# Patient Record
Sex: MALE | Race: WHITE | NOT HISPANIC OR LATINO | Employment: FULL TIME | URBAN - NONMETROPOLITAN AREA
[De-identification: names, ages, dates, MRNs, and addresses within clinical notes are randomized per-mention and may not be internally consistent; named-entity substitution may affect disease eponyms.]

---

## 2017-12-11 ENCOUNTER — OFFICE VISIT (OUTPATIENT)
Dept: RETAIL CLINIC | Facility: CLINIC | Age: 42
End: 2017-12-11

## 2017-12-11 VITALS
HEART RATE: 100 BPM | SYSTOLIC BLOOD PRESSURE: 130 MMHG | DIASTOLIC BLOOD PRESSURE: 78 MMHG | OXYGEN SATURATION: 98 % | TEMPERATURE: 98.9 F | RESPIRATION RATE: 20 BRPM

## 2017-12-11 DIAGNOSIS — J06.9 VIRAL UPPER RESPIRATORY TRACT INFECTION: Primary | ICD-10-CM

## 2017-12-11 PROCEDURE — 99213 OFFICE O/P EST LOW 20 MIN: CPT | Performed by: NURSE PRACTITIONER

## 2017-12-11 NOTE — PROGRESS NOTES
Chief Complaint   Patient presents with   • Flu Symptoms     Subjective   Speedy Nichols is a 42 y.o. male who presents to the clinic today with complaints flu symptoms that started 2-3 days ago. He has coughing, mild runny nose, fatigue, chills (but did not check his temperature)  Flu Symptoms   This is a new problem. The current episode started in the past 7 days. The problem occurs constantly. The problem has been gradually worsening. Associated symptoms include arthralgias, chills, congestion, coughing, fatigue, a fever, headaches (mild ), myalgias and a sore throat (mild). Pertinent negatives include no abdominal pain, anorexia, change in bowel habit, chest pain, diaphoresis, joint swelling, nausea, neck pain, numbness, rash, swollen glands, urinary symptoms, vertigo, visual change, vomiting or weakness. Nothing aggravates the symptoms. Treatments tried: cold medication. The treatment provided mild relief.       No current outpatient prescriptions on file.    Allergies:  Review of patient's allergies indicates no known allergies.    Past Medical History:   Diagnosis Date   • Diabetes mellitus      Past Surgical History:   Procedure Laterality Date   • WISDOM TOOTH EXTRACTION       Family History   Problem Relation Age of Onset   • Diabetes Father    • Diabetes Paternal Grandmother      Social History   Substance Use Topics   • Smoking status: Never Smoker   • Smokeless tobacco: None   • Alcohol use No       Review of Systems  Review of Systems   Constitutional: Positive for chills, fatigue and fever. Negative for diaphoresis.   HENT: Positive for congestion and sore throat (mild).    Respiratory: Positive for cough.    Cardiovascular: Negative for chest pain.   Gastrointestinal: Negative for abdominal pain, anorexia, change in bowel habit, nausea and vomiting.   Musculoskeletal: Positive for arthralgias and myalgias. Negative for joint swelling and neck pain.   Skin: Negative for rash.   Neurological: Positive  for headaches (mild ). Negative for vertigo, weakness and numbness.       Objective   /78 (BP Location: Left arm, Patient Position: Sitting, Cuff Size: Adult)  Pulse 100  Temp 98.9 °F (37.2 °C) (Tympanic)   Resp 20  SpO2 98%      Physical Exam   Constitutional: He is oriented to person, place, and time. He appears well-developed and well-nourished.   HENT:   Head: Normocephalic and atraumatic.   Right Ear: Hearing, external ear and ear canal normal. Tympanic membrane is bulging.   Left Ear: External ear and ear canal normal.   Nose: Mucosal edema and rhinorrhea present. Right sinus exhibits no maxillary sinus tenderness and no frontal sinus tenderness. Left sinus exhibits no maxillary sinus tenderness and no frontal sinus tenderness.   Mouth/Throat: Uvula is midline and mucous membranes are normal. Posterior oropharyngeal erythema present. No oropharyngeal exudate or posterior oropharyngeal edema. Tonsils are 1+ on the right. Tonsils are 1+ on the left. No tonsillar exudate.   Eyes: Pupils are equal, round, and reactive to light.   Neck: Normal range of motion. Neck supple.   Cardiovascular: Normal rate, regular rhythm and normal heart sounds.  Exam reveals no gallop and no friction rub.    No murmur heard.  Pulmonary/Chest: Effort normal and breath sounds normal. No stridor. No respiratory distress. He has no wheezes. He has no rales. He exhibits no tenderness.   Lymphadenopathy:     He has no cervical adenopathy.   Neurological: He is alert and oriented to person, place, and time.   Skin: Skin is warm and dry.   Psychiatric: He has a normal mood and affect. His behavior is normal.   Vitals reviewed.      Assessment/Plan     Speedy was seen today for flu symptoms.    Diagnoses and all orders for this visit:    Viral upper respiratory tract infection

## 2017-12-11 NOTE — PATIENT INSTRUCTIONS
"Upper Respiratory Infection, Adult  Most upper respiratory infections (URIs) are a viral infection of the air passages leading to the lungs. A URI affects the nose, throat, and upper air passages. The most common type of URI is nasopharyngitis and is typically referred to as \"the common cold.\"  URIs run their course and usually go away on their own. Most of the time, a URI does not require medical attention, but sometimes a bacterial infection in the upper airways can follow a viral infection. This is called a secondary infection. Sinus and middle ear infections are common types of secondary upper respiratory infections.  Bacterial pneumonia can also complicate a URI. A URI can worsen asthma and chronic obstructive pulmonary disease (COPD). Sometimes, these complications can require emergency medical care and may be life threatening.   CAUSES  Almost all URIs are caused by viruses. A virus is a type of germ and can spread from one person to another.   RISKS FACTORS  You may be at risk for a URI if:   · You smoke.    · You have chronic heart or lung disease.  · You have a weakened defense (immune) system.    · You are very young or very old.    · You have nasal allergies or asthma.  · You work in crowded or poorly ventilated areas.  · You work in health care facilities or schools.  SIGNS AND SYMPTOMS   Symptoms typically develop 2-3 days after you come in contact with a cold virus. Most viral URIs last 7-10 days. However, viral URIs from the influenza virus (flu virus) can last 14-18 days and are typically more severe. Symptoms may include:   · Runny or stuffy (congested) nose.    · Sneezing.    · Cough.    · Sore throat.    · Headache.    · Fatigue.    · Fever.    · Loss of appetite.    · Pain in your forehead, behind your eyes, and over your cheekbones (sinus pain).  · Muscle aches.    DIAGNOSIS   Your health care provider may diagnose a URI by:  · Physical exam.  · Tests to check that your symptoms are not due to " another condition such as:  ¨ Strep throat.  ¨ Sinusitis.  ¨ Pneumonia.  ¨ Asthma.  TREATMENT   A URI goes away on its own with time. It cannot be cured with medicines, but medicines may be prescribed or recommended to relieve symptoms. Medicines may help:  · Reduce your fever.  · Reduce your cough.  · Relieve nasal congestion.  HOME CARE INSTRUCTIONS   · Take medicines only as directed by your health care provider.    · Gargle warm saltwater or take cough drops to comfort your throat as directed by your health care provider.  · Use a warm mist humidifier or inhale steam from a shower to increase air moisture. This may make it easier to breathe.  · Drink enough fluid to keep your urine clear or pale yellow.    · Eat soups and other clear broths and maintain good nutrition.    · Rest as needed.    · Return to work when your temperature has returned to normal or as your health care provider advises. You may need to stay home longer to avoid infecting others. You can also use a face mask and careful hand washing to prevent spread of the virus.  · Increase the usage of your inhaler if you have asthma.    · Do not use any tobacco products, including cigarettes, chewing tobacco, or electronic cigarettes. If you need help quitting, ask your health care provider.  PREVENTION   The best way to protect yourself from getting a cold is to practice good hygiene.   · Avoid oral or hand contact with people with cold symptoms.    · Wash your hands often if contact occurs.    There is no clear evidence that vitamin C, vitamin E, echinacea, or exercise reduces the chance of developing a cold. However, it is always recommended to get plenty of rest, exercise, and practice good nutrition.   SEEK MEDICAL CARE IF:   · You are getting worse rather than better.    · Your symptoms are not controlled by medicine.    · You have chills.  · You have worsening shortness of breath.  · You have brown or red mucus.  · You have yellow or brown nasal  discharge.  · You have pain in your face, especially when you bend forward.  · You have a fever.  · You have swollen neck glands.  · You have pain while swallowing.  · You have white areas in the back of your throat.  SEEK IMMEDIATE MEDICAL CARE IF:   · You have severe or persistent:    Headache.    Ear pain.    Sinus pain.    Chest pain.  · You have chronic lung disease and any of the following:    Wheezing.    Prolonged cough.    Coughing up blood.    A change in your usual mucus.  · You have a stiff neck.  · You have changes in your:    Vision.    Hearing.    Thinking.    Mood.  MAKE SURE YOU:   · Understand these instructions.  · Will watch your condition.  · Will get help right away if you are not doing well or get worse.     This information is not intended to replace advice given to you by your health care provider. Make sure you discuss any questions you have with your health care provider.     Document Released: 06/13/2002 Document Revised: 05/03/2016 Document Reviewed: 03/25/2015  [email or web address] Interactive Patient Education ©2017 [email or web address]    Follow up if symptoms worsen or persist

## 2017-12-13 ENCOUNTER — HOSPITAL ENCOUNTER (EMERGENCY)
Facility: HOSPITAL | Age: 42
Discharge: HOME | End: 2017-12-14
Admitting: EMERGENCY MEDICINE

## 2017-12-13 DIAGNOSIS — R23.8 VESICULAR RASH: ICD-10-CM

## 2017-12-13 DIAGNOSIS — J02.9 PHARYNGITIS, UNSPECIFIED ETIOLOGY: Primary | ICD-10-CM

## 2017-12-13 DIAGNOSIS — B09 VIRAL RASH: ICD-10-CM

## 2017-12-13 LAB — S PYO AG THROAT QL: NEGATIVE

## 2017-12-13 PROCEDURE — 99283 EMERGENCY DEPT VISIT LOW MDM: CPT

## 2017-12-13 PROCEDURE — 87077 CULTURE AEROBIC IDENTIFY: CPT | Performed by: NURSE PRACTITIONER

## 2017-12-13 PROCEDURE — 87081 CULTURE SCREEN ONLY: CPT | Performed by: NURSE PRACTITIONER

## 2017-12-13 PROCEDURE — 87252 VIRUS INOCULATION TISSUE: CPT | Performed by: NURSE PRACTITIONER

## 2017-12-13 PROCEDURE — 87880 STREP A ASSAY W/OPTIC: CPT | Performed by: NURSE PRACTITIONER

## 2017-12-13 RX ORDER — METHYLPREDNISOLONE 4 MG/1
TABLET ORAL
Qty: 21 TABLET | Refills: 0 | Status: SHIPPED | OUTPATIENT
Start: 2017-12-13 | End: 2018-05-05

## 2017-12-13 RX ORDER — DIPHENHYDRAMINE HCL 25 MG
25 TABLET ORAL EVERY 6 HOURS PRN
Qty: 30 TABLET | Refills: 0 | Status: SHIPPED | OUTPATIENT
Start: 2017-12-13 | End: 2018-11-02

## 2017-12-13 RX ORDER — AZITHROMYCIN 250 MG/1
TABLET, FILM COATED ORAL
Qty: 6 TABLET | Refills: 0 | Status: SHIPPED | OUTPATIENT
Start: 2017-12-13 | End: 2018-05-05

## 2017-12-13 RX ORDER — ACYCLOVIR 400 MG/1
400 TABLET ORAL
Qty: 50 TABLET | Refills: 0 | Status: SHIPPED | OUTPATIENT
Start: 2017-12-13 | End: 2017-12-23

## 2017-12-14 VITALS
RESPIRATION RATE: 18 BRPM | WEIGHT: 175 LBS | BODY MASS INDEX: 23.7 KG/M2 | HEART RATE: 102 BPM | OXYGEN SATURATION: 96 % | SYSTOLIC BLOOD PRESSURE: 142 MMHG | DIASTOLIC BLOOD PRESSURE: 84 MMHG | HEIGHT: 72 IN | TEMPERATURE: 97.7 F

## 2017-12-14 NOTE — ED PROVIDER NOTES
Subjective   HPI Comments: Mr Nichols is a 42 year old male who presents today with new complaints of acute onset of tender rash all over body since yesterday. He states he has been sick with cough, subjective fever, chills and sore throat x 1 week. He was seen in walk in clinic where he had negative flu swab and diagnosed with viral URI. Pt has been using Robitussin, Nyquil, and Motrin PM without much improvement. Onset of rapidly spreading rash prompted ER presentation.  Pt also states that throat has been more sore and continues to have subjective fever/chills. He denies any vomiting/diarrhea. States some mild congestion. Mild cough with occasional sputum. He states he did have contact with a child last week who has strep throat.      History provided by:  Patient   used: No        Review of Systems   Constitutional: Positive for chills, fatigue and fever.   HENT: Positive for congestion, postnasal drip, sinus pressure and sore throat. Negative for ear pain, rhinorrhea and trouble swallowing.    Eyes: Negative.    Respiratory: Positive for cough. Negative for wheezing.    Gastrointestinal: Positive for constipation and nausea (sporadic episodes of nausea, not long lasting.). Negative for vomiting.   Endocrine: Negative.    Genitourinary: Negative.    Musculoskeletal: Positive for myalgias.   Skin: Positive for rash.   Allergic/Immunologic: Negative.    Neurological: Negative.    Hematological: Negative.    Psychiatric/Behavioral: Negative.        Past Medical History:   Diagnosis Date   • Diabetes mellitus        No Known Allergies    Past Surgical History:   Procedure Laterality Date   • WISDOM TOOTH EXTRACTION         Family History   Problem Relation Age of Onset   • Diabetes Father    • Diabetes Paternal Grandmother        Social History     Social History   • Marital status: Single     Spouse name: N/A   • Number of children: N/A   • Years of education: N/A     Social History Main Topics    • Smoking status: Never Smoker   • Smokeless tobacco: None   • Alcohol use No   • Drug use: Defer   • Sexual activity: Defer     Other Topics Concern   • None     Social History Narrative           Objective   Physical Exam   Constitutional: He is oriented to person, place, and time. He appears well-developed and well-nourished.   HENT:   Head: Normocephalic and atraumatic.   Right Ear: Tympanic membrane normal.   Left Ear: Tympanic membrane normal.   Nose: Mucosal edema present.   Mouth/Throat: Posterior oropharyngeal erythema (with PND and cobblestoning) present. Tonsils are 2+ on the right. Tonsils are 2+ on the left. No tonsillar exudate.   Eyes: EOM are normal. Pupils are equal, round, and reactive to light.   Neck: Normal range of motion. Neck supple.   Cardiovascular: Regular rhythm.  Tachycardia present.    Pulmonary/Chest: Effort normal and breath sounds normal.   Abdominal: Soft. Bowel sounds are normal.   Lymphadenopathy:     He has no cervical adenopathy.   Neurological: He is alert and oriented to person, place, and time.   Skin: Skin is warm. Rash noted. Rash is macular (diffuse macular rash concentrated to trunk also noted to extremities, face, and scalp.) and vesicular (vesicular lesions noted with rash that scattered over trunk and scalp. some vesicles have opened and crusted.).   Nursing note and vitals reviewed.      Procedures         ED Course  ED Course   Comment By Time   Pending strep swab at this time. There were no throat swabs in the emergency dept. Pending receiving swabs from lab Josephine Orozco, APRN 12/13 6320   Reviewed strep screen with pt - advised to call back in 2 days for viral screen. Will be discharged home soon in stable condition Josephine Orozco APRSHADI 12/13 [id number]                  MDM  Number of Diagnoses or Management Options  Pharyngitis, unspecified etiology: minor  Vesicular rash: minor  Viral rash: minor     Amount and/or Complexity of Data Reviewed  Clinical  lab tests: ordered and reviewed    Patient Progress  Patient progress: stable      Final diagnoses:   Pharyngitis, unspecified etiology   Viral rash   Vesicular rash            Josephine Orozco, APRN  12/15/17 0046

## 2017-12-16 LAB
BACTERIA SPEC AEROBE CULT: ABNORMAL
BACTERIA SPEC AEROBE CULT: ABNORMAL

## 2017-12-18 ENCOUNTER — TELEPHONE (OUTPATIENT)
Dept: EMERGENCY DEPT | Facility: HOSPITAL | Age: 42
End: 2017-12-18

## 2017-12-18 NOTE — TELEPHONE ENCOUNTER
----- Message from ANUPAM Ruth sent at 12/16/2017  3:32 PM CST -----  Call and see if pt is doing better.  ----- Message -----     From: Lab, Background User     Sent: 12/16/2017   6:45 AM       To: Bh Pad Asap In Basket Results Pool

## 2017-12-19 ENCOUNTER — TELEPHONE (OUTPATIENT)
Dept: EMERGENCY DEPT | Facility: HOSPITAL | Age: 42
End: 2017-12-19

## 2017-12-22 LAB — VIRAL CULTURE, GENERAL: NORMAL

## 2018-05-05 ENCOUNTER — OFFICE VISIT (OUTPATIENT)
Dept: RETAIL CLINIC | Facility: CLINIC | Age: 43
End: 2018-05-05

## 2018-05-05 VITALS
HEART RATE: 86 BPM | SYSTOLIC BLOOD PRESSURE: 128 MMHG | DIASTOLIC BLOOD PRESSURE: 70 MMHG | HEIGHT: 72 IN | BODY MASS INDEX: 24.56 KG/M2 | TEMPERATURE: 98.3 F | WEIGHT: 181.3 LBS | OXYGEN SATURATION: 97 % | RESPIRATION RATE: 18 BRPM

## 2018-05-05 DIAGNOSIS — K52.9 GASTROENTERITIS, ACUTE: ICD-10-CM

## 2018-05-05 DIAGNOSIS — J01.40 ACUTE PANSINUSITIS, RECURRENCE NOT SPECIFIED: Primary | ICD-10-CM

## 2018-05-05 DIAGNOSIS — E13.9 DIABETES 1.5, MANAGED AS TYPE 2 (HCC): ICD-10-CM

## 2018-05-05 DIAGNOSIS — Z76.0 MEDICATION REFILL: ICD-10-CM

## 2018-05-05 LAB — GLUCOSE BLDC GLUCOMTR-MCNC: 306 MG/DL (ref 70–130)

## 2018-05-05 PROCEDURE — 99214 OFFICE O/P EST MOD 30 MIN: CPT | Performed by: NURSE PRACTITIONER

## 2018-05-05 PROCEDURE — 82962 GLUCOSE BLOOD TEST: CPT | Performed by: NURSE PRACTITIONER

## 2018-05-05 RX ORDER — AMOXICILLIN AND CLAVULANATE POTASSIUM 875; 125 MG/1; MG/1
1 TABLET, FILM COATED ORAL 2 TIMES DAILY
Qty: 20 TABLET | Refills: 0 | Status: SHIPPED | OUTPATIENT
Start: 2018-05-05 | End: 2018-05-15

## 2018-05-05 RX ORDER — PROMETHAZINE HYDROCHLORIDE 25 MG/1
25 SUPPOSITORY RECTAL EVERY 6 HOURS PRN
Qty: 12 SUPPOSITORY | Refills: 0 | Status: SHIPPED | OUTPATIENT
Start: 2018-05-05 | End: 2018-11-02

## 2018-05-05 NOTE — PATIENT INSTRUCTIONS
He will need to monitor his glucose level more closely and continues to increase with use of Metformin, he will need to go to emergency room. Follow up with Teodoro Tamez, RICHARD  Stay well hydrated and call if he as any questions.     He has driving so will not give him any antiemetic. He continues to be nauseated, but no vomiting since last night so he can take suppository when he gets home.     Start Augmentin in am.     For diarrhea clear liquids until this resolves       Viral Gastroenteritis, Adult  Viral gastroenteritis is also known as the stomach flu. This condition is caused by various viruses. These viruses can be passed from person to person very easily (are very contagious). This condition may affect your stomach, small intestine, and large intestine. It can cause sudden watery diarrhea, fever, and vomiting.  Diarrhea and vomiting can make you feel weak and cause you to become dehydrated. You may not be able to keep fluids down. Dehydration can make you tired and thirsty, cause you to have a dry mouth, and decrease how often you urinate. Older adults and people with other diseases or a weak immune system are at higher risk for dehydration.  It is important to replace the fluids that you lose from diarrhea and vomiting. If you become severely dehydrated, you may need to get fluids through an IV tube.  What are the causes?  Gastroenteritis is caused by various viruses, including rotavirus and norovirus. Norovirus is the most common cause in adults.  You can get sick by eating food, drinking water, or touching a surface contaminated with one of these viruses. You can also get sick from sharing utensils or other personal items with an infected person.  What increases the risk?  This condition is more likely to develop in people:  · Who have a weak defense system (immune system).  · Who live with one or more children who are younger than 2 years old.  · Who live in a nursing home.  · Who go on cruise ships.  What  are the signs or symptoms?  Symptoms of this condition start suddenly 1-2 days after exposure to a virus. Symptoms may last a few days or as long as a week. The most common symptoms are watery diarrhea and vomiting. Other symptoms include:  · Fever.  · Headache.  · Fatigue.  · Pain in the abdomen.  · Chills.  · Weakness.  · Nausea.  · Muscle aches.  · Loss of appetite.  How is this diagnosed?  This condition is diagnosed with a medical history and physical exam. You may also have a stool test to check for viruses or other infections.  How is this treated?  This condition typically goes away on its own. The focus of treatment is to restore lost fluids (rehydration). Your health care provider may recommend that you take an oral rehydration solution (ORS) to replace important salts and minerals (electrolytes) in your body. Severe cases of this condition may require giving fluids through an IV tube.  Treatment may also include medicine to help with your symptoms.  Follow these instructions at home:  Follow instructions from your health care provider about how to care for yourself at home.  Eating and drinking   Follow these recommendations as told by your health care provider:  · Take an ORS. This is a drink that is sold at pharmacies and retail stores.  · Drink clear fluids in small amounts as you are able. Clear fluids include water, ice chips, diluted fruit juice, and low-calorie sports drinks.  · Eat bland, easy-to-digest foods in small amounts as you are able. These foods include bananas, applesauce, rice, lean meats, toast, and crackers.  · Avoid fluids that contain a lot of sugar or caffeine, such as energy drinks, sports drinks, and soda.  · Avoid alcohol.  · Avoid spicy or fatty foods.  General instructions     · Drink enough fluid to keep your urine clear or pale yellow.  · Wash your hands often. If soap and water are not available, use hand .  · Make sure that all people in your household wash their  hands well and often.  · Take over-the-counter and prescription medicines only as told by your health care provider.  · Rest at home while you recover.  · Watch your condition for any changes.  · Take a warm bath to relieve any burning or pain from frequent diarrhea episodes.  · Keep all follow-up visits as told by your health care provider. This is important.  Contact a health care provider if:  · You cannot keep fluids down.  · Your symptoms get worse.  · You have new symptoms.  · You feel light-headed or dizzy.  · You have muscle cramps.  Get help right away if:  · You have chest pain.  · You feel extremely weak or you faint.  · You see blood in your vomit.  · Your vomit looks like coffee grounds.  · You have bloody or black stools or stools that look like tar.  · You have a severe headache, a stiff neck, or both.  · You have a rash.  · You have severe pain, cramping, or bloating in your abdomen.  · You have trouble breathing or you are breathing very quickly.  · Your heart is beating very quickly.  · Your skin feels cold and clammy.  · You feel confused.  · You have pain when you urinate.  · You have signs of dehydration, such as:  ¨ Dark urine, very little urine, or no urine.  ¨ Cracked lips.  ¨ Dry mouth.  ¨ Sunken eyes.  ¨ Sleepiness.  ¨ Weakness.  This information is not intended to replace advice given to you by your health care provider. Make sure you discuss any questions you have with your health care provider.  Document Released: 12/18/2006 Document Revised: 05/31/2017 Document Reviewed: 08/23/2016  [employer] Patient Education © 2017 [employer]  Sinusitis, Adult  Sinusitis is soreness and inflammation of your sinuses. Sinuses are hollow spaces in the bones around your face. Your sinuses are located:  · Around your eyes.  · In the middle of your forehead.  · Behind your nose.  · In your cheekbones.  Your sinuses and nasal passages are lined with a stringy fluid (mucus). Mucus normally  drains out of your sinuses. When your nasal tissues become inflamed or swollen, the mucus can become trapped or blocked so air cannot flow through your sinuses. This allows bacteria, viruses, and funguses to grow, which leads to infection.  Sinusitis can develop quickly and last for 7?10 days (acute) or for more than 12 weeks (chronic). Sinusitis often develops after a cold.  What are the causes?  This condition is caused by anything that creates swelling in the sinuses or stops mucus from draining, including:  · Allergies.  · Asthma.  · Bacterial or viral infection.  · Abnormally shaped bones between the nasal passages.  · Nasal growths that contain mucus (nasal polyps).  · Narrow sinus openings.  · Pollutants, such as chemicals or irritants in the air.  · A foreign object stuck in the nose.  · A fungal infection. This is rare.  What increases the risk?  The following factors may make you more likely to develop this condition:  · Having allergies or asthma.  · Having had a recent cold or respiratory tract infection.  · Having structural deformities or blockages in your nose or sinuses.  · Having a weak immune system.  · Doing a lot of swimming or diving.  · Overusing nasal sprays.  · Smoking.  What are the signs or symptoms?  The main symptoms of this condition are pain and a feeling of pressure around the affected sinuses. Other symptoms include:  · Upper toothache.  · Earache.  · Headache.  · Bad breath.  · Decreased sense of smell and taste.  · A cough that may get worse at night.  · Fatigue.  · Fever.  · Thick drainage from your nose. The drainage is often green and it may contain pus (purulent).  · Stuffy nose or congestion.  · Postnasal drip. This is when extra mucus collects in the throat or back of the nose.  · Swelling and warmth over the affected sinuses.  · Sore throat.  · Sensitivity to light.  How is this diagnosed?  This condition is diagnosed based on symptoms, a medical history, and a physical exam.  To find out if your condition is acute or chronic, your health care provider may:  · Look in your nose for signs of nasal polyps.  · Tap over the affected sinus to check for signs of infection.  · View the inside of your sinuses using an imaging device that has a light attached (endoscope).  If your health care provider suspects that you have chronic sinusitis, you may also:  · Be tested for allergies.  · Have a sample of mucus taken from your nose (nasal culture) and checked for bacteria.  · Have a mucus sample examined to see if your sinusitis is related to an allergy.  If your sinusitis does not respond to treatment and it lasts longer than 8 weeks, you may have an MRI or CT scan to check your sinuses. These scans also help to determine how severe your infection is.  In rare cases, a bone biopsy may be done to rule out more serious types of fungal sinus disease.  How is this treated?  Treatment for sinusitis depends on the cause and whether your condition is chronic or acute. If a virus is causing your sinusitis, your symptoms will go away on their own within 10 days. You may be given medicines to relieve your symptoms, including:  · Topical nasal decongestants. They shrink swollen nasal passages and let mucus drain from your sinuses.  · Antihistamines. These drugs block inflammation that is triggered by allergies. This can help to ease swelling in your nose and sinuses.  · Topical nasal corticosteroids. These are nasal sprays that ease inflammation and swelling in your nose and sinuses.  · Nasal saline washes. These rinses can help to get rid of thick mucus in your nose.  If your condition is caused by bacteria, you will be given an antibiotic medicine. If your condition is caused by a fungus, you will be given an antifungal medicine.  Surgery may be needed to correct underlying conditions, such as narrow nasal passages. Surgery may also be needed to remove polyps.  Follow these instructions at home:  Medicines    · Take, use, or apply over-the-counter and prescription medicines only as told by your health care provider. These may include nasal sprays.  · If you were prescribed an antibiotic medicine, take it as told by your health care provider. Do not stop taking the antibiotic even if you start to feel better.  Hydrate and Humidify   · Drink enough water to keep your urine clear or pale yellow. Staying hydrated will help to thin your mucus.  · Use a cool mist humidifier to keep the humidity level in your home above 50%.  · Inhale steam for 10-15 minutes, 3-4 times a day or as told by your health care provider. You can do this in the bathroom while a hot shower is running.  · Limit your exposure to cool or dry air.  Rest   · Rest as much as possible.  · Sleep with your head raised (elevated).  · Make sure to get enough sleep each night.  General instructions   · Apply a warm, moist washcloth to your face 3-4 times a day or as told by your health care provider. This will help with discomfort.  · Wash your hands often with soap and water to reduce your exposure to viruses and other germs. If soap and water are not available, use hand .  · Do not smoke. Avoid being around people who are smoking (secondhand smoke).  · Keep all follow-up visits as told by your health care provider. This is important.  Contact a health care provider if:  · You have a fever.  · Your symptoms get worse.  · Your symptoms do not improve within 10 days.  Get help right away if:  · You have a severe headache.  · You have persistent vomiting.  · You have pain or swelling around your face or eyes.  · You have vision problems.  · You develop confusion.  · Your neck is stiff.  · You have trouble breathing.  This information is not intended to replace advice given to you by your health care provider. Make sure you discuss any questions you have with your health care provider.  Document Released: 12/18/2006 Document Revised: 08/13/2017 Document  Reviewed: 10/12/2016  [employer] Interactive Patient Education © 2017 [employer]

## 2018-05-05 NOTE — PROGRESS NOTES
Chief Complaint   Patient presents with   • GI Problem     Subjective   Speedy Nichols is a 43 y.o. male who presents to the clinic today with complaints nausea and vomiting since last night. He has had upper respiratory tract infection for about a week and now having GI symptoms. He started vomiting last night with dry heaves most of the night and now no longer throwing up. He has had a couple of loose stools. His wife was sick with this two days ago. He is out of his Metformin and needs refill. Normal glucose is 170-180s fasting. He would like phenergan suppositories as these help him best.   GI Problem   The primary symptoms include fever, fatigue, nausea, vomiting, diarrhea and myalgias. Primary symptoms do not include weight loss, abdominal pain, melena, hematemesis, jaundice, hematochezia, dysuria or rash. The illness began yesterday. The onset was sudden.   The illness is also significant for chills and anorexia. The illness does not include dysphagia, odynophagia, bloating, constipation, tenesmus, back pain or itching. Associated medical issues do not include inflammatory bowel disease, GERD, gallstones, liver disease, alcohol abuse, PUD, gastric bypass, bowel resection, irritable bowel syndrome, hemorrhoids or diverticulitis.         Current Outpatient Prescriptions:   •  amoxicillin-clavulanate (AUGMENTIN) 875-125 MG per tablet, Take 1 tablet by mouth 2 (Two) Times a Day for 10 days. Start tomorrow, Disp: 20 tablet, Rfl: 0  •  diphenhydrAMINE (BENADRYL) 25 MG tablet, Take 1 tablet by mouth Every 6 (Six) Hours As Needed for Itching., Disp: 30 tablet, Rfl: 0  •  metFORMIN (GLUCOPHAGE) 1000 MG tablet, Take 1 tablet by mouth 2 (Two) Times a Day With Meals for 30 days., Disp: 60 tablet, Rfl: 0  •  promethazine (PHENERGAN) 25 MG suppository, Insert 1 suppository into the rectum Every 6 (Six) Hours As Needed for Nausea or Vomiting., Disp: 12 suppository, Rfl: 0    Allergies:  Review of patient's allergies  "indicates no known allergies.    Past Medical History:   Diagnosis Date   • Diabetes mellitus    • Type 2 diabetes mellitus      Past Surgical History:   Procedure Laterality Date   • WISDOM TOOTH EXTRACTION       Family History   Problem Relation Age of Onset   • Diabetes Father    • Diabetes Paternal Grandmother      Social History   Substance Use Topics   • Smoking status: Never Smoker   • Smokeless tobacco: Never Used   • Alcohol use No       Review of Systems  Review of Systems   Constitutional: Positive for chills, fatigue and fever. Negative for weight loss.   Gastrointestinal: Positive for anorexia, diarrhea, nausea and vomiting. Negative for abdominal pain, bloating, constipation, dysphagia, hematemesis, hematochezia, jaundice and melena.   Genitourinary: Negative for dysuria.   Musculoskeletal: Positive for myalgias. Negative for back pain.   Skin: Negative for itching and rash.       Objective   /70 (BP Location: Left arm, Patient Position: Sitting, Cuff Size: Adult)   Pulse 86   Temp 98.3 °F (36.8 °C) (Axillary)   Resp 18   Ht 181.9 cm (71.6\")   Wt 82.2 kg (181 lb 4.8 oz)   SpO2 97%   BMI 24.86 kg/m²       Physical Exam   Constitutional: He is oriented to person, place, and time. He appears well-developed and well-nourished.   HENT:   Head: Normocephalic and atraumatic.   Right Ear: Hearing, external ear and ear canal normal. Tympanic membrane is bulging.   Left Ear: Hearing, external ear and ear canal normal. Tympanic membrane is bulging.   Nose: Right sinus exhibits maxillary sinus tenderness and frontal sinus tenderness. Left sinus exhibits maxillary sinus tenderness and frontal sinus tenderness.   Mouth/Throat: Uvula is midline and mucous membranes are normal. Oropharyngeal exudate and posterior oropharyngeal erythema present. Posterior oropharyngeal edema: thick yellow secretions in posterior pharynx  Tonsils are 1+ on the right. Tonsils are 1+ on the left. No tonsillar exudate.   Eyes: " EOM are normal. Pupils are equal, round, and reactive to light.   Neck: Normal range of motion. Neck supple.   Cardiovascular: Normal rate, regular rhythm and normal heart sounds.  Exam reveals no gallop and no friction rub.    No murmur heard.  Pulmonary/Chest: Effort normal and breath sounds normal. No stridor. No respiratory distress. He has no wheezes. He has no rales. He exhibits no tenderness.   Abdominal: Soft. Bowel sounds are normal. He exhibits no distension and no mass. There is no tenderness. There is no rebound and no guarding. No hernia.   Lymphadenopathy:     He has no cervical adenopathy.   Neurological: He is alert and oriented to person, place, and time.   Skin: Skin is warm and dry.   Psychiatric: He has a normal mood and affect. His behavior is normal.   Vitals reviewed.      Assessment/Plan     Speedy was seen today for gi problem.    Diagnoses and all orders for this visit:    Acute pansinusitis, recurrence not specified    Gastroenteritis, acute    Medication refill    Diabetes 1.5, managed as type 2  -     POCT Glucose    Other orders  -     promethazine (PHENERGAN) 25 MG suppository; Insert 1 suppository into the rectum Every 6 (Six) Hours As Needed for Nausea or Vomiting.  -     amoxicillin-clavulanate (AUGMENTIN) 875-125 MG per tablet; Take 1 tablet by mouth 2 (Two) Times a Day for 10 days. Start tomorrow  -     metFORMIN (GLUCOPHAGE) 1000 MG tablet; Take 1 tablet by mouth 2 (Two) Times a Day With Meals for 30 days.    He will need to monitor his glucose level more closely and continues to increase with use of Metformin, he will need to go to emergency room. Follow up with RICHARD Garsia  Stay well hydrated and call if he as any questions.     He has driving so will not give him any antiemetic. He continues to be nauseated, but no vomiting since last night so he can take suppository when he gets home.     Start Augmentin in am. Metformin after phenergan suppository and nausea improves.      [facility] called to verify Metformin prescriptions with last date of fill on 3/27 and dose 1000 mg bid.

## 2018-05-09 ENCOUNTER — APPOINTMENT (OUTPATIENT)
Dept: GENERAL RADIOLOGY | Facility: HOSPITAL | Age: 43
End: 2018-05-09

## 2018-05-09 ENCOUNTER — HOSPITAL ENCOUNTER (EMERGENCY)
Facility: HOSPITAL | Age: 43
Discharge: HOME | End: 2018-05-09
Attending: EMERGENCY MEDICINE | Admitting: EMERGENCY MEDICINE

## 2018-05-09 VITALS
WEIGHT: 181 LBS | OXYGEN SATURATION: 98 % | SYSTOLIC BLOOD PRESSURE: 142 MMHG | DIASTOLIC BLOOD PRESSURE: 83 MMHG | HEIGHT: 72 IN | TEMPERATURE: 98.5 F | BODY MASS INDEX: 24.52 KG/M2 | HEART RATE: 87 BPM | RESPIRATION RATE: 16 BRPM

## 2018-05-09 DIAGNOSIS — K52.9 GASTROENTERITIS: Primary | ICD-10-CM

## 2018-05-09 DIAGNOSIS — J06.9 UPPER RESPIRATORY TRACT INFECTION, UNSPECIFIED TYPE: ICD-10-CM

## 2018-05-09 LAB
ALBUMIN SERPL-MCNC: 3.9 G/DL (ref 3.5–5)
ALBUMIN/GLOB SERPL: 1.3 G/DL (ref 1.1–2.5)
ALP SERPL-CCNC: 61 U/L (ref 24–120)
ALT SERPL W P-5'-P-CCNC: 71 U/L (ref 0–54)
ANION GAP SERPL CALCULATED.3IONS-SCNC: 9 MMOL/L (ref 4–13)
AST SERPL-CCNC: 44 U/L (ref 7–45)
BASOPHILS # BLD AUTO: 0.03 10*3/MM3 (ref 0–0.2)
BASOPHILS NFR BLD AUTO: 0.6 % (ref 0–2)
BILIRUB SERPL-MCNC: 0.8 MG/DL (ref 0.1–1)
BUN BLD-MCNC: 11 MG/DL (ref 5–21)
BUN/CREAT SERPL: 16.7 (ref 7–25)
CALCIUM SPEC-SCNC: 9.2 MG/DL (ref 8.4–10.4)
CHLORIDE SERPL-SCNC: 101 MMOL/L (ref 98–110)
CO2 SERPL-SCNC: 28 MMOL/L (ref 24–31)
CREAT BLD-MCNC: 0.66 MG/DL (ref 0.5–1.4)
DEPRECATED RDW RBC AUTO: 36.8 FL (ref 40–54)
EOSINOPHIL # BLD AUTO: 0.18 10*3/MM3 (ref 0–0.7)
EOSINOPHIL NFR BLD AUTO: 3.8 % (ref 0–4)
ERYTHROCYTE [DISTWIDTH] IN BLOOD BY AUTOMATED COUNT: 11.9 % (ref 12–15)
FLUAV AG NPH QL: NEGATIVE
FLUBV AG NPH QL IA: NEGATIVE
GFR SERPL CREATININE-BSD FRML MDRD: 131 ML/MIN/1.73
GLOBULIN UR ELPH-MCNC: 3 GM/DL
GLUCOSE BLD-MCNC: 218 MG/DL (ref 70–100)
HCT VFR BLD AUTO: 40.9 % (ref 40–52)
HGB BLD-MCNC: 14.1 G/DL (ref 14–18)
IMM GRANULOCYTES # BLD: 0.04 10*3/MM3 (ref 0–0.03)
IMM GRANULOCYTES NFR BLD: 0.9 % (ref 0–5)
LYMPHOCYTES # BLD AUTO: 1.4 10*3/MM3 (ref 0.72–4.86)
LYMPHOCYTES NFR BLD AUTO: 29.8 % (ref 15–45)
MCH RBC QN AUTO: 29.3 PG (ref 28–32)
MCHC RBC AUTO-ENTMCNC: 34.5 G/DL (ref 33–36)
MCV RBC AUTO: 84.9 FL (ref 82–95)
MONOCYTES # BLD AUTO: 0.39 10*3/MM3 (ref 0.19–1.3)
MONOCYTES NFR BLD AUTO: 8.3 % (ref 4–12)
NEUTROPHILS # BLD AUTO: 2.66 10*3/MM3 (ref 1.87–8.4)
NEUTROPHILS NFR BLD AUTO: 56.6 % (ref 39–78)
NRBC BLD MANUAL-RTO: 0 /100 WBC (ref 0–0)
PLATELET # BLD AUTO: 156 10*3/MM3 (ref 130–400)
PMV BLD AUTO: 10.4 FL (ref 6–12)
POTASSIUM BLD-SCNC: 4.3 MMOL/L (ref 3.5–5.3)
PROT SERPL-MCNC: 6.9 G/DL (ref 6.3–8.7)
RBC # BLD AUTO: 4.82 10*6/MM3 (ref 4.8–5.9)
SODIUM BLD-SCNC: 138 MMOL/L (ref 135–145)
WBC NRBC COR # BLD: 4.7 10*3/MM3 (ref 4.8–10.8)

## 2018-05-09 PROCEDURE — 87804 INFLUENZA ASSAY W/OPTIC: CPT | Performed by: EMERGENCY MEDICINE

## 2018-05-09 PROCEDURE — 96360 HYDRATION IV INFUSION INIT: CPT

## 2018-05-09 PROCEDURE — 71046 X-RAY EXAM CHEST 2 VIEWS: CPT

## 2018-05-09 PROCEDURE — 99284 EMERGENCY DEPT VISIT MOD MDM: CPT

## 2018-05-09 PROCEDURE — 80053 COMPREHEN METABOLIC PANEL: CPT | Performed by: EMERGENCY MEDICINE

## 2018-05-09 PROCEDURE — 85025 COMPLETE CBC W/AUTO DIFF WBC: CPT | Performed by: EMERGENCY MEDICINE

## 2018-05-09 RX ORDER — DIPHENOXYLATE HYDROCHLORIDE AND ATROPINE SULFATE 2.5; .025 MG/1; MG/1
1 TABLET ORAL 4 TIMES DAILY PRN
Qty: 12 TABLET | Refills: 0 | Status: SHIPPED | OUTPATIENT
Start: 2018-05-09 | End: 2018-11-02

## 2018-05-09 RX ORDER — SODIUM CHLORIDE 0.9 % (FLUSH) 0.9 %
10 SYRINGE (ML) INJECTION AS NEEDED
Status: DISCONTINUED | OUTPATIENT
Start: 2018-05-09 | End: 2018-05-09 | Stop reason: HOSPADM

## 2018-05-09 RX ADMIN — SODIUM CHLORIDE 1000 ML: 9 INJECTION, SOLUTION INTRAVENOUS at 07:04

## 2018-05-09 NOTE — ED PROVIDER NOTES
"Subjective   Patient says he had \"cold\" symptoms for 1 week.  Finally seen at clinic and diagnosed with sinus infection and viral illness and put on augmentin and metformin refilled.  Also has had sore throat and severe cover that occasionally has some production.  No definite fever but chilled.  Diarrhea has become more severe.        History provided by:  Patient   used: No    Illness   Location:  Generalized  Quality:  Aching  Severity:  Severe  Onset quality:  Gradual  Duration:  1 week  Timing:  Constant  Progression:  Worsening  Chronicity:  New  Associated symptoms: congestion, cough, diarrhea, myalgias, rhinorrhea and sore throat    Associated symptoms: no abdominal pain, no chest pain, no ear pain, no fatigue, no fever, no headaches, no loss of consciousness, no nausea, no rash, no shortness of breath, no vomiting and no wheezing        Review of Systems   Constitutional: Positive for chills. Negative for fatigue and fever.   HENT: Positive for congestion, rhinorrhea and sore throat. Negative for ear pain.    Respiratory: Positive for cough. Negative for shortness of breath and wheezing.    Cardiovascular: Negative for chest pain.   Gastrointestinal: Positive for diarrhea. Negative for abdominal pain, nausea and vomiting.   Genitourinary: Negative.    Musculoskeletal: Positive for myalgias.   Skin: Negative.  Negative for rash.   Neurological: Negative.  Negative for loss of consciousness and headaches.   Hematological: Negative.    Psychiatric/Behavioral: Negative.    All other systems reviewed and are negative.      Past Medical History:   Diagnosis Date   • Diabetes mellitus    • Type 2 diabetes mellitus        No Known Allergies    Past Surgical History:   Procedure Laterality Date   • WISDOM TOOTH EXTRACTION         Family History   Problem Relation Age of Onset   • Diabetes Father    • Diabetes Paternal Grandmother        Social History     Social History   • Marital status: Single "     Social History Main Topics   • Smoking status: Never Smoker   • Smokeless tobacco: Never Used   • Alcohol use No   • Drug use: No   • Sexual activity: Defer     Other Topics Concern   • Not on file       Prior to Admission medications    Medication Sig Start Date End Date Taking? Authorizing Provider   amoxicillin-clavulanate (AUGMENTIN) 875-125 MG per tablet Take 1 tablet by mouth 2 (Two) Times a Day for 10 days. Start tomorrow 5/5/18 5/15/18 Yes ANUPAM Pyle   diphenhydrAMINE (BENADRYL) 25 MG tablet Take 1 tablet by mouth Every 6 (Six) Hours As Needed for Itching. 12/13/17  Yes ANUPAM Ruth   metFORMIN (GLUCOPHAGE) 1000 MG tablet Take 1 tablet by mouth 2 (Two) Times a Day With Meals for 30 days. 5/5/18 6/4/18 Yes ANUPAM Pyle   promethazine (PHENERGAN) 25 MG suppository Insert 1 suppository into the rectum Every 6 (Six) Hours As Needed for Nausea or Vomiting. 5/5/18  Yes ANUPAM Pyle       Medications   sodium chloride 0.9 % flush 10 mL (not administered)   sodium chloride 0.9 % bolus 1,000 mL (0 mL Intravenous Stopped 5/9/18 0809)       Vitals:    05/09/18 0614   BP: 160/99   Pulse: 101   Resp: 18   Temp: 98.1 °F (36.7 °C)   SpO2: 100%         Objective   Physical Exam   Constitutional: He is oriented to person, place, and time. He appears well-developed and well-nourished.   HENT:   Head: Normocephalic and atraumatic.   Nose: Nose normal.   Mouth/Throat: Oropharynx is clear and moist.   Neck: Normal range of motion. Neck supple.   Cardiovascular: Normal rate and regular rhythm.    Pulmonary/Chest: Effort normal and breath sounds normal.   Abdominal: Soft. Bowel sounds are normal.   Musculoskeletal: Normal range of motion.   Neurological: He is alert and oriented to person, place, and time.   Psychiatric: He has a normal mood and affect. His behavior is normal.   Nursing note and vitals reviewed.      Procedures         Lab Results (last 24  hours)     Procedure Component Value Units Date/Time    CBC & Differential [[id number]] Collected:  05/09/18 0659    Specimen:  Blood Updated:  05/09/18 0714    Narrative:       The following orders were created for panel order CBC & Differential.  Procedure                               Abnormality         Status                     ---------                               -----------         ------                     CBC Auto Differential[[id number]]         Abnormal            Final result                 Please view results for these tests on the individual orders.    Comprehensive Metabolic Panel [[id number]]  (Abnormal) Collected:  05/09/18 0659    Specimen:  Blood Updated:  05/09/18 0729     Glucose 218 (H) mg/dL      BUN 11 mg/dL      Creatinine 0.66 mg/dL      Sodium 138 mmol/L      Potassium 4.3 mmol/L      Chloride 101 mmol/L      CO2 28.0 mmol/L      Calcium 9.2 mg/dL      Total Protein 6.9 g/dL      Albumin 3.90 g/dL      ALT (SGPT) 71 (H) U/L      AST (SGOT) 44 U/L      Alkaline Phosphatase 61 U/L      Total Bilirubin 0.8 mg/dL      eGFR Non African Amer 131 mL/min/1.73      Globulin 3.0 gm/dL      A/G Ratio 1.3 g/dL      BUN/Creatinine Ratio 16.7     Anion Gap 9.0 mmol/L     CBC Auto Differential [[id number]]  (Abnormal) Collected:  05/09/18 0659    Specimen:  Blood Updated:  05/09/18 0714     WBC 4.70 (L) 10*3/mm3      RBC 4.82 10*6/mm3      Hemoglobin 14.1 g/dL      Hematocrit 40.9 %      MCV 84.9 fL      MCH 29.3 pg      MCHC 34.5 g/dL      RDW 11.9 (L) %      RDW-SD 36.8 (L) fl      MPV 10.4 fL      Platelets 156 10*3/mm3      Neutrophil % 56.6 %      Lymphocyte % 29.8 %      Monocyte % 8.3 %      Eosinophil % 3.8 %      Basophil % 0.6 %      Immature Grans % 0.9 %      Neutrophils, Absolute 2.66 10*3/mm3      Lymphocytes, Absolute 1.40 10*3/mm3      Monocytes, Absolute 0.39 10*3/mm3      Eosinophils, Absolute 0.18 10*3/mm3      Basophils, Absolute 0.03 10*3/mm3      Immature Grans, Absolute 0.04 (H)  10*3/mm3      nRBC 0.0 /100 WBC     Influenza Antigen, Rapid - Swab, Nasopharynx [[id number]]  (Normal) Collected:  05/09/18 0704    Specimen:  Swab from Nasopharynx Updated:  05/09/18 0726     Influenza A Ag, EIA Negative     Influenza B Ag, EIA Negative    Narrative:         Recommend confirmation of negative results by viral culture or molecular assay.          XR Chest 2 View   Final Result   1. Shallow inspiration, no acute cardiopulmonary abnormality.   2. Small air-fluid levels within nondilated bowel loops in the upper   abdomen, correlate clinically for diarrhea or enteritis.   This report was finalized on 05/09/2018 07:43 by Dr. Mahin Villasenor MD.          ED Course  ED Course   Comment By Time   Told patient his testing was okay and this seems to be viral illness and we will treat symptom. Eliseo Shin Jr., MD 05/09 7686          MDM  Number of Diagnoses or Management Options  Gastroenteritis: new and requires workup  Upper respiratory tract infection, unspecified type: new and requires workup     Amount and/or Complexity of Data Reviewed  Clinical lab tests: ordered and reviewed  Tests in the radiology section of CPT®: ordered and reviewed    Risk of Complications, Morbidity, and/or Mortality  Presenting problems: moderate  Diagnostic procedures: moderate  Management options: moderate    Patient Progress  Patient progress: stable      Final diagnoses:   Gastroenteritis   Upper respiratory tract infection, unspecified type          Eliseo Shin Jr., MD  05/09/18 4836

## 2018-11-02 ENCOUNTER — OFFICE VISIT (OUTPATIENT)
Dept: RETAIL CLINIC | Facility: CLINIC | Age: 43
End: 2018-11-02

## 2018-11-02 DIAGNOSIS — J20.9 ACUTE BRONCHITIS, UNSPECIFIED ORGANISM: Primary | ICD-10-CM

## 2018-11-02 PROCEDURE — 99213 OFFICE O/P EST LOW 20 MIN: CPT | Performed by: NURSE PRACTITIONER

## 2018-11-02 RX ORDER — AZITHROMYCIN 250 MG/1
TABLET, FILM COATED ORAL
Qty: 6 TABLET | Refills: 0 | Status: SHIPPED | OUTPATIENT
Start: 2018-11-02 | End: 2019-01-02

## 2018-11-02 RX ORDER — METHYLPREDNISOLONE 4 MG/1
TABLET ORAL
Qty: 21 TABLET | Refills: 0 | Status: SHIPPED | OUTPATIENT
Start: 2018-11-02 | End: 2019-01-02

## 2018-11-02 NOTE — PROGRESS NOTES
Chief Complaint   Patient presents with   • Cough     Subjective   Speedy Nichols is a 43 y.o. male who presents to the clinic today with complaints bronchitis. He had cold about a week ago and thought he was getting better, but cough is worsening with rib pain and sore abdominal muscles from coughing so much. He take MDP when he has bronchitis and does not affect his glucose level much and will monitor it closely while on the steroid.   Cough   This is a new problem. The current episode started 1 to 4 weeks ago. The problem has been gradually worsening. The problem occurs every few minutes. The cough is productive of sputum. Associated symptoms include chills, headaches, nasal congestion, postnasal drip, rhinorrhea and shortness of breath. Pertinent negatives include no chest pain, ear congestion, ear pain, fever, heartburn, hemoptysis, myalgias, rash, sore throat, sweats, weight loss or wheezing. Nothing aggravates the symptoms. He has tried OTC cough suppressant for the symptoms. The treatment provided mild relief. His past medical history is significant for pneumonia. There is no history of asthma, bronchiectasis, COPD, emphysema or environmental allergies.         Current Outpatient Prescriptions:   •  metFORMIN (GLUCOPHAGE) 1000 MG tablet, Take 1,000 mg by mouth 2 (Two) Times a Day With Meals., Disp: , Rfl:   •  azithromycin (ZITHROMAX Z-TANG) 250 MG tablet, Take 2 tablets the first day, then 1 tablet daily for 4 days., Disp: 6 tablet, Rfl: 0  •  MethylPREDNISolone (MEDROL, TANG,) 4 MG tablet, Take as directed on package instructions., Disp: 21 tablet, Rfl: 0    Allergies:  Patient has no known allergies.    Past Medical History:   Diagnosis Date   • Diabetes mellitus (CMS/HCC)    • Type 2 diabetes mellitus (CMS/HCC)      Past Surgical History:   Procedure Laterality Date   • WISDOM TOOTH EXTRACTION       Family History   Problem Relation Age of Onset   • Diabetes Father    • Diabetes Paternal Grandmother       Social History   Substance Use Topics   • Smoking status: Never Smoker   • Smokeless tobacco: Never Used   • Alcohol use No       Review of Systems  Review of Systems   Constitutional: Positive for chills. Negative for fever and weight loss.   HENT: Positive for postnasal drip and rhinorrhea. Negative for ear pain and sore throat.    Respiratory: Positive for cough and shortness of breath. Negative for hemoptysis and wheezing.    Cardiovascular: Negative for chest pain.   Gastrointestinal: Negative for heartburn.   Musculoskeletal: Negative for myalgias.   Skin: Negative for rash.   Allergic/Immunologic: Negative for environmental allergies.   Neurological: Positive for headaches.       Objective   There were no vitals taken for this visit.      Physical Exam   Constitutional: He is oriented to person, place, and time. He appears well-developed and well-nourished.   HENT:   Head: Normocephalic and atraumatic.   Right Ear: Hearing, external ear and ear canal normal. Tympanic membrane is erythematous.   Left Ear: Hearing, external ear and ear canal normal. Tympanic membrane is erythematous.   Nose: Mucosal edema and rhinorrhea present. Right sinus exhibits no maxillary sinus tenderness and no frontal sinus tenderness. Left sinus exhibits no maxillary sinus tenderness and no frontal sinus tenderness.   Mouth/Throat: Uvula is midline and mucous membranes are normal. Posterior oropharyngeal erythema present. No oropharyngeal exudate, posterior oropharyngeal edema or tonsillar abscesses. Tonsils are 1+ on the right. Tonsils are 1+ on the left.   Eyes: Pupils are equal, round, and reactive to light. EOM are normal.   Neck: Normal range of motion. Neck supple.   Cardiovascular: Normal rate, regular rhythm and normal heart sounds.  Exam reveals no gallop and no friction rub.    No murmur heard.  Pulmonary/Chest: Effort normal and breath sounds normal. No stridor. No respiratory distress. He has no wheezes. He has no  rales. He exhibits no tenderness.   Nonproductive cough every few minutes    Lymphadenopathy:     He has no cervical adenopathy.   Neurological: He is alert and oriented to person, place, and time.   Skin: Skin is warm and dry.   Psychiatric: He has a normal mood and affect. His behavior is normal.   Vitals reviewed.      Assessment/Plan     Speedy was seen today for cough.    Diagnoses and all orders for this visit:    Acute bronchitis, unspecified organism    Other orders  -     azithromycin (ZITHROMAX Z-TANG) 250 MG tablet; Take 2 tablets the first day, then 1 tablet daily for 4 days.  -     MethylPREDNISolone (MEDROL, TANG,) 4 MG tablet; Take as directed on package instructions.      Drink plenty of fluids and rest  Monitor glucose closely and if elevated > 180 call pcp, ANUPAM Frederick  Follow up is symptoms worsen or persist

## 2018-11-02 NOTE — PATIENT INSTRUCTIONS
Drink plenty of fluids and rest  Monitor glucose closely and if elevated > 180 call pcp, ANUPAM Frederick  Follow up is symptoms worsen or persist       Acute Bronchitis, Adult  Acute bronchitis is when air tubes (bronchi) in the lungs suddenly get swollen. The condition can make it hard to breathe. It can also cause these symptoms:  · A cough.  · Coughing up clear, yellow, or green mucus.  · Wheezing.  · Chest congestion.  · Shortness of breath.  · A fever.  · Body aches.  · Chills.  · A sore throat.    Follow these instructions at home:  Medicines  · Take over-the-counter and prescription medicines only as told by your doctor.  · If you were prescribed an antibiotic medicine, take it as told by your doctor. Do not stop taking the antibiotic even if you start to feel better.  General instructions  · Rest.  · Drink enough fluids to keep your pee (urine) clear or pale yellow.  · Avoid smoking and secondhand smoke. If you smoke and you need help quitting, ask your doctor. Quitting will help your lungs heal faster.  · Use an inhaler, cool mist vaporizer, or humidifier as told by your doctor.  · Keep all follow-up visits as told by your doctor. This is important.  How is this prevented?  To lower your risk of getting this condition again:  · Wash your hands often with soap and water. If you cannot use soap and water, use hand .  · Avoid contact with people who have cold symptoms.  · Try not to touch your hands to your mouth, nose, or eyes.  · Make sure to get the flu shot every year.    Contact a doctor if:  · Your symptoms do not get better in 2 weeks.  Get help right away if:  · You cough up blood.  · You have chest pain.  · You have very bad shortness of breath.  · You become dehydrated.  · You faint (pass out) or keep feeling like you are going to pass out.  · You keep throwing up (vomiting).  · You have a very bad headache.  · Your fever or chills gets worse.  This information is not intended to replace  advice given to you by your health care provider. Make sure you discuss any questions you have with your health care provider.  Document Released: 06/05/2009 Document Revised: 07/26/2017 Document Reviewed: 06/07/2017  [employer] Interactive Patient Education © 2018 [employer]

## 2019-01-02 ENCOUNTER — OFFICE VISIT (OUTPATIENT)
Dept: RETAIL CLINIC | Facility: CLINIC | Age: 44
End: 2019-01-02

## 2019-01-02 DIAGNOSIS — J20.9 ACUTE BRONCHITIS, UNSPECIFIED ORGANISM: Primary | ICD-10-CM

## 2019-01-02 PROCEDURE — 99213 OFFICE O/P EST LOW 20 MIN: CPT | Performed by: NURSE PRACTITIONER

## 2019-01-02 RX ORDER — ALBUTEROL SULFATE 90 UG/1
2 AEROSOL, METERED RESPIRATORY (INHALATION) EVERY 4 HOURS PRN
Qty: 1 INHALER | Refills: 0 | Status: SHIPPED | OUTPATIENT
Start: 2019-01-02

## 2019-01-02 RX ORDER — AZITHROMYCIN 250 MG/1
TABLET, FILM COATED ORAL
Qty: 6 TABLET | Refills: 0 | Status: SHIPPED | OUTPATIENT
Start: 2019-01-02 | End: 2019-03-11

## 2019-01-02 RX ORDER — METHYLPREDNISOLONE 4 MG/1
TABLET ORAL
Qty: 21 TABLET | Refills: 0 | Status: SHIPPED | OUTPATIENT
Start: 2019-01-02 | End: 2019-03-11

## 2019-01-02 NOTE — PATIENT INSTRUCTIONS
Drink plenty of fluids and rest  Follow up if symptoms worsen or persist     Acute Bronchitis, Adult  Acute bronchitis is when air tubes (bronchi) in the lungs suddenly get swollen. The condition can make it hard to breathe. It can also cause these symptoms:  · A cough.  · Coughing up clear, yellow, or green mucus.  · Wheezing.  · Chest congestion.  · Shortness of breath.  · A fever.  · Body aches.  · Chills.  · A sore throat.    Follow these instructions at home:  Medicines  · Take over-the-counter and prescription medicines only as told by your doctor.  · If you were prescribed an antibiotic medicine, take it as told by your doctor. Do not stop taking the antibiotic even if you start to feel better.  General instructions  · Rest.  · Drink enough fluids to keep your pee (urine) clear or pale yellow.  · Avoid smoking and secondhand smoke. If you smoke and you need help quitting, ask your doctor. Quitting will help your lungs heal faster.  · Use an inhaler, cool mist vaporizer, or humidifier as told by your doctor.  · Keep all follow-up visits as told by your doctor. This is important.  How is this prevented?  To lower your risk of getting this condition again:  · Wash your hands often with soap and water. If you cannot use soap and water, use hand .  · Avoid contact with people who have cold symptoms.  · Try not to touch your hands to your mouth, nose, or eyes.  · Make sure to get the flu shot every year.    Contact a doctor if:  · Your symptoms do not get better in 2 weeks.  Get help right away if:  · You cough up blood.  · You have chest pain.  · You have very bad shortness of breath.  · You become dehydrated.  · You faint (pass out) or keep feeling like you are going to pass out.  · You keep throwing up (vomiting).  · You have a very bad headache.  · Your fever or chills gets worse.  This information is not intended to replace advice given to you by your health care provider. Make sure you discuss any  questions you have with your health care provider.  Document Released: 06/05/2009 Document Revised: 07/26/2017 Document Reviewed: 06/07/2017  [employer] Interactive Patient Education © 2018 [employer]

## 2019-01-02 NOTE — PROGRESS NOTES
"  Chief Complaint   Patient presents with   • Cough     Subjective   Speedy Nichols is a 43 y.o. male who presents to the clinic today with complaints cough. He has had cough off and on for \"very long time.\" His cough has worsened over the last few days and he feels he has bronchitis again. He has had pneumonia from this type of infection in the past and this is really concerning to him. He reports wheezing sometimes during the day.   Cough   This is a new problem. The current episode started in the past 7 days. The problem has been gradually worsening. The cough is non-productive. Associated symptoms include chest pain, chills (burning with coughing ), nasal congestion, postnasal drip, shortness of breath and wheezing. Pertinent negatives include no ear congestion, ear pain, fever, headaches, heartburn, hemoptysis, myalgias, rash, rhinorrhea, sore throat, sweats or weight loss. Nothing aggravates the symptoms. He has tried OTC cough suppressant for the symptoms. The treatment provided mild relief. There is no history of asthma, bronchiectasis, bronchitis, COPD, emphysema, environmental allergies or pneumonia.         Current Outpatient Medications:   •  metFORMIN (GLUCOPHAGE) 1000 MG tablet, Take 1,000 mg by mouth 2 (Two) Times a Day With Meals., Disp: , Rfl:   •  albuterol sulfate  (90 Base) MCG/ACT inhaler, Inhale 2 puffs Every 4 (Four) Hours As Needed for Wheezing or Shortness of Air., Disp: 1 inhaler, Rfl: 0  •  azithromycin (ZITHROMAX Z-TANG) 250 MG tablet, Take 2 tablets the first day, then 1 tablet daily for 4 days., Disp: 6 tablet, Rfl: 0  •  MethylPREDNISolone (MEDROL, TANG,) 4 MG tablet, Take as directed on package instructions., Disp: 21 tablet, Rfl: 0    Allergies:  Patient has no known allergies.    Past Medical History:   Diagnosis Date   • Diabetes mellitus (CMS/HCC)    • Type 2 diabetes mellitus (CMS/HCC)      Past Surgical History:   Procedure Laterality Date   • WISDOM TOOTH EXTRACTION   "     Family History   Problem Relation Age of Onset   • Diabetes Father    • Diabetes Paternal Grandmother      Social History     Tobacco Use   • Smoking status: Never Smoker   • Smokeless tobacco: Never Used   Substance Use Topics   • Alcohol use: No   • Drug use: No       Review of Systems  Review of Systems   Constitutional: Positive for chills (burning with coughing ). Negative for fatigue, fever and weight loss.   HENT: Positive for congestion, postnasal drip, sinus pressure and sinus pain. Negative for ear pain, rhinorrhea, sneezing and sore throat.    Respiratory: Positive for cough, shortness of breath and wheezing. Negative for hemoptysis.    Cardiovascular: Positive for chest pain.   Gastrointestinal: Negative for heartburn.   Musculoskeletal: Negative for myalgias.   Skin: Negative for rash.   Allergic/Immunologic: Negative for environmental allergies.   Neurological: Negative for headaches.   Hematological: Negative for adenopathy.       Objective   There were no vitals taken for this visit.      Physical Exam   Constitutional: He is oriented to person, place, and time. He appears well-developed and well-nourished. He appears ill.   HENT:   Head: Normocephalic and atraumatic.   Right Ear: Hearing, tympanic membrane, external ear and ear canal normal.   Left Ear: Hearing, tympanic membrane, external ear and ear canal normal.   Nose: Mucosal edema and rhinorrhea present. Right sinus exhibits no maxillary sinus tenderness and no frontal sinus tenderness. Left sinus exhibits no maxillary sinus tenderness and no frontal sinus tenderness.   Mouth/Throat: Uvula is midline and mucous membranes are normal. Posterior oropharyngeal erythema present. No oropharyngeal exudate, posterior oropharyngeal edema or tonsillar abscesses. Tonsils are 1+ on the right. Tonsils are 1+ on the left. No tonsillar exudate.   Eyes: EOM are normal. Pupils are equal, round, and reactive to light.   Cardiovascular: Normal rate, regular  rhythm and normal heart sounds.   Pulmonary/Chest: Effort normal and breath sounds normal. No stridor. No respiratory distress. He has no wheezes. He has no rales. He exhibits no tenderness.   Intermittent nonproductive cough every few minutes.    Neurological: He is oriented to person, place, and time.   Skin: Skin is warm and dry.   Psychiatric: He has a normal mood and affect.   Vitals reviewed.      Assessment/Plan     Speedy was seen today for cough.    Diagnoses and all orders for this visit:    Acute bronchitis, unspecified organism    Other orders  -     azithromycin (ZITHROMAX Z-TANG) 250 MG tablet; Take 2 tablets the first day, then 1 tablet daily for 4 days.  -     MethylPREDNISolone (MEDROL, TANG,) 4 MG tablet; Take as directed on package instructions.  -     albuterol sulfate  (90 Base) MCG/ACT inhaler; Inhale 2 puffs Every 4 (Four) Hours As Needed for Wheezing or Shortness of Air.        Drink plenty of fluids and rest  Follow up if symptoms worsen or persist

## 2019-03-11 ENCOUNTER — OFFICE VISIT (OUTPATIENT)
Dept: RETAIL CLINIC | Facility: CLINIC | Age: 44
End: 2019-03-11

## 2019-03-11 VITALS
TEMPERATURE: 97.9 F | OXYGEN SATURATION: 98 % | RESPIRATION RATE: 18 BRPM | DIASTOLIC BLOOD PRESSURE: 78 MMHG | SYSTOLIC BLOOD PRESSURE: 122 MMHG | HEART RATE: 98 BPM

## 2019-03-11 DIAGNOSIS — K52.9 ACUTE GASTROENTERITIS: Primary | ICD-10-CM

## 2019-03-11 LAB
EXPIRATION DATE: NORMAL
FLUAV AG NPH QL: NEGATIVE
FLUBV AG NPH QL: NEGATIVE
INTERNAL CONTROL: NORMAL
Lab: NORMAL

## 2019-03-11 PROCEDURE — 99213 OFFICE O/P EST LOW 20 MIN: CPT | Performed by: NURSE PRACTITIONER

## 2019-03-11 PROCEDURE — 87804 INFLUENZA ASSAY W/OPTIC: CPT | Performed by: NURSE PRACTITIONER

## 2019-03-11 RX ORDER — ONDANSETRON 4 MG/1
4 TABLET, ORALLY DISINTEGRATING ORAL EVERY 8 HOURS PRN
Qty: 20 TABLET | Refills: 0 | Status: SHIPPED | OUTPATIENT
Start: 2019-03-11 | End: 2020-11-25

## 2019-03-11 RX ORDER — PROMETHAZINE HYDROCHLORIDE 25 MG/1
25 SUPPOSITORY RECTAL EVERY 6 HOURS PRN
Qty: 4 EACH | Refills: 0 | Status: SHIPPED | OUTPATIENT
Start: 2019-03-11 | End: 2020-11-25

## 2019-03-11 NOTE — PROGRESS NOTES
Chief Complaint   Patient presents with   • Flu Symptoms     Subjective   Speedy Nichols is a 43 y.o. male who presents to the clinic today with complaints flu like symptoms since Saturday. He has had nausea, vomiting and diarrhea. He has has mild cough and nasal congestion. He reports fever with chills, but no way to measure fever. He reports phenergan supp helps best when he is vomiting.   Flu Symptoms   This is a new problem. The current episode started in the past 7 days. The problem occurs constantly. The problem has been gradually improving. Associated symptoms include chills, congestion, coughing, diaphoresis, fatigue, a fever, headaches, nausea and vomiting. Pertinent negatives include no abdominal pain, anorexia, arthralgias, change in bowel habit, chest pain, joint swelling, myalgias, neck pain, numbness, rash, sore throat, swollen glands, urinary symptoms, vertigo, visual change or weakness. Nothing aggravates the symptoms. Treatments tried: phenergan suppository and dayquil this morning.  The treatment provided moderate relief.         Current Outpatient Medications:   •  albuterol sulfate  (90 Base) MCG/ACT inhaler, Inhale 2 puffs Every 4 (Four) Hours As Needed for Wheezing or Shortness of Air., Disp: 1 inhaler, Rfl: 0  •  metFORMIN (GLUCOPHAGE) 1000 MG tablet, Take 1,000 mg by mouth 2 (Two) Times a Day With Meals., Disp: , Rfl:   •  ondansetron ODT (ZOFRAN ODT) 4 MG disintegrating tablet, Take 1 tablet by mouth Every 8 (Eight) Hours As Needed for Nausea or Vomiting., Disp: 20 tablet, Rfl: 0  •  promethazine (PHENERGAN) 25 MG suppository, Insert 1 suppository into the rectum Every 6 (Six) Hours As Needed for Nausea or Vomiting., Disp: 4 each, Rfl: 0    Allergies:  Patient has no known allergies.    Past Medical History:   Diagnosis Date   • Diabetes mellitus (CMS/HCC)    • Type 2 diabetes mellitus (CMS/HCC)      Past Surgical History:   Procedure Laterality Date   • WISDOM TOOTH EXTRACTION        Family History   Problem Relation Age of Onset   • Diabetes Father    • Diabetes Paternal Grandmother      Social History     Tobacco Use   • Smoking status: Never Smoker   • Smokeless tobacco: Never Used   Substance Use Topics   • Alcohol use: No   • Drug use: No       Review of Systems  Review of Systems   Constitutional: Positive for chills, diaphoresis, fatigue and fever.   HENT: Positive for congestion. Negative for rhinorrhea, sinus pressure, sinus pain, sneezing and sore throat.    Respiratory: Positive for cough.    Cardiovascular: Negative for chest pain.   Gastrointestinal: Positive for nausea and vomiting. Negative for abdominal pain, anorexia and change in bowel habit.   Musculoskeletal: Negative for arthralgias, joint swelling, myalgias and neck pain.   Skin: Negative for rash.   Neurological: Positive for headaches. Negative for vertigo, weakness and numbness.   Hematological: Negative for adenopathy.       Objective   /78 (BP Location: Left arm, Patient Position: Sitting, Cuff Size: Adult)   Pulse 98   Temp 97.9 °F (36.6 °C) (Tympanic)   Resp 18   SpO2 98%       Physical Exam   Constitutional: He is oriented to person, place, and time. He appears well-developed and well-nourished.   HENT:   Head: Normocephalic and atraumatic.   Right Ear: Hearing, tympanic membrane, external ear and ear canal normal.   Left Ear: Hearing, tympanic membrane, external ear and ear canal normal.   Nose: Nose normal. Right sinus exhibits no maxillary sinus tenderness and no frontal sinus tenderness. Left sinus exhibits no maxillary sinus tenderness and no frontal sinus tenderness.   Mouth/Throat: Uvula is midline, oropharynx is clear and moist and mucous membranes are normal. Tonsils are 1+ on the right. Tonsils are 1+ on the left. No tonsillar exudate.   Eyes: Pupils are equal, round, and reactive to light.   Neck: Normal range of motion. Neck supple.   Cardiovascular: Normal rate, regular rhythm and normal  heart sounds.   Pulmonary/Chest: Effort normal and breath sounds normal.   Abdominal: Soft. Normal appearance and bowel sounds are normal. There is no hepatosplenomegaly. There is no tenderness. There is no rigidity, no rebound, no guarding and no CVA tenderness.   Lymphadenopathy:     He has no cervical adenopathy.   Neurological: He is alert and oriented to person, place, and time.   Skin: Skin is warm and dry.   Psychiatric: He has a normal mood and affect.   Vitals reviewed.      Assessment/Plan     Speedy was seen today for flu symptoms.    Diagnoses and all orders for this visit:    Acute gastroenteritis  -     POCT Influenza A/B    Other orders  -     promethazine (PHENERGAN) 25 MG suppository; Insert 1 suppository into the rectum Every 6 (Six) Hours As Needed for Nausea or Vomiting.  -     ondansetron ODT (ZOFRAN ODT) 4 MG disintegrating tablet; Take 1 tablet by mouth Every 8 (Eight) Hours As Needed for Nausea or Vomiting.    Try ondansetron first and if needed promethazine supp.   Drink plenty fluids and rest  Acetaminophen or ibuprofen for fever > 101 or pain   Follow up if symptoms persist > 2-3 days or before if they worsen

## 2019-03-11 NOTE — PATIENT INSTRUCTIONS
Drink plenty fluids and rest  Acetaminophen or ibuprofen for fever > 101 or pain   Follow up if symptoms persist > 3-4 days or before if they worsen     Viral Gastroenteritis, Adult  Viral gastroenteritis is also known as the stomach flu. This condition is caused by various viruses. These viruses can be passed from person to person very easily (are very contagious). This condition may affect your stomach, small intestine, and large intestine. It can cause sudden watery diarrhea, fever, and vomiting.  Diarrhea and vomiting can make you feel weak and cause you to become dehydrated. You may not be able to keep fluids down. Dehydration can make you tired and thirsty, cause you to have a dry mouth, and decrease how often you urinate. Older adults and people with other diseases or a weak immune system are at higher risk for dehydration.  It is important to replace the fluids that you lose from diarrhea and vomiting. If you become severely dehydrated, you may need to get fluids through an IV tube.  What are the causes?  Gastroenteritis is caused by various viruses, including rotavirus and norovirus. Norovirus is the most common cause in adults.  You can get sick by eating food, drinking water, or touching a surface contaminated with one of these viruses. You can also get sick from sharing utensils or other personal items with an infected person.  What increases the risk?  This condition is more likely to develop in people:  · Who have a weak defense system (immune system).  · Who live with one or more children who are younger than 2 years old.  · Who live in a nursing home.  · Who go on cruise ships.    What are the signs or symptoms?  Symptoms of this condition start suddenly 1-2 days after exposure to a virus. Symptoms may last a few days or as long as a week. The most common symptoms are watery diarrhea and vomiting. Other symptoms include:  · Fever.  · Headache.  · Fatigue.  · Pain in the  abdomen.  · Chills.  · Weakness.  · Nausea.  · Muscle aches.  · Loss of appetite.    How is this diagnosed?  This condition is diagnosed with a medical history and physical exam. You may also have a stool test to check for viruses or other infections.  How is this treated?  This condition typically goes away on its own. The focus of treatment is to restore lost fluids (rehydration). Your health care provider may recommend that you take an oral rehydration solution (ORS) to replace important salts and minerals (electrolytes) in your body. Severe cases of this condition may require giving fluids through an IV tube.  Treatment may also include medicine to help with your symptoms.  Follow these instructions at home:  Follow instructions from your health care provider about how to care for yourself at home.  Eating and drinking  Follow these recommendations as told by your health care provider:  · Take an ORS. This is a drink that is sold at pharmacies and retail stores.  · Drink clear fluids in small amounts as you are able. Clear fluids include water, ice chips, diluted fruit juice, and low-calorie sports drinks.  · Eat bland, easy-to-digest foods in small amounts as you are able. These foods include bananas, applesauce, rice, lean meats, toast, and crackers.  · Avoid fluids that contain a lot of sugar or caffeine, such as energy drinks, sports drinks, and soda.  · Avoid alcohol.  · Avoid spicy or fatty foods.    General instructions    · Drink enough fluid to keep your urine clear or pale yellow.  · Wash your hands often. If soap and water are not available, use hand .  · Make sure that all people in your household wash their hands well and often.  · Take over-the-counter and prescription medicines only as told by your health care provider.  · Rest at home while you recover.  · Watch your condition for any changes.  · Take a warm bath to relieve any burning or pain from frequent diarrhea episodes.  · Keep all  follow-up visits as told by your health care provider. This is important.  Contact a health care provider if:  · You cannot keep fluids down.  · Your symptoms get worse.  · You have new symptoms.  · You feel light-headed or dizzy.  · You have muscle cramps.  Get help right away if:  · You have chest pain.  · You feel extremely weak or you faint.  · You see blood in your vomit.  · Your vomit looks like coffee grounds.  · You have bloody or black stools or stools that look like tar.  · You have a severe headache, a stiff neck, or both.  · You have a rash.  · You have severe pain, cramping, or bloating in your abdomen.  · You have trouble breathing or you are breathing very quickly.  · Your heart is beating very quickly.  · Your skin feels cold and clammy.  · You feel confused.  · You have pain when you urinate.  · You have signs of dehydration, such as:  ? Dark urine, very little urine, or no urine.  ? Cracked lips.  ? Dry mouth.  ? Sunken eyes.  ? Sleepiness.  ? Weakness.  This information is not intended to replace advice given to you by your health care provider. Make sure you discuss any questions you have with your health care provider.  Document Released: 12/18/2006 Document Revised: 05/31/2017 Document Reviewed: 08/23/2016  [employer] Interactive Patient Education © 2018 [employer]

## 2019-03-20 ENCOUNTER — OFFICE VISIT (OUTPATIENT)
Dept: RETAIL CLINIC | Facility: CLINIC | Age: 44
End: 2019-03-20

## 2019-03-20 ENCOUNTER — TELEPHONE (OUTPATIENT)
Dept: PRIMARY CARE CLINIC | Age: 44
End: 2019-03-20

## 2019-03-20 VITALS — OXYGEN SATURATION: 98 % | HEART RATE: 95 BPM | DIASTOLIC BLOOD PRESSURE: 80 MMHG | SYSTOLIC BLOOD PRESSURE: 128 MMHG

## 2019-03-20 DIAGNOSIS — Z76.0 ENCOUNTER FOR MEDICATION REFILL: Primary | ICD-10-CM

## 2019-03-20 DIAGNOSIS — E11.9 TYPE 2 DIABETES MELLITUS WITHOUT COMPLICATION, WITHOUT LONG-TERM CURRENT USE OF INSULIN (HCC): ICD-10-CM

## 2019-03-20 PROCEDURE — 99213 OFFICE O/P EST LOW 20 MIN: CPT | Performed by: NURSE PRACTITIONER

## 2019-03-20 NOTE — PATIENT INSTRUCTIONS

## 2019-03-20 NOTE — PROGRESS NOTES
Subjective   Speedy Nichols is a 43 y.o. male.     Speedy presents today for a medication refill on Metformin.  He states he has had his diabetes diagnosis for years and has been taking the metformin for a few years now.  He had to recently change his PCP due to insurance.  He is scheduled to see his new PCP on April 1st.  He is requesting a refill to get him through until he sees his new provider.  He states he has some occasional stomach issues related to the medication.  He used to checked his blood sugars are home but started to have trouble with his glucometer and hasn't purchased a new one yet.  He thinks his sugar may have dropped some a little this morning but he hasn't been eating as much due to recent stomach bug and not having his metformin.  He reports he has been without his medication for about 1 week now. He presents with a pill bottle with last fill date of 2/08/19.           The following portions of the patient's history were reviewed and updated as appropriate: allergies, current medications, past family history, past medical history, past social history, past surgical history and problem list.    Review of Systems   Constitutional: Negative for fever, unexpected weight gain and unexpected weight loss.   Gastrointestinal: Negative for diarrhea, nausea and vomiting.   Genitourinary: Negative for dysuria.   Neurological: Negative for headache.       Objective   Physical Exam   Constitutional: He does not appear ill.   HENT:   Mouth/Throat: Oropharynx is clear and moist. No oropharyngeal exudate or posterior oropharyngeal erythema.   Neck: Neck supple.   Cardiovascular: Normal rate, regular rhythm and normal heart sounds. Exam reveals no gallop and no friction rub.   No murmur heard.  Pulses:       Radial pulses are 2+ on the right side, and 2+ on the left side.   No lower extremity edema   Pulmonary/Chest: Effort normal and breath sounds normal. No stridor. No respiratory distress. He has no decreased  breath sounds. He has no wheezes. He has no rhonchi. He has no rales.   Lymphadenopathy:     He has no cervical adenopathy.   Neurological: He is alert.   Skin: Skin is warm and dry.   Psychiatric: He has a normal mood and affect. His behavior is normal.         Assessment/Plan   Speedy was seen today for med refill.    Diagnoses and all orders for this visit:    Encounter for medication refill    Type 2 diabetes mellitus without complication, without long-term current use of insulin (CMS/[facility])    Other orders  -     metFORMIN (GLUCOPHAGE) 1000 MG tablet; Take 1 tablet by mouth 2 (Two) Times a Day With Meals.    Keep follow up with new PCP on April 1st

## 2019-03-31 ENCOUNTER — OFFICE VISIT (OUTPATIENT)
Dept: RETAIL CLINIC | Facility: CLINIC | Age: 44
End: 2019-03-31

## 2019-03-31 VITALS
WEIGHT: 178.8 LBS | HEART RATE: 91 BPM | SYSTOLIC BLOOD PRESSURE: 110 MMHG | DIASTOLIC BLOOD PRESSURE: 84 MMHG | TEMPERATURE: 98.5 F | BODY MASS INDEX: 24.25 KG/M2 | OXYGEN SATURATION: 99 %

## 2019-03-31 DIAGNOSIS — R21 RASH AND OTHER NONSPECIFIC SKIN ERUPTION: Primary | ICD-10-CM

## 2019-03-31 PROCEDURE — 99213 OFFICE O/P EST LOW 20 MIN: CPT | Performed by: NURSE PRACTITIONER

## 2019-03-31 RX ORDER — SULFAMETHOXAZOLE AND TRIMETHOPRIM 800; 160 MG/1; MG/1
1 TABLET ORAL 2 TIMES DAILY
Qty: 20 TABLET | Refills: 0 | Status: SHIPPED | OUTPATIENT
Start: 2019-03-31 | End: 2019-04-10

## 2019-03-31 NOTE — PROGRESS NOTES
Subjective   Speedy Nichols is a 43 y.o. male.     Insect Bite   This is a new problem. The current episode started today. The problem has been gradually worsening. Associated symptoms include a rash. Pertinent negatives include no coughing, fever, nausea, sore throat or vomiting. He has tried nothing for the symptoms.    Patient reports he was cleaning out children's toy box today and suspects he was bit by a spider.  He states he has gotten this rash before related to spider bites.  He first noticed today when he walked into the store.  He denies itchiness or pain.  He has not taken anything for the bite.  He states he has gotten this type of rash before related to spider bites and his old physician would give him steroids and an antibiotic.  He does not know when his last Tetanus shot was.     The following portions of the patient's history were reviewed and updated as appropriate: allergies, current medications, past family history, past medical history, past social history, past surgical history and problem list.    Review of Systems   Constitutional: Negative for fever.   HENT: Negative for sore throat.    Respiratory: Negative for cough and shortness of breath.    Gastrointestinal: Negative for diarrhea, nausea and vomiting.   Skin: Positive for rash.   Neurological: Negative for headache.       Objective   Physical Exam   Constitutional: He appears well-developed and well-nourished. He does not appear ill. No distress.   HENT:   Mouth/Throat: No posterior oropharyngeal edema or posterior oropharyngeal erythema.   Neck: Neck supple.   Cardiovascular: Normal rate, regular rhythm and normal heart sounds. Exam reveals no gallop and no friction rub.   No murmur heard.  Pulmonary/Chest: Effort normal and breath sounds normal. No stridor. No respiratory distress. He has no wheezes. He has no rales.   Lymphadenopathy:     He has no cervical adenopathy.   Neurological: He is alert.   Skin: Skin is warm and dry. He is  not diaphoretic.   Right hand presents with erythematous discoloration with streaks of clearing through the area.  There is no entry point that would suggest insect bite.  The hands are cool to touch bilaterally.  Radial pulses 2+ bilaterally. There is no increased heat or warmth, no swelling.    Psychiatric: He has a normal mood and affect. His behavior is normal.         Assessment/Plan   Speedy was seen today for insect bite.    Diagnoses and all orders for this visit:    Rash and other nonspecific skin eruption    Other orders  -     sulfamethoxazole-trimethoprim (BACTRIM DS,SEPTRA DS) 800-160 MG per tablet; Take 1 tablet by mouth 2 (Two) Times a Day for 10 days.      The presentation today does not suggest an insect bite.  Patient continued to say that this was not his first go-around with this rash and in the past his doctor gave him antibiotics and steroids, although the last few times it has cleared without antibiotic treatment.  He is diabetic.  He plans to see his new PCP tomorrow.  Informed that this was an odd presentation for a spider bite.  If concerned about a spider bite, recommended benadryl at this time.  Instructed to wait on antibiotic and have area re-evaluated tomorrow by his PCP.       Start benadryl first and wait on antibiotic.  If you noticed warmth, swelling, start the antibiotic.   Keep appointment with Dr. Schumacher tomorrow for recheck.

## 2019-03-31 NOTE — PATIENT INSTRUCTIONS
Start benadryl first and wait on antibiotic.  If you noticed warmth, swelling, start the antibiotic.   Keep appointment with Dr. Schumacher tomorrow for recheck.     Insect Bite, Adult  An insect bite can make your skin red, itchy, and swollen. Some insects can spread disease to people with a bite. However, most insect bites do not lead to disease, and most are not serious.  Follow these instructions at home:  Bite area care  · Do not scratch the bite area.  · Keep the bite area clean and dry.  · Wash the bite area every day with soap and water as told by your doctor.  · Check the bite area every day for signs of infection. Check for:  ? More redness, swelling, or pain.  ? Fluid or blood.  ? Warmth.  ? Pus.  Managing pain, itching, and swelling  · You may put any of these on the bite area as told by your doctor:  ? A baking soda paste.  ? Cortisone cream.  ? Calamine lotion.  · If directed, put ice on the bite area.  ? Put ice in a plastic bag.  ? Place a towel between your skin and the bag.  ? Leave the ice on for 20 minutes, 2-3 times a day.  Medicines  · Take medicines or put medicines on your skin only as told by your doctor.  · If you were prescribed an antibiotic medicine, use it as told by your doctor. Do not stop using the antibiotic even if your condition improves.  General instructions  · Keep all follow-up visits as told by your doctor. This is important.  How is this prevented?  To help you have a lower risk of insect bites:  · When you are outside, wear clothing that covers your arms and legs.  · Use insect repellent. The best insect repellents have:  ? An active ingredient of DEET, picaridin, oil of lemon eucalyptus (OLE), or [id number].  ? Higher amounts of DEET or another active ingredient than other repellents have.  · If your home windows do not have screens, think about putting some in.    Contact a doctor if:  · You have more redness, swelling, or pain in the bite area.  · You have fluid, blood, or  pus coming from the bite area.  · The bite area feels warm.  · You have a fever.  Get help right away if:  · You have joint pain.  · You have a rash.  · You have shortness of breath.  · You feel more tired or sleepy than you normally do.  · You have neck pain.  · You have a headache.  · You feel weaker than you normally do.  · You have chest pain.  · You have pain in your belly.  · You feel sick to your stomach (nauseous) or you throw up (vomit).  Summary  · An insect bite can make your skin red, itchy, and swollen.  · Do not scratch the bite area, and keep it clean and dry.  · Ice can help with pain and itching from the bite.  This information is not intended to replace advice given to you by your health care provider. Make sure you discuss any questions you have with your health care provider.  Document Released: 12/15/2001 Document Revised: 07/20/2017 Document Reviewed: 05/04/2016  [employer] Interactive Patient Education © 2019 [employer]

## 2019-04-01 ENCOUNTER — OFFICE VISIT (OUTPATIENT)
Dept: PRIMARY CARE CLINIC | Age: 44
End: 2019-04-01
Payer: MEDICAID

## 2019-04-01 VITALS
TEMPERATURE: 97.8 F | BODY MASS INDEX: 24.35 KG/M2 | HEART RATE: 97 BPM | WEIGHT: 179.75 LBS | DIASTOLIC BLOOD PRESSURE: 82 MMHG | SYSTOLIC BLOOD PRESSURE: 120 MMHG | HEIGHT: 72 IN | OXYGEN SATURATION: 98 %

## 2019-04-01 DIAGNOSIS — N52.9 ORGANIC ERECTILE DYSFUNCTION: ICD-10-CM

## 2019-04-01 DIAGNOSIS — E11.9 TYPE 2 DIABETES MELLITUS WITHOUT COMPLICATION, WITHOUT LONG-TERM CURRENT USE OF INSULIN (HCC): Primary | ICD-10-CM

## 2019-04-01 PROCEDURE — 99204 OFFICE O/P NEW MOD 45 MIN: CPT | Performed by: PEDIATRICS

## 2019-04-01 RX ORDER — GLUCOSAMINE HCL/CHONDROITIN SU 500-400 MG
CAPSULE ORAL
Qty: 100 STRIP | Refills: 0 | Status: SHIPPED | OUTPATIENT
Start: 2019-04-01 | End: 2019-05-23

## 2019-04-01 RX ORDER — LANCETS 30 GAUGE
1 EACH MISCELLANEOUS DAILY
Qty: 100 EACH | Refills: 5 | Status: SHIPPED | OUTPATIENT
Start: 2019-04-01 | End: 2019-05-23

## 2019-04-01 RX ORDER — SILDENAFIL 100 MG/1
100 TABLET, FILM COATED ORAL PRN
Qty: 10 TABLET | Refills: 3 | Status: SHIPPED | OUTPATIENT
Start: 2019-04-01 | End: 2021-12-01 | Stop reason: SDUPTHER

## 2019-04-01 SDOH — HEALTH STABILITY: MENTAL HEALTH: HOW OFTEN DO YOU HAVE A DRINK CONTAINING ALCOHOL?: MONTHLY OR LESS

## 2019-04-01 ASSESSMENT — ENCOUNTER SYMPTOMS
VOMITING: 0
BACK PAIN: 0
ABDOMINAL PAIN: 0
ALLERGIC/IMMUNOLOGIC NEGATIVE: 1
SHORTNESS OF BREATH: 0
TROUBLE SWALLOWING: 0
NAUSEA: 0
WHEEZING: 0
ROS SKIN COMMENTS: NO NEW OR SUSPICIOUS SKIN LESIONS
CONSTIPATION: 0
RHINORRHEA: 0
DIARRHEA: 0
SORE THROAT: 0
SINUS PRESSURE: 0
COUGH: 0
CHEST TIGHTNESS: 0

## 2019-04-01 ASSESSMENT — PATIENT HEALTH QUESTIONNAIRE - PHQ9
1. LITTLE INTEREST OR PLEASURE IN DOING THINGS: 0
2. FEELING DOWN, DEPRESSED OR HOPELESS: 0
SUM OF ALL RESPONSES TO PHQ QUESTIONS 1-9: 0
SUM OF ALL RESPONSES TO PHQ9 QUESTIONS 1 & 2: 0
SUM OF ALL RESPONSES TO PHQ QUESTIONS 1-9: 0

## 2019-04-01 NOTE — PROGRESS NOTES
[address] [facility], [address]  Phone [phone]   Fax [phone]      OFFICE VISIT: 2019    Barry Coelho-: 1975      HPI  Reason For Visit:  Barry Nazario is a 38 y.o. Health Maintenance    Establish Care (here to establish care, patient is a type 2 diabetic on metformin, [facility] PCP Sumanth Luther, located at [address] [place] [address].)      This presents to establish care. Present concerns:        Diabetes Mellitus Type 2    Diet compliance:  compliant most of the time  Nutrition Consultation Needed:  no  Medication:   Metformin 1000 mg twice daily  Medication compliance:  compliant most of the time  Weight trend: stable  Current exercise: yes - life in general  Checkin times daily  Home blood sugar records: unknown  Low BG:  no  Eye exam current (within one year): yes . Checking Feet regularly:  yes - no sores  ACE/ARB:  No, but no need  Aspirin: No:   Tobacco history: He  reports that he has never smoked. He has never used smokeless tobacco.    No results found for: LABA1C  No results found for: LABMICR, CREATININE    Last A1C was about 1 yr ago. He is having some issues with neuropathy in his feet. He has some issues with ED as well.     height is 6' (1.829 m) and weight is 179 lb 12 oz (81.5 kg). His temporal temperature is 97.8 °F (36.6 °C). His blood pressure is 120/82 and his pulse is 97. His oxygen saturation is 98%. Body mass index is 24.38 kg/m². I have reviewed the following with the Mr. Coelho   Lab Review  No visits with results within 6 Month(s) from this visit. Latest known visit with results is:   No results found for any previous visit. Copies of these are in the chart. Current Outpatient Medications   Medication Sig Dispense Refill    blood glucose monitor kit and supplies Test 1 times a day & as needed for symptoms of irregular blood glucose.  1 kit 0    blood glucose monitor strips Test 1 times a day & as needed for symptoms of irregular blood glucose. 100 strip 0    Lancets MISC 1 each by Does not apply route daily 100 each 5    metFORMIN (GLUCOPHAGE) 1000 MG tablet Take 1 tablet by mouth 2 times daily 180 tablet 3     No current facility-administered medications for this visit. Allergies: Patient has no known allergies. Past Medical History:   Diagnosis Date    Type 2 diabetes mellitus without complication ([facility] [address]        Family History   Problem Relation Age of Onset    Diabetes Father     Heart Disease Maternal Grandmother     Diabetes Maternal Grandfather     Diabetes Paternal Grandmother     Heart Disease Paternal Grandmother     Cancer Paternal Grandmother        Past Surgical History:   Procedure Laterality Date    WISDOM TOOTH EXTRACTION         Social History     Tobacco Use    Smoking status: Never Smoker    Smokeless tobacco: Never Used   Substance Use Topics    Alcohol use: Yes     Frequency: Monthly or less        Review of Systems   Constitutional: Negative for appetite change, chills, diaphoresis, fatigue, fever and unexpected weight change. HENT: Negative for congestion, dental problem (following with dentist regularly), ear pain, hearing loss, postnasal drip, rhinorrhea, sinus pressure, sore throat, tinnitus and trouble swallowing. Eyes: Negative for visual disturbance (following with regular eye exams). Respiratory: Negative for cough, chest tightness, shortness of breath and wheezing. No orthopnea   Cardiovascular: Negative for chest pain (or pressure), palpitations and leg swelling. Gastrointestinal: Negative for abdominal pain, constipation, diarrhea, nausea and vomiting. No melena or hematochezia   Endocrine: Negative for cold intolerance, heat intolerance, polydipsia and polyuria. Genitourinary: Negative for difficulty urinating, dysuria and enuresis. Musculoskeletal: Negative for arthralgias, back pain, joint swelling and myalgias.    Skin: Negative for sensation at most  Diminished sensation at most   No sensation at none    No sensation at none         ASSESSMENT      ICD-10-CM    1. 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 Type 2 diabetes mellitus without complication, without long-term current use of insulin ([facility]) E11.9 blood glucose monitor kit and supplies     blood glucose monitor strips     Lancets MISC     metFORMIN (GLUCOPHAGE) 1000 MG tablet     CBC Auto Differential     Comprehensive Metabolic Panel     Hemoglobin A1C     Lipid Panel     T4, Free     TSH without Reflex     Microalbumin / Creatinine Urine Ratio     Testosterone   2. 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

## 2019-04-17 ENCOUNTER — OFFICE VISIT (OUTPATIENT)
Dept: PRIMARY CARE CLINIC | Age: 44
End: 2019-04-17
Payer: MEDICAID

## 2019-04-17 VITALS
WEIGHT: 184.25 LBS | BODY MASS INDEX: 24.95 KG/M2 | DIASTOLIC BLOOD PRESSURE: 90 MMHG | HEART RATE: 90 BPM | SYSTOLIC BLOOD PRESSURE: 126 MMHG | TEMPERATURE: 99.2 F | HEIGHT: 72 IN | OXYGEN SATURATION: 98 %

## 2019-04-17 DIAGNOSIS — J02.9 SORE THROAT: Primary | ICD-10-CM

## 2019-04-17 DIAGNOSIS — E11.9 TYPE 2 DIABETES MELLITUS WITHOUT COMPLICATION, WITHOUT LONG-TERM CURRENT USE OF INSULIN (HCC): ICD-10-CM

## 2019-04-17 DIAGNOSIS — R50.9 FEVER, UNSPECIFIED FEVER CAUSE: ICD-10-CM

## 2019-04-17 LAB — S PYO AG THROAT QL: NORMAL

## 2019-04-17 PROCEDURE — 87880 STREP A ASSAY W/OPTIC: CPT | Performed by: PEDIATRICS

## 2019-04-17 PROCEDURE — 99213 OFFICE O/P EST LOW 20 MIN: CPT | Performed by: PEDIATRICS

## 2019-04-17 ASSESSMENT — ENCOUNTER SYMPTOMS
GASTROINTESTINAL NEGATIVE: 1
ALLERGIC/IMMUNOLOGIC NEGATIVE: 1
SORE THROAT: 1
EYES NEGATIVE: 1
COUGH: 1

## 2019-04-17 NOTE — PROGRESS NOTES
1719 [facility], [address]  Phone [phone]   Fax [phone]      OFFICE VISIT: 2019    Aline Coelho-: 1975      HPI  Reason For Visit:  Aline Barajas is a [address] y.o. Health Maintenance    Cough (pt woke up at 3 am this morning with a coughing fit. Coughing up colored phlegm. ); Fever (low grade ); Nasal Congestion (always had some sinus issues but got real bad last night. ); and Pharyngitis (scratchy throat - wife has strep throat)      Patient presents with a cough starting this morning he also has colored phlegm and low-grade fever, nasal congestion. Multiple Family members are sick. He is not allergic to any medication     height is 5' 11.5\" (1.816 m) and weight is 184 lb 4 oz (83.6 kg). His temporal temperature is 99.2 °F (37.3 °C). His blood pressure is 126/90 (abnormal) and his pulse is 90. His oxygen saturation is 98%. Body mass index is 25.34 kg/m². I have reviewed the following with the Mr. Coelho   Lab Review  No visits with results within 6 Month(s) from this visit. Latest known visit with results is:   No results found for any previous visit. Copies of these are in the chart. Current Outpatient Medications   Medication Sig Dispense Refill    UNABLE TO FIND Take 1 capsule by mouth daily Ashwagandha      metFORMIN (GLUCOPHAGE) 1000 MG tablet Take 1 tablet by mouth 2 times daily 180 tablet 3    sildenafil (VIAGRA) 100 MG tablet Take 1 tablet by mouth as needed for Erectile Dysfunction 10 tablet 3    blood glucose monitor kit and supplies Test 1 times a day & as needed for symptoms of irregular blood glucose. 1 kit 0    blood glucose monitor strips Test 1 times a day & as needed for symptoms of irregular blood glucose. 100 strip 0    Lancets MISC 1 each by Does not apply route daily 100 each 5     No current facility-administered medications for this visit. Allergies: Patient has no known allergies.      Past Medical History: Diagnosis Date    Type 2 diabetes mellitus without complication ([facility] [address]        Family History   Problem Relation Age of Onset    Diabetes Father     Heart Disease Maternal Grandmother     Diabetes Maternal Grandfather     Diabetes Paternal Grandmother     Heart Disease Paternal Grandmother     Cancer Paternal Grandmother        Past Surgical History:   Procedure Laterality Date    WISDOM TOOTH EXTRACTION         Social History     Tobacco Use    Smoking status: Never Smoker    Smokeless tobacco: Never Used   Substance Use Topics    Alcohol use: Yes     Frequency: Monthly or less        Review of Systems   Constitutional: Positive for fatigue and fever. HENT: Positive for congestion and sore throat. Eyes: Negative. Respiratory: Positive for cough (productive for thick secretions). Cardiovascular: Negative. Gastrointestinal: Negative. Endocrine: Negative. Genitourinary: Negative. Musculoskeletal: Negative. Skin: Negative. Allergic/Immunologic: Negative. Neurological: Negative. Hematological: Negative. Psychiatric/Behavioral: Negative. Physical Exam   Constitutional: He is oriented to person, place, and time. He appears well-developed and well-nourished. No distress. HENT:   Head: Normocephalic and atraumatic. Right Ear: External ear normal.   Left Ear: External ear normal.   Nose: Nose normal.   Mouth/Throat: Oropharynx is clear and moist. No oropharyngeal exudate. Eyes: Conjunctivae and EOM are normal. Right eye exhibits no discharge. Left eye exhibits no discharge. Neck: Normal range of motion. Neck supple. No JVD present. No thyromegaly present. Cardiovascular: Normal rate, regular rhythm and normal heart sounds. Exam reveals no gallop and no friction rub. No murmur heard. Pulmonary/Chest: Effort normal and breath sounds normal. No respiratory distress. He has no wheezes. He has no rales. Abdominal: Soft.  Bowel sounds are normal. He exhibits no distension and no mass. There is no tenderness. Musculoskeletal: Normal range of motion. He exhibits no edema or tenderness. Lymphadenopathy:     He has no cervical adenopathy. Neurological: He is alert and oriented to person, place, and time. He exhibits normal muscle tone. Skin: Skin is warm and dry. No rash noted. Nursing note and vitals reviewed. ASSESSMENT      ICD-10-CM    1. Sore throat J02.9 POCT rapid strep A   2. Type 2 diabetes mellitus without complication, without long-term current use of insulin ([facility]) E11.9 metFORMIN (GLUCOPHAGE) 1000 MG tablet   3. Fever, unspecified fever cause R50.9 POCT rapid strep A       PLAN      ICD-10-CM    1. Sore throat J02.9 POCT rapid strep A   2. Type 2 diabetes mellitus without complication, without long-term current use of insulin ([facility]CC) E11.9 metFORMIN (GLUCOPHAGE) 1000 MG tablet   3. Fever, unspecified fever cause R50.9 POCT rapid strep A       Orders Placed This Encounter   Procedures    POCT rapid strep A      Return if symptoms worsen or fail to improve.

## 2019-04-17 NOTE — PROGRESS NOTES
Results for orders placed or performed in visit on 04/17/19   POCT rapid strep A   Result Value Ref Range    Strep A Ag None Detected None Detected

## 2019-04-17 NOTE — PATIENT INSTRUCTIONS

## 2019-04-22 ENCOUNTER — OFFICE VISIT (OUTPATIENT)
Dept: RETAIL CLINIC | Facility: CLINIC | Age: 44
End: 2019-04-22

## 2019-04-22 VITALS
TEMPERATURE: 99.1 F | OXYGEN SATURATION: 98 % | SYSTOLIC BLOOD PRESSURE: 144 MMHG | DIASTOLIC BLOOD PRESSURE: 94 MMHG | HEART RATE: 113 BPM

## 2019-04-22 DIAGNOSIS — J06.9 ACUTE URI: Primary | ICD-10-CM

## 2019-04-22 PROCEDURE — 99213 OFFICE O/P EST LOW 20 MIN: CPT | Performed by: NURSE PRACTITIONER

## 2019-04-22 RX ORDER — BENZONATATE 200 MG/1
200 CAPSULE ORAL 3 TIMES DAILY PRN
Qty: 10 CAPSULE | Refills: 0 | Status: SHIPPED | OUTPATIENT
Start: 2019-04-22 | End: 2020-11-25

## 2019-04-22 RX ORDER — ALBUTEROL SULFATE 90 UG/1
2 AEROSOL, METERED RESPIRATORY (INHALATION) EVERY 4 HOURS PRN
Qty: 1 INHALER | Refills: 0 | Status: SHIPPED | OUTPATIENT
Start: 2019-04-22 | End: 2020-11-25

## 2019-04-22 RX ORDER — AZITHROMYCIN 250 MG/1
TABLET, FILM COATED ORAL
Qty: 6 TABLET | Refills: 0 | Status: SHIPPED | OUTPATIENT
Start: 2019-04-22 | End: 2020-11-25

## 2019-04-22 NOTE — PROGRESS NOTES
Subjective   Speedy Nichols is a 44 y.o. male.     URI    This is a new problem. The current episode started 1 to 4 weeks ago. The problem has been gradually worsening. Associated symptoms include chest pain (From coughing), congestion, coughing (Has worsened), headaches and nausea (From drainage). Pertinent negatives include no diarrhea or vomiting. Treatments tried: Robitussin; Dayquil. The treatment provided mild relief.    Patient states he saw new PCP last Wednesday for URI with other family members.  He wife also had strep, through the rest of the family tested negative.  He was told this was an URI and to give it 7-10 days.  He states some symptoms have improved but the cough has worsened.      The following portions of the patient's history were reviewed and updated as appropriate: allergies, current medications, past family history, past medical history, past social history, past surgical history and problem list.    Review of Systems   Constitutional: Positive for fever (Initially; denies recent fever).   HENT: Positive for congestion.    Respiratory: Positive for cough (Has worsened).    Cardiovascular: Positive for chest pain (From coughing).   Gastrointestinal: Positive for nausea (From drainage). Negative for diarrhea and vomiting.   Neurological: Positive for headache.       Objective   Physical Exam   Constitutional: He appears well-developed and well-nourished. He does not appear ill. No distress.   HENT:   Right Ear: External ear normal. Tympanic membrane is not erythematous.   Left Ear: External ear normal. Tympanic membrane is not erythematous.   Nose: No congestion. Right sinus exhibits no maxillary sinus tenderness and no frontal sinus tenderness. Left sinus exhibits no maxillary sinus tenderness and no frontal sinus tenderness.   Mouth/Throat: No oropharyngeal exudate or posterior oropharyngeal erythema. Tonsils are 1+ on the right. Tonsils are 1+ on the left. No tonsillar exudate.   Neck: Neck  supple.   Cardiovascular: Normal rate, regular rhythm and normal heart sounds. Exam reveals no gallop and no friction rub.   No murmur heard.  Pulmonary/Chest: Effort normal and breath sounds normal. No stridor. No respiratory distress. He has no decreased breath sounds. He has no wheezes. He has no rhonchi. He has no rales. He exhibits tenderness.   Frequent, tight, shallow cough.  Dry, nonproductive in office.    Lymphadenopathy:     He has no cervical adenopathy.   Neurological: He is alert.   Skin: Skin is warm. He is not diaphoretic.   Psychiatric: He has a normal mood and affect. His behavior is normal.         Assessment/Plan   Speedy was seen today for uri.    Diagnoses and all orders for this visit:    Acute URI    Other orders  -     albuterol sulfate  (90 Base) MCG/ACT inhaler; Inhale 2 puffs Every 4 (Four) Hours As Needed for Wheezing.  -     benzonatate (TESSALON) 200 MG capsule; Take 1 capsule by mouth 3 (Three) Times a Day As Needed for Cough.  -     azithromycin (ZITHROMAX Z-TANG) 250 MG tablet; Take 2 tablets the first day, then 1 tablet daily for 4 days.    May watch and wait on antibiotic over the next 2 days.  If symptoms do not start to improve, start the antibiotic.    Increase fluid intake  Warm salt water gargles as needed for sore throat  Do not over suppress cough  If no improvement over next 2-3 days or symptoms worsen, follow up with PCP

## 2019-04-23 ENCOUNTER — TELEPHONE (OUTPATIENT)
Dept: PRIMARY CARE CLINIC | Age: 44
End: 2019-04-23

## 2019-04-23 NOTE — PATIENT INSTRUCTIONS
"Increase fluid intake  Warm salt water gargles as needed for sore throat  Do not over suppress cough  If no improvement over next 2-3 days or symptoms worsen, follow up with PCP      Upper Respiratory Infection, Adult  An upper respiratory infection (URI) affects the nose, throat, and upper air passages. URIs are caused by germs (viruses). The most common type of URI is often called \"the common cold.\"  Medicines cannot cure URIs, but you can do things at home to relieve your symptoms. URIs usually get better within 7-10 days.  Follow these instructions at home:  Activity  · Rest as needed.  · If you have a fever, stay home from work or school until your fever is gone, or until your doctor says you may return to work or school.  ? You should stay home until you cannot spread the infection anymore (you are not contagious).  ? Your doctor may have you wear a face mask so you have less risk of spreading the infection.  Relieving symptoms  · Gargle with a salt-water mixture 3-4 times a day or as needed. To make a salt-water mixture, completely dissolve ½-1 tsp of salt in 1 cup of warm water.  · Use a cool-mist humidifier to add moisture to the air. This can help you breathe more easily.  Eating and drinking  · Drink enough fluid to keep your pee (urine) pale yellow.  · Eat soups and other clear broths.  General instructions  · Take over-the-counter and prescription medicines only as told by your doctor. These include cold medicines, fever reducers, and cough suppressants.  · Do not use any products that contain nicotine or tobacco. These include cigarettes and e-cigarettes. If you need help quitting, ask your doctor.  · Avoid being where people are smoking (avoid secondhand smoke).  · Make sure you get regular shots and get the flu shot every year.  · Keep all follow-up visits as told by your doctor. This is important.  How to avoid spreading infection to others  · Wash your hands often with soap and water. If you do not " "have soap and water, use hand .  · Avoid touching your mouth, face, eyes, or nose.  · Cough or sneeze into a tissue or your sleeve or elbow. Do not cough or sneeze into your hand or into the air.  Contact a doctor if:  · You are getting worse, not better.  · You have any of these:  ? A fever.  ? Chills.  ? Brown or red mucus in your nose.  ? Yellow or brown fluid (discharge)coming from your nose.  ? Pain in your face, especially when you bend forward.  ? Swollen neck glands.  ? Pain with swallowing.  ? White areas in the back of your throat.  Get help right away if:  · You have shortness of breath that gets worse.  · You have very bad or constant:  ? Headache.  ? Ear pain.  ? Pain in your forehead, behind your eyes, and over your cheekbones (sinus pain).  ? Chest pain.  · You have long-lasting (chronic) lung disease along with any of these:  ? Wheezing.  ? Long-lasting cough.  ? Coughing up blood.  ? A change in your usual mucus.  · You have a stiff neck.  · You have changes in your:  ? Vision.  ? Hearing.  ? Thinking.  ? Mood.  Summary  · An upper respiratory infection (URI) is caused by a germ called a virus. The most common type of URI is often called \"the common cold.\"  · URIs usually get better within 7-10 days.  · Take over-the-counter and prescription medicines only as told by your doctor.  This information is not intended to replace advice given to you by your health care provider. Make sure you discuss any questions you have with your health care provider.  Document Released: 06/05/2009 Document Revised: 08/10/2018 Document Reviewed: 08/10/2018  [employer] Interactive Patient Education © 2019 [employer]          "

## 2019-04-24 RX ORDER — DEXTROMETHORPHAN HYDROBROMIDE AND PROMETHAZINE HYDROCHLORIDE 15; 6.25 MG/5ML; MG/5ML
5 SYRUP ORAL 4 TIMES DAILY PRN
Qty: 180 ML | Refills: 0 | Status: SHIPPED | OUTPATIENT
Start: 2019-04-24 | End: 2019-04-26 | Stop reason: RX

## 2019-04-24 NOTE — TELEPHONE ENCOUNTER
Phenergan DM  5 ML's by mouth every 12 hours when necessary cough  Dispense 180 mL no refills  Please advise as may make drowsy

## 2019-04-26 RX ORDER — DEXTROMETHORPHAN POLISTIREX 30 MG/5ML
60 SUSPENSION ORAL 2 TIMES DAILY PRN
Qty: 240 ML | Refills: 0 | Status: SHIPPED | OUTPATIENT
Start: 2019-04-26 | End: 2019-05-06

## 2019-05-23 ENCOUNTER — OFFICE VISIT (OUTPATIENT)
Dept: PRIMARY CARE CLINIC | Age: 44
End: 2019-05-23
Payer: MEDICAID

## 2019-05-23 VITALS
BODY MASS INDEX: 24.78 KG/M2 | SYSTOLIC BLOOD PRESSURE: 137 MMHG | HEIGHT: 71 IN | TEMPERATURE: 97.8 F | DIASTOLIC BLOOD PRESSURE: 88 MMHG | OXYGEN SATURATION: 95 % | WEIGHT: 177 LBS | HEART RATE: 114 BPM

## 2019-05-23 DIAGNOSIS — J01.90 ACUTE SINUSITIS, RECURRENCE NOT SPECIFIED, UNSPECIFIED LOCATION: Primary | ICD-10-CM

## 2019-05-23 DIAGNOSIS — H69.81 EUSTACHIAN TUBE DYSFUNCTION, RIGHT: ICD-10-CM

## 2019-05-23 PROCEDURE — 99213 OFFICE O/P EST LOW 20 MIN: CPT | Performed by: NURSE PRACTITIONER

## 2019-05-23 RX ORDER — CETIRIZINE HYDROCHLORIDE 10 MG/1
10 TABLET ORAL DAILY
Qty: 30 TABLET | Refills: 3 | Status: SHIPPED | OUTPATIENT
Start: 2019-05-23 | End: 2019-11-06 | Stop reason: SDUPTHER

## 2019-05-23 RX ORDER — FLUTICASONE PROPIONATE 50 MCG
2 SPRAY, SUSPENSION (ML) NASAL DAILY
Qty: 1 BOTTLE | Refills: 3 | Status: SHIPPED | OUTPATIENT
Start: 2019-05-23 | End: 2020-09-28

## 2019-05-23 RX ORDER — AMOXICILLIN AND CLAVULANATE POTASSIUM 875; 125 MG/1; MG/1
1 TABLET, FILM COATED ORAL 2 TIMES DAILY
Qty: 20 TABLET | Refills: 0 | Status: SHIPPED | OUTPATIENT
Start: 2019-05-23 | End: 2019-06-02

## 2019-05-23 ASSESSMENT — ENCOUNTER SYMPTOMS
SHORTNESS OF BREATH: 0
SORE THROAT: 0
DIARRHEA: 0
ABDOMINAL PAIN: 0
COUGH: 1
VOMITING: 0
TROUBLE SWALLOWING: 0
RHINORRHEA: 0
NAUSEA: 0
CONSTIPATION: 0

## 2019-05-23 NOTE — PATIENT INSTRUCTIONS
Patient Education        Eustachian Tube Problems: Care Instructions  Your Care Instructions    The eustachian (say \"you-STAY-shee-un\") tubes run between the inside of the ears and the throat. They keep air pressure stable in the ears. If your eustachian tubes become blocked, the air pressure in your ears changes. The fluids from a cold can clog eustachian tubes, causing pain in the ears. A quick change in air pressure can cause eustachian tubes to close up. This might happen when an airplane changes altitude or when a  goes up or down underwater. Eustachian tube problems often clear up on their own or after antibiotic treatment. If your tubes continue to be blocked, you may need surgery. Follow-up care is a key part of your treatment and safety. Be sure to make and go to all appointments, and call your doctor if you are having problems. It's also a good idea to know your test results and keep a list of the medicines you take. How can you care for yourself at home? · To ease ear pain, apply a warm washcloth or a heating pad set on low. There may be some drainage from the ear when the heat melts earwax. Put a cloth between the heat source and your skin. Do not use a heating pad with children. · If your doctor prescribed antibiotics, take them as directed. Do not stop taking them just because you feel better. You need to take the full course of antibiotics. · Your doctor may recommend over-the-counter medicine. Be safe with medicines. Oral or nasal decongestants may relieve ear pain. Avoid decongestants that are combined with antihistamines, which tend to cause more blockage. But if allergies seem to be the problem, your doctor may recommend a combination. Be careful with cough and cold medicines. Don't give them to children younger than 6, because they don't work for children that age and can even be harmful. For children 6 and older, always follow all the instructions carefully.  Make sure you know how much medicine to give and how long to use it. And use the dosing device if one is included. When should you call for help? Call your doctor now or seek immediate medical care if:    · You develop sudden, complete hearing loss.     · You have severe pain or feel dizzy.     · You have new or increasing pus or blood draining from your ear.     · You have redness, swelling, or pain around or behind the ear.    Watch closely for changes in your health, and be sure to contact your doctor if:    · You do not get better after 2 weeks.     · You have any new symptoms, such as itching or a feeling of fullness in the ear. Where can you learn more? Go to https://[employer]Hound.efw-suhl. org and sign in to your [employer] account. Enter Y822 in the Glance box to learn more about \"Eustachian Tube Problems: Care Instructions. \"     If you do not have an account, please click on the \"Sign Up Now\" link. Current as of: October 21, 2018  Content Version: 12.0  © 2752-5641 [employer]. Care instructions adapted under license by [facility] ([facility]). If you have questions about a medical condition or this instruction, always ask your healthcare professional. [address] any warranty or liability for your use of this information.

## 2019-05-23 NOTE — PROGRESS NOTES
[address], [address]  Phone [phone]   Fax [phone]      OFFICE VISIT: 5/23/2019    Daja Williamson is a 38 y.o. male whopresents today for his medical conditions/complaints as noted below. Daja Williamson isc/o of Cough (sore throat, right ear pain, drainage. started around friday. low grade fever. )        :     HPI  Here for cough, sore throat, right ear pain and drainage. Low grade fever. Have chronic sinus problems. Coughing spells can hardly breathe. Right ear rings and has pain. Using cough drops for comfort. Throat is better. Pt read online that his symptoms could be throat cancer so he wanted checked.      Past Medical History:   Diagnosis Date    Type 2 diabetes mellitus without complication (HCC)       Past Surgical History:   Procedure Laterality Date    WISDOM TOOTH EXTRACTION         Family History   Problem Relation Age of Onset    Diabetes Father     Heart Disease Maternal Grandmother     Diabetes Maternal Grandfather     Diabetes Paternal Grandmother     Heart Disease Paternal Grandmother     Cancer Paternal Grandmother        Social History     Tobacco Use    Smoking status: Never Smoker    Smokeless tobacco: Never Used   Substance Use Topics    Alcohol use: Yes     Frequency: Monthly or less      Current Outpatient Medications   Medication Sig Dispense Refill    fluticasone (FLONASE) 50 MCG/ACT nasal spray 2 sprays by Nasal route daily 1 Bottle 3    cetirizine (ZYRTEC) 10 MG tablet Take 1 tablet by mouth daily 30 tablet 3    amoxicillin-clavulanate (AUGMENTIN) 875-125 MG per tablet Take 1 tablet by mouth 2 times daily for 10 days 20 tablet 0    UNABLE TO FIND Take 1 capsule by mouth daily Ashwagandha      metFORMIN (GLUCOPHAGE) 1000 MG tablet Take 1 tablet by mouth 2 times daily 180 tablet 3    sildenafil (VIAGRA) 100 MG tablet Take 1 tablet by mouth as needed for Erectile Dysfunction 10 tablet 3     No current facility-administered medications for this answered. Pt voiced understanding. Reviewed health maintenance. .  Patient agreed with treatment plan. Follow up asdirected. Patient Instructions       Patient Education        Eustachian Tube Problems: Care Instructions  Your Care Instructions    The eustachian (say \"you-STAY-shee-un\") tubes run between the inside of the ears and the throat. They keep air pressure stable in the ears. If your eustachian tubes become blocked, the air pressure in your ears changes. The fluids from a cold can clog eustachian tubes, causing pain in the ears. A quick change in air pressure can cause eustachian tubes to close up. This might happen when an airplane changes altitude or when a  goes up or down underwater. Eustachian tube problems often clear up on their own or after antibiotic treatment. If your tubes continue to be blocked, you may need surgery. Follow-up care is a key part of your treatment and safety. Be sure to make and go to all appointments, and call your doctor if you are having problems. It's also a good idea to know your test results and keep a list of the medicines you take. How can you care for yourself at home? · To ease ear pain, apply a warm washcloth or a heating pad set on low. There may be some drainage from the ear when the heat melts earwax. Put a cloth between the heat source and your skin. Do not use a heating pad with children. · If your doctor prescribed antibiotics, take them as directed. Do not stop taking them just because you feel better. You need to take the full course of antibiotics. · Your doctor may recommend over-the-counter medicine. Be safe with medicines. Oral or nasal decongestants may relieve ear pain. Avoid decongestants that are combined with antihistamines, which tend to cause more blockage. But if allergies seem to be the problem, your doctor may recommend a combination. Be careful with cough and cold medicines.  Don't give them to children younger than 6, because they don't work for children that age and can even be harmful. For children 6 and older, always follow all the instructions carefully. Make sure you know how much medicine to give and how long to use it. And use the dosing device if one is included. When should you call for help? Call your doctor now or seek immediate medical care if:    · You develop sudden, complete hearing loss.     · You have severe pain or feel dizzy.     · You have new or increasing pus or blood draining from your ear.     · You have redness, swelling, or pain around or behind the ear.    Watch closely for changes in your health, and be sure to contact your doctor if:    · You do not get better after 2 weeks.     · You have any new symptoms, such as itching or a feeling of fullness in the ear. Where can you learn more? Go to [email or web address]://[email or web address] [email or web address]. org and sign in to your [employer] account. Enter Y822 in the [employer] box to learn more about \"Eustachian Tube Problems: Care Instructions. \"     If you do not have an account, please click on the \"Sign Up Now\" link. Current as of: October 21, 2018  Content Version: 12.0  © 8960-2143 Healthwise, Incorporated. Care instructions adapted under license by [facility] ([facility]). If you have questions about a medical condition or this instruction, always ask your healthcare professional. [address] any warranty or liability for your use of this information. No flowsheet data found.     Electronically signed by JOE Deutsch on5/23/2019 at 3:38 PM

## 2019-07-08 ENCOUNTER — TELEPHONE (OUTPATIENT)
Dept: PRIMARY CARE CLINIC | Age: 44
End: 2019-07-08

## 2019-07-08 ENCOUNTER — OFFICE VISIT (OUTPATIENT)
Dept: PRIMARY CARE CLINIC | Age: 44
End: 2019-07-08
Payer: MEDICAID

## 2019-07-08 VITALS
BODY MASS INDEX: 24.24 KG/M2 | OXYGEN SATURATION: 97 % | SYSTOLIC BLOOD PRESSURE: 120 MMHG | HEIGHT: 72 IN | HEART RATE: 80 BPM | WEIGHT: 179 LBS | DIASTOLIC BLOOD PRESSURE: 70 MMHG | TEMPERATURE: 98.4 F

## 2019-07-08 DIAGNOSIS — E11.9 TYPE 2 DIABETES MELLITUS WITHOUT COMPLICATION, WITHOUT LONG-TERM CURRENT USE OF INSULIN (HCC): ICD-10-CM

## 2019-07-08 DIAGNOSIS — E11.9 TYPE 2 DIABETES MELLITUS WITHOUT COMPLICATION, WITHOUT LONG-TERM CURRENT USE OF INSULIN (HCC): Primary | ICD-10-CM

## 2019-07-08 DIAGNOSIS — N52.9 ORGANIC ERECTILE DYSFUNCTION: ICD-10-CM

## 2019-07-08 LAB
ALBUMIN SERPL-MCNC: 4.5 G/DL (ref 3.5–5.2)
ALP BLD-CCNC: 96 U/L (ref 40–130)
ALT SERPL-CCNC: 38 U/L (ref 5–41)
ANION GAP SERPL CALCULATED.3IONS-SCNC: 15 MMOL/L (ref 7–19)
AST SERPL-CCNC: 30 U/L (ref 5–40)
BASOPHILS ABSOLUTE: 0.1 K/UL (ref 0–0.2)
BASOPHILS RELATIVE PERCENT: 1 % (ref 0–1)
BILIRUB SERPL-MCNC: 0.3 MG/DL (ref 0.2–1.2)
BUN BLDV-MCNC: 20 MG/DL (ref 6–20)
CALCIUM SERPL-MCNC: 9.6 MG/DL (ref 8.6–10)
CHLORIDE BLD-SCNC: 100 MMOL/L (ref 98–111)
CHOLESTEROL, TOTAL: 202 MG/DL (ref 160–199)
CO2: 24 MMOL/L (ref 22–29)
CREAT SERPL-MCNC: 0.8 MG/DL (ref 0.5–1.2)
CREATININE URINE: 63.4 MG/DL (ref 4.2–622)
EOSINOPHILS ABSOLUTE: 0.2 K/UL (ref 0–0.6)
EOSINOPHILS RELATIVE PERCENT: 3.1 % (ref 0–5)
GFR NON-AFRICAN AMERICAN: >60
GLUCOSE BLD-MCNC: 350 MG/DL (ref 74–109)
HBA1C MFR BLD: 10.5 % (ref 4–6)
HCT VFR BLD CALC: 45.1 % (ref 42–52)
HDLC SERPL-MCNC: 34 MG/DL (ref 55–121)
HEMOGLOBIN: 15.2 G/DL (ref 14–18)
LDL CHOLESTEROL CALCULATED: ABNORMAL MG/DL
LDL CHOLESTEROL DIRECT: 100 MG/DL
LYMPHOCYTES ABSOLUTE: 1.1 K/UL (ref 1.1–4.5)
LYMPHOCYTES RELATIVE PERCENT: 19.3 % (ref 20–40)
MCH RBC QN AUTO: 29.7 PG (ref 27–31)
MCHC RBC AUTO-ENTMCNC: 33.7 G/DL (ref 33–37)
MCV RBC AUTO: 88.3 FL (ref 80–94)
MICROALBUMIN UR-MCNC: 6 MG/DL (ref 0–19)
MICROALBUMIN/CREAT UR-RTO: 94.6 MG/G
MONOCYTES ABSOLUTE: 0.5 K/UL (ref 0–0.9)
MONOCYTES RELATIVE PERCENT: 8.2 % (ref 0–10)
NEUTROPHILS ABSOLUTE: 4 K/UL (ref 1.5–7.5)
NEUTROPHILS RELATIVE PERCENT: 67.7 % (ref 50–65)
PDW BLD-RTO: 12 % (ref 11.5–14.5)
PLATELET # BLD: 175 K/UL (ref 130–400)
PMV BLD AUTO: 11 FL (ref 9.4–12.4)
POTASSIUM SERPL-SCNC: 4.9 MMOL/L (ref 3.5–5)
RBC # BLD: 5.11 M/UL (ref 4.7–6.1)
SODIUM BLD-SCNC: 139 MMOL/L (ref 136–145)
T4 FREE: 1.4 NG/DL (ref 0.9–1.7)
TESTOSTERONE TOTAL: 215.7 NG/DL (ref 249–836)
TOTAL PROTEIN: 7.7 G/DL (ref 6.6–8.7)
TRIGL SERPL-MCNC: 514 MG/DL (ref 0–149)
TSH SERPL DL<=0.05 MIU/L-ACNC: 1.37 UIU/ML (ref 0.27–4.2)
WBC # BLD: 5.9 K/UL (ref 4.8–10.8)

## 2019-07-08 PROCEDURE — 99213 OFFICE O/P EST LOW 20 MIN: CPT | Performed by: PEDIATRICS

## 2019-07-08 ASSESSMENT — ENCOUNTER SYMPTOMS
SORE THROAT: 1
ALLERGIC/IMMUNOLOGIC NEGATIVE: 1
GASTROINTESTINAL NEGATIVE: 1
COUGH: 1
EYES NEGATIVE: 1

## 2019-07-08 NOTE — PROGRESS NOTES
[address] [facility], [address]  Phone [phone]   Fax [phone]      OFFICE VISIT: 2019    Mateo Coelho-: 1975      HPI  Reason For Visit:  Mateo is a 38 y.o. Health Maintenance    Follow-up (Patient is here for follow up for DM and to go over his lab work. ) and Diabetes (Patient has not checked his blood sugar in a few days)      Diabetes Mellitus Type 2  Diet compliance:  compliant most of the time  Nutrition Consultation Needed:  no  Medication:              Metformin 1000 mg twice daily  Medication compliance:  compliant most of the time  Weight trend: stable  Current exercise: yes - life in general  Checkin times daily  Home blood sugar records: unknown  Low BG:  no  Eye exam current (within one year): yes . Checking Feet regularly:  yes - no sores  ACE/ARB:  No, but no need  Aspirin: No:   Tobacco history: He  reports that he has never smoked. He has never used smokeless tobacco.    Lab Results   Component Value Date    LABA1C 10.5 (H) 2019     Lab Results   Component Value Date    LABMICR 6.00 2019    CREATININE 0.8 2019       Low testosterone:  Medication:   He is not on any medication for this at this point time. Symptoms:none       height is 6' (1.829 m) and weight is 179 lb (81.2 kg). His temporal temperature is 98.4 °F (36.9 °C). His blood pressure is 120/70 and his pulse is 80. His oxygen saturation is 97%. Body mass index is 24.28 kg/m². I have reviewed the following with the Mr. Coelho   Lab Review  Orders Only on 2019   Component Date Value    Testosterone 2019 215.7*    Microalbumin, Random Uri* 2019 6.00     Creatinine, Ur 2019 63.4     Microalbumin Creatinine * 2019 94.6     TSH 2019 1.370     T4 Free 2019 1.4     Cholesterol, Total 2019 202*    Triglycerides 2019 514*    HDL 2019 34*    LDL Calculated 2019 see below     Hemoglobin A1C 07/08/2019 10.5*    Sodium 07/08/2019 139     Potassium 07/08/2019 4.9     Chloride 07/08/2019 100     CO2 07/08/2019 24     Anion Gap 07/08/2019 15     Glucose 07/08/2019 350*    BUN 07/08/2019 20     CREATININE 07/08/2019 0.8     GFR Non- 07/08/2019 >60     Calcium 07/08/2019 9.6     Total Protein 07/08/2019 7.7     Alb 07/08/2019 4.5     Total Bilirubin 07/08/2019 0.3     Alkaline Phosphatase 07/08/2019 96     ALT 07/08/2019 38     AST 07/08/2019 30     WBC 07/08/2019 5.9     RBC 07/08/2019 5.11     Hemoglobin 07/08/2019 15.2     Hematocrit 07/08/2019 45.1     MCV 07/08/2019 88.3     MCH 07/08/2019 29.7     MCHC 07/08/2019 33.7     RDW 07/08/2019 12.0     Platelets 14/15/2268 175     MPV 07/08/2019 11.0     Neutrophils % 07/08/2019 67.7*    Lymphocytes % 07/08/2019 19.3*    Monocytes % 07/08/2019 8.2     Eosinophils % 07/08/2019 3.1     Basophils % 07/08/2019 1.0     Neutrophils # 07/08/2019 4.0     Lymphocytes # 07/08/2019 1.1     Monocytes # 07/08/2019 0.50     Eosinophils # 07/08/2019 0.20     Basophils # 07/08/2019 0.10     LDL Direct 07/08/2019 100    Office Visit on 04/17/2019   Component Date Value    Strep A Ag 04/17/2019 None Detected      Copies of these are in the chart. Current Outpatient Medications   Medication Sig Dispense Refill    Semaglutide 0.25 or 0.5 MG/DOSE SOPN Inject 0.25 mg into the skin every 7 days 1 pen 11    fluticasone (FLONASE) 50 MCG/ACT nasal spray 2 sprays by Nasal route daily 1 Bottle 3    UNABLE TO FIND Take 1 capsule by mouth daily Ashwagandha      metFORMIN (GLUCOPHAGE) 1000 MG tablet Take 1 tablet by mouth 2 times daily 180 tablet 3    sildenafil (VIAGRA) 100 MG tablet Take 1 tablet by mouth as needed for Erectile Dysfunction 10 tablet 3     No current facility-administered medications for this visit. Allergies: Patient has no known allergies.      Past Medical History:   Diagnosis Date    Type 2 diabetes tenderness. Musculoskeletal: Normal range of motion. He exhibits no edema or tenderness. Lymphadenopathy:     He has no cervical adenopathy. Neurological: He is alert and oriented to person, place, and time. He exhibits normal muscle tone. Skin: Skin is warm and dry. No rash noted. Nursing note and vitals reviewed. ASSESSMENT      ICD-10-CM    1. Type 2 diabetes mellitus without complication, without long-term current use of insulin (HCC) E11.9        PLAN      ICD-10-CM    1. Type 2 diabetes mellitus without complication, without long-term current use of insulin (HCC) E11.9 Trial of ozempic. Sample and prescription given. This will be in addition to his metformin. We did discuss the need to assess sugars more frequently. We also discussed the possibility of type 1 diabetes or latent autoimmune diabetes. No orders of the defined types were placed in this encounter. Return in about 3 months (around 10/8/2019) for 30.

## 2019-08-08 ENCOUNTER — TELEPHONE (OUTPATIENT)
Dept: PRIMARY CARE CLINIC | Age: 44
End: 2019-08-08

## 2019-11-06 ENCOUNTER — OFFICE VISIT (OUTPATIENT)
Dept: PRIMARY CARE CLINIC | Age: 44
End: 2019-11-06
Payer: MEDICAID

## 2019-11-06 VITALS
SYSTOLIC BLOOD PRESSURE: 136 MMHG | TEMPERATURE: 98.1 F | HEIGHT: 72 IN | HEART RATE: 92 BPM | WEIGHT: 176 LBS | DIASTOLIC BLOOD PRESSURE: 86 MMHG | OXYGEN SATURATION: 98 % | BODY MASS INDEX: 23.84 KG/M2

## 2019-11-06 DIAGNOSIS — R79.89 LOW TESTOSTERONE IN MALE: ICD-10-CM

## 2019-11-06 DIAGNOSIS — E11.9 TYPE 2 DIABETES MELLITUS WITHOUT COMPLICATION, WITHOUT LONG-TERM CURRENT USE OF INSULIN (HCC): Primary | ICD-10-CM

## 2019-11-06 DIAGNOSIS — E78.1 HYPERTRIGLYCERIDEMIA: ICD-10-CM

## 2019-11-06 DIAGNOSIS — Z91.09 ENVIRONMENTAL ALLERGIES: ICD-10-CM

## 2019-11-06 PROCEDURE — 99213 OFFICE O/P EST LOW 20 MIN: CPT | Performed by: PEDIATRICS

## 2019-11-06 RX ORDER — CETIRIZINE HYDROCHLORIDE 10 MG/1
10 TABLET ORAL DAILY
Qty: 30 TABLET | Refills: 3 | Status: SHIPPED | OUTPATIENT
Start: 2019-11-06 | End: 2019-12-06

## 2019-11-06 ASSESSMENT — ENCOUNTER SYMPTOMS
SORE THROAT: 0
ALLERGIC/IMMUNOLOGIC NEGATIVE: 1
COUGH: 0
EYES NEGATIVE: 1
GASTROINTESTINAL NEGATIVE: 1

## 2020-02-07 ENCOUNTER — NURSE TRIAGE (OUTPATIENT)
Dept: CALL CENTER | Facility: HOSPITAL | Age: 45
End: 2020-02-07

## 2020-02-07 NOTE — TELEPHONE ENCOUNTER
He accidentally took his wife Keppra 1000mg- Advised to call poison control. He was given the phone number. He will call them.     Reason for Disposition  • Triager unable to answer question    Additional Information  • Negative: Severe difficulty breathing (e.g., struggling for each breath, speaks in single words)  • Negative: Bluish (or gray) lips or face now  • Negative: Seizure  • Negative: Difficult to awaken or acting confused (e.g., disoriented, slurred speech)  • Negative: Shock suspected (e.g., cold/pale/clammy skin, too weak to stand, low BP, rapid pulse)  • Negative: [1] Intentional overdose AND [2] suicidal thoughts or ideas  • Negative: Suicide attempt, known or suspected  • Negative: Sounds like a life-threatening emergency to the triager  • Negative: Inhalation of smoke or fumes  • Negative: Carbon monoxide exposure, known or suspected  • Negative: Chemical in the eye  • Negative: Chemical on the skin  • Negative: Swallowed a (non-poisonous) foreign body  • Negative: [1] HARMFUL SUBSTANCE or ACID or ALKALI ingestion (e.g., toilet , drain , lye, Clinitest tablets, ammonia, bleaches) AND [2] any symptoms (e.g., mouth pain, sore throat, breathing difficulty)  • Negative: [1] PETROLEUM PRODUCT ingestion (e.g., kerosene, gasoline, benzene, furniture polish, lighter fluid) AND [2] any symptoms (e.g., breathing difficulty, coughing, vomiting)  • Negative: [1] Poison Center advised caller to go to ED AND [2] caller seeking second opinion  • Negative: Patient sounds very sick or weak to the triager  • Negative: [1] HARMFUL SUBSTANCE or ACID or ALKALI ingestion (e.g., toilet , drain , lye, Clinitest tablets, ammonia, bleaches) AND [2] NO symptoms  • Negative: [1] PETROLEUM PRODUCT ingestion (e.g., kerosene, gasoline, benzene, furniture polish, lighter fluid) AND [2] NO symptoms  • Negative: DOUBLE DOSE (an extra dose or lesser amount) of prescription drug  • Negative: [1] DOUBLE  "DOSE (an extra dose or lesser amount) of over-the-counter (OTC) drug AND [2] any symptoms (e.g., dizziness, nausea, pain, sleepiness)  • Negative: Mercury spill (e.g., broken glass thermometer, broken spiral CFL lightbulb)  • Negative: Patient or caller provides unclear information about type or amount of substance  • Negative: All OTHER POTENTIALLY HARMFUL SUBSTANCES (e.g., nearly all chemicals, plants, more than a double dose of a drug, took someone else's medicine)    Answer Assessment - Initial Assessment Questions  1. SUBSTANCE: \"What was swallowed?\" If necessary, have the caller look at the label on the container.       He swallowed a Keppra   2. AMOUNT: \"How much was swallowed?\" (Err on the side of recording the maximal amount that is missing)       1000mg   3. ONSET: \"When was it probably swallowed?\" (Minutes or hours ago)       5 minutes ago   4. SYMPTOMS: \"Do you have any symptoms?\" If so, ask: \"What are they?\" (e.g., abdominal pain, vomiting, weakness)       None   5. SUICIDAL: \"Did you take this to hurt or kill yourself?\"      n  6. PREGNANCY: \"Is there any chance you are pregnant?\" \"When was your last menstrual period?\"      n/a    Protocols used: POISONING-ADULT-AH      "

## 2020-03-10 NOTE — TELEPHONE ENCOUNTER
Pt seen 11/6/19.     Requested Prescriptions     Pending Prescriptions Disp Refills    metFORMIN (GLUCOPHAGE) 1000 MG tablet [Pharmacy Med Name: metFORMIN HCl 1000 MG Oral Tablet] 180 tablet 0     Sig: Take 1 tablet by mouth twice daily

## 2020-09-28 ENCOUNTER — OFFICE VISIT (OUTPATIENT)
Dept: PRIMARY CARE CLINIC | Age: 45
End: 2020-09-28
Payer: MEDICAID

## 2020-09-28 VITALS
HEART RATE: 101 BPM | BODY MASS INDEX: 25.33 KG/M2 | WEIGHT: 187 LBS | HEIGHT: 72 IN | TEMPERATURE: 97.9 F | OXYGEN SATURATION: 98 %

## 2020-09-28 PROCEDURE — 99214 OFFICE O/P EST MOD 30 MIN: CPT | Performed by: NURSE PRACTITIONER

## 2020-09-28 ASSESSMENT — PATIENT HEALTH QUESTIONNAIRE - PHQ9
SUM OF ALL RESPONSES TO PHQ QUESTIONS 1-9: 0
SUM OF ALL RESPONSES TO PHQ9 QUESTIONS 1 & 2: 0
2. FEELING DOWN, DEPRESSED OR HOPELESS: 0
1. LITTLE INTEREST OR PLEASURE IN DOING THINGS: 0
SUM OF ALL RESPONSES TO PHQ QUESTIONS 1-9: 0

## 2020-09-28 ASSESSMENT — ENCOUNTER SYMPTOMS
COUGH: 1
SORE THROAT: 0
EYE REDNESS: 0
WHEEZING: 0
NAUSEA: 1
RHINORRHEA: 1
CHOKING: 0
DIARRHEA: 0
CONSTIPATION: 0
EYE DISCHARGE: 0
BLOOD IN STOOL: 0

## 2020-09-28 NOTE — PROGRESS NOTES
[address], [address] [facility]  Phone [phone]   Fax [phone]      OFFICE VISIT: 9/28/2020    Carroll Delaney is a 54 y.o. male who presents today for his medical conditions/complaints as noted below. Carroll Delaney isc/o of Cough; Fatigue; Otalgia (ringing in both of his ears, been happening for a while now on and off); and Nausea (pt was nauseous this morning)        :     HPI   Cough; Fatigue; Otalgia (ringing in both of his ears, been happening for a while now on and off); and Nausea (pt was nauseous this morning)  Chills. Has felt feverish. Past Medical History:   Diagnosis Date    Type 2 diabetes mellitus without complication (HCC)       Past Surgical History:   Procedure Laterality Date    WISDOM TOOTH EXTRACTION         Family History   Problem Relation Age of Onset    Diabetes Father     Heart Disease Maternal Grandmother     Diabetes Maternal Grandfather     Diabetes Paternal Grandmother     Heart Disease Paternal Grandmother     Cancer Paternal Grandmother        Social History     Tobacco Use    Smoking status: Never Smoker    Smokeless tobacco: Never Used   Substance Use Topics    Alcohol use: Yes     Frequency: Monthly or less      Current Outpatient Medications   Medication Sig Dispense Refill    metFORMIN (GLUCOPHAGE) 1000 MG tablet Take 1 tablet by mouth 2 times daily (with meals) 180 tablet 1    sildenafil (VIAGRA) 100 MG tablet Take 1 tablet by mouth as needed for Erectile Dysfunction 10 tablet 3    Semaglutide 0.25 or 0.5 MG/DOSE SOPN Inject 0.25 mg into the skin every 7 days (Patient not taking: Reported on 9/28/2020) 1 pen 11     No current facility-administered medications for this visit.       No Known Allergies    Health Maintenance   Topic Date Due    Statin Therapy  1975    Pneumococcal 0-64 years Vaccine (1 of 1 - PPSV23) 04/01/1980    Diabetic foot exam  04/01/1984    Diabetic retinal exam  04/01/1984    HIV screen  04/01/1989    Hepatitis B vaccine (1 of 3 - Risk 3-dose series) 04/01/1993    DTaP/Tdap/Td vaccine (1 - Tdap) 04/01/1993    A1C test (Diabetic or Prediabetic)  07/08/2020    Diabetic microalbuminuria test  07/08/2020    Lipid screen  07/08/2020    Flu vaccine (1) 09/01/2020    Hepatitis A vaccine  Aged Out    Hib vaccine  Aged Out    Meningococcal (ACWY) vaccine  Aged Out        :     Review of Systems   Constitutional: Positive for appetite change, chills, diaphoresis and fatigue. Negative for unexpected weight change. HENT: Positive for rhinorrhea. Negative for congestion, ear pain and sore throat. Eyes: Negative for discharge and redness. Respiratory: Positive for cough. Negative for choking and wheezing. Cardiovascular: Negative for chest pain. Gastrointestinal: Positive for nausea. Negative for blood in stool, constipation and diarrhea. Genitourinary: Negative for decreased urine volume and dysuria. Skin: Negative for rash. Neurological: Negative for weakness. Hematological: Negative for adenopathy. Psychiatric/Behavioral: Negative for suicidal ideas.       :     Physical Exam  Vitals signs reviewed. Constitutional:       Appearance: He is well-developed. HENT:      Head: Normocephalic. Eyes:      Conjunctiva/sclera: Conjunctivae normal.   Neck:      Musculoskeletal: Normal range of motion and neck supple. Cardiovascular:      Rate and Rhythm: Normal rate and regular rhythm. Heart sounds: Normal heart sounds. Pulmonary:      Effort: Pulmonary effort is normal.      Breath sounds: Normal breath sounds. Abdominal:      Palpations: Abdomen is soft. Skin:     General: Skin is warm and dry. Neurological:      Mental Status: He is alert and oriented to person, place, and time.    Psychiatric:         Behavior: Behavior normal.       Pulse 101   Temp 97.9 °F (36.6 °C) (Infrared)   Ht 6' (1.829 m)   Wt 187 lb (84.8 kg)   SpO2 98%   BMI 25.36 kg/m²     :        ICD-10-CM    1. Viral infection  B34.9 hospitalized  and   People with confirmed COVID-19 who were hospitalized and determined to be medically stable to go home    Your healthcare provider and public health staff will evaluate whether you can be cared for at home. If it is determined that you do not need to be hospitalized and can be isolated at home, you will be monitored by staff from your local or state health department. You should follow the prevention steps below until a healthcare provider or local or state health department says you can return to your normal activities. Stay home except to get medical care  People who are mildly ill with COVID-19 are able to isolate at home during their illness. You should restrict activities outside your home, except for getting medical care. Do not go to work, school, or public areas. Avoid using public transportation, ride-sharing, or taxis. Separate yourself from other people and animals in your home  People: As much as possible, you should stay in a specific room and away from other people in your home. Also, you should use a separate bathroom, if available. Animals: You should restrict contact with pets and other animals while you are sick with COVID-19, just like you would around other people. Although there have not been reports of pets or other animals becoming sick with COVID-19, it is still recommended that people sick with COVID-19 limit contact with animals until more information is known about the virus. When possible, have another member of your household care for your animals while you are sick. If you are sick with COVID-19, avoid contact with your pet, including petting, snuggling, being kissed or licked, and sharing food. If you must care for your pet or be around animals while you are sick, wash your hands before and after you interact with pets and wear a facemask.   Call ahead before visiting your doctor  If you have a medical appointment, call the healthcare provider and tell them that you have or may have COVID-19. This will help the healthcare providers office take steps to keep other people from getting infected or exposed. Wear a facemask  You should wear a facemask when you are around other people (e.g., sharing a room or vehicle) or pets and before you enter a healthcare providers office. If you are not able to wear a facemask (for example, because it causes trouble breathing), then people who live with you should not stay in the same room with you, or they should wear a facemask if they enter your room. Cover your coughs and sneezes  Cover your mouth and nose with a tissue when you cough or sneeze. Throw used tissues in a lined trash can. Immediately wash your hands with soap and water for at least 20 seconds or, if soap and water are not available, clean your hands with an alcohol-based hand  that contains at least 60% alcohol. Clean your hands often  Wash your hands often with soap and water for at least 20 seconds, especially after blowing your nose, coughing, or sneezing; going to the bathroom; and before eating or preparing food. If soap and water are not readily available, use an alcohol-based hand  with at least 60% alcohol, covering all surfaces of your hands and rubbing them together until they feel dry. Soap and water are the best option if hands are visibly dirty. Avoid touching your eyes, nose, and mouth with unwashed hands. Avoid sharing personal household items  You should not share dishes, drinking glasses, cups, eating utensils, towels, or bedding with other people or pets in your home. After using these items, they should be washed thoroughly with soap and water. Clean all high-touch surfaces everyday  High touch surfaces include counters, tabletops, doorknobs, bathroom fixtures, toilets, phones, keyboards, tablets, and bedside tables. Also, clean any surfaces that may have blood, stool, or body fluids on them.  Use a household cleaning spray or wipe, according to the label instructions. Labels contain instructions for safe and effective use of the cleaning product including precautions you should take when applying the product, such as wearing gloves and making sure you have good ventilation during use of the product. Monitor your symptoms  Seek prompt medical attention if your illness is worsening (e.g., difficulty breathing). Before seeking care, call your healthcare provider and tell them that you have, or are being evaluated for, COVID-19. Put on a facemask before you enter the facility. These steps will help the healthcare providers office to keep other people in the office or waiting room from getting infected or exposed. Ask your healthcare provider to call the local or state health department. Persons who are placed under active monitoring or facilitated self-monitoring should follow instructions provided by their local health department or occupational health professionals, as appropriate. When working with your local health department check their available hours. If you have a medical emergency and need to call 911, notify the dispatch personnel that you have, or are being evaluated for COVID-19. If possible, put on a facemask before emergency medical services arrive. Discontinuing home isolation  Patients with confirmed COVID-19 should remain under home isolation precautions until the risk of secondary transmission to others is thought to be low. The decision to discontinue home isolation precautions should be made on a case-by-case basis, in consultation with healthcare providers and state and local health departments.               Electronically signed by JOE Sexton on9/28/2020 at 4:36 PM

## 2020-09-30 LAB — SARS-COV-2, NAA: NOT DETECTED

## 2020-10-01 ENCOUNTER — TELEPHONE (OUTPATIENT)
Dept: PRIMARY CARE CLINIC | Age: 45
End: 2020-10-01

## 2020-11-16 NOTE — TELEPHONE ENCOUNTER
Received fax from pharmacy requesting refill on pts medication(s). Pt was last seen in office on 9/28/2020  and has a follow up scheduled for Visit date not found. Will send request to  Dr. Bayron Arredondo  for authorization.      Requested Prescriptions     Pending Prescriptions Disp Refills    metFORMIN (GLUCOPHAGE) 1000 MG tablet [Pharmacy Med Name: metFORMIN HCl 1000 MG Oral Tablet] 180 tablet 0     Sig: TAKE 1 TABLET BY MOUTH TWICE DAILY WITH MEALS

## 2020-11-25 PROCEDURE — U0003 INFECTIOUS AGENT DETECTION BY NUCLEIC ACID (DNA OR RNA); SEVERE ACUTE RESPIRATORY SYNDROME CORONAVIRUS 2 (SARS-COV-2) (CORONAVIRUS DISEASE [COVID-19]), AMPLIFIED PROBE TECHNIQUE, MAKING USE OF HIGH THROUGHPUT TECHNOLOGIES AS DESCRIBED BY CMS-2020-01-R: HCPCS | Performed by: NURSE PRACTITIONER

## 2021-03-23 DIAGNOSIS — E11.9 TYPE 2 DIABETES MELLITUS WITHOUT COMPLICATION, WITHOUT LONG-TERM CURRENT USE OF INSULIN (HCC): ICD-10-CM

## 2021-03-23 NOTE — TELEPHONE ENCOUNTER
Received fax from pharmacy requesting refill on pts medication(s). Pt was last seen in office on 9/28/2020  and has a follow up scheduled for Visit date not found. Will send request to  Dr. Jackelin De La Fuente  for patient.      Requested Prescriptions     Pending Prescriptions Disp Refills    metFORMIN (GLUCOPHAGE) 1000 MG tablet [Pharmacy Med Name: metFORMIN HCl 1000 MG Oral Tablet] 60 tablet 0     Sig: TAKE 1 TABLET BY MOUTH TWICE DAILY WITH MEALS --MUST  HAVE  APPOINTMENT  FOR  ADDITIONAL  REFILLS

## 2021-03-25 DIAGNOSIS — E11.9 TYPE 2 DIABETES MELLITUS WITHOUT COMPLICATION, WITHOUT LONG-TERM CURRENT USE OF INSULIN (HCC): ICD-10-CM

## 2021-03-25 NOTE — TELEPHONE ENCOUNTER
Called to reschedule pts apt from tomorrow to next week. Pt reports he is out of his metformin.  Will send this in for pt     Requested Prescriptions     Signed Prescriptions Disp Refills    metFORMIN (GLUCOPHAGE) 1000 MG tablet 60 tablet 0     Sig: Take 1 tablet by mouth 2 times daily (with meals)     Authorizing Provider: Horace Waddell     Ordering User: Pamela Strange

## 2021-03-30 ENCOUNTER — VIRTUAL VISIT (OUTPATIENT)
Dept: PRIMARY CARE CLINIC | Age: 46
End: 2021-03-30
Payer: MEDICAID

## 2021-03-30 DIAGNOSIS — E11.41 TYPE 2 DIABETES MELLITUS WITH DIABETIC MONONEUROPATHY, WITH LONG-TERM CURRENT USE OF INSULIN (HCC): Primary | ICD-10-CM

## 2021-03-30 DIAGNOSIS — Z79.4 TYPE 2 DIABETES MELLITUS WITH DIABETIC MONONEUROPATHY, WITH LONG-TERM CURRENT USE OF INSULIN (HCC): Primary | ICD-10-CM

## 2021-03-30 DIAGNOSIS — N52.9 ED (ERECTILE DYSFUNCTION) OF ORGANIC ORIGIN: ICD-10-CM

## 2021-03-30 DIAGNOSIS — I10 ESSENTIAL HYPERTENSION: ICD-10-CM

## 2021-03-30 PROCEDURE — 99214 OFFICE O/P EST MOD 30 MIN: CPT | Performed by: PEDIATRICS

## 2021-03-30 ASSESSMENT — ENCOUNTER SYMPTOMS
SORE THROAT: 0
GASTROINTESTINAL NEGATIVE: 1
COUGH: 0
ALLERGIC/IMMUNOLOGIC NEGATIVE: 1
EYES NEGATIVE: 1

## 2021-03-30 NOTE — PROGRESS NOTES
[facility], [address]  Phone [phone]   Fax [phone]      OFFICE VISIT: 3/30/2021    Tonya Coelho-: 1975      HPI  Reason For Visit:  Tonya Wells is a 54 y.o. Diabetes      Diabetes Mellitus Type 2  Diet compliance:  compliant most of the time, but he gets carried away at times. Nutrition Consultation Needed:  no  Medication:              Metformin 1000 mg twice daily              He was on Ozempic, but he stopped due to abdominal distention and nausea. Medication compliance:  compliant most of the time other than ozempic  Weight trend: stable by self report  Current exercise: yes - life in general  Checkin times daily. He does not check. Home blood sugar records: unknown  Low BG:  no  Eye exam current (within one year): yes . Checking Feet regularly:  yes - no sores  ACE/ARB:  No, but no need  Aspirin: No:     Tobacco history: He  reports that he has never smoked. He has never used smokeless tobacco.    Lab Results   Component Value Date    LABA1C 10.5 (H) 2019     Lab Results   Component Value Date    LABMICR 6.00 2019    CREATININE 0.8 2019       Hypertension:   BP today was   BP Readings from Last 1 Encounters:   19 136/86      Recent BP readings:    BP Readings from Last 3 Encounters:   19 136/86   19 120/70   19 137/88     Medication   none  Home blood pressure monitoring: No.    He is not adherent to a low sodium diet. Symptoms: none  Laboratory:  Lab Results   Component Value Date    BUN 20 2019    CREATININE 0.8 2019        vitals were not taken for this visit. There is no height or weight on file to calculate BMI. I have reviewed the following with the Mr. Coelho   Lab Review  No visits with results within 6 Month(s) from this visit.    Latest known visit with results is:   Office Visit on 2020   Component Date Value    SARS-CoV-2, YFN 2020 NOT DETECTED      Copies of these are in the chart. Current Outpatient Medications   Medication Sig Dispense Refill    metFORMIN (GLUCOPHAGE) 1000 MG tablet Take 1 tablet by mouth 2 times daily (with meals) 60 tablet 0    sildenafil (VIAGRA) 100 MG tablet Take 1 tablet by mouth as needed for Erectile Dysfunction 10 tablet 3     No current facility-administered medications for this visit. Allergies: Patient has no known allergies. Past Medical History:   Diagnosis Date    Type 2 diabetes mellitus without complication ([facility] [address]        Family History   Problem Relation Age of Onset    Diabetes Father     Heart Disease Maternal Grandmother     Diabetes Maternal Grandfather     Diabetes Paternal Grandmother     Heart Disease Paternal Grandmother     Cancer Paternal Grandmother        Past Surgical History:   Procedure Laterality Date    WISDOM TOOTH EXTRACTION         Social History     Tobacco Use    Smoking status: Never Smoker    Smokeless tobacco: Never Used   Substance Use Topics    Alcohol use: Yes     Frequency: Monthly or less        Review of Systems   Constitutional: Positive for fatigue. Negative for fever. HENT: Negative for congestion and sore throat. Eyes: Negative. Respiratory: Negative for cough (occasional). Cardiovascular: Negative. Gastrointestinal: Negative. Endocrine: Negative. Genitourinary: Negative. Musculoskeletal: Negative. Skin: Negative. Allergic/Immunologic: Negative. Neurological: Negative. Hematological: Negative. Psychiatric/Behavioral: Negative. Physical Exam  Physical exam was not performed today as this was a video teleconference visit using doxy. me      ASSESSMENT      ICD-10-CM    1.  Type 2 diabetes mellitus with diabetic mononeuropathy, with long-term current use of insulin (HCC)  E11.41 Comprehensive Metabolic Panel    R94.7 Hemoglobin A1C     Lipid Panel     Microalbumin / Creatinine Urine Ratio     T4, Free     TSH without Reflex C-Peptide     Glutamic Acid Decarboxylase   2. Essential hypertension  I10 CBC Auto Differential     Comprehensive Metabolic Panel     Lipid Panel     Microalbumin / Creatinine Urine Ratio   3. ED (erectile dysfunction) of organic origin  N52.9          PLAN    1. Type 2 diabetes mellitus with diabetic mononeuropathy, with long-term current use of insulin ([address]  He has not been monitoring at all. He believes that he may actually have type 1 diabetes. We can check some labs to ensure we can evaluate C-peptide and [id number]. We will also monitor diabetes control in general.  It has been a year and a half since she has had any labs done. He is not checking blood sugars at all. Strongly encouraged him to get a glucometer and do this on a regular basis. We are likely going to have to do something besides Metformin given his historical hemoglobin A1c of 10.5 in the past.    - Comprehensive Metabolic Panel; Future  - Hemoglobin A1C; Future  - Lipid Panel; Future  - Microalbumin / Creatinine Urine Ratio; Future  - T4, Free; Future  - TSH without Reflex; Future  - C-Peptide; Future  - Glutamic Acid Decarboxylase; Future    2. Essential hypertension  He is not monitoring blood pressure but when he has it checked at various places, it is running high. He is not on any blood pressure medications at this time. He does not know how high it has been but he states that he had other symptoms that may have been causing discomfort or stress at the time his blood pressure was being evaluated  He attributes this as the cause of his elevated blood pressure.    - CBC Auto Differential; Future  - Comprehensive Metabolic Panel; Future  - Lipid Panel; Future  - Microalbumin / Creatinine Urine Ratio; Future    3. ED (erectile dysfunction) of organic origin  He is doing okay on as needed Viagra. He is not need a refill of this today.       Orders Placed This Encounter   Procedures    CBC Auto Differential    Comprehensive Metabolic Panel    Hemoglobin A1C    Lipid Panel    Microalbumin / Creatinine Urine Ratio    T4, Free    TSH without Reflex    C-Peptide    Glutamic Acid Decarboxylase        Return in about 1 month (around 4/30/2021) for Gabe Monika Coats, was evaluated through a synchronous (real-time) audio-video encounter. The patient (or guardian if applicable) is aware that this is a billable service. Verbal consent to proceed has been obtained within the past 12 months. The visit was conducted pursuant to the emergency declaration under the [address] [place] [address] authority and the [employer] and [employer] Act. Patient identification was verified, and a caregiver was present when appropriate. The patient was located in a state where the provider was credentialed to provide care. Total time spent for this encounter: 30m    --ADWOA Barrera DO on 3/30/2021 at 7:55 AM    An electronic signature was used to authenticate this note.

## 2021-03-30 NOTE — PATIENT INSTRUCTIONS
Patient Education        Learning About Meal Planning for Diabetes  Why plan your meals? Meal planning can be a key part of managing diabetes. Planning meals and snacks with the right balance of carbohydrate, protein, and fat can help you keep your blood sugar at the target level you set with your doctor. You don't have to eat special foods. You can eat what your family eats, including sweets once in a while. But you do have to pay attention to how often you eat and how much you eat of certain foods. You may want to work with a dietitian or a certified diabetes educator. He or she can give you tips and meal ideas and can answer your questions about meal planning. This health professional can also help you reach a healthy weight if that is one of your goals. What plan is right for you? Your dietitian or diabetes educator may suggest that you start with the plate format or carbohydrate counting. The plate format  The plate format is a simple way to help you manage how you eat. You plan meals by learning how much space each food should take on a plate. Using the plate format helps you spread carbohydrate throughout the day. It can make it easier to keep your blood sugar level within your target range. It also helps you see if you're eating healthy portion sizes. To use the plate format, you put non-starchy vegetables on half your plate. Add meat or meat substitutes on one-quarter of the plate. Put a grain or starchy vegetable (such as brown rice or a potato) on the final quarter of the plate. You can add a small piece of fruit and some low-fat or fat-free milk or yogurt, depending on your carbohydrate goal for each meal.  Here are some tips for using the plate format:  · Make sure that you are not using an oversized plate. A 9-inch plate is best. Many restaurants use larger plates. · Get used to using the plate format at home. Then you can use it when you eat out.   · Write down your questions about using the plate format. Talk to your doctor, a dietitian, or a diabetes educator about your concerns. Carbohydrate counting  With carbohydrate counting, you plan meals based on the amount of carbohydrate in each food. Carbohydrate raises blood sugar higher and more quickly than any other nutrient. It is found in desserts, breads and cereals, and fruit. It's also found in starchy vegetables such as potatoes and corn, grains such as rice and pasta, and milk and yogurt. Spreading carbohydrate throughout the day helps keep your blood sugar levels within your target range. Your daily amount depends on several things, including your weight, how active you are, which diabetes medicines you take, and what your goals are for your blood sugar levels. A registered dietitian or diabetes educator can help you plan how much carbohydrate to include in each meal and snack. A guideline for your daily amount of carbohydrate is:  · 45 to 60 grams at each meal. That's about the same as 3 to 4 carbohydrate servings. · 15 to 20 grams at each snack. That's about the same as 1 carbohydrate serving. The Nutrition Facts label on packaged foods tells you how much carbohydrate is in a serving of the food. First, look at the serving size on the food label. Is that the amount you eat in a serving? All of the nutrition information on a food label is based on that serving size. So if you eat more or less than that, you'll need to adjust the other numbers. Total carbohydrate is the next thing you need to look for on the label. If you count carbohydrate servings, one serving of carbohydrate is 15 grams. For foods that don't come with labels, such as fresh fruits and vegetables, you'll need a guide that lists carbohydrate in these foods. Ask your doctor, dietitian, or diabetes educator about books or other nutrition guides you can use.   If you take insulin, you need to know how many grams of carbohydrate are in a meal. This lets you know how much rapid-acting insulin to take before you eat. If you use an insulin pump, you get a constant rate of insulin during the day. So the pump must be programmed at meals to give you extra insulin to cover the rise in blood sugar after meals. When you know how much carbohydrate you will eat, you can take the right amount of insulin. Or, if you always use the same amount of insulin, you need to make sure that you eat the same amount of carbohydrate at meals. If you need more help to understand carbohydrate counting and food labels, ask your doctor, dietitian, or diabetes educator. How can you plan healthy meals? Here are some tips to get started:  · Plan your meals a week at a time. Don't forget to include snacks too. · Use cookbooks or online recipes to plan several main meals. Plan some quick meals for busy nights. You also can double some recipes that freeze well. Then you can save half for other busy nights when you don't have time to cook. · Make sure you have the ingredients you need for your recipes. If you're running low on basic items, put these items on your shopping list too. · List foods that you use to make breakfasts, lunches, and snacks. List plenty of fruits and vegetables. · Post this list on the refrigerator. Add to it as you think of more things you need. · Take the list to the store to do your weekly shopping. Follow-up care is a key part of your treatment and safety. Be sure to make and go to all appointments, and call your doctor if you are having problems. It's also a good idea to know your test results and keep a list of the medicines you take. Where can you learn more? Go to https://nataliyaewanila.Cursa.[email or web address] org and sign in to your Sai [employer] account. Enter C310 in the drchrono box to learn more about \"Learning About Meal Planning for Diabetes. \"     If you do not have an account, please click on the \"Sign Up Now\" link.   Current as of: August 31, 2020               Content Version: 12.8  © 4916-2935 Healthwise, [employer]. Care instructions adapted under license by [facility] ([facility]). If you have questions about a medical condition or this instruction, always ask your healthcare professional. [address] any warranty or liability for your use of this information. Patient Education        Learning About Carbohydrate (Carb) Counting and Eating Out When You Have Diabetes  Why plan your meals? Meal planning can be a key part of managing diabetes. Planning meals and snacks with the right balance of carbohydrate, protein, and fat can help you keep your blood sugar at the target level you set with your doctor. You don't have to eat special foods. You can eat what your family eats, including sweets once in a while. But you do have to pay attention to how often you eat and how much you eat of certain foods. You may want to work with a dietitian or a certified diabetes educator. He or she can give you tips and meal ideas and can answer your questions about meal planning. This health professional can also help you reach a healthy weight if that is one of your goals. What should you know about eating carbs? Managing the amount of carbohydrate (carbs) you eat is an important part of healthy meals when you have diabetes. Carbohydrate is found in many foods. · Learn which foods have carbs. And learn the amounts of carbs in different foods. ? Bread, cereal, pasta, and rice have about 15 grams of carbs in a serving. A serving is 1 slice of bread (1 ounce), ½ cup of cooked cereal, or 1/3 cup of cooked pasta or rice. ? Fruits have 15 grams of carbs in a serving. A serving is 1 small fresh fruit, such as an apple or orange; ½ of a banana; ½ cup of cooked or canned fruit; ½ cup of fruit juice; 1 cup of melon or raspberries; or 2 tablespoons of dried fruit. ? Milk and no-sugar-added yogurt have 15 grams of carbs in a serving.  A serving is 1 cup of milk or 2/3 cup of no-sugar-added yogurt. ? Starchy vegetables have 15 grams of carbs in a serving. A serving is ½ cup of mashed potatoes or sweet potato; 1 cup winter squash; ½ of a small baked potato; ½ cup of cooked beans; or ½ cup cooked corn or green peas. · Learn how much carbs to eat each day and at each meal. A dietitian or CDE can teach you how to keep track of the amount of carbs you eat. This is called carbohydrate counting. · If you are not sure how to count carbohydrate grams, use the Plate Method to plan meals. It is a good, quick way to make sure that you have a balanced meal. It also helps you spread carbs throughout the day. ? Divide your plate by types of foods. Put non-starchy vegetables on half the plate, meat or other protein food on one-quarter of the plate, and a grain or starchy vegetable in the final quarter of the plate. To this you can add a small piece of fruit and 1 cup of milk or yogurt, depending on how many carbs you are supposed to eat at a meal.  · Try to eat about the same amount of carbs at each meal. Do not \"save up\" your daily allowance of carbs to eat at one meal.  · Proteins have very little or no carbs per serving. Examples of proteins are beef, chicken, turkey, fish, eggs, tofu, cheese, cottage cheese, and peanut butter. A serving size of meat is 3 ounces, which is about the size of a deck of cards. Examples of meat substitute serving sizes (equal to 1 ounce of meat) are 1/4 cup of cottage cheese, 1 egg, 1 tablespoon of peanut butter, and ½ cup of tofu. How can you eat out and still eat healthy? · Learn to estimate the serving sizes of foods that have carbohydrate. If you measure food at home, it will be easier to estimate the amount in a serving of restaurant food. · If the meal you order has too much carbohydrate (such as potatoes, corn, or baked beans), ask to have a low-carbohydrate food instead. Ask for a salad or green vegetables.   · If you use insulin, check your blood sugar before and after eating out to help you plan how much to eat in the future. · If you eat more carbohydrate at a meal than you had planned, take a walk or do other exercise. This will help lower your blood sugar. What are some tips for eating healthy? · Limit saturated fat, such as the fat from meat and dairy products. This is a healthy choice because people who have diabetes are at higher risk of heart disease. So choose lean cuts of meat and nonfat or low-fat dairy products. Use olive or canola oil instead of butter or shortening when cooking. · Don't skip meals. Your blood sugar may drop too low if you skip meals and take insulin or certain medicines for diabetes. · Check with your doctor before you drink alcohol. Alcohol can cause your blood sugar to drop too low. Alcohol can also cause a bad reaction if you take certain diabetes medicines. Follow-up care is a key part of your treatment and safety. Be sure to make and go to all appointments, and call your doctor if you are having problems. It's also a good idea to know your test results and keep a list of the medicines you take. Where can you learn more? Go to https://cafegivepepicewTagged.Trist. org and sign in to your Drip In account. Enter Z104 in the [facility] box to learn more about \"Learning About Carbohydrate (Carb) Counting and Eating Out When You Have Diabetes. \"     If you do not have an account, please click on the \"Sign Up Now\" link. Current as of: August 31, 2020               Content Version: 12.8  © 2006-2021 24/[employer] Card. Care instructions adapted under license by [facility] ([facility]). If you have questions about a medical condition or this instruction, always ask your healthcare professional. [place] [address] any warranty or liability for your use of this information.          Patient Education        Elevated Blood Pressure: Care Instructions  Your Care Instructions    Blood pressure is a measure of how hard the blood pushes against the walls of your arteries. It's normal for blood pressure to go up and down throughout the day. But if it stays up over time, you have high blood pressure. Two numbers tell you your blood pressure. The first number is the systolic pressure. It shows how hard the blood pushes when your heart is pumping. The second number is the diastolic pressure. It shows how hard the blood pushes between heartbeats, when your heart is relaxed and filling with blood. An ideal blood pressure in adults is less than 120/80 (say \"120 over 80\"). High blood pressure is 140/90 or higher. You have high blood pressure if your top number is 140 or higher or your bottom number is 90 or higher, or both. The main test for high blood pressure is simple, fast, and painless. To diagnose high blood pressure, your doctor will test your blood pressure at different times. After testing your blood pressure, your doctor may ask you to test it again when you are home. If you are diagnosed with high blood pressure, you can work with your doctor to make a long-term plan to manage it. Follow-up care is a key part of your treatment and safety. Be sure to make and go to all appointments, and call your doctor if you are having problems. It's also a good idea to know your test results and keep a list of the medicines you take. How can you care for yourself at home? · Do not smoke. Smoking increases your risk for heart attack and stroke. If you need help quitting, talk to your doctor about stop-smoking programs and medicines. These can increase your chances of quitting for good. · Stay at a healthy weight. · Try to limit how much sodium you eat to less than 2,300 milligrams (mg) a day. Your doctor may ask you to try to eat less than 1,500 mg a day. · Be physically active. Get at least 30 minutes of exercise on most days of the week. Walking is a good choice.  You also may want to do other activities, such as running, swimming, cycling, or playing tennis or team sports. · Avoid or limit alcohol. Talk to your doctor about whether you can drink any alcohol. · Eat plenty of fruits, vegetables, and low-fat dairy products. Eat less saturated and total fats. · Learn how to check your blood pressure at home. When should you call for help? Call your doctor now or seek immediate medical care if:    · Your blood pressure is much higher than normal (such as 180/110 or higher).     · You think high blood pressure is causing symptoms such as:  ¨ Severe headache. ¨ Blurry vision.    Watch closely for changes in your health, and be sure to contact your doctor if:    · You do not get better as expected. Where can you learn more? Go to https://Catacomb Technologies.Blaze health. org and sign in to your aVinci Media account. Enter T531 in the QualtrÃ© box to learn more about \"Elevated Blood Pressure: Care Instructions. \"     If you do not have an account, please click on the \"Sign Up Now\" link. Current as of: May 10, 2017  Content Version: 11.6  © 6462-3997 What's Trending. Care instructions adapted under license by [facility] ([facility]). If you have questions about a medical condition or this instruction, always ask your healthcare professional. [address] any warranty or liability for your use of this information. Patient Education        High Blood Pressure: Care Instructions  Overview     It's normal for blood pressure to go up and down throughout the day. But if it stays up, you have high blood pressure. Another name for high blood pressure is hypertension. Despite what a lot of people think, high blood pressure usually doesn't cause headaches or make you feel dizzy or lightheaded. It usually has no symptoms. But it does increase your risk of stroke, heart attack, and other problems. You and your doctor will talk about your risks of these problems based on your blood pressure.   Your doctor will give you a goal for your blood pressure. Your goal will be based on your health and your age. Lifestyle changes, such as eating healthy and being active, are always important to help lower blood pressure. You might also take medicine to reach your blood pressure goal.  Follow-up care is a key part of your treatment and safety. Be sure to make and go to all appointments, and call your doctor if you are having problems. It's also a good idea to know your test results and keep a list of the medicines you take. How can you care for yourself at home? Medical treatment  · If you stop taking your medicine, your blood pressure will go back up. You may take one or more types of medicine to lower your blood pressure. Be safe with medicines. Take your medicine exactly as prescribed. Call your doctor if you think you are having a problem with your medicine. · Talk to your doctor before you start taking aspirin every day. Aspirin can help certain people lower their risk of a heart attack or stroke. But taking aspirin isn't right for everyone, because it can cause serious bleeding. · See your doctor regularly. You may need to see the doctor more often at first or until your blood pressure comes down. · If you are taking blood pressure medicine, talk to your doctor before you take decongestants or anti-inflammatory medicine, such as ibuprofen. Some of these medicines can raise blood pressure. · Learn how to check your blood pressure at home. Lifestyle changes  · Stay at a healthy weight. This is especially important if you put on weight around the waist. Losing even 10 pounds can help you lower your blood pressure. · If your doctor recommends it, get more exercise. Walking is a good choice. Bit by bit, increase the amount you walk every day. Try for at least 30 minutes on most days of the week. You also may want to swim, bike, or do other activities. · Avoid or limit alcohol.  Talk to your doctor about whether you can drink any alcohol. · Try to limit how much sodium you eat to less than 2,300 milligrams (mg) a day. Your doctor may ask you to try to eat less than 1,500 mg a day. · Eat plenty of fruits (such as bananas and oranges), vegetables, legumes, whole grains, and low-fat dairy products. · Lower the amount of saturated fat in your diet. Saturated fat is found in animal products such as milk, cheese, and meat. Limiting these foods may help you lose weight and also lower your risk for heart disease. · Do not smoke. Smoking increases your risk for heart attack and stroke. If you need help quitting, talk to your doctor about stop-smoking programs and medicines. These can increase your chances of quitting for good. When should you call for help? Call  911 anytime you think you may need emergency care. This may mean having symptoms that suggest that your blood pressure is causing a serious heart or blood vessel problem. Your blood pressure may be over 180/120. For example, call 911 if:    · You have symptoms of a heart attack. These may include:  ? Chest pain or pressure, or a strange feeling in the chest.  ? Sweating. ? Shortness of breath. ? Nausea or vomiting. ? Pain, pressure, or a strange feeling in the back, neck, jaw, or upper belly or in one or both shoulders or arms. ? Lightheadedness or sudden weakness. ? A fast or irregular heartbeat.     · You have symptoms of a stroke. These may include:  ? Sudden numbness, tingling, weakness, or loss of movement in your face, arm, or leg, especially on only one side of your body. ? Sudden vision changes. ? Sudden trouble speaking. ? Sudden confusion or trouble understanding simple statements. ? Sudden problems with walking or balance. ? A sudden, severe headache that is different from past headaches.     · You have severe back or belly pain. Do not wait until your blood pressure comes down on its own. Get help right away.   Call your doctor now or seek immediate care if:    · Your blood pressure is much higher than normal (such as 180/120 or higher), but you don't have symptoms.     · You think high blood pressure is causing symptoms, such as:  ? Severe headache.  ? Blurry vision. Watch closely for changes in your health, and be sure to contact your doctor if:    · Your blood pressure measures higher than your doctor recommends at least 2 times. That means the top number is higher or the bottom number is higher, or both.     · You think you may be having side effects from your blood pressure medicine. Where can you learn more? Go to https://ImmusanTpeGazelle.Path. org and sign in to your Directa Plus account. Enter F431 in the GetLikeminds box to learn more about \"High Blood Pressure: Care Instructions. \"     If you do not have an account, please click on the \"Sign Up Now\" link. Current as of: August 31, 2020               Content Version: 12.8  © 2006-2021 Big Six. Care instructions adapted under license by [facility] [employer] [facility] ([facility]). If you have questions about a medical condition or this instruction, always ask your healthcare professional. [place] [address] any warranty or liability for your use of this information. Patient Education        Learning About High Blood Pressure  What is high blood pressure? Blood pressure is a measure of how hard the blood pushes against the walls of your arteries. It's normal for blood pressure to go up and down throughout the day. But if it stays up, you have high blood pressure. Another name for high blood pressure is hypertension. Two numbers tell you your blood pressure. The first number is the systolic pressure (top number). It shows how hard the blood pushes when your heart is pumping. The second number is the diastolic pressure (bottom number). It shows how hard the blood pushes between heartbeats, when your heart is relaxed and filling with blood.   Your doctor will give you a goal for your blood pressure based on your health and your age. High blood pressure (hypertension) means that the top number stays high, or the bottom number stays high, or both. High blood pressure increases the risk of stroke, heart attack, and other problems. What happens when you have high blood pressure? · Blood flows through your arteries with too much force. Over time, this can damage the heart and the walls of your arteries. But you can't feel it. High blood pressure usually doesn't cause symptoms. · High blood pressure makes your heart work harder. And that can lead to heart failure, which means your heart doesn't pump as much blood as your body needs. · Fat and calcium start to build up in your arteries. This buildup is called hardening of the arteries. It can cause many problems including a heart attack and stroke. · Arteries also carry blood and oxygen to organs like your eyes, kidneys, and brain. If high blood pressure damages those arteries, it can lead to vision loss, kidney disease, stroke, and a higher risk of dementia. How can you prevent high blood pressure? · Stay at a healthy weight. · Try to limit how much sodium you eat to less than 2,300 milligrams (mg) a day. If you limit your sodium to 1,500 mg a day, you can lower your blood pressure even more. ? Buy foods that are labeled \"unsalted,\" \"sodium-free,\" or \"low-sodium. \" Foods labeled \"reduced-sodium\" and \"light sodium\" may still have too much sodium. ? Flavor your food with garlic, lemon juice, onion, vinegar, herbs, and spices instead of salt. Do not use soy sauce, steak sauce, onion salt, garlic salt, mustard, or ketchup on your food. ? Use less salt (or none) when recipes call for it. You can often use half the salt a recipe calls for without losing flavor. · Be physically active. Get at least 30 minutes of exercise on most days of the week. Walking is a good choice.  You also may want to do other activities, such as running, swimming, cycling, or playing tennis or team sports. · Limit alcohol to 2 drinks a day for men and 1 drink a day for women. · Eat plenty of fruits, vegetables, and low-fat dairy products. Eat less saturated and total fats. How is high blood pressure treated? · Your doctor will suggest making lifestyle changes to help your heart. For example, your doctor may ask you to eat healthy foods, quit smoking, lose extra weight, and be more active. · If lifestyle changes don't help enough, your doctor may recommend that you take medicine. · When blood pressure is very high, medicines are needed to lower it. Follow-up care is a key part of your treatment and safety. Be sure to make and go to all appointments, and call your doctor if you are having problems. It's also a good idea to know your test results and keep a list of the medicines you take. Where can you learn more? Go to [email or web address] and sign in to your [employer] account. Enter P501 in the [employer] box to learn more about \"Learning About High Blood Pressure. \"     If you do not have an account, please click on the \"Sign Up Now\" link. Current as of: August 31, 2020               Content Version: 12.8  © 2006-2021 Healthwise, Incorporated. Care instructions adapted under license by [facility] ([facility]). If you have questions about a medical condition or this instruction, always ask your healthcare professional. [place] [address] any warranty or liability for your use of this information.

## 2021-04-28 DIAGNOSIS — E11.41 TYPE 2 DIABETES MELLITUS WITH DIABETIC MONONEUROPATHY, WITH LONG-TERM CURRENT USE OF INSULIN (HCC): ICD-10-CM

## 2021-04-28 DIAGNOSIS — I10 ESSENTIAL HYPERTENSION: ICD-10-CM

## 2021-04-28 DIAGNOSIS — Z79.4 TYPE 2 DIABETES MELLITUS WITH DIABETIC MONONEUROPATHY, WITH LONG-TERM CURRENT USE OF INSULIN (HCC): ICD-10-CM

## 2021-04-28 LAB
ALBUMIN SERPL-MCNC: 4.2 G/DL (ref 3.5–5.2)
ALP BLD-CCNC: 89 U/L (ref 40–130)
ALT SERPL-CCNC: 27 U/L (ref 5–41)
ANION GAP SERPL CALCULATED.3IONS-SCNC: 15 MMOL/L (ref 7–19)
AST SERPL-CCNC: 22 U/L (ref 5–40)
BASOPHILS ABSOLUTE: 0.1 K/UL (ref 0–0.2)
BASOPHILS RELATIVE PERCENT: 0.9 % (ref 0–1)
BILIRUB SERPL-MCNC: 0.3 MG/DL (ref 0.2–1.2)
BUN BLDV-MCNC: 19 MG/DL (ref 6–20)
CALCIUM SERPL-MCNC: 9.1 MG/DL (ref 8.6–10)
CHLORIDE BLD-SCNC: 93 MMOL/L (ref 98–111)
CHOLESTEROL, TOTAL: 267 MG/DL (ref 160–199)
CO2: 25 MMOL/L (ref 22–29)
CREAT SERPL-MCNC: 0.9 MG/DL (ref 0.5–1.2)
CREATININE URINE: 32.8 MG/DL (ref 4.2–622)
EOSINOPHILS ABSOLUTE: 0.2 K/UL (ref 0–0.6)
EOSINOPHILS RELATIVE PERCENT: 3.2 % (ref 0–5)
GFR AFRICAN AMERICAN: >59
GFR NON-AFRICAN AMERICAN: >60
GLUCOSE BLD-MCNC: 312 MG/DL (ref 74–109)
HBA1C MFR BLD: 11.4 % (ref 4–6)
HCT VFR BLD CALC: 42.5 % (ref 42–52)
HDLC SERPL-MCNC: 34 MG/DL (ref 55–121)
HEMOGLOBIN: 14.1 G/DL (ref 14–18)
IMMATURE GRANULOCYTES #: 0 K/UL
LDL CHOLESTEROL CALCULATED: ABNORMAL MG/DL
LDL CHOLESTEROL DIRECT: 108 MG/DL
LYMPHOCYTES ABSOLUTE: 1.4 K/UL (ref 1.1–4.5)
LYMPHOCYTES RELATIVE PERCENT: 25.1 % (ref 20–40)
MCH RBC QN AUTO: 29.5 PG (ref 27–31)
MCHC RBC AUTO-ENTMCNC: 33.2 G/DL (ref 33–37)
MCV RBC AUTO: 88.9 FL (ref 80–94)
MICROALBUMIN UR-MCNC: 6.6 MG/DL (ref 0–19)
MICROALBUMIN/CREAT UR-RTO: 201.2 MG/G
MONOCYTES ABSOLUTE: 0.4 K/UL (ref 0–0.9)
MONOCYTES RELATIVE PERCENT: 7.1 % (ref 0–10)
NEUTROPHILS ABSOLUTE: 3.6 K/UL (ref 1.5–7.5)
NEUTROPHILS RELATIVE PERCENT: 63.3 % (ref 50–65)
PDW BLD-RTO: 12.3 % (ref 11.5–14.5)
PLATELET # BLD: 162 K/UL (ref 130–400)
PMV BLD AUTO: 11.7 FL (ref 9.4–12.4)
POTASSIUM SERPL-SCNC: 4.6 MMOL/L (ref 3.5–5)
RBC # BLD: 4.78 M/UL (ref 4.7–6.1)
SODIUM BLD-SCNC: 133 MMOL/L (ref 136–145)
T4 FREE: 1.25 NG/DL (ref 0.93–1.7)
TOTAL PROTEIN: 6.9 G/DL (ref 6.6–8.7)
TRIGL SERPL-MCNC: 847 MG/DL (ref 0–149)
TSH SERPL DL<=0.05 MIU/L-ACNC: 0.66 UIU/ML (ref 0.27–4.2)
WBC # BLD: 5.7 K/UL (ref 4.8–10.8)

## 2021-04-30 ENCOUNTER — VIRTUAL VISIT (OUTPATIENT)
Dept: PRIMARY CARE CLINIC | Age: 46
End: 2021-04-30
Payer: MEDICAID

## 2021-04-30 DIAGNOSIS — E11.9 TYPE 2 DIABETES MELLITUS WITHOUT COMPLICATION, WITH LONG-TERM CURRENT USE OF INSULIN (HCC): Primary | ICD-10-CM

## 2021-04-30 DIAGNOSIS — I10 ESSENTIAL HYPERTENSION: ICD-10-CM

## 2021-04-30 DIAGNOSIS — R80.9 MICROALBUMINURIA: ICD-10-CM

## 2021-04-30 DIAGNOSIS — E78.2 MIXED HYPERLIPIDEMIA: ICD-10-CM

## 2021-04-30 DIAGNOSIS — Z79.4 TYPE 2 DIABETES MELLITUS WITHOUT COMPLICATION, WITH LONG-TERM CURRENT USE OF INSULIN (HCC): Primary | ICD-10-CM

## 2021-04-30 PROCEDURE — 99214 OFFICE O/P EST MOD 30 MIN: CPT | Performed by: PEDIATRICS

## 2021-04-30 RX ORDER — LISINOPRIL 5 MG/1
5 TABLET ORAL DAILY
Qty: 30 TABLET | Refills: 0 | Status: SHIPPED | OUTPATIENT
Start: 2021-04-30 | End: 2021-06-01 | Stop reason: SDUPTHER

## 2021-04-30 RX ORDER — INSULIN DEGLUDEC 200 U/ML
25 INJECTION, SOLUTION SUBCUTANEOUS NIGHTLY
Qty: 2 PEN | Refills: 3 | Status: SHIPPED | OUTPATIENT
Start: 2021-04-30 | End: 2021-06-01 | Stop reason: SDUPTHER

## 2021-04-30 RX ORDER — FENOFIBRATE 160 MG/1
160 TABLET ORAL DAILY
Qty: 30 TABLET | Refills: 5 | Status: SHIPPED | OUTPATIENT
Start: 2021-04-30 | End: 2021-05-07 | Stop reason: CLARIF

## 2021-04-30 ASSESSMENT — ENCOUNTER SYMPTOMS
EYES NEGATIVE: 1
ALLERGIC/IMMUNOLOGIC NEGATIVE: 1
COUGH: 0
ABDOMINAL PAIN: 0
GASTROINTESTINAL NEGATIVE: 1
ABDOMINAL DISTENTION: 0
SORE THROAT: 0

## 2021-04-30 NOTE — PROGRESS NOTES
[facility] [facility] [address], [address]  Phone [phone]   Fax [phone]      OFFICE VISIT: 2021    Adal Watts [place]-: 1975      [facility]  Reason For Visit:  Adal Watts is a 51 y.o. Diabetes, Hypertension, and Hyperlipidemia  Patient presents via doxy. me video conferencing on follow-up for his diabetes, hyperlipidemia and elevated blood pressure. He recently had blood work performed and there are some significant concerns    Diabetes Mellitus Type 2  Diet compliance:  compliant most of the time, but he gets carried away at times. Nutrition Consultation Needed:  no  Medication:              Metformin 1000 mg twice daily              He was on Ozempic, but he stopped due to abdominal distention and nausea. Medication compliance:  compliant most of the time other than ozempic  Weight trend: stable by self report  Current exercise: yes - life in general  Checkin times daily. [facility] does not check. Home blood sugar records: unknown  Low BG:  no  Eye exam current (within one year): yes . Checking Feet regularly:  yes - no sores  ACE/ARB:  No, but no need  Aspirin: No:   Tobacco history: He  reports that he has never smoked. He has never used smokeless tobacco.    We are still waiting on C-peptide and DANTE-65 results. Lab Results   Component Value Date    LABA1C 11.4 (H) 2021    LABA1C 10.5 (H) 2019     Lab Results   Component Value Date    LABMICR 6.60 2021    CREATININE 0.9 2021       Hypertension:   BP today was   BP Readings from Last 1 Encounters:   19 136/86      Recent BP readings:    BP Readings from Last 3 Encounters:   19 136/86   19 120/70   19 137/88     Medication   none  Home blood pressure monitoring: No.    He is not adherent to a low sodium diet.      Symptoms: none  Laboratory:  Lab Results   Component Value Date    BUN 19 2021    CREATININE 0.9 2021       Hyperlipidemia:   Medication:   none  Low Fat, Low Choleterol Diet:  no  Myalgias or GI upset: no  The patient exercises he is active. .  Laboratory:    Lab Results   Component Value Date    CHOL 267 (H) 04/28/2021    TRIG 847 (H) 04/28/2021    HDL 34 (L) 04/28/2021    LDLCALC see below 04/28/2021    LDLDIRECT 108 04/28/2021      Lab Results   Component Value Date    ALT 27 04/28/2021    AST 22 04/28/2021        vitals were not taken for this visit. There is no height or weight on file to calculate BMI. Lipids are significantly elevated to the point that this increases her risk of serious complications such as pancreatitis and premature heart disease. Recommend starting a medication called fenofibrate 160 mg daily. I would also recommend Vascepa 1 mg, 2 tablets twice daily. If this medication is too expensive, we can use over-the-counter fish oil to help get this down as well. It is also absolutely essential that all cholesterol determinations be made completely fasting for at least 12 hours. If you were not fasting for this specimen, we may need to recollected and see where your cholesterol levels truly are.     Blood sugar was 312 at the time of the evaluation. This is consistent with uncontrolled diabetes. Hemoglobin A1c is 11.4 which is significantly worse than it was last time. This value would equate to an average blood sugar of 294 24 hours a day 7 days a week. At this level, you are at serious risk of complications from diabetes. We need to schedule an appointment to discuss this and figure out how we are going to get this under better control before you begin having health issues from your uncontrolled diabetes. Thyroid values are normal.  Your WBC, (infection fighting ability) Hgb and Hct, (oxygen carrying cells) are normal; as is your percentage of each cell type. It does appear that there is protein in your urine which indicates that you already are having some kidney disease secondary to your uncontrolled diabetes.   We need to get this under control as the injury to your kidneys is irreversible. We are still waiting on other labs           I have reviewed the following with the Mr. Coelho   Lab Review  Orders Only on 04/28/2021   Component Date Value    TSH 04/28/2021 0.656     T4 Free 04/28/2021 1.25     Microalbumin, Random Uri* 04/28/2021 6.60     Creatinine, Ur 04/28/2021 32.8     Microalbumin Creatinine * 04/28/2021 201.2     Cholesterol, Total 04/28/2021 267*    Triglycerides 04/28/2021 847*    HDL 04/28/2021 34*    LDL Calculated 04/28/2021 see below     Hemoglobin A1C 04/28/2021 11.4*    Sodium 04/28/2021 133*    Potassium 04/28/2021 4.6     Chloride 04/28/2021 93*    CO2 04/28/2021 25     Anion Gap 04/28/2021 15     Glucose 04/28/2021 312*    BUN 04/28/2021 19     CREATININE 04/28/2021 0.9     GFR Non- 04/28/2021 >60     GFR  04/28/2021 >59     Calcium 04/28/2021 9.1     Total Protein 04/28/2021 6.9     Albumin 04/28/2021 4.2     Total Bilirubin 04/28/2021 0.3     Alkaline Phosphatase 04/28/2021 89     ALT 04/28/2021 27     AST 04/28/2021 22     WBC 04/28/2021 5.7     RBC 04/28/2021 4.78     Hemoglobin 04/28/2021 14.1     Hematocrit 04/28/2021 42.5     MCV 04/28/2021 88.9     MCH 04/28/2021 29.5     MCHC 04/28/2021 33.2     RDW 04/28/2021 12.3     Platelets 58/87/9635 162     MPV 04/28/2021 11.7     Neutrophils % 04/28/2021 63.3     Lymphocytes % 04/28/2021 25.1     Monocytes % 04/28/2021 7.1     Eosinophils % 04/28/2021 3.2     Basophils % 04/28/2021 0.9     Neutrophils Absolute 04/28/2021 3.6     Immature Granulocytes # 04/28/2021 0.0     Lymphocytes Absolute 04/28/2021 1.4     Monocytes Absolute 04/28/2021 0.40     Eosinophils Absolute 04/28/2021 0.20     Basophils Absolute 04/28/2021 0.10     LDL Direct 04/28/2021 108      Copies of these are in the chart.     Current Outpatient Medications   Medication Sig Dispense Refill    fenofibrate (TRIGLIDE) 160 MG tablet Take 1 tablet by mouth daily 30 tablet 5    Insulin Degludec (TRESIBA FLEXTOUCH) 200 UNIT/ML SOPN Inject 25 Units into the skin nightly 2 pen 3    Insulin Pen Needle 32G X 4 MM MISC 1 each by Does not apply route daily 100 each 3    lisinopril (PRINIVIL;ZESTRIL) 5 MG tablet Take 1 tablet by mouth daily 30 tablet 0    metFORMIN (GLUCOPHAGE) 1000 MG tablet Take 1 tablet by mouth 2 times daily (with meals) 60 tablet 0    sildenafil (VIAGRA) 100 MG tablet Take 1 tablet by mouth as needed for Erectile Dysfunction 10 tablet 3     No current facility-administered medications for this visit. Allergies: Patient has no known allergies. Past Medical History:   Diagnosis Date    Type 2 diabetes mellitus without complication ([facility] [address]        Family History   Problem Relation Age of Onset    Diabetes Father     Heart Disease Maternal Grandmother     Diabetes Maternal Grandfather     Diabetes Paternal Grandmother     Heart Disease Paternal Grandmother     Cancer Paternal Grandmother        Past Surgical History:   Procedure Laterality Date    WISDOM TOOTH EXTRACTION         Social History     Tobacco Use    Smoking status: Never Smoker    Smokeless tobacco: Never Used   Substance Use Topics    Alcohol use: Yes     Frequency: Monthly or less        Review of Systems   Constitutional: Positive for fatigue. Negative for fever. HENT: Negative for congestion and sore throat. Eyes: Negative. Respiratory: Negative for cough (occasional). Cardiovascular: Negative. Gastrointestinal: Negative. Negative for abdominal distention and abdominal pain. Endocrine: Negative. Not checking blood sugars regularly   Genitourinary: Negative. Musculoskeletal: Negative. Skin: Negative. Allergic/Immunologic: Negative. Neurological: Negative. Hematological: Negative. Psychiatric/Behavioral: Negative.         Physical Exam  Physical exam was not performed today as this was a video teleconference visit using doxy. Me      ASSESSMENT      ICD-10-CM    1. Type 2 diabetes mellitus without complication, with long-term current use of insulin ([facility])  E11.9 Insulin Degludec (TRESIBA FLEXTOUCH) 200 UNIT/ML SOPN    Z79.4 Insulin Pen Needle 32G X 4 MM MISC   2. Mixed hyperlipidemia  E78.2 fenofibrate (TRIGLIDE) 160 MG tablet   3. Microalbuminuria  R80.9 lisinopril (PRINIVIL;ZESTRIL) 5 MG tablet   4. Essential hypertension  I10 lisinopril (PRINIVIL;ZESTRIL) 5 MG tablet         PLAN    1. Type 2 diabetes mellitus without complication, with long-term current use of insulin ([facility])  Blood sugars are vastly uncontrolled. There is concerned that he may be type 1.5 or late onset autoimmune diabetes. We are going to initiate basal insulin at a modest dosage approximately 20-25 units nightly. We can adjust this based upon his response. We can also initiate Bydureon BCise with some samples. We will give him pen needles for his [facility] as well. We are still waiting on C-peptide level and holly 65 results. This will tell us whether we can continue with the Bydureon or whether we need to proceed to a basal bolus insulin regimen  - Insulin Degludec (TRESIBA FLEXTOUCH) 200 UNIT/ML SOPN; Inject 25 Units into the skin nightly  Dispense: 2 pen; Refill: 3  - Insulin Pen Needle 32G X 4 MM MISC; 1 each by Does not apply route daily  Dispense: 100 each; Refill: 3    2. Mixed hyperlipidemia  He has significantly elevated triglycerides. We are going to initially try fenofibrate 160 mg daily. He will also use over-the-counter fish oil to assist in getting this down. We advised him to take up to 2 g twice daily. We did offer Vascepa as an option in this regard but he would prefer to try the over-the-counter regimen  - fenofibrate (TRIGLIDE) 160 MG tablet; Take 1 tablet by mouth daily  Dispense: 30 tablet; Refill: 5    3. Microalbuminuria  He does have microalbuminuria indicating some degree renal injury.   We are going to initiate therapy with lisinopril 5 mg daily to try to get this under control and preserve renal function  - lisinopril (PRINIVIL;ZESTRIL) 5 MG tablet; Take 1 tablet by mouth daily  Dispense: 30 tablet; Refill: 0    4. Essential hypertension  Hopefully very low-dose lisinopril will result in normalization of his blood pressure  - lisinopril (PRINIVIL;ZESTRIL) 5 MG tablet; Take 1 tablet by mouth daily  Dispense: 30 tablet; Refill: 0      No orders of the defined types were placed in this encounter. Return in about 1 month (around 5/30/2021). Anabell Oneida, was evaluated through a synchronous (real-time) audio-video encounter. The patient (or guardian if applicable) is aware that this is a billable service. Verbal consent to proceed has been obtained within the past 12 months. The visit was conducted pursuant to the emergency declaration under the [address] [place] [address], [address] [place] [address] authority and the [employer] [facility] General Act. Patient identification was verified, and a caregiver was present when appropriate. The patient was located in a state where the provider was credentialed to provide care. Total time spent for this encounter: 30m    --ADWOA Zendejas DO on 4/30/2021 at 12:30 PM    An electronic signature was used to authenticate this note.

## 2021-05-01 LAB — GLUTAMIC ACID DECARB AB: <5 IU/ML (ref 0–5)

## 2021-05-03 LAB — C-PEPTIDE: 4.8 NG/ML (ref 1.1–4.4)

## 2021-05-04 ENCOUNTER — TELEPHONE (OUTPATIENT)
Dept: PRIMARY CARE CLINIC | Age: 46
End: 2021-05-04

## 2021-05-04 DIAGNOSIS — E11.9 TYPE 2 DIABETES MELLITUS WITHOUT COMPLICATION, WITHOUT LONG-TERM CURRENT USE OF INSULIN (HCC): ICD-10-CM

## 2021-05-04 NOTE — TELEPHONE ENCOUNTER
----- Message from [address] [facility],[address], DO sent at 4/29/2021  7:47 AM CDT -----  Lipids are significantly elevated to the point that this increases her risk of serious complications such as pancreatitis and premature heart disease. Recommend starting a medication called fenofibrate 160 mg daily. I would also recommend Vascepa 1 mg, 2 tablets twice daily. If this medication is too expensive, we can use over-the-counter fish oil to help get this down as well. It is also absolutely essential that all cholesterol determinations be made completely fasting for at least 12 hours. If you were not fasting for this specimen, we may need to recollected and see where your cholesterol levels truly are. Blood sugar was 312 at the time of the evaluation. This is consistent with uncontrolled diabetes. Hemoglobin A1c is 11.4 which is significantly worse than it was last time. This value would equate to an average blood sugar of 294 24 hours a day 7 days a week. At this level, you are at serious risk of complications from diabetes. We need to schedule an appointment to discuss this and figure out how we are going to get this under better control before you begin having health issues from your uncontrolled diabetes. Thyroid values are normal.  Your WBC, (infection fighting ability) Hgb and Hct, (oxygen carrying cells) are normal; as is your percentage of each cell type. It does appear that there is protein in your urine which indicates that you already are having some kidney disease secondary to your uncontrolled diabetes. We need to get this under control as the injury to your kidneys is irreversible.   We are still waiting on other labs

## 2021-05-06 DIAGNOSIS — E78.2 MIXED HYPERLIPIDEMIA: Primary | ICD-10-CM

## 2021-05-06 NOTE — TELEPHONE ENCOUNTER
Received a denial from [employer] on pts Fenofibrate. This medication cannot be approved     based on a review of [facility] Medicaid Cardiovascular: Lipotropics Clinical Criteria. Your doctor asked [address] to cover the drug, fenofibrate. A health plan medical director studied the request. The health plan will not approve the request. This is not a preferred drug for your health plan. You must try two preferred drugs first. Some of the preferred drugs are fenofibrate nanocrystalized (generic Tricor), fenofibric acid (generic Trilipix), gemfibrozil, ezetimibe, niacin extended release, and omega-3 acid ethyl esters. The preferred drug list is on the health plan formulary. You can find the formulary on the health plan website. You can call the health plan to help you. The Member Services telephone number is on your health plan identification card. A paper copy of the formulary can be sent if needed. Decisions about the care that you will have are between you and your doctor. Please talk to your doctor about treatment options. I will send this to provider for further recommendations.

## 2021-05-07 RX ORDER — FENOFIBRATE 145 MG/1
145 TABLET, COATED ORAL DAILY
Qty: 30 TABLET | Refills: 11 | Status: SHIPPED | OUTPATIENT
Start: 2021-05-07 | End: 2021-12-01 | Stop reason: SDUPTHER

## 2021-06-01 ENCOUNTER — VIRTUAL VISIT (OUTPATIENT)
Dept: PRIMARY CARE CLINIC | Age: 46
End: 2021-06-01
Payer: MEDICAID

## 2021-06-01 DIAGNOSIS — E78.2 MIXED HYPERLIPIDEMIA: ICD-10-CM

## 2021-06-01 DIAGNOSIS — R80.9 MICROALBUMINURIA: ICD-10-CM

## 2021-06-01 DIAGNOSIS — I10 ESSENTIAL HYPERTENSION: ICD-10-CM

## 2021-06-01 DIAGNOSIS — E11.9 TYPE 2 DIABETES MELLITUS WITHOUT COMPLICATION, WITHOUT LONG-TERM CURRENT USE OF INSULIN (HCC): Primary | ICD-10-CM

## 2021-06-01 PROCEDURE — 99214 OFFICE O/P EST MOD 30 MIN: CPT | Performed by: PEDIATRICS

## 2021-06-01 RX ORDER — LISINOPRIL 5 MG/1
5 TABLET ORAL DAILY
Qty: 30 TABLET | Refills: 11 | Status: SHIPPED | OUTPATIENT
Start: 2021-06-01 | End: 2021-12-01 | Stop reason: SDUPTHER

## 2021-06-01 RX ORDER — INSULIN DEGLUDEC 200 U/ML
25 INJECTION, SOLUTION SUBCUTANEOUS NIGHTLY
Qty: 2 PEN | Refills: 3 | Status: SHIPPED | OUTPATIENT
Start: 2021-06-01 | End: 2021-07-27

## 2021-06-01 ASSESSMENT — ENCOUNTER SYMPTOMS
ABDOMINAL DISTENTION: 0
ABDOMINAL PAIN: 0
ALLERGIC/IMMUNOLOGIC NEGATIVE: 1
EYES NEGATIVE: 1
SORE THROAT: 0
GASTROINTESTINAL NEGATIVE: 1
COUGH: 0

## 2021-06-01 NOTE — PROGRESS NOTES
[address] [facility], [address]  Phone [phone]   Fax [phone]      OFFICE VISIT: 2021    Helen Griffin [place]-: 1975      HPI  Reason For Visit:  Helen Griffin is a 51 y.o. Diabetes    Patient presents via doxy doxy. me video conferencing on follow-up for multiple health issues. Primary concern is his diabetes which has been vastly uncontrolled. At last visit, on 2021, we initiated basal insulin therapy with Tresiba 25 units and instructed him to titrate based upon morning blood sugars compared to nighttime. We also started him on fenofibrate for his hypertriglyceridemia. He was instructed to take this with fish oil    We treated his microalbuminuria and mild hypertension with lisinopril 5 mg daily. He was instructed to increase his clear liquids and avoid sugary drinks. Diabetes Mellitus Type 2  Diet compliance:  noncompliant: A good portion of the time  Nutrition Consultation Needed:  no  Medication:   Tresiba insulin 25 units nightly   Metformin 1000 mg twice daily  Medication compliance:  compliant most of the time  Weight trend: stable  Current exercise: he is very active  Checking: occasionally but rarely  Home blood sugar records: fasting range: not checking  Low BG:  no  Eye exam current (within one year): yes  Checking Feet regularly:  yes - and no sores  ACE/ARB:  yes -lisinopril 5 mg  Aspirin: No: But recommended  Tobacco history: He  reports that he has never smoked.  He has never used smokeless tobacco.    Lab Results   Component Value Date    LABA1C 11.4 (H) 2021    LABA1C 10.5 (H) 2019     Lab Results   Component Value Date    LABMICR 6.60 2021    CREATININE 0.9 2021       Hypertension:   BP today was   BP Readings from Last 1 Encounters:   19 136/86      Recent BP readings:    BP Readings from Last 3 Encounters:   19 136/86   19 120/70   19 137/88     Medication   Lisinopril 5 mg daily  Medication compliance:  compliant most of the time  Home blood pressure monitoring: No.    He Is somewhat adherent to a low sodium diet. Symptoms: none  Laboratory:  Lab Results   Component Value Date    BUN 19 04/28/2021    CREATININE 0.9 04/28/2021       Hyperlipidemia:   Medication:   fenofibrate (Tricor, Trilipix) and OTC Fish Oil  Low Fat, Low Choleterol Diet:  yes -to some degree  Myalgias or GI upset: no  The patient exercises intermittently. Laboratory:    Lab Results   Component Value Date    CHOL 267 (H) 04/28/2021    TRIG 847 (H) 04/28/2021    HDL 34 (L) 04/28/2021    LDLCALC see below 04/28/2021    LDLDIRECT 108 04/28/2021      Lab Results   Component Value Date    ALT 27 04/28/2021    AST 22 04/28/2021        vitals were not taken for this visit. There is no height or weight on file to calculate BMI. I have reviewed the following with the Mr. Coelho   Lab Review  Orders Only on 04/28/2021   Component Date Value    Glutamic Acid Decarb Ab 04/28/2021 <5.0     C-Peptide 04/28/2021 4.8*    TSH 04/28/2021 0.656     T4 Free 04/28/2021 1.25     Microalbumin, Random Uri* 04/28/2021 6.60     Creatinine, Ur 04/28/2021 32.8     Microalbumin Creatinine * 04/28/2021 201.2     Cholesterol, Total 04/28/2021 267*    Triglycerides 04/28/2021 847*    HDL 04/28/2021 34*    LDL Calculated 04/28/2021 see below     Hemoglobin A1C 04/28/2021 11.4*    Sodium 04/28/2021 133*    Potassium 04/28/2021 4.6     Chloride 04/28/2021 93*    CO2 04/28/2021 25     Anion Gap 04/28/2021 15     Glucose 04/28/2021 312*    BUN 04/28/2021 19     CREATININE 04/28/2021 0.9     GFR Non- 04/28/2021 >60     GFR  04/28/2021 >59     Calcium 04/28/2021 9.1     Total Protein 04/28/2021 6.9     Albumin 04/28/2021 4.2     Total Bilirubin 04/28/2021 0.3     Alkaline Phosphatase 04/28/2021 89     ALT 04/28/2021 27     AST 04/28/2021 22     WBC 04/28/2021 5.7     RBC 04/28/2021 4.78     Hemoglobin 04/28/2021 14.1     Hematocrit 04/28/2021 42.5     MCV 04/28/2021 88.9     MCH 04/28/2021 29.5     MCHC 04/28/2021 33.2     RDW 04/28/2021 12.3     Platelets 19/72/5310 162     MPV 04/28/2021 11.7     Neutrophils % 04/28/2021 63.3     Lymphocytes % 04/28/2021 25.1     Monocytes % 04/28/2021 7.1     Eosinophils % 04/28/2021 3.2     Basophils % 04/28/2021 0.9     Neutrophils Absolute 04/28/2021 3.6     Immature Granulocytes # 04/28/2021 0.0     Lymphocytes Absolute 04/28/2021 1.4     Monocytes Absolute 04/28/2021 0.40     Eosinophils Absolute 04/28/2021 0.20     Basophils Absolute 04/28/2021 0.10     LDL Direct 04/28/2021 108      Copies of these are in the chart. Current Outpatient Medications   Medication Sig Dispense Refill    lisinopril (PRINIVIL;ZESTRIL) 5 MG tablet Take 1 tablet by mouth daily 30 tablet 11    Insulin Degludec (TRESIBA FLEXTOUCH) 200 UNIT/ML SOPN Inject 25 Units into the skin nightly 2 pen 3    fenofibrate (TRICOR) 145 MG tablet Take 1 tablet by mouth daily 30 tablet 11    metFORMIN (GLUCOPHAGE) 1000 MG tablet Take 1 tablet by mouth 2 times daily (with meals) 60 tablet 11    Insulin Pen Needle 32G X 4 MM MISC 1 each by Does not apply route daily 100 each 3    sildenafil (VIAGRA) 100 MG tablet Take 1 tablet by mouth as needed for Erectile Dysfunction 10 tablet 3     No current facility-administered medications for this visit. Allergies: Patient has no known allergies.      Past Medical History:   Diagnosis Date    Type 2 diabetes mellitus without complication ([address]        Family History   Problem Relation Age of Onset    Diabetes Father     Heart Disease Maternal Grandmother     Diabetes Maternal Grandfather     Diabetes Paternal Grandmother     Heart Disease Paternal Grandmother     Cancer Paternal Grandmother        Past Surgical History:   Procedure Laterality Date    WISDOM TOOTH EXTRACTION         Social History Tobacco Use    Smoking status: Never Smoker    Smokeless tobacco: Never Used   Substance Use Topics    Alcohol use: Yes        Review of Systems   Constitutional: Positive for fatigue. Negative for fever. HENT: Negative for congestion and sore throat. Eyes: Negative. Respiratory: Negative for cough (occasional). Cardiovascular: Negative. Gastrointestinal: Negative. Negative for abdominal distention and abdominal pain. Endocrine: Negative. Not checking blood sugars regularly   Genitourinary: Negative. Musculoskeletal: Negative. Skin: Negative. Allergic/Immunologic: Negative. Neurological: Negative. Hematological: Negative. Psychiatric/Behavioral: Negative. Physical Exam  PE was not done due to being a video conference      ASSESSMENT      ICD-10-CM    1. Type 2 diabetes mellitus without complication, without long-term current use of insulin (HCC)  E11.9 Insulin Degludec (TRESIBA FLEXTOUCH) 200 UNIT/ML SOPN   2. Essential hypertension  I10 lisinopril (PRINIVIL;ZESTRIL) 5 MG tablet   3. Mixed hyperlipidemia  E78.2    4. Microalbuminuria  R80.9 lisinopril (PRINIVIL;ZESTRIL) 5 MG tablet         PLAN    1. Type 2 diabetes mellitus without complication, without long-term current use of insulin (HCC)  We will set aside a sample of Tresiba for him and send in a prescription of this medication as well. He will get a glucometer and titrate his Manjit Deal dose to obtain control of his blood sugars. If you need additional medications we can do that. He will call me and let me know about the sugars and whether we need to make any additional adjustments  - Insulin Degludec (TRESIBA FLEXTOUCH) 200 UNIT/ML SOPN; Inject 25 Units into the skin nightly  Dispense: 2 pen; Refill: 3    2. Essential hypertension  He is not monitoring blood pressure but he feels fine. We will continue to utilize lisinopril 5 mg daily  - lisinopril (PRINIVIL;ZESTRIL) 5 MG tablet;  Take 1 tablet by mouth daily  Dispense: 30 tablet; Refill: 11    3. Mixed hyperlipidemia  He does have a prescription of TriCor as well as fish oil. This medication regimen is well-tolerated. We will recheck lipids with his next blood draw    4. Microalbuminuria  He is on lisinopril to control his microalbuminuria. We will recheck this at next blood draw as well. - lisinopril (PRINIVIL;ZESTRIL) 5 MG tablet; Take 1 tablet by mouth daily  Dispense: 30 tablet; Refill: 11      No orders of the defined types were placed in this encounter. Return in about 3 months (around 9/1/2021) for 30. This visit was initially conducted via doxy. Me video conferencing, connection was lost and visit was finished utilizing a phone conversation      Berkley Méndez, was evaluated through a synchronous (real-time) audio-video encounter. The patient (or guardian if applicable) is aware that this is a billable service. Verbal consent to proceed has been obtained within the past 12 months. The visit was conducted pursuant to the emergency declaration under the [address] [place] [address], [address] [place] [address] authority and the [employer] General Act. Patient identification was verified, and a caregiver was present when appropriate. The patient was located in a state where the provider was credentialed to provide care. Total time spent for this encounter: 30m    --ADWOA Lozano DO on 6/1/2021 at 6:19 PM    An electronic signature was used to authenticate this note.

## 2021-06-09 ENCOUNTER — TELEPHONE (OUTPATIENT)
Dept: PRIMARY CARE CLINIC | Age: 46
End: 2021-06-09

## 2021-06-10 RX ORDER — INSULIN GLARGINE 100 [IU]/ML
25 INJECTION, SOLUTION SUBCUTANEOUS NIGHTLY
Qty: 5 PEN | Refills: 3 | Status: SHIPPED | OUTPATIENT
Start: 2021-06-10 | End: 2021-09-01 | Stop reason: SDUPTHER

## 2021-07-26 ENCOUNTER — TELEPHONE (OUTPATIENT)
Dept: PRIMARY CARE CLINIC | Age: 46
End: 2021-07-26

## 2021-07-26 NOTE — TELEPHONE ENCOUNTER
We can do Lantus Solostar U1 100 pens. Same dose as the Tresiba nightly.   Dispense quantity sufficient (1 month supply) with 11 refills

## 2021-09-01 ENCOUNTER — VIRTUAL VISIT (OUTPATIENT)
Dept: PRIMARY CARE CLINIC | Age: 46
End: 2021-09-01
Payer: MEDICAID

## 2021-09-01 DIAGNOSIS — E11.9 TYPE 2 DIABETES MELLITUS WITHOUT COMPLICATION, WITHOUT LONG-TERM CURRENT USE OF INSULIN (HCC): Primary | ICD-10-CM

## 2021-09-01 DIAGNOSIS — I10 ESSENTIAL HYPERTENSION: ICD-10-CM

## 2021-09-01 DIAGNOSIS — N52.9 ED (ERECTILE DYSFUNCTION) OF ORGANIC ORIGIN: ICD-10-CM

## 2021-09-01 DIAGNOSIS — E78.2 MIXED HYPERLIPIDEMIA: ICD-10-CM

## 2021-09-01 PROCEDURE — 99214 OFFICE O/P EST MOD 30 MIN: CPT | Performed by: PEDIATRICS

## 2021-09-01 RX ORDER — INSULIN GLARGINE 100 [IU]/ML
25 INJECTION, SOLUTION SUBCUTANEOUS NIGHTLY
Qty: 5 PEN | Refills: 3 | Status: SHIPPED | OUTPATIENT
Start: 2021-09-01 | End: 2021-12-01 | Stop reason: SDUPTHER

## 2021-09-01 ASSESSMENT — ENCOUNTER SYMPTOMS
ALLERGIC/IMMUNOLOGIC NEGATIVE: 1
ABDOMINAL DISTENTION: 0
COUGH: 0
GASTROINTESTINAL NEGATIVE: 1
EYES NEGATIVE: 1
SORE THROAT: 0
ABDOMINAL PAIN: 0

## 2021-09-01 NOTE — PROGRESS NOTES
17[address] [facility], [address]  Phone [phone]   Fax [phone]      OFFICE VISIT: 2021    Amy Lee [place]-: 1975      \[facility]\""  Reason For Visit:  Amy Lee is a 51 y.o. Diabetes    Patient presents today via doxy. Me video conferencing on follow-up for multiple health issues. Present concerns:  He has been having problems with getting his insulin. He notes that he has not had any basal insulin for a while now. Diabetes Mellitus Type 2  Diet compliance:  noncompliant: A good portion of the time  Nutrition Consultation Needed:  no  Medication:              Lantus insulin 25 units nightly              Metformin 1000 mg twice daily  Medication compliance:  compliant most of the time  Weight trend: stable  Current exercise: he is very active  Checking: occasionally but rarely. He feels like his sugar has been up lately. Home blood sugar records: fasting range: not checking  Low BG:  no  Eye exam current (within one year): yes  Checking Feet regularly:  yes - and no sores  ACE/ARB:  yes -lisinopril 5 mg  Aspirin: No: But recommended  Tobacco history: He  reports that he has never smoked. He has never used smokeless tobacco.    Lab Results   Component Value Date    LABA1C 11.4 (H) 2021    LABA1C 10.5 (H) 2019     Lab Results   Component Value Date    LABMICR 6.60 2021    CREATININE 0.9 2021       Hypertension:   BP today was   BP Readings from Last 1 Encounters:   19 136/86      Recent BP readings:    BP Readings from Last 3 Encounters:   19 136/86   19 120/70   19 137/88     Medication   Lisinopril 5 mg daily  Medication compliance:  compliant most of the time  Home blood pressure monitoring: No.    He Is somewhat adherent to a low sodium diet.      Symptoms: none  Laboratory:  Lab Results   Component Value Date    BUN 19 2021    CREATININE 0.9 2021       Hyperlipidemia:   Medication:   fenofibrate (Tricor, Trilipix) and OTC Fish Oil  Low Fat, Low Choleterol Diet:  yes - to some degree  Myalgias or GI upset: no  The patient exercises he remains very active. .  Laboratory:    Lab Results   Component Value Date    CHOL 267 (H) 04/28/2021    TRIG 847 (H) 04/28/2021    HDL 34 (L) 04/28/2021    LDLCALC see below 04/28/2021    LDLDIRECT 108 04/28/2021      Lab Results   Component Value Date    ALT 27 04/28/2021    AST 22 04/28/2021       Organic ED:  Medication:   Viagra 100 mg as needed  Symptoms: this helps some. vitals were not taken for this visit. There is no height or weight on file to calculate BMI. I have reviewed the following with the Mr. Coelho   Lab Review  Orders Only on 04/28/2021   Component Date Value    Glutamic Acid Decarb Ab 04/28/2021 <5.0     C-Peptide 04/28/2021 4.8*    TSH 04/28/2021 0.656     T4 Free 04/28/2021 1.25     Microalbumin, Random Uri* 04/28/2021 6.60     Creatinine, Ur 04/28/2021 32.8     Microalbumin Creatinine * 04/28/2021 201.2     Cholesterol, Total 04/28/2021 267*    Triglycerides 04/28/2021 847*    HDL 04/28/2021 34*    LDL Calculated 04/28/2021 see below     Hemoglobin A1C 04/28/2021 11.4*    Sodium 04/28/2021 133*    Potassium 04/28/2021 4.6     Chloride 04/28/2021 93*    CO2 04/28/2021 25     Anion Gap 04/28/2021 15     Glucose 04/28/2021 312*    BUN 04/28/2021 19     CREATININE 04/28/2021 0.9     GFR Non- 04/28/2021 >60     GFR  04/28/2021 >59     Calcium 04/28/2021 9.1     Total Protein 04/28/2021 6.9     Albumin 04/28/2021 4.2     Total Bilirubin 04/28/2021 0.3     Alkaline Phosphatase 04/28/2021 89     ALT 04/28/2021 27     AST 04/28/2021 22     WBC 04/28/2021 5.7     RBC 04/28/2021 4.78     Hemoglobin 04/28/2021 14.1     Hematocrit 04/28/2021 42.5     MCV 04/28/2021 88.9     MCH 04/28/2021 29.5     MCHC 04/28/2021 33.2     RDW 04/28/2021 12.3     Platelets 33/20/2449 162     MPV 04/28/2021 11.7  Neutrophils % 04/28/2021 63.3     Lymphocytes % 04/28/2021 25.1     Monocytes % 04/28/2021 7.1     Eosinophils % 04/28/2021 3.2     Basophils % 04/28/2021 0.9     Neutrophils Absolute 04/28/2021 3.6     Immature Granulocytes # 04/28/2021 0.0     Lymphocytes Absolute 04/28/2021 1.4     Monocytes Absolute 04/28/2021 0.40     Eosinophils Absolute 04/28/2021 0.20     Basophils Absolute 04/28/2021 0.10     LDL Direct 04/28/2021 108      Copies of these are in the chart. Current Outpatient Medications   Medication Sig Dispense Refill    insulin glargine (LANTUS SOLOSTAR) 100 UNIT/ML injection pen Inject 25 Units into the skin nightly 5 pen 3    lisinopril (PRINIVIL;ZESTRIL) 5 MG tablet Take 1 tablet by mouth daily 30 tablet 11    fenofibrate (TRICOR) 145 MG tablet Take 1 tablet by mouth daily 30 tablet 11    metFORMIN (GLUCOPHAGE) 1000 MG tablet Take 1 tablet by mouth 2 times daily (with meals) 60 tablet 11    Insulin Pen Needle 32G X 4 MM MISC 1 each by Does not apply route daily 100 each 3    sildenafil (VIAGRA) 100 MG tablet Take 1 tablet by mouth as needed for Erectile Dysfunction 10 tablet 3     No current facility-administered medications for this visit. Allergies: Patient has no known allergies. Past Medical History:   Diagnosis Date    Type 2 diabetes mellitus without complication ([facility] [address]        Family History   Problem Relation Age of Onset    Diabetes Father     Heart Disease Maternal Grandmother     Diabetes Maternal Grandfather     Diabetes Paternal Grandmother     Heart Disease Paternal Grandmother     Cancer Paternal Grandmother        Past Surgical History:   Procedure Laterality Date    WISDOM TOOTH EXTRACTION         Social History     Tobacco Use    Smoking status: Never Smoker    Smokeless tobacco: Never Used   Substance Use Topics    Alcohol use: Yes        Review of Systems   Constitutional: Positive for fatigue. Negative for fever.    HENT: Negative for congestion and sore throat. Eyes: Negative. Respiratory: Negative for cough (occasional). Cardiovascular: Negative. Gastrointestinal: Negative. Negative for abdominal distention and abdominal pain. Endocrine: Negative. Not checking blood sugars regularly   Genitourinary: Negative. Musculoskeletal: Negative. Skin: Negative. Allergic/Immunologic: Negative. Neurological: Negative. Hematological: Negative. Psychiatric/Behavioral: Negative. Physical Exam  Physical exam was not performed today as this was a video teleconference visit using doxy. Me      ASSESSMENT      ICD-10-CM    1. Type 2 diabetes mellitus without complication, without long-term current use of insulin (HCC)  E11.9 Comprehensive Metabolic Panel     Hemoglobin A1C     Microalbumin / Creatinine Urine Ratio     TSH without Reflex     T4, Free   2. Essential hypertension  I10 CBC Auto Differential     Comprehensive Metabolic Panel     Microalbumin / Creatinine Urine Ratio   3. Mixed hyperlipidemia  E78.2 Lipid Panel   4. ED (erectile dysfunction) of organic origin  N52.9          PLAN    1. Type 2 diabetes mellitus without complication, without long-term current use of insulin ([facility])  Blood sugars have been uncontrolled historically and he notes that he believes they remain elevated just by the way he feels. He has not been checking his blood sugar on any regularity. He does not have his basal insulin for couple of months now. We are going to send his Lantus into [facility] again. He will let me know if he has ongoing issues trying to get his insulin. We will do our best to try to get him some basal insulin in the form of a sample until we can get his insurance straightened out.  - Comprehensive Metabolic Panel; Future  - Hemoglobin A1C; Future  - Microalbumin / Creatinine Urine Ratio; Future  - TSH without Reflex; Future  - T4, Free; Future    2.  Essential hypertension  Blood pressure has also been running high but the way that he feels. He is under a lot of stress. He is taking lisinopril. He would like to see where things eating out before making any adjustments. We will see where his blood pressure averages out by home measurements  - CBC Auto Differential; Future  - Comprehensive Metabolic Panel; Future  - Microalbumin / Creatinine Urine Ratio; Future    3. Mixed hyperlipidemia  We do need to recheck lipids on therapy  - Lipid Panel; Future    4. ED (erectile dysfunction) of organic origin  This has been effective somewhat in the past.  Continue present medication regimen on a as needed basis      Orders Placed This Encounter   Procedures    CBC Auto Differential    Comprehensive Metabolic Panel    Hemoglobin A1C    Lipid Panel    Microalbumin / Creatinine Urine Ratio    TSH without Reflex    T4, Free        Return in about 3 months (around 12/1/2021) for 30. Adriana Pandey, was evaluated through a synchronous (real-time) audio-video encounter. The patient (or guardian if applicable) is aware that this is a billable service. Verbal consent to proceed has been obtained within the past 12 months. The visit was conducted pursuant to the emergency declaration under the [address] [place] [address], [address] [place] [address] authority and the [employer] and [employer] General Act. Patient identification was verified, and a caregiver was present when appropriate. The patient was located in a state where the provider was credentialed to provide care. Total time spent for this encounter: 30m    --ADWOA Lara DO on 9/1/2021 at 5:43 PM    An electronic signature was used to authenticate this note.

## 2021-09-01 NOTE — PATIENT INSTRUCTIONS
Patient Education        Learning About Meal Planning for Diabetes  Why plan your meals? Meal planning can be a key part of managing diabetes. Planning meals and snacks with the right balance of carbohydrate, protein, and fat can help you keep your blood sugar at the target level you set with your doctor. You don't have to eat special foods. You can eat what your family eats, including sweets once in a while. But you do have to pay attention to how often you eat and how much you eat of certain foods. You may want to work with a dietitian or a certified diabetes educator. He or she can give you tips and meal ideas and can answer your questions about meal planning. This health professional can also help you reach a healthy weight if that is one of your goals. What plan is right for you? Your dietitian or diabetes educator may suggest that you start with the plate format or carbohydrate counting. The plate format  The plate format is a simple way to help you manage how you eat. You plan meals by learning how much space each food should take on a plate. Using the plate format helps you spread carbohydrate throughout the day. It can make it easier to keep your blood sugar level within your target range. It also helps you see if you're eating healthy portion sizes. To use the plate format, you put non-starchy vegetables on half your plate. Add meat or meat substitutes on one-quarter of the plate. Put a grain or starchy vegetable (such as brown rice or a potato) on the final quarter of the plate. You can add a small piece of fruit and some low-fat or fat-free milk or yogurt, depending on your carbohydrate goal for each meal.  Here are some tips for using the plate format:  · Make sure that you are not using an oversized plate. A 9-inch plate is best. Many restaurants use larger plates. · Get used to using the plate format at home. Then you can use it when you eat out.   · Write down your questions about using the plate format. Talk to your doctor, a dietitian, or a diabetes educator about your concerns. Carbohydrate counting  With carbohydrate counting, you plan meals based on the amount of carbohydrate in each food. Carbohydrate raises blood sugar higher and more quickly than any other nutrient. It is found in desserts, breads and cereals, and fruit. It's also found in starchy vegetables such as potatoes and corn, grains such as rice and pasta, and milk and yogurt. Spreading carbohydrate throughout the day helps keep your blood sugar levels within your target range. Your daily amount depends on several things, including your weight, how active you are, which diabetes medicines you take, and what your goals are for your blood sugar levels. A registered dietitian or diabetes educator can help you plan how much carbohydrate to include in each meal and snack. A guideline for your daily amount of carbohydrate is:  · 45 to 60 grams at each meal. That's about the same as 3 to 4 carbohydrate servings. · 15 to 20 grams at each snack. That's about the same as 1 carbohydrate serving. The Nutrition Facts label on packaged foods tells you how much carbohydrate is in a serving of the food. First, look at the serving size on the food label. Is that the amount you eat in a serving? All of the nutrition information on a food label is based on that serving size. So if you eat more or less than that, you'll need to adjust the other numbers. Total carbohydrate is the next thing you need to look for on the label. If you count carbohydrate servings, one serving of carbohydrate is 15 grams. For foods that don't come with labels, such as fresh fruits and vegetables, you'll need a guide that lists carbohydrate in these foods. Ask your doctor, dietitian, or diabetes educator about books or other nutrition guides you can use.   If you take insulin, you need to know how many grams of carbohydrate are in a meal. This lets you know how much Version: 12.9  © 3938-6815 [employer]. Care instructions adapted under license by [facility] ([facility]). If you have questions about a medical condition or this instruction, always ask your healthcare professional. [address] any warranty or liability for your use of this information.

## 2021-10-06 ENCOUNTER — OFFICE VISIT (OUTPATIENT)
Dept: PRIMARY CARE CLINIC | Age: 46
End: 2021-10-06
Payer: MEDICAID

## 2021-10-06 VITALS
HEART RATE: 98 BPM | DIASTOLIC BLOOD PRESSURE: 110 MMHG | SYSTOLIC BLOOD PRESSURE: 170 MMHG | BODY MASS INDEX: 25.11 KG/M2 | TEMPERATURE: 97.8 F | OXYGEN SATURATION: 99 % | HEIGHT: 72 IN | WEIGHT: 185.38 LBS

## 2021-10-06 DIAGNOSIS — R09.89 CHEST CONGESTION: ICD-10-CM

## 2021-10-06 DIAGNOSIS — J06.9 VIRAL UPPER RESPIRATORY TRACT INFECTION: Primary | ICD-10-CM

## 2021-10-06 DIAGNOSIS — R05.9 COUGH: ICD-10-CM

## 2021-10-06 DIAGNOSIS — R19.7 DIARRHEA OF PRESUMED INFECTIOUS ORIGIN: ICD-10-CM

## 2021-10-06 LAB — SARS-COV-2, PCR: NOT DETECTED

## 2021-10-06 PROCEDURE — 99213 OFFICE O/P EST LOW 20 MIN: CPT | Performed by: NURSE PRACTITIONER

## 2021-10-06 ASSESSMENT — ENCOUNTER SYMPTOMS
RHINORRHEA: 1
SORE THROAT: 1
VOMITING: 0
DIARRHEA: 1
NAUSEA: 0
COUGH: 1
SHORTNESS OF BREATH: 0
ABDOMINAL PAIN: 0

## 2021-10-06 NOTE — PROGRESS NOTES
Richard Cantrell (:  1975) is a 51 y.o. male,Established patient, here for evaluation of the following chief complaint(s):  Congestion, Cough, and Diarrhea      ASSESSMENT/PLAN:    ICD-10-CM    1. Viral upper respiratory tract infection  J06.9 COVID-19   Supportive care  Rest  Tylenol as needed  Further recommendations pending COVID-19 test results     2. Cough  R05.9 COVID-19   3. Chest congestion  R09.89 COVID-19   4. Diarrhea of presumed infectious origin  R19.7 COVID-19       Return if symptoms worsen or fail to improve. SUBJECTIVE/OBJECTIVE:  HPI     Reports sinus drainage. \"I had a mucous cough. \"  \"I had some rough diarrhea yesterday. \"  Reports decreased appetite  Reports intermittent headache  Reports myalgia associated with the coughing. Symptoms started last week. \"I have been taking TheraFlu. \"    BP is elevated today. \"I took some OTC decongestant meds. \"    He has not been COVID vaccinated    He has not checked his blood sugars in the last few days. BP (!) 170/110   Pulse 98   Temp 97.8 °F (36.6 °C) (Temporal)   Ht 6' (1.829 m)   Wt 185 lb 6 oz (84.1 kg)   SpO2 99%   BMI 25.14 kg/m²     Review of Systems   Constitutional: Positive for appetite change (decreased). Negative for fever. HENT: Positive for congestion, rhinorrhea and sore throat (\"some\"). Respiratory: Positive for cough (productive). Negative for shortness of breath. Gastrointestinal: Positive for diarrhea (yesterday, none today). Negative for abdominal pain, nausea and vomiting. Musculoskeletal: Positive for myalgias (\"associated with the coughing\"). Neurological: Positive for headaches. Physical Exam  Vitals reviewed. Constitutional:       Appearance: Normal appearance. He is well-developed and normal weight. HENT:      Head: Normocephalic.       Right Ear: Tympanic membrane, ear canal and external ear normal.      Left Ear: Tympanic membrane, ear canal and external ear normal.      Nose: Nose normal. Eyes:      General:         Right eye: No discharge. Left eye: No discharge. Cardiovascular:      Rate and Rhythm: Normal rate and regular rhythm. Pulmonary:      Effort: Pulmonary effort is normal.      Breath sounds: Normal breath sounds. No wheezing, rhonchi or rales. Abdominal:      General: Bowel sounds are normal.      Palpations: Abdomen is soft. Musculoskeletal:      Cervical back: Normal range of motion. Lymphadenopathy:      Cervical: No cervical adenopathy. Skin:     General: Skin is dry. Neurological:      General: No focal deficit present. Mental Status: He is alert and oriented to person, place, and time. Mental status is at baseline. Psychiatric:         Mood and Affect: Mood normal.         Behavior: Behavior normal.         Thought Content: Thought content normal.         Judgment: Judgment normal.             An electronic signature was used to authenticate this note.     --JOE Galindo

## 2021-10-07 ENCOUNTER — TELEPHONE (OUTPATIENT)
Dept: PRIMARY CARE CLINIC | Age: 46
End: 2021-10-07

## 2021-10-07 NOTE — TELEPHONE ENCOUNTER
Patient aware voiced understanding. Message  Received: Today  JOE Hernandes  [facility] [address] [facility] Clinical Staff  Please call patient and let them know results. Negative Covid           Associated Results    COVID-19  Order: [id number]  Status:  Final result   Visible to patient:  Yes (MyChart)        1 Result Note   Ref Range & Units 1 d ago   SARS-CoV-2, PCR Not Detected Not Detected    Comment: This test has been authorized by FDA under an   Emergency Use Authorization (EUA). This test is only authorized for the duration of the   time of declaration that circumstances exist justifying the   authorization of the emergency use of in vitro diagnostic   testing for detection of the SARS-CoV-2 virus   and/or diagnosis of COVID-19 infection under   section 564 (b)(1) of the Act, 21 U. S.C. 916RIV-3 (b) (1),   unless the authorization is terminated or revoked sooner. Patient Fact [email or web address]   Provider Fact [email or web address]     METHODOLOGY: RT PCR    Resulting Agency  [facility]         Specimen Collected: 10/06/21 14:19 Last Resulted: 10/06/21 21:30         Lab Flowsheet      Order Details      View Encounter      Lab and Collection Details      Routing      Result History           Result Care Coordination      Result Notes     JOE Doherty   10/7/2021  8:07 AM CDT Back to Top      Please call patient and let them know results.    Negative Covid            Status of Other Orders    Canceled    COVID-19 10/06/21   Reason: Other   Comment: COVID-19 was cancelled on 10/06/2021 at 17:47 by [facility]; Cancelled: In-house testing COVID-19~NP swab   COVID-19 10/06/21   Reason: Other   Comment: COVID GROUP PANEL was cancelled on 10/06/2021 at 17:47 by [facility]; Cancelled: In-house testing

## 2021-12-01 ENCOUNTER — VIRTUAL VISIT (OUTPATIENT)
Dept: PRIMARY CARE CLINIC | Age: 46
End: 2021-12-01
Payer: MEDICAID

## 2021-12-01 DIAGNOSIS — I10 ESSENTIAL HYPERTENSION: ICD-10-CM

## 2021-12-01 DIAGNOSIS — E11.9 TYPE 2 DIABETES MELLITUS WITHOUT COMPLICATION, WITHOUT LONG-TERM CURRENT USE OF INSULIN (HCC): Primary | ICD-10-CM

## 2021-12-01 DIAGNOSIS — N52.9 ED (ERECTILE DYSFUNCTION) OF ORGANIC ORIGIN: ICD-10-CM

## 2021-12-01 DIAGNOSIS — E78.2 MIXED HYPERLIPIDEMIA: ICD-10-CM

## 2021-12-01 DIAGNOSIS — R80.9 MICROALBUMINURIA: ICD-10-CM

## 2021-12-01 PROCEDURE — 99214 OFFICE O/P EST MOD 30 MIN: CPT | Performed by: PEDIATRICS

## 2021-12-01 RX ORDER — LISINOPRIL 5 MG/1
5 TABLET ORAL DAILY
Qty: 90 TABLET | Refills: 3 | Status: SHIPPED | OUTPATIENT
Start: 2021-12-01

## 2021-12-01 RX ORDER — INSULIN GLARGINE 100 [IU]/ML
25 INJECTION, SOLUTION SUBCUTANEOUS NIGHTLY
Qty: 10 PEN | Refills: 2 | Status: SHIPPED | OUTPATIENT
Start: 2021-12-01 | End: 2022-06-01 | Stop reason: SDUPTHER

## 2021-12-01 RX ORDER — FENOFIBRATE 145 MG/1
145 TABLET, COATED ORAL DAILY
Qty: 90 TABLET | Refills: 3 | Status: SHIPPED | OUTPATIENT
Start: 2021-12-01

## 2021-12-01 RX ORDER — SILDENAFIL 100 MG/1
100 TABLET, FILM COATED ORAL PRN
Qty: 30 TABLET | Refills: 3 | Status: SHIPPED | OUTPATIENT
Start: 2021-12-01

## 2021-12-01 ASSESSMENT — ENCOUNTER SYMPTOMS
GASTROINTESTINAL NEGATIVE: 1
ALLERGIC/IMMUNOLOGIC NEGATIVE: 1
COUGH: 0
ABDOMINAL DISTENTION: 0
ABDOMINAL PAIN: 0
EYES NEGATIVE: 1
SORE THROAT: 0

## 2021-12-01 NOTE — PATIENT INSTRUCTIONS
Patient Education        Counting Carbohydrates for Diabetes: Care Instructions  Your Care Instructions     You don't have to eat special foods when you have diabetes. You just have to be careful to eat healthy foods. Carbohydrates (carbs) raise blood sugar higher and quicker than any other nutrient. Carbs are found in desserts, breads and cereals, and fruit. They're also in starchy vegetables. These include potatoes, corn, and grains such as rice and pasta. Carbs are also in milk and yogurt. The more carbs you eat at one time, the higher your blood sugar will rise. Spreading carbs all through the day helps keep your blood sugar levels within your target range. Counting carbs is one of the best ways to keep your blood sugar under control. If you use insulin, counting carbs helps you match the right amount of insulin to the number of grams of carbs in a meal. Then you can change your diet and insulin dose as needed. Testing your blood sugar several times a day can help you learn how carbs affect your blood sugar. A registered dietitian or certified diabetes educator can help you plan meals and snacks. Follow-up care is a key part of your treatment and safety. Be sure to make and go to all appointments, and call your doctor if you are having problems. It's also a good idea to know your test results and keep a list of the medicines you take. How can you care for yourself at home? Know your daily amount of carbohydrates  Your daily amount depends on several things, such as your weight, how active you are, which diabetes medicines you take, and what your goals are for your blood sugar levels. A registered dietitian or certified diabetes educator can help you plan how many carbs to include in each meal and snack. For most adults, a guideline for the daily amount of carbs is:  · 45 to 60 grams at each meal. That's about the same as 3 to 4 carbohydrate servings. · 15 to 20 grams at each snack.  That's about the same as 1 carbohydrate serving. Count carbs  Counting carbs lets you know how much rapid-acting insulin to take before you eat. If you use an insulin pump, you get a constant rate of insulin during the day. So the pump must be programmed at meals. This gives you extra insulin to cover the rise in blood sugar after meals. If you take insulin:  · Learn your own insulin-to-carb ratio. You and your diabetes health professional will figure out the ratio. You can do this by testing your blood sugar after meals. For example, you may need a certain amount of insulin for every 15 grams of carbs. · Add up the carb grams in a meal. Then you can figure out how many units of insulin to take based on your insulin-to-carb ratio. · Exercise lowers blood sugar. You can use less insulin than you would if you were not doing exercise. Keep in mind that timing matters. If you exercise within 1 hour after a meal, your body may need less insulin for that meal than it would if you exercised 3 hours after the meal. Test your blood sugar to find out how exercise affects your need for insulin. If you do or don't take insulin:  · Look at labels on packaged foods. This can tell you how many carbs are in a serving. You can also use guides from the American Diabetes Association. · Be aware of portions, or serving sizes. If a package has two servings and you eat the whole package, you need to double the number of grams of carbohydrate listed for one serving. · Protein, fat, and fiber do not raise blood sugar as much as carbs do. If you eat a lot of these nutrients in a meal, your blood sugar will rise more slowly than it would otherwise. Eat from all food groups  · Eat at least three meals a day. · Plan meals to include food from all the food groups. The food groups include grains, fruits, dairy, proteins, and vegetables. · Talk to your dietitian or diabetes educator about ways to add limited amounts of sweets into your meal plan.   · If you drink alcohol, talk to your doctor. It may not be recommended when you are taking certain diabetes medicines. Where can you learn more? Go to https://chpepiceweb.[employer]. org and sign in to your [employer] account. Enter L936 in the [facility] box to learn more about \"Counting Carbohydrates for Diabetes: Care Instructions. \"     If you do not have an account, please click on the \"Sign Up Now\" link. Current as of: August 31, 2020               Content Version: 13.0  © 9855-6895 [employer]. Care instructions adapted under license by [facility] ([facility]). If you have questions about a medical condition or this instruction, always ask your healthcare professional. [address] any warranty or liability for your use of this information. Patient Education        Learning About Carbohydrate (Carb) Counting and Eating Out When You Have Diabetes  Why plan your meals? Meal planning can be a key part of managing diabetes. Planning meals and snacks with the right balance of carbohydrate, protein, and fat can help you keep your blood sugar at the target level you set with your doctor. You don't have to eat special foods. You can eat what your family eats, including sweets once in a while. But you do have to pay attention to how often you eat and how much you eat of certain foods. You may want to work with a dietitian or a certified diabetes educator. He or she can give you tips and meal ideas and can answer your questions about meal planning. This health professional can also help you reach a healthy weight if that is one of your goals. What should you know about eating carbs? Managing the amount of carbohydrate (carbs) you eat is an important part of healthy meals when you have diabetes. Carbohydrate is found in many foods. · Learn which foods have carbs. And learn the amounts of carbs in different foods.   ? Bread, cereal, pasta, and rice have about 15 tablespoon of peanut butter, and ½ cup of tofu. How can you eat out and still eat healthy? · Learn to estimate the serving sizes of foods that have carbohydrate. If you measure food at home, it will be easier to estimate the amount in a serving of restaurant food. · If the meal you order has too much carbohydrate (such as potatoes, corn, or baked beans), ask to have a low-carbohydrate food instead. Ask for a salad or green vegetables. · If you use insulin, check your blood sugar before and after eating out to help you plan how much to eat in the future. · If you eat more carbohydrate at a meal than you had planned, take a walk or do other exercise. This will help lower your blood sugar. What are some tips for eating healthy? · Limit saturated fat, such as the fat from meat and dairy products. This is a healthy choice because people who have diabetes are at higher risk of heart disease. So choose lean cuts of meat and nonfat or low-fat dairy products. Use olive or canola oil instead of butter or shortening when cooking. · Don't skip meals. Your blood sugar may drop too low if you skip meals and take insulin or certain medicines for diabetes. · Check with your doctor before you drink alcohol. Alcohol can cause your blood sugar to drop too low. Alcohol can also cause a bad reaction if you take certain diabetes medicines. Follow-up care is a key part of your treatment and safety. Be sure to make and go to all appointments, and call your doctor if you are having problems. It's also a good idea to know your test results and keep a list of the medicines you take. Where can you learn more? Go to https://remberto.[employer]. org and sign in to your [employer] account. Enter O793 in the [facility] box to learn more about \"Learning About Carbohydrate (Carb) Counting and Eating Out When You Have Diabetes. \"     If you do not have an account, please click on the \"Sign Up Now\" link.   Current as of: December 17, 2020               Content Version: 13.0  © 2006-2021 Healthwise, [employer]. Care instructions adapted under license by [facility] ([facility]). If you have questions about a medical condition or this instruction, always ask your healthcare professional. [address] any warranty or liability for your use of this information. Patient Education        Learning About Meal Planning for Diabetes  Why plan your meals? Meal planning can be a key part of managing diabetes. Planning meals and snacks with the right balance of carbohydrate, protein, and fat can help you keep your blood sugar at the target level you set with your doctor. You don't have to eat special foods. You can eat what your family eats, including sweets once in a while. But you do have to pay attention to how often you eat and how much you eat of certain foods. You may want to work with a dietitian or a certified diabetes educator. He or she can give you tips and meal ideas and can answer your questions about meal planning. This health professional can also help you reach a healthy weight if that is one of your goals. What plan is right for you? Your dietitian or diabetes educator may suggest that you start with the plate format or carbohydrate counting. The plate format  The plate format is a simple way to help you manage how you eat. You plan meals by learning how much space each food should take on a plate. Using the plate format helps you spread carbohydrate throughout the day. It can make it easier to keep your blood sugar level within your target range. It also helps you see if you're eating healthy portion sizes. To use the plate format, you put non-starchy vegetables on half your plate. Add meat or meat substitutes on one-quarter of the plate. Put a grain or starchy vegetable (such as brown rice or a potato) on the final quarter of the plate.  You can add a small piece of fruit and some low-fat or fat-free milk or yogurt, depending on your carbohydrate goal for each meal.  Here are some tips for using the plate format:  · Make sure that you are not using an oversized plate. A 9-inch plate is best. Many restaurants use larger plates. · Get used to using the plate format at home. Then you can use it when you eat out. · Write down your questions about using the plate format. Talk to your doctor, a dietitian, or a diabetes educator about your concerns. Carbohydrate counting  With carbohydrate counting, you plan meals based on the amount of carbohydrate in each food. Carbohydrate raises blood sugar higher and more quickly than any other nutrient. It is found in desserts, breads and cereals, and fruit. It's also found in starchy vegetables such as potatoes and corn, grains such as rice and pasta, and milk and yogurt. Spreading carbohydrate throughout the day helps keep your blood sugar levels within your target range. Your daily amount depends on several things, including your weight, how active you are, which diabetes medicines you take, and what your goals are for your blood sugar levels. A registered dietitian or diabetes educator can help you plan how much carbohydrate to include in each meal and snack. A guideline for your daily amount of carbohydrate is:  · 45 to 60 grams at each meal. That's about the same as 3 to 4 carbohydrate servings. · 15 to 20 grams at each snack. That's about the same as 1 carbohydrate serving. The Nutrition Facts label on packaged foods tells you how much carbohydrate is in a serving of the food. First, look at the serving size on the food label. Is that the amount you eat in a serving? All of the nutrition information on a food label is based on that serving size. So if you eat more or less than that, you'll need to adjust the other numbers. Total carbohydrate is the next thing you need to look for on the label.  If you count carbohydrate servings, one serving of carbohydrate is 15 grams. For foods that don't come with labels, such as fresh fruits and vegetables, you'll need a guide that lists carbohydrate in these foods. Ask your doctor, dietitian, or diabetes educator about books or other nutrition guides you can use. If you take insulin, you need to know how many grams of carbohydrate are in a meal. This lets you know how much rapid-acting insulin to take before you eat. If you use an insulin pump, you get a constant rate of insulin during the day. So the pump must be programmed at meals to give you extra insulin to cover the rise in blood sugar after meals. When you know how much carbohydrate you will eat, you can take the right amount of insulin. Or, if you always use the same amount of insulin, you need to make sure that you eat the same amount of carbohydrate at meals. If you need more help to understand carbohydrate counting and food labels, ask your doctor, dietitian, or diabetes educator. How can you plan healthy meals? Here are some tips to get started:  · Plan your meals a week at a time. Don't forget to include snacks too. · Use cookbooks or online recipes to plan several main meals. Plan some quick meals for busy nights. You also can double some recipes that freeze well. Then you can save half for other busy nights when you don't have time to cook. · Make sure you have the ingredients you need for your recipes. If you're running low on basic items, put these items on your shopping list too. · List foods that you use to make breakfasts, lunches, and snacks. List plenty of fruits and vegetables. · Post this list on the refrigerator. Add to it as you think of more things you need. · Take the list to the store to do your weekly shopping. Follow-up care is a key part of your treatment and safety. Be sure to make and go to all appointments, and call your doctor if you are having problems.  It's also a good idea to know your test results and keep a list of the medicines you take. Where can you learn more? Go to https://chpepiceweb.[email or web address] org and sign in to your [employer] account. Enter A790 in the [email or web address] box to learn more about \"Learning About Meal Planning for Diabetes. \"     If you do not have an account, please click on the \"Sign Up Now\" link. Current as of: December 17, 2020               Content Version: 13.0  © 2006-2021 Healthwise, Incorporated. Care instructions adapted under license by [facility] ([facility]). If you have questions about a medical condition or this instruction, always ask your healthcare professional. [address] any warranty or liability for your use of this information.

## 2021-12-01 NOTE — PROGRESS NOTES
[facility], [address]  Phone [phone]   Fax [phone]      OFFICE VISIT: 2021    Daniele Olvera Meliton-: 1975      HPI  Reason For Visit:  Daniele Olvera is a 51 y.o. Diabetes, Hypertension, and Hyperlipidemia      Patient presents on routine follow-up for multiple health issues. Present concerns:  No new concerns   He needs refills of his medications. Diabetes Mellitus Type 1.5  Diet compliance:  noncompliant: A good portion of the time  Nutrition Consultation Needed:  no  Medication:              Lantus insulin 25 units nightly    He is reluctant to try basal bolus regimen.              Metformin 1000 mg twice daily  Medication compliance:  compliant most of the time  Weight trend: stable  Current exercise: he is very active  Checking: occasionally but rarely. He feels like his sugar has been up lately. Home blood sugar records: fasting range: not checking  Low BG:  no  Eye exam current (within one year): yes  Checking Feet regularly:  yes - and no sores  ACE/ARB:  yes -lisinopril 5 mg  Aspirin: No: But recommended  Tobacco history: He  reports that he has never smoked. He has never used smokeless tobacco.    Lab Results   Component Value Date    LABA1C 11.4 (H) 2021    LABA1C 10.5 (H) 2019     Lab Results   Component Value Date    LABMICR 6.60 2021    CREATININE 0.9 2021       Hypertension:   BP today was   BP Readings from Last 1 Encounters:   10/06/21 (!) 170/110      Recent BP readings:    BP Readings from Last 3 Encounters:   10/06/21 (!) 170/110   19 136/86   19 120/70     Medication   Lisinopril 5 mg daily  Medication compliance:  compliant most of the time  Home blood pressure monitoring: Yes -occasionally and controlled when he checks. He is not adherent to a low sodium diet.      Symptoms: none  Laboratory:  Lab Results   Component Value Date    BUN 19 2021    CREATININE 0.9 2021 Hyperlipidemia:   Medication:   fenofibrate (Tricor, Trilipix) and OTC Fish Oil  Low Fat, Low Choleterol Diet:  yes -to some degree  Myalgias or GI upset: no  The patient exercises He is very active with his job. Laboratory:    Lab Results   Component Value Date    CHOL 267 (H) 04/28/2021    TRIG 847 (H) 04/28/2021    HDL 34 (L) 04/28/2021    LDLCALC see below 04/28/2021    LDLDIRECT 108 04/28/2021      Lab Results   Component Value Date    ALT 27 04/28/2021    AST 22 04/28/2021       Organic ED:  Medication:              Viagra 100 mg as needed  Symptoms: this helps some.           vitals were not taken for this visit. There is no height or weight on file to calculate BMI. I have reviewed the following with the Mr. Coelho   Lab Review  Office Visit on 10/06/2021   Component Date Value    SARS-CoV-2, PCR 10/06/2021 Not Detected      Copies of these are in the chart. Current Outpatient Medications   Medication Sig Dispense Refill    insulin glargine (LANTUS SOLOSTAR) 100 UNIT/ML injection pen Inject 25 Units into the skin nightly 10 pen 2    lisinopril (PRINIVIL;ZESTRIL) 5 MG tablet Take 1 tablet by mouth daily 90 tablet 3    fenofibrate (TRICOR) 145 MG tablet Take 1 tablet by mouth daily 90 tablet 3    metFORMIN (GLUCOPHAGE) 1000 MG tablet Take 1 tablet by mouth 2 times daily (with meals) 180 tablet 3    Insulin Pen Needle 32G X 4 MM MISC 1 each by Does not apply route daily 100 each 3    sildenafil (VIAGRA) 100 MG tablet Take 1 tablet by mouth as needed for Erectile Dysfunction 30 tablet 3     No current facility-administered medications for this visit. Allergies: Patient has no known allergies.      Past Medical History:   Diagnosis Date    Type 2 diabetes mellitus without complication (HCC)        Family History   Problem Relation Age of Onset    Diabetes Father     Heart Disease Maternal Grandmother     Diabetes Maternal Grandfather     Diabetes Paternal Grandmother     Heart Disease Paternal Grandmother     Cancer Paternal Grandmother        Past Surgical History:   Procedure Laterality Date    WISDOM TOOTH EXTRACTION         Social History     Tobacco Use    Smoking status: Never Smoker    Smokeless tobacco: Never Used   Substance Use Topics    Alcohol use: Yes        Review of Systems   Constitutional: Positive for fatigue. Negative for fever. HENT: Negative for congestion and sore throat. Eyes: Negative. Respiratory: Negative for cough (occasional). Cardiovascular: Negative. Gastrointestinal: Negative. Negative for abdominal distention and abdominal pain. Endocrine: Negative. Not checking blood sugars regularly   Genitourinary: Negative. Musculoskeletal: Negative. Skin: Negative. Allergic/Immunologic: Negative. Neurological: Negative. Hematological: Negative. Psychiatric/Behavioral: Negative. Physical Exam  Physical exam was not performed today as this was a video teleconference visit using doxy. Me      ASSESSMENT      ICD-10-CM    1. Type 2 diabetes mellitus without complication, without long-term current use of insulin ([facility])  E11.9 Hemoglobin A1C     insulin glargine (LANTUS SOLOSTAR) 100 UNIT/ML injection pen     metFORMIN (GLUCOPHAGE) 1000 MG tablet     Insulin Pen Needle 32G X 4 MM MISC     Microalbumin / Creatinine Urine Ratio   2. Essential hypertension  I10 Comprehensive Metabolic Panel     lisinopril (PRINIVIL;ZESTRIL) 5 MG tablet     Microalbumin / Creatinine Urine Ratio   3. Mixed hyperlipidemia  E78.2 Lipid Panel     fenofibrate (TRICOR) 145 MG tablet   4. ED (erectile dysfunction) of organic origin  N52.9 sildenafil (VIAGRA) 100 MG tablet   5. Microalbuminuria  R80.9 lisinopril (PRINIVIL;ZESTRIL) 5 MG tablet     Microalbumin / Creatinine Urine Ratio         PLAN    1.  Type 2 diabetes mellitus without complication, without long-term current use of insulin ([facility] [address]  He does state his blood sugars are better controlled than they have been in the past.  We are going to try to check an A1c in the near future. I will continue the same regimen for now. He is still reluctant to do a basal bolus regimen  - Hemoglobin A1C; Future  - insulin glargine (LANTUS SOLOSTAR) 100 UNIT/ML injection pen; Inject 25 Units into the skin nightly  Dispense: 10 pen; Refill: 2  - metFORMIN (GLUCOPHAGE) 1000 MG tablet; Take 1 tablet by mouth 2 times daily (with meals)  Dispense: 180 tablet; Refill: 3  - Insulin Pen Needle 32G X 4 MM MISC; 1 each by Does not apply route daily  Dispense: 100 each; Refill: 3    2. Essential hypertension  Blood pressure was high on last evaluation however he was sick. We will try to check her blood pressure here in the near future. He does not typically monitor blood pressure too often. He does note that it has been higher due to some family issues and stressors  - Comprehensive Metabolic Panel; Future  - lisinopril (PRINIVIL;ZESTRIL) 5 MG tablet; Take 1 tablet by mouth daily  Dispense: 90 tablet; Refill: 3    3. Mixed hyperlipidemia  Triglycerides are consistently elevated secondary to elevated blood sugars. Hopefully if the blood sugars are down his triglycerides will be down as well. LDL was 108 on last check  - Lipid Panel; Future  - fenofibrate (TRICOR) 145 MG tablet; Take 1 tablet by mouth daily  Dispense: 90 tablet; Refill: 3    4. ED (erectile dysfunction) of organic origin  This is effective at managing his symptoms. Continue the same  We will give him a 3-month supply  - sildenafil (VIAGRA) 100 MG tablet; Take 1 tablet by mouth as needed for Erectile Dysfunction  Dispense: 30 tablet; Refill: 3    5. Microalbuminuria  Continue with lisinopril to protect the kidneys. Recheck microalbumin on next lab draw  - lisinopril (PRINIVIL;ZESTRIL) 5 MG tablet; Take 1 tablet by mouth daily  Dispense: 90 tablet;  Refill: 3      Orders Placed This Encounter   Procedures    Hemoglobin A1C    Comprehensive Metabolic Panel  Lipid Panel    Microalbumin / Creatinine Urine Ratio        Return in about 3 months (around 3/1/2022) for 30Gregorio Potts, was evaluated through a synchronous (real-time) audio-video encounter. The patient (or guardian if applicable) is aware that this is a billable service. Verbal consent to proceed has been obtained within the past 12 months. The visit was conducted pursuant to the emergency declaration under the [address] [place] [address] and the [employer] and [employer] General Act. Patient identification was verified, and a caregiver was present when appropriate. The patient was located in a state where the provider was credentialed to provide care. Total time spent for this encounter: 30m    --ADWOA Weiner DO on 12/1/2021 at 5:53 PM    An electronic signature was used to authenticate this note.

## 2022-01-12 ENCOUNTER — TELEPHONE (OUTPATIENT)
Dept: PRIMARY CARE CLINIC | Age: 47
End: 2022-01-12

## 2022-01-12 DIAGNOSIS — K04.7 DENTAL ABSCESS: Primary | ICD-10-CM

## 2022-01-12 RX ORDER — AMOXICILLIN 500 MG/1
500 CAPSULE ORAL 3 TIMES DAILY
Qty: 30 CAPSULE | Refills: 0 | Status: SHIPPED | OUTPATIENT
Start: 2022-01-12 | End: 2022-01-22

## 2022-01-12 NOTE — TELEPHONE ENCOUNTER
Pt called stating that he woke up this morning with dental pain. Has a dental abscess and requesting an abx be sent to [facility] so he can pick it up after work. Will send to provider for authorization.

## 2022-01-12 NOTE — TELEPHONE ENCOUNTER
Returned call to pt to let him know that this was sent to the pharmacy. Left msg on pts vm.      Requested Prescriptions     Signed Prescriptions Disp Refills    amoxicillin (AMOXIL) 500 MG capsule 30 capsule 0     Sig: Take 1 capsule by mouth 3 times daily for 10 days     Authorizing Provider: Hardy Eisenmenger     Ordering User: Vianey Elkins

## 2022-01-25 ENCOUNTER — OFFICE VISIT (OUTPATIENT)
Dept: PRIMARY CARE CLINIC | Age: 47
End: 2022-01-25
Payer: MEDICAID

## 2022-01-25 VITALS
WEIGHT: 193.25 LBS | SYSTOLIC BLOOD PRESSURE: 143 MMHG | BODY MASS INDEX: 26.21 KG/M2 | HEART RATE: 119 BPM | DIASTOLIC BLOOD PRESSURE: 83 MMHG | OXYGEN SATURATION: 96 % | TEMPERATURE: 98.6 F

## 2022-01-25 DIAGNOSIS — Z20.822 SUSPECTED COVID-19 VIRUS INFECTION: ICD-10-CM

## 2022-01-25 DIAGNOSIS — I10 ESSENTIAL HYPERTENSION: ICD-10-CM

## 2022-01-25 DIAGNOSIS — R50.9 FEVER, UNSPECIFIED FEVER CAUSE: ICD-10-CM

## 2022-01-25 DIAGNOSIS — R05.9 COUGH: ICD-10-CM

## 2022-01-25 DIAGNOSIS — J40 BRONCHITIS: Primary | ICD-10-CM

## 2022-01-25 LAB — SARS-COV-2, PCR: DETECTED

## 2022-01-25 PROCEDURE — 99214 OFFICE O/P EST MOD 30 MIN: CPT | Performed by: NURSE PRACTITIONER

## 2022-01-25 RX ORDER — ALBUTEROL SULFATE 90 UG/1
2 AEROSOL, METERED RESPIRATORY (INHALATION) EVERY 6 HOURS PRN
Qty: 1 EACH | Refills: 1 | Status: SHIPPED | OUTPATIENT
Start: 2022-01-25

## 2022-01-25 SDOH — ECONOMIC STABILITY: FOOD INSECURITY: WITHIN THE PAST 12 MONTHS, THE FOOD YOU BOUGHT JUST DIDN'T LAST AND YOU DIDN'T HAVE MONEY TO GET MORE.: NEVER TRUE

## 2022-01-25 SDOH — ECONOMIC STABILITY: FOOD INSECURITY: WITHIN THE PAST 12 MONTHS, YOU WORRIED THAT YOUR FOOD WOULD RUN OUT BEFORE YOU GOT MONEY TO BUY MORE.: NEVER TRUE

## 2022-01-25 ASSESSMENT — ENCOUNTER SYMPTOMS
EYE DISCHARGE: 0
COUGH: 1
DIARRHEA: 0
CHOKING: 0
BLOOD IN STOOL: 0
CONSTIPATION: 0
RHINORRHEA: 1
EYE REDNESS: 0
WHEEZING: 0
SORE THROAT: 1

## 2022-01-25 ASSESSMENT — PATIENT HEALTH QUESTIONNAIRE - PHQ9
SUM OF ALL RESPONSES TO PHQ9 QUESTIONS 1 & 2: 0
5. POOR APPETITE OR OVEREATING: 0
6. FEELING BAD ABOUT YOURSELF - OR THAT YOU ARE A FAILURE OR HAVE LET YOURSELF OR YOUR FAMILY DOWN: 0
SUM OF ALL RESPONSES TO PHQ QUESTIONS 1-9: 0
3. TROUBLE FALLING OR STAYING ASLEEP: 0
7. TROUBLE CONCENTRATING ON THINGS, SUCH AS READING THE NEWSPAPER OR WATCHING TELEVISION: 0
SUM OF ALL RESPONSES TO PHQ QUESTIONS 1-9: 0
SUM OF ALL RESPONSES TO PHQ QUESTIONS 1-9: 0
9. THOUGHTS THAT YOU WOULD BE BETTER OFF DEAD, OR OF HURTING YOURSELF: 0
1. LITTLE INTEREST OR PLEASURE IN DOING THINGS: 0
8. MOVING OR SPEAKING SO SLOWLY THAT OTHER PEOPLE COULD HAVE NOTICED. OR THE OPPOSITE, BEING SO FIGETY OR RESTLESS THAT YOU HAVE BEEN MOVING AROUND A LOT MORE THAN USUAL: 0
SUM OF ALL RESPONSES TO PHQ QUESTIONS 1-9: 0
4. FEELING TIRED OR HAVING LITTLE ENERGY: 0
10. IF YOU CHECKED OFF ANY PROBLEMS, HOW DIFFICULT HAVE THESE PROBLEMS MADE IT FOR YOU TO DO YOUR WORK, TAKE CARE OF THINGS AT HOME, OR GET ALONG WITH OTHER PEOPLE: 0
2. FEELING DOWN, DEPRESSED OR HOPELESS: 0

## 2022-01-25 ASSESSMENT — SOCIAL DETERMINANTS OF HEALTH (SDOH): HOW HARD IS IT FOR YOU TO PAY FOR THE VERY BASICS LIKE FOOD, HOUSING, MEDICAL CARE, AND HEATING?: NOT HARD AT ALL

## 2022-01-25 NOTE — LETTER
[facility]  [place]  Phone: [phone]  Fax: [phone]    JOE Montgomery        January 25, 2022     Patient: Yue Frias   YOB: 1975   Date of Visit: 1/25/2022       To Whom it May Concern:    Yue Frias was seen in my clinic on 1/25/2022. He may return to work on 01/26/2022 pending covid results. If you have any questions or concerns, please don't hesitate to call.     Sincerely,         JOE Montgomery

## 2022-01-25 NOTE — PROGRESS NOTES
Judy Moy (:  1975) is a 51 y.o. male,Established patient, here for evaluation of the following chief complaint(s):  Cough (cough, feverish, achy. Symptoms started Monday.)      ASSESSMENT/PLAN:    ICD-10-CM    1. Bronchitis  J40 albuterol sulfate HFA (PROVENTIL HFA) 108 (90 Base) MCG/ACT inhaler   2. Cough  R05.9 COVID-19   3. Fever, unspecified fever cause  R50.9    4. Suspected COVID-19 virus infection  Z20.822    5. Essential hypertension  I10      bp is elevated today. He has been taking otc cough and cold medications for symptoms. Continue lisinopril 5 mg and will need to monitor again when feeling better if he is not able to monitor at home. Treat symptoms with tylenol and over the counter cough/cold medicine. Rest, increase fluids. Self Quarantine. If symptoms worsen, please follow up. If develop SHORTNESS OF BREATH then go to the nearest emergency room. Of course if any questions or concerns please do not hesitate to reach out to the office. Return if symptoms worsen or fail to improve. SUBJECTIVE/OBJECTIVE:  HPI  Cough (cough, feverish, achy. Symptoms started Monday.)  Running temp at home. BP is elevated today. Review of Systems   Constitutional: Positive for chills, fatigue and fever. Negative for appetite change and unexpected weight change. HENT: Positive for congestion, rhinorrhea and sore throat. Negative for ear pain. Eyes: Negative for discharge and redness. Respiratory: Positive for cough. Negative for choking and wheezing. Cardiovascular: Negative for chest pain. Gastrointestinal: Negative for blood in stool, constipation and diarrhea. Genitourinary: Negative for decreased urine volume and dysuria. Skin: Negative for rash. Neurological: Positive for headaches. Negative for weakness. Hematological: Negative for adenopathy. Psychiatric/Behavioral: Negative for suicidal ideas.        BP (!) 143/83 (Site: Left Upper Arm, Position: Sitting, Cuff Size: Large Adult)   Pulse 119   Temp 98.6 °F (37 °C) (Temporal)   Wt 193 lb 4 oz (87.7 kg)   SpO2 96%   BMI 26.21 kg/m²    Physical Exam  Vitals reviewed. Constitutional:       Appearance: He is well-developed. HENT:      Head: Normocephalic. Nose: Rhinorrhea present. Mouth/Throat:      Pharynx: Posterior oropharyngeal erythema present. Eyes:      Conjunctiva/sclera: Conjunctivae normal.   Cardiovascular:      Rate and Rhythm: Normal rate and regular rhythm. Heart sounds: Normal heart sounds. Pulmonary:      Effort: Pulmonary effort is normal.      Breath sounds: Normal breath sounds. Comments: Tight frequent cough  Abdominal:      General: Bowel sounds are normal.      Palpations: Abdomen is soft. Tenderness: There is no abdominal tenderness. Musculoskeletal:      Cervical back: Normal range of motion and neck supple. Skin:     General: Skin is warm and dry. Neurological:      Mental Status: He is alert and oriented to person, place, and time. Psychiatric:         Behavior: Behavior normal.                 An electronic signature was used to authenticate this note.     --JOE Núñez

## 2022-01-26 ENCOUNTER — TELEPHONE (OUTPATIENT)
Dept: PRIMARY CARE CLINIC | Age: 47
End: 2022-01-26

## 2022-01-26 NOTE — TELEPHONE ENCOUNTER
Pt called to get his covid results. I went ahead and gave them to pt. He had already seen them on mychart. Advised to quarantine and that the health dept would be contacting them. Needs to advise work to see what their guidelines are as well.

## 2022-03-01 ENCOUNTER — TELEMEDICINE (OUTPATIENT)
Dept: PRIMARY CARE CLINIC | Age: 47
End: 2022-03-01
Payer: MEDICAID

## 2022-03-01 DIAGNOSIS — R53.83 FATIGUE, UNSPECIFIED TYPE: ICD-10-CM

## 2022-03-01 DIAGNOSIS — E78.2 MIXED HYPERLIPIDEMIA: ICD-10-CM

## 2022-03-01 DIAGNOSIS — N52.9 ED (ERECTILE DYSFUNCTION) OF ORGANIC ORIGIN: ICD-10-CM

## 2022-03-01 DIAGNOSIS — J06.9 VIRAL UPPER RESPIRATORY TRACT INFECTION: ICD-10-CM

## 2022-03-01 DIAGNOSIS — I10 ESSENTIAL HYPERTENSION: ICD-10-CM

## 2022-03-01 DIAGNOSIS — E13.9 DIABETES MELLITUS TYPE 1.5 (HCC): Primary | ICD-10-CM

## 2022-03-01 PROCEDURE — 99214 OFFICE O/P EST MOD 30 MIN: CPT | Performed by: PEDIATRICS

## 2022-03-01 ASSESSMENT — ENCOUNTER SYMPTOMS
RHINORRHEA: 1
SORE THROAT: 1
ABDOMINAL PAIN: 0
GASTROINTESTINAL NEGATIVE: 1
ABDOMINAL DISTENTION: 0
EYES NEGATIVE: 1
ALLERGIC/IMMUNOLOGIC NEGATIVE: 1
COUGH: 1

## 2022-03-01 NOTE — PROGRESS NOTES
[facility] [address], [address]  Phone [phone]   Fax [phone]      OFFICE VISIT: 3/1/2022    Shashi Weinberg Dawson-: 1975      HPI  Reason For Visit:  Shashi Weinberg is a 51 y.o.     3 Month Follow-Up (had covid last month, now has congestion cough and drainage. ) and Diabetes (Bg higher than normal after having covid. )    Patient presents on routine follow-up for multiple health issues. Present concerns:  He does have some cough and congestion and drainage. He had Covid just a month ago. Treatment: otc medications and vitamins. Diabetes Mellitus Type 1.5  Diet compliance:  noncompliant: A good portion of the time  Nutrition Consultation Needed:  no  Medication:              PRXHUD [id number] 25 units nightly                          He is reluctant to try basal bolus regimen.              Metformin 1000 mg twice daily  Medication compliance:  compliant most of the time  Weight trend: stable  Current exercise: he is very active  Checking: occasionally but rarely.    He feels like his sugar has been up lately. Home blood sugar records: fasting range: not checking much, but higher when checked. Low BG:  no  Eye exam current (within one year): yes  Checking Feet regularly:  yes - and no sores  ACE/ARB:  yes -lisinopril 5 mg  Aspirin: No: But recommended  Tobacco history: He  reports that he has never smoked.  He has never used smokeless tobacco.    Lab Results   Component Value Date    LABA1C 11.4 (H) 2021    LABA1C 10.5 (H) 2019     Lab Results   Component Value Date    LABMICR 6.60 2021    CREATININE 0.9 2021       Hypertension:   BP today was   BP Readings from Last 1 Encounters:   22 (!) 143/83      Recent BP readings:    BP Readings from Last 3 Encounters:   22 (!) 143/83   10/06/21 (!) 170/110   19 136/86     Medication   Lisinopril 5 mg daily  Medication compliance:  compliant most of the time  Home blood pressure monitoring: No. He is not adherent to a low sodium diet. Symptoms: none  Laboratory:  Lab Results   Component Value Date    BUN 19 04/28/2021    CREATININE 0.9 04/28/2021       Hyperlipidemia:   Medication:   fenofibrate (Tricor, Trilipix) and OTC Fish Oil  Low Fat, Low Choleterol Diet:  yes -he tries  Myalgias or GI upset: no  The patient exercises he remains active. .  Laboratory:    Lab Results   Component Value Date    CHOL 267 (H) 04/28/2021    TRIG 847 (H) 04/28/2021    HDL 34 (L) 04/28/2021    LDLCALC see below 04/28/2021    LDLDIRECT 108 04/28/2021      Lab Results   Component Value Date    ALT 27 04/28/2021    AST 22 04/28/2021         Organic ED:  Medication:              Viagra 100 mg as needed  Symptoms: this helps some.       He has been very busy and stress level has been high.      vitals were not taken for this visit. There is no height or weight on file to calculate BMI. I have reviewed the following with the Mr. Coelho   Lab Review  Office Visit on 01/25/2022   Component Date Value    SARS-CoV-2, PCR 01/25/2022 DETECTED*   Office Visit on 10/06/2021   Component Date Value    SARS-CoV-2, PCR 10/06/2021 Not Detected      Copies of these are in the chart.     Current Outpatient Medications   Medication Sig Dispense Refill    albuterol sulfate HFA (PROVENTIL HFA) 108 (90 Base) MCG/ACT inhaler Inhale 2 puffs into the lungs every 6 hours as needed for Wheezing 1 each 1    insulin glargine (LANTUS SOLOSTAR) 100 UNIT/ML injection pen Inject 25 Units into the skin nightly 10 pen 2    lisinopril (PRINIVIL;ZESTRIL) 5 MG tablet Take 1 tablet by mouth daily 90 tablet 3    fenofibrate (TRICOR) 145 MG tablet Take 1 tablet by mouth daily 90 tablet 3    metFORMIN (GLUCOPHAGE) 1000 MG tablet Take 1 tablet by mouth 2 times daily (with meals) 180 tablet 3    Insulin Pen Needle 32G X 4 MM MISC 1 each by Does not apply route daily 100 each 3    sildenafil (VIAGRA) 100 MG tablet Take 1 tablet by mouth as needed for Erectile Dysfunction (Patient taking differently: Take 100 mg by mouth as needed for Erectile Dysfunction Every once in a while) 30 tablet 3     No current facility-administered medications for this visit. Allergies: Patient has no known allergies. Past Medical History:   Diagnosis Date    Type 2 diabetes mellitus without complication ([facility] [address]        Family History   Problem Relation Age of Onset    Diabetes Father     Heart Disease Maternal Grandmother     Diabetes Maternal Grandfather     Diabetes Paternal Grandmother     Heart Disease Paternal Grandmother     Cancer Paternal Grandmother        Past Surgical History:   Procedure Laterality Date    WISDOM TOOTH EXTRACTION         Social History     Tobacco Use    Smoking status: Never Smoker    Smokeless tobacco: Never Used   Substance Use Topics    Alcohol use: Yes        Review of Systems   Constitutional: Positive for fatigue. Negative for chills and fever. HENT: Positive for congestion, postnasal drip, rhinorrhea and sore throat (scratchy). Eyes: Negative. Respiratory: Positive for cough (occasional). Cardiovascular: Negative. Gastrointestinal: Negative. Negative for abdominal distention and abdominal pain. Endocrine: Negative. Not checking blood sugars regularly   Genitourinary: Negative. Musculoskeletal: Negative. Skin: Negative. Allergic/Immunologic: Negative. Neurological: Negative. Hematological: Negative. Psychiatric/Behavioral: Negative. Physical Exam  Physical exam was not performed today as this was a video teleconference visit using Doxy. Me      ASSESSMENT      ICD-10-CM    1. Diabetes mellitus type 1.5 (HCC)  E13.9 Comprehensive Metabolic Panel     Hemoglobin A1C     Microalbumin / Creatinine Urine Ratio   2. Essential hypertension  I10 CBC with Auto Differential     Comprehensive Metabolic Panel     Microalbumin / Creatinine Urine Ratio   3.  Mixed hyperlipidemia  E78.2 Lipid Panel 4. ED (erectile dysfunction) of organic origin  N52.9    5. Viral upper respiratory tract infection  J06.9    6. Fatigue, unspecified type  R53.83 T4, Free     TSH         PLAN    1. Diabetes mellitus type 1.5 (HCC)  We will recheck labs  We will also make modifications to his medication regimen based on laboratory results  - Comprehensive Metabolic Panel; Future  - Hemoglobin A1C; Future  - Microalbumin / Creatinine Urine Ratio; Future    2. Essential hypertension  Blood pressure is appropriately controlled  We will continue present medication regimen. He is on low-dose lisinopril only  - CBC with Auto Differential; Future  - Comprehensive Metabolic Panel; Future  - Microalbumin / Creatinine Urine Ratio; Future    3. Mixed hyperlipidemia  Triglycerides have been extremely high. He is on 2 medications for this. We will recheck lipids in the near future  - Lipid Panel; Future    4. ED (erectile dysfunction) of organic origin  Continue with sildenafil on a as needed basis    5. Viral upper respiratory tract infection  Supportive care as he is doing. Recommend Tylenol and ibuprofen as well as guaifenesin for thick secretions    6. Fatigue, unspecified type  Recheck thyroid on therapy  - T4, Free; Future  - TSH; Future      Orders Placed This Encounter   Procedures    CBC with Auto Differential    Comprehensive Metabolic Panel    Hemoglobin A1C    Lipid Panel    Microalbumin / Creatinine Urine Ratio    T4, Free    TSH        Return in about 3 months (around 6/1/2022) for 30. Kerry Adan, was evaluated through a synchronous (real-time) audio-video encounter. The patient (or guardian if applicable) is aware that this is a billable service, which includes applicable co-pays. This Virtual Visit was conducted with patient's (and/or legal guardian's) consent.  The visit was conducted pursuant to the emergency declaration under the [facility] waiver authority and the Coronavirus Preparedness and Response Supplemental Appropriations Act. Patient identification was verified, and a caregiver was present when appropriate. The patient was located at home in a state where the provider was licensed to provide care. Total time spent for this encounter: 30m    --ADWOA Guthrie DO on 3/1/2022 at 6:15 PM    An electronic signature was used to authenticate this note.

## 2022-03-02 NOTE — PATIENT INSTRUCTIONS
Patient Education        Learning About Carbohydrate (Carb) Counting and Eating Out When You Have Diabetes  Why plan your meals? Meal planning can be a key part of managing diabetes. Planning meals and snacks with the right balance of carbohydrate, protein, and fat can help you keep your blood sugar at the target level you set with your doctor. You don't have to eat special foods. You can eat what your family eats, including sweets once in a while. But you do have to pay attention to how often you eat and how much you eat of certain foods. You may want to work with a dietitian or a certified diabetes educator. He or she can give you tips and meal ideas and can answer your questions about meal planning. This health professional can also help you reach a healthy weight if that is one of your goals. What should you know about eating carbs? Managing the amount of carbohydrate (carbs) you eat is an important part of healthy meals when you have diabetes. Carbohydrate is found in many foods. · Learn which foods have carbs. And learn the amounts of carbs in different foods. ? Bread, cereal, pasta, and rice have about 15 grams of carbs in a serving. A serving is 1 slice of bread (1 ounce), ½ cup of cooked cereal, or 1/3 cup of cooked pasta or rice. ? Fruits have 15 grams of carbs in a serving. A serving is 1 small fresh fruit, such as an apple or orange; ½ of a banana; ½ cup of cooked or canned fruit; ½ cup of fruit juice; 1 cup of melon or raspberries; or 2 tablespoons of dried fruit. ? Milk and no-sugar-added yogurt have 15 grams of carbs in a serving. A serving is 1 cup of milk or 2/3 cup of no-sugar-added yogurt. ? Starchy vegetables have 15 grams of carbs in a serving. A serving is ½ cup of mashed potatoes or sweet potato; 1 cup winter squash; ½ of a small baked potato; ½ cup of cooked beans; or ½ cup cooked corn or green peas.   · Learn how much carbs to eat each day and at each meal. A dietitian or CDE can teach you how to keep track of the amount of carbs you eat. This is called carbohydrate counting. · If you are not sure how to count carbohydrate grams, use the Plate Method to plan meals. It is a good, quick way to make sure that you have a balanced meal. It also helps you spread carbs throughout the day. ? Divide your plate by types of foods. Put non-starchy vegetables on half the plate, meat or other protein food on one-quarter of the plate, and a grain or starchy vegetable in the final quarter of the plate. To this you can add a small piece of fruit and 1 cup of milk or yogurt, depending on how many carbs you are supposed to eat at a meal.  · Try to eat about the same amount of carbs at each meal. Do not \"save up\" your daily allowance of carbs to eat at one meal.  · Proteins have very little or no carbs per serving. Examples of proteins are beef, chicken, turkey, fish, eggs, tofu, cheese, cottage cheese, and peanut butter. A serving size of meat is 3 ounces, which is about the size of a deck of cards. Examples of meat substitute serving sizes (equal to 1 ounce of meat) are 1/4 cup of cottage cheese, 1 egg, 1 tablespoon of peanut butter, and ½ cup of tofu. How can you eat out and still eat healthy? · Learn to estimate the serving sizes of foods that have carbohydrate. If you measure food at home, it will be easier to estimate the amount in a serving of restaurant food. · If the meal you order has too much carbohydrate (such as potatoes, corn, or baked beans), ask to have a low-carbohydrate food instead. Ask for a salad or green vegetables. · If you use insulin, check your blood sugar before and after eating out to help you plan how much to eat in the future. · If you eat more carbohydrate at a meal than you had planned, take a walk or do other exercise. This will help lower your blood sugar. What are some tips for eating healthy? · Limit saturated fat, such as the fat from meat and dairy products. This is a healthy choice because people who have diabetes are at higher risk of heart disease. So choose lean cuts of meat and nonfat or low-fat dairy products. Use olive or canola oil instead of butter or shortening when cooking. · Don't skip meals. Your blood sugar may drop too low if you skip meals and take insulin or certain medicines for diabetes. · Check with your doctor before you drink alcohol. Alcohol can cause your blood sugar to drop too low. Alcohol can also cause a bad reaction if you take certain diabetes medicines. Follow-up care is a key part of your treatment and safety. Be sure to make and go to all appointments, and call your doctor if you are having problems. It's also a good idea to know your test results and keep a list of the medicines you take. Where can you learn more? Go to https://aihuishoudinaeb.Xcalia. org and sign in to your [employer] account. Enter E460 in the [employer] box to learn more about \"Learning About Carbohydrate (Carb) Counting and Eating Out When You Have Diabetes. \"     If you do not have an account, please click on the \"Sign Up Now\" link. Current as of: September 8, 2021               Content Version: 13.1  © 1585-0093 Healthwise, [employer]. Care instructions adapted under license by [facility] ([facility]). If you have questions about a medical condition or this instruction, always ask your healthcare professional. [place] [address] any warranty or liability for your use of this information. Patient Education        Counting Carbohydrates for Diabetes: Care Instructions  Your Care Instructions     You don't have to eat special foods when you have diabetes. You just have to be careful to eat healthy foods. Carbohydrates (carbs) raise blood sugar higher and quicker than any other nutrient. Carbs are found in desserts, breads and cereals, and fruit. They're also in starchy vegetables.  These include potatoes, corn, and grains such as rice and pasta. Carbs are also in milk and yogurt. The more carbs you eat at one time, the higher your blood sugar will rise. Spreading carbs all through the day helps keep your blood sugar levels within your target range. Counting carbs is one of the best ways to keep your blood sugar under control. If you use insulin, counting carbs helps you match the right amount of insulin to the number of grams of carbs in a meal. Then you can change your diet and insulin dose as needed. Testing your blood sugar several times a day can help you learn how carbs affect your blood sugar. A registered dietitian or certified diabetes educator can help you plan meals and snacks. Follow-up care is a key part of your treatment and safety. Be sure to make and go to all appointments, and call your doctor if you are having problems. It's also a good idea to know your test results and keep a list of the medicines you take. How can you care for yourself at home? Know your daily amount of carbohydrates  Your daily amount depends on several things, such as your weight, how active you are, which diabetes medicines you take, and what your goals are for your blood sugar levels. A registered dietitian or certified diabetes educator can help you plan how many carbs to include in each meal and snack. For most adults, a guideline for the daily amount of carbs is:  · 45 to 60 grams at each meal. That's about the same as 3 to 4 carbohydrate servings. · 15 to 20 grams at each snack. That's about the same as 1 carbohydrate serving. Count carbs  Counting carbs lets you know how much rapid-acting insulin to take before you eat. If you use an insulin pump, you get a constant rate of insulin during the day. So the pump must be programmed at meals. This gives you extra insulin to cover the rise in blood sugar after meals. If you take insulin:  · Learn your own insulin-to-carb ratio.  You and your diabetes health professional will figure out the ratio. You can do this by testing your blood sugar after meals. For example, you may need a certain amount of insulin for every 15 grams of carbs. · Add up the carb grams in a meal. Then you can figure out how many units of insulin to take based on your insulin-to-carb ratio. · Exercise lowers blood sugar. You can use less insulin than you would if you were not doing exercise. Keep in mind that timing matters. If you exercise within 1 hour after a meal, your body may need less insulin for that meal than it would if you exercised 3 hours after the meal. Test your blood sugar to find out how exercise affects your need for insulin. If you do or don't take insulin:  · Look at labels on packaged foods. This can tell you how many carbs are in a serving. You can also use guides from the American Diabetes Association. · Be aware of portions, or serving sizes. If a package has two servings and you eat the whole package, you need to double the number of grams of carbohydrate listed for one serving. · Protein, fat, and fiber do not raise blood sugar as much as carbs do. If you eat a lot of these nutrients in a meal, your blood sugar will rise more slowly than it would otherwise. Eat from all food groups  · Eat at least three meals a day. · Plan meals to include food from all the food groups. The food groups include grains, fruits, dairy, proteins, and vegetables. · Talk to your dietitian or diabetes educator about ways to add limited amounts of sweets into your meal plan. · If you drink alcohol, talk to your doctor. It may not be recommended when you are taking certain diabetes medicines. Where can you learn more? Go to https://NorthStar Anesthesiajesneia.Solid Sound. org and sign in to your [employer] account. Enter W139 in the [employer] box to learn more about \"Counting Carbohydrates for Diabetes: Care Instructions. \"     If you do not have an account, please click on the \"Sign Up Now\" link.   Current as of: July 28, 2021               Content Version: 13.1  © 5661-4570 Healthwise, Incorporated. Care instructions adapted under license by [facility] ([facility]). If you have questions about a medical condition or this instruction, always ask your healthcare professional. [address] any warranty or liability for your use of this information.

## 2022-04-22 ENCOUNTER — TELEMEDICINE (OUTPATIENT)
Dept: PRIMARY CARE CLINIC | Age: 47
End: 2022-04-22
Payer: MEDICAID

## 2022-04-22 ENCOUNTER — TELEPHONE (OUTPATIENT)
Dept: PRIMARY CARE CLINIC | Age: 47
End: 2022-04-22

## 2022-04-22 DIAGNOSIS — R05.9 COUGH: ICD-10-CM

## 2022-04-22 DIAGNOSIS — J40 BRONCHITIS: Primary | ICD-10-CM

## 2022-04-22 DIAGNOSIS — J01.00 ACUTE MAXILLARY SINUSITIS, RECURRENCE NOT SPECIFIED: ICD-10-CM

## 2022-04-22 DIAGNOSIS — E13.9 DIABETES MELLITUS TYPE 1.5 (HCC): ICD-10-CM

## 2022-04-22 DIAGNOSIS — R06.2 WHEEZING: ICD-10-CM

## 2022-04-22 PROCEDURE — 99214 OFFICE O/P EST MOD 30 MIN: CPT | Performed by: NURSE PRACTITIONER

## 2022-04-22 RX ORDER — METHYLPREDNISOLONE 4 MG/1
TABLET ORAL
Qty: 1 KIT | Refills: 0 | Status: SHIPPED | OUTPATIENT
Start: 2022-04-22 | End: 2022-05-03

## 2022-04-22 RX ORDER — CEFDINIR 300 MG/1
300 CAPSULE ORAL 2 TIMES DAILY
Qty: 20 CAPSULE | Refills: 0 | Status: SHIPPED | OUTPATIENT
Start: 2022-04-22 | End: 2022-05-02

## 2022-04-22 ASSESSMENT — ENCOUNTER SYMPTOMS
SINUS PRESSURE: 1
CONSTIPATION: 0
BLOOD IN STOOL: 0
COUGH: 1
WHEEZING: 1
CHOKING: 0
EYE DISCHARGE: 0
RHINORRHEA: 0
EYE REDNESS: 0
SORE THROAT: 0
DIARRHEA: 0

## 2022-04-22 NOTE — TELEPHONE ENCOUNTER
Pt was transferred from nurse triage. Pt coughing up green phlegm and having coughing fits. Pt does not know if he has fever. Pt reports no SOA. No fever. Pt scheduled for VV this afternoon.

## 2022-04-22 NOTE — PROGRESS NOTES
Mary Jc (:  1975) is a Established patient, here for evaluation of the following:    Assessment & Plan   Below is the assessment and plan developed based on review of pertinent history, physical exam, labs, studies, and medications. 1. Bronchitis  -     methylPREDNISolone (MEDROL, WILBUR,) 4 MG tablet; Take po as directed, Disp-1 kit, R-0Normal  2. Diabetes mellitus type 1.5 ([address]  3. Cough  -     methylPREDNISolone (MEDROL, WILBUR,) 4 MG tablet; Take po as directed, Disp-1 kit, R-0Normal  4. Acute maxillary sinusitis, recurrence not specified  -     cefdinir (OMNICEF) 300 MG capsule; Take 1 capsule by mouth 2 times daily for 10 days, Disp-20 capsule, R-0Normal  5. Wheezing  -     methylPREDNISolone (MEDROL, WILBUR,) 4 MG tablet; Take po as directed, Disp-1 kit, R-0Normal    Discussed will need to make adjustments in insulin while on steroid. Also discussed he needs lab work and a diabetes fu  Return if symptoms worsen or fail to improve. Subjective   Cough  This is a new problem. The current episode started in the past 7 days. The cough is productive of sputum. Associated symptoms include wheezing. Pertinent negatives include no chest pain, ear pain, eye redness, rash, rhinorrhea or sore throat. Sinusitis  This is a new problem. The current episode started in the past 7 days. The problem has been gradually improving since onset. There has been no fever. Associated symptoms include congestion, coughing and sinus pressure. Pertinent negatives include no ear pain or sore throat. Review of Systems   Constitutional: Negative for appetite change and unexpected weight change. HENT: Positive for congestion and sinus pressure. Negative for ear pain, rhinorrhea and sore throat. Eyes: Negative for discharge and redness. Respiratory: Positive for cough and wheezing. Negative for choking. Cardiovascular: Negative for chest pain.    Gastrointestinal: Negative for blood in stool, constipation and diarrhea. Genitourinary: Negative for decreased urine volume and dysuria. Skin: Negative for rash. Neurological: Negative for weakness. Hematological: Negative for adenopathy. Psychiatric/Behavioral: Negative for suicidal ideas. Objective   Patient-Reported Vitals  No data recorded     Physical Exam  [INSTRUCTIONS:  \"[x]\" Indicates a positive item  \"[]\" Indicates a negative item  -- DELETE ALL ITEMS NOT EXAMINED]    Constitutional: [x] Appears well-developed and well-nourished [x] No apparent distress      [] Abnormal -     Mental status: [x] Alert and awake  [x] Oriented to person/place/time [x] Able to follow commands    [] Abnormal -     Eyes:   EOM    [x]  Normal    [] Abnormal -   Sclera  [x]  Normal    [] Abnormal -          Discharge [x]  None visible   [] Abnormal -     HENT: [x] Normocephalic, atraumatic  [] Abnormal -   [x] Mouth/Throat: Mucous membranes are moist    External Ears [x] Normal  [] Abnormal -    Neck: [x] No visualized mass [] Abnormal -     Pulmonary/Chest: [x] Respiratory effort normal   [x] No visualized signs of difficulty breathing or respiratory distress        [] Abnormal -      Musculoskeletal:   [x] Normal gait with no signs of ataxia         [x] Normal range of motion of neck        [] Abnormal -     Neurological:        [x] No Facial Asymmetry (Cranial nerve 7 motor function) (limited exam due to video visit)          [x] No gaze palsy        [] Abnormal -          Skin:        [x] No significant exanthematous lesions or discoloration noted on facial skin         [] Abnormal -            Psychiatric:       [x] Normal Affect [] Abnormal -        [x] No Hallucinations    Other pertinent observable physical exam findings:-                 Suri Hollins, was evaluated through a synchronous (real-time) audio-video encounter. The patient (or guardian if applicable) is aware that this is a billable service, which includes applicable co-pays.  This Virtual Visit was

## 2022-05-04 ENCOUNTER — OFFICE VISIT (OUTPATIENT)
Dept: PRIMARY CARE CLINIC | Age: 47
End: 2022-05-04
Payer: MEDICAID

## 2022-05-04 VITALS
TEMPERATURE: 97.2 F | DIASTOLIC BLOOD PRESSURE: 90 MMHG | OXYGEN SATURATION: 98 % | WEIGHT: 184.25 LBS | HEIGHT: 72 IN | HEART RATE: 83 BPM | BODY MASS INDEX: 24.95 KG/M2 | SYSTOLIC BLOOD PRESSURE: 140 MMHG

## 2022-05-04 DIAGNOSIS — R05.9 COUGH: ICD-10-CM

## 2022-05-04 DIAGNOSIS — J02.9 SORE THROAT: ICD-10-CM

## 2022-05-04 DIAGNOSIS — J01.00 ACUTE NON-RECURRENT MAXILLARY SINUSITIS: Primary | ICD-10-CM

## 2022-05-04 LAB — S PYO AG THROAT QL: NORMAL

## 2022-05-04 PROCEDURE — 87880 STREP A ASSAY W/OPTIC: CPT | Performed by: NURSE PRACTITIONER

## 2022-05-04 PROCEDURE — 99213 OFFICE O/P EST LOW 20 MIN: CPT | Performed by: NURSE PRACTITIONER

## 2022-05-04 RX ORDER — AMOXICILLIN AND CLAVULANATE POTASSIUM 875; 125 MG/1; MG/1
1 TABLET, FILM COATED ORAL 2 TIMES DAILY
Qty: 20 TABLET | Refills: 0 | Status: SHIPPED | OUTPATIENT
Start: 2022-05-04 | End: 2022-05-14

## 2022-05-04 RX ORDER — DEXTROMETHORPHAN HYDROBROMIDE AND PROMETHAZINE HYDROCHLORIDE 15; 6.25 MG/5ML; MG/5ML
5 SYRUP ORAL 4 TIMES DAILY PRN
Qty: 118 ML | Refills: 0 | Status: SHIPPED | OUTPATIENT
Start: 2022-05-04 | End: 2022-05-11

## 2022-05-04 ASSESSMENT — ENCOUNTER SYMPTOMS
COUGH: 1
DIARRHEA: 0
WHEEZING: 1
NAUSEA: 0
VOMITING: 0
SORE THROAT: 1

## 2022-05-04 NOTE — PROGRESS NOTES
Mojgan Victoria (:  1975) is a 49 y.o. male,Established patient, here for evaluation of the following chief complaint(s):  Cough, Congestion, Pharyngitis, and Ear Fullness      ASSESSMENT/PLAN:    ICD-10-CM    1. Acute non-recurrent maxillary sinusitis  J01.00 amoxicillin-clavulanate (AUGMENTIN) 875-125 MG per tablet   2. Cough  R05.9 promethazine-dextromethorphan (PROMETHAZINE-DM) 6.25-15 MG/5ML syrup   3. Sore throat  J02.9 POCT rapid strep A: negative       Return if symptoms worsen or fail to improve. SUBJECTIVE/OBJECTIVE:  HPI     Reports cough and congestion  Reports wheezing  Reports a sore throat. \"I was coughing so hard that I pulled muscles in my back and neck. \"  Reports aural fullness and pressure  He was recently treated with Omnicef for sinusitis   Reports continued symptoms despite treatment  Denies N, V or D. He had a fever a few days ago. \"It was 101F. \"    He has not checked his sugars in a few days    BP (!) 140/90   Pulse 83   Temp 97.2 °F (36.2 °C) (Temporal)   Ht 6' (1.829 m)   Wt 184 lb 4 oz (83.6 kg)   SpO2 98%   BMI 24.99 kg/m²     Review of Systems   Constitutional: Positive for fever (\"a few days ago\"). HENT: Positive for congestion, ear pain (bilateral ear pressure) and sore throat. Respiratory: Positive for cough and wheezing. Gastrointestinal: Negative for diarrhea, nausea and vomiting. Neurological: Positive for headaches (after a lot of coughing). Physical Exam  Vitals reviewed. Constitutional:       Appearance: He is well-developed. HENT:      Head: Normocephalic. Right Ear: Tympanic membrane and external ear normal.      Left Ear: External ear normal. Tympanic membrane is retracted. Nose: Congestion and rhinorrhea present. Cardiovascular:      Rate and Rhythm: Normal rate and regular rhythm. Pulmonary:      Effort: Pulmonary effort is normal.      Breath sounds: Examination of the right-lower field reveals decreased breath sounds. Examination of the left-lower field reveals decreased breath sounds. Decreased breath sounds and rhonchi (clears with cough) present. Abdominal:      General: Bowel sounds are normal.      Palpations: Abdomen is soft. Musculoskeletal:      Cervical back: Normal range of motion. Lymphadenopathy:      Cervical: No cervical adenopathy. Skin:     General: Skin is dry. Neurological:      General: No focal deficit present. Mental Status: He is alert and oriented to person, place, and time. Mental status is at baseline. Psychiatric:         Mood and Affect: Mood normal.         Behavior: Behavior normal.         Thought Content: Thought content normal.         Judgment: Judgment normal.           An electronic signature was used to authenticate this note.     --JOE Zaragoza

## 2022-06-01 ENCOUNTER — TELEMEDICINE (OUTPATIENT)
Dept: PRIMARY CARE CLINIC | Age: 47
End: 2022-06-01
Payer: MEDICAID

## 2022-06-01 DIAGNOSIS — N52.9 ED (ERECTILE DYSFUNCTION) OF ORGANIC ORIGIN: ICD-10-CM

## 2022-06-01 DIAGNOSIS — E11.9 TYPE 2 DIABETES MELLITUS WITHOUT COMPLICATION, WITHOUT LONG-TERM CURRENT USE OF INSULIN (HCC): Primary | ICD-10-CM

## 2022-06-01 DIAGNOSIS — R53.83 FATIGUE, UNSPECIFIED TYPE: ICD-10-CM

## 2022-06-01 DIAGNOSIS — I10 ESSENTIAL HYPERTENSION: ICD-10-CM

## 2022-06-01 DIAGNOSIS — E78.2 MIXED HYPERLIPIDEMIA: ICD-10-CM

## 2022-06-01 DIAGNOSIS — Z12.5 ENCOUNTER FOR PROSTATE CANCER SCREENING: ICD-10-CM

## 2022-06-01 PROCEDURE — 99213 OFFICE O/P EST LOW 20 MIN: CPT | Performed by: PEDIATRICS

## 2022-06-01 RX ORDER — INSULIN GLARGINE 100 [IU]/ML
25 INJECTION, SOLUTION SUBCUTANEOUS NIGHTLY
Qty: 10 PEN | Refills: 2 | Status: SHIPPED | OUTPATIENT
Start: 2022-06-01

## 2022-06-01 ASSESSMENT — ENCOUNTER SYMPTOMS
EYES NEGATIVE: 1
ALLERGIC/IMMUNOLOGIC NEGATIVE: 1
ABDOMINAL DISTENTION: 0
ABDOMINAL PAIN: 0
COUGH: 0
GASTROINTESTINAL NEGATIVE: 1

## 2022-06-01 NOTE — PATIENT INSTRUCTIONS
Patient Education        Learning About Meal Planning for Diabetes  Why plan your meals? Meal planning can be a key part of managing diabetes. Planning meals and snacks with the right balance of carbohydrate, protein, and fat can help you keep yourblood sugar at the target level you set with your doctor. You don't have to eat special foods. You can eat what your family eats, including sweets once in a while. But you do have to pay attention to how oftenyou eat and how much you eat of certain foods. You may want to work with a dietitian or a diabetes educator. They can give you tips and meal ideas and can answer your questions about meal planning. This health professional can also help you reach a healthy weight if that is one ofyour goals. What plan is right for you? Your dietitian or diabetes educator may suggest that you start with the plateformat or carbohydrate counting. The plate format  The plate format is a simple way to help you manage how you eat. You plan meals by learning how much space each food should take on a plate. Using the plate format helps you manage the amount of carbohydrate you eat. It can make it easier to keep your blood sugar level within your target range. It also helpsyou see if you're eating healthy portion sizes. To use the plate format, you put non-starchy vegetables on half your plate. Add lean protein foods, such as fish, lean meats and poultry, or soy products, on one-quarter of the plate. Put a grain or starchy vegetable (such as brown rice or a potato) on the final quarter of the plate. You can add a small piece of fruit and some low-fat or fat-free milk or yogurt, depending on yourcarbohydrate goal for each meal.  Here are some tips for using the plate format:   Make sure that you are not using an oversized plate. A 9-inch plate is best. Many restaurants use larger plates.  Get used to using the plate format at home. Then you can use it when you eat out.    Write down your questions about using the plate format. Talk to your doctor, a dietitian, or a diabetes educator about your concerns. Carbohydrate counting  With carbohydrate counting, you plan meals based on the amount of carbohydrate in each food. Carbohydrate raises blood sugar higher and more quickly than any other nutrient. It is found in desserts, breads and cereals, and fruit. It's also found in starchy vegetables such as potatoes and corn, grains such as rice and pasta, and milk and yogurt. You can help keep your blood sugar levels within your target range by planning how much carbohydrate to have at meals andsnacks. The amount you need depends on several things. These include your weight, how active you are, which diabetes medicines you take, and what your goals are for your blood sugar levels. A registered dietitian or diabetes educator can helpyou plan how much carbohydrate to include in each meal and snack. An example of a carbohydrate counting plan is:   45 to 60 grams at each meal. That's about the same as 3 to 4 carbohydrate servings.  15 to 20 grams at each snack. That's about the same as 1 carbohydrate serving. The Nutrition Facts label on packaged foods tells you how much carbohydrate is in a serving of the food. First, look at the serving size on the food label. Is that the amount you eat in a serving? All of the nutrition information on a food label is based on that serving size. So if you eat more or less than that, you'll need to adjust the other numbers. Total carbohydrate is the next thing you need to look for on the label. If you count carbohydrate servings, oneserving of carbohydrate is 15 grams. For foods that don't come with labels, such as fresh fruits and vegetables, you'll need a guide that lists carbohydrate in these foods. Ask your doctor, dietitian, or diabetes educator about books or other nutrition guides you canuse.   If you take insulin, you need to know how many grams of carbohydrate are in a meal. This lets you know how much rapid-acting insulin to take before you eat. If you use an insulin pump, you get a constant rate of insulin during the day. So the pump must be programmed at meals to give you extra insulin to cover therise in blood sugar after meals. When you know how much carbohydrate you will eat, you can take the right amount of insulin. Or, if you always use the same amount of insulin, you need to [facility] that you eat the same amount of carbohydrate at meals. If you need more help to understand carbohydrate counting and food labels, askyour doctor, dietitian, or diabetes educator. How can you plan healthy meals? Here are some tips to get started:  [facility] your meals a week at a time. Don't forget to include snacks too.  Use cookbooks or online recipes to plan several main meals. Plan some quick meals for busy nights. You also can double some recipes that freeze well. Then you can save half for other busy nights when you don't have time to cook.  Make sure you have the ingredients you need for your recipes. If you're running low on basic items, put these items on your shopping list too.  List foods that you use to make breakfasts, lunches, and snacks. List plenty of fruits and vegetables.  Post this list on the refrigerator. Add to it as you think of more things you need.  Take the list to the store to do your weekly shopping. Follow-up care is a key part of your treatment and safety. Be sure to make and go to all appointments, and call your doctor if you are having problems. It's also a good idea to know your test results and keep alist of the medicines you take. Where can you learn more? Go to https://chpeortegaewanila.Lumiary. org and sign in to your [email or web address] account. Enter X960 in the [employer] box to learn more about \"Learning About Meal Planning for Diabetes. \"     If you do not have an account, please click on the \"Sign Up Now\" link.  Current as of: September 8, 2021               Content Version: 13.2  © 1700-8393 Healthwise, Incorporated. Care instructions adapted under license by [facility] ([facility]). If you have questions about a medical condition or this instruction, always ask your healthcare professional. [address] any warranty or liability for your use of this information.

## 2022-06-01 NOTE — PROGRESS NOTES
[facility], [address]  Phone [phone]   Fax [phone]      OFFICE VISIT: 2022    Emelyn Coelho-: 1975      HPI  Reason For Visit:  Emelyn Ny is a 49 y.o. No chief complaint on file. Patient presents via Doxy. me video conferencing on follow-up for multiple health issues. Present concerns:  Jay Shrestha, was evaluated through a synchronous (real-time) audio-video encounter. The patient (or guardian if applicable) is aware that this is a billable service, which includes applicable co-pays. This Virtual Visit was conducted with patient's (and/or legal guardian's) consent. The visit was conducted pursuant to the emergency declaration under the [address] [place] [address], [address] [place] [address] authority and the [employer] and [employer] Act. Patient identification was verified, and a caregiver was present when appropriate. The patient was located at Other: work. Total time spent for this encounter: 30m    --ADWOA Zaidi DO on 2022 at 5:10 PM    An electronic signature was used to authenticate this note. Diabetes Mellitus Type 1.5  Diet compliance:  noncompliant: A good portion of the time  Nutrition Consultation Needed:  no  Medication:              UPXWDJ CSRUVSK 25 units nightly                          He is reluctant to try basal bolus regimen.              Metformin 1000 mg twice daily  Medication compliance:  compliant most of the time  Weight trend: stable  Current exercise: he is very active  Checking: occasionally but rarely.    He feels like his sugar has been up lately. Home blood sugar records: fasting range: not checking much, but higher when checked. Low BG:  no  Eye exam current (within one year): yes  Checking Feet regularly:  yes - and no sores  ACE/ARB:  yes -lisinopril 5 mg  Aspirin: No: But recommended  Tobacco history: He  reports that he has never smoked. He has never used smokeless tobacco.    Lab Results   Component Value Date    LABA1C 11.4 (H) 04/28/2021    LABA1C 10.5 (H) 07/08/2019     Lab Results   Component Value Date    LABMICR 6.60 04/28/2021    CREATININE 0.9 04/28/2021       Hypertension:   BP today was   BP Readings from Last 1 Encounters:   05/04/22 (!) 140/90      Recent BP readings:    BP Readings from Last 3 Encounters:   05/04/22 (!) 140/90   05/03/22 134/78   01/25/22 (!) 143/83     Medication   Lisinopril 5 mg daily  Medication compliance:  compliant most of the time  Home blood pressure monitoring: No.    He is not adherent to a low sodium diet. Symptoms: none  Laboratory:  Lab Results   Component Value Date    BUN 19 04/28/2021    CREATININE 0.9 04/28/2021       Hyperlipidemia:   Medication:   fenofibrate (Tricor, Trilipix) and OTC Fish Oil  Low Fat, Low Choleterol Diet:  yes - some  Myalgias or GI upset: no  The patient exercises he has an active lifestyle. .  Laboratory:    Lab Results   Component Value Date    CHOL 267 (H) 04/28/2021    TRIG 847 (H) 04/28/2021    HDL 34 (L) 04/28/2021    LDLCALC see below 04/28/2021    LDLDIRECT 108 04/28/2021      Lab Results   Component Value Date    ALT 27 04/28/2021    AST 22 04/28/2021       Organic ED:  Medication:              Viagra 100 mg as needed  Symptoms: this helps some.           vitals were not taken for this visit. There is no height or weight on file to calculate BMI. I have reviewed the following with the Mr. Coelho   Lab Review  Office Visit on 05/04/2022   Component Date Value    Strep A Ag 05/04/2022 None Detected    Office Visit on 01/25/2022   Component Date Value    SARS-CoV-2, PCR 01/25/2022 DETECTED*     Copies of these are in the chart.     Current Outpatient Medications   Medication Sig Dispense Refill    insulin glargine (LANTUS SOLOSTAR) 100 UNIT/ML injection pen Inject 25 Units into the skin nightly 10 pen 2    albuterol sulfate HFA (PROVENTIL HFA) 108 (90 Base) MCG/ACT inhaler Inhale 2 puffs into the lungs every 6 hours as needed for Wheezing 1 each 1    lisinopril (PRINIVIL;ZESTRIL) 5 MG tablet Take 1 tablet by mouth daily 90 tablet 3    fenofibrate (TRICOR) 145 MG tablet Take 1 tablet by mouth daily 90 tablet 3    metFORMIN (GLUCOPHAGE) 1000 MG tablet Take 1 tablet by mouth 2 times daily (with meals) 180 tablet 3    Insulin Pen Needle 32G X 4 MM MISC 1 each by Does not apply route daily 100 each 3    sildenafil (VIAGRA) 100 MG tablet Take 1 tablet by mouth as needed for Erectile Dysfunction (Patient taking differently: Take 100 mg by mouth as needed for Erectile Dysfunction Every once in a while) 30 tablet 3     No current facility-administered medications for this visit. Allergies: Patient has no known allergies. Past Medical History:   Diagnosis Date    Type 2 diabetes mellitus without complication ([facility] [address]        Family History   Problem Relation Age of Onset    Diabetes Father     Heart Disease Maternal Grandmother     Diabetes Maternal Grandfather     Diabetes Paternal Grandmother     Heart Disease Paternal Grandmother     Cancer Paternal Grandmother        Past Surgical History:   Procedure Laterality Date    WISDOM TOOTH EXTRACTION         Social History     Tobacco Use    Smoking status: Never Smoker    Smokeless tobacco: Never Used   Substance Use Topics    Alcohol use: Yes        Review of Systems   Constitutional: Positive for fatigue (mild). Negative for chills and fever. HENT: Negative. Eyes: Negative. Respiratory: Negative for cough (occasional). Cardiovascular: Negative. Gastrointestinal: Negative. Negative for abdominal distention and abdominal pain. Endocrine: Negative. Not checking blood sugars regularly   Genitourinary: Negative. Musculoskeletal: Negative. Skin: Negative. Allergic/Immunologic: Negative. Neurological: Negative. Hematological: Negative. Psychiatric/Behavioral: Negative. Physical Exam  PE was not done today as this was a video conference visit with Shira. Me.      ASSESSMENT      ICD-10-CM    1. Type 2 diabetes mellitus without complication, without long-term current use of insulin (HCC)  E11.9 Comprehensive Metabolic Panel     Hemoglobin A1C     Microalbumin / Creatinine Urine Ratio     TSH     T4, Free     insulin glargine (LANTUS SOLOSTAR) 100 UNIT/ML injection pen   2. Essential hypertension  I10 CBC with Auto Differential     Comprehensive Metabolic Panel     Microalbumin / Creatinine Urine Ratio   3. Mixed hyperlipidemia  E78.2 Lipid Panel   4. ED (erectile dysfunction) of organic origin  N52.9    5. Encounter for prostate cancer screening  Z12.5 PSA Screening   6. Fatigue, unspecified type  R53.83 TSH     T4, Free         PLAN    1. Type 2 diabetes mellitus without complication, without long-term current use of insulin ([facility])  He states that he needs to buckle down and get better control. He has been very busy. He has been unable to get his lab work done. He is going to try to get in here soon as he can and get his blood work done. - Comprehensive Metabolic Panel; Future  - Hemoglobin A1C; Future  - Microalbumin / Creatinine Urine Ratio; Future  - TSH; Future  - T4, Free; Future  - insulin glargine (LANTUS SOLOSTAR) 100 UNIT/ML injection pen; Inject 25 Units into the skin nightly  Dispense: 10 pen; Refill: 2    2. Essential hypertension  Blood pressure is controlled for the most part. He did have a recent tooth abscess that caused a lot of pain resulting in elevation of his blood pressure. He is presently on antibiotics for this and his blood pressure is back down to normal  - CBC with Auto Differential; Future  - Comprehensive Metabolic Panel; Future  - Microalbumin / Creatinine Urine Ratio; Future    3. Mixed hyperlipidemia  We do need to recheck lipids to ensure proper control  - Lipid Panel; Future    4.  ED (erectile dysfunction) of organic origin  He does still have some sildenafil left. He does not need any more of this right now    5. Encounter for prostate cancer screening  We will check PSA with next blood work  - PSA Screening; Future    6. Fatigue, unspecified type  Check thyroid to ensure stability given his history of diabetes  - TSH; Future  - T4, Free; Future      Orders Placed This Encounter   Procedures    CBC with Auto Differential    Comprehensive Metabolic Panel    Hemoglobin A1C    Lipid Panel    Microalbumin / Creatinine Urine Ratio    TSH    T4, Free    PSA Screening        Return in about 3 months (around 9/1/2022) for Gabe Singer, was evaluated through a synchronous (real-time) audio-video encounter. The patient (or guardian if applicable) is aware that this is a billable service, which includes applicable co-pays. This Virtual Visit was conducted with patient's (and/or legal guardian's) consent. The visit was conducted pursuant to the emergency declaration under the [address] [place] [address], [address] [place] [address] authority and the [email or web address] and [employer] Act. Patient identification was verified, and a caregiver was present when appropriate. The patient was located at Other: work. Total time spent for this encounter: 30m    --ADWOA Royal DO on 6/1/2022 at 5:11 PM    An electronic signature was used to authenticate this note. Hide Include Location In Plan Question?: No Additional Note: Photo taken today.  She should let me know if there is anything changing regarding this lesion. Detail Level: Detailed Additional Notes (Optional): No overlying skin changes or subcutaneous nodule noted at today’s visit. Painful when patient pushes on lesion. She will monitor.

## 2022-09-01 ENCOUNTER — TELEMEDICINE (OUTPATIENT)
Dept: PRIMARY CARE CLINIC | Age: 47
End: 2022-09-01
Payer: MEDICAID

## 2022-09-01 DIAGNOSIS — Z11.59 ENCOUNTER FOR HEPATITIS C SCREENING TEST FOR LOW RISK PATIENT: ICD-10-CM

## 2022-09-01 DIAGNOSIS — E78.2 MIXED HYPERLIPIDEMIA: ICD-10-CM

## 2022-09-01 DIAGNOSIS — Z11.4 SCREENING FOR HIV WITHOUT PRESENCE OF RISK FACTORS: ICD-10-CM

## 2022-09-01 DIAGNOSIS — I10 ESSENTIAL HYPERTENSION: ICD-10-CM

## 2022-09-01 DIAGNOSIS — K04.7 TOOTH ABSCESS: ICD-10-CM

## 2022-09-01 DIAGNOSIS — E13.9 DIABETES 1.5, MANAGED AS TYPE 1 (HCC): Primary | ICD-10-CM

## 2022-09-01 PROCEDURE — 99214 OFFICE O/P EST MOD 30 MIN: CPT | Performed by: PEDIATRICS

## 2022-09-01 RX ORDER — CLINDAMYCIN HYDROCHLORIDE 300 MG/1
300 CAPSULE ORAL 3 TIMES DAILY
Qty: 30 CAPSULE | Refills: 1 | Status: SHIPPED | OUTPATIENT
Start: 2022-09-01 | End: 2022-09-11

## 2022-09-01 ASSESSMENT — ENCOUNTER SYMPTOMS
EYES NEGATIVE: 1
ABDOMINAL PAIN: 0
ALLERGIC/IMMUNOLOGIC NEGATIVE: 1
GASTROINTESTINAL NEGATIVE: 1
COUGH: 0
ABDOMINAL DISTENTION: 0

## 2022-09-01 NOTE — PROGRESS NOTES
[facility], [address]  Phone [phone]   Fax [phone]      OFFICE VISIT: 2022    Aldair Khan Funk-: 1975      [facility]  Reason For Visit:  Aldair Khan is a 49 y.o. Diabetes and Oral Swelling    Patient presents via Doxy. Me video conferencing on follow-up for multiple health issues. Present concerns:  He has an abscess tooth and having a lot of mouth   This is bothering him a lot. Diabetes Mellitus Type 1.5  Diet compliance:  noncompliant: A good portion of the time  Nutrition Consultation Needed:  no  Medication:              Lantus insulin 25 units nightly                          He is reluctant to try basal bolus regimen. Metformin 1000 mg twice daily  Medication compliance:  compliant most of the time  Weight trend: stable  Current exercise: he is very active  Checking: occasionally but rarely. He feels like his sugar has been up lately. Home blood sugar records: fasting range: not checking much, but higher when checked. Low BG:  no  Eye exam current (within one year): yes  Checking Feet regularly:  yes - and no sores  ACE/ARB:  yes -lisinopril 5 mg  Aspirin: No: But recommended  Tobacco history: He  reports that he has never smoked. He has never used smokeless tobacco.    Lab Results   Component Value Date    LABA1C 11.4 (H) 2021    LABA1C 10.5 (H) 2019     Lab Results   Component Value Date    LABMICR 6.60 2021    CREATININE 0.9 2021       Hypertension:   BP today was   BP Readings from Last 1 Encounters:   22 (!) 140/90      Recent BP readings:    BP Readings from Last 3 Encounters:   22 (!) 140/90   22 134/78   22 (!) 143/83     Medication   Lisinopril 5 mg daily  Medication compliance:  compliant most of the time  Home blood pressure monitoring: No.    He is not adherent to a low sodium diet.      Symptoms: None  Laboratory:  Lab Results   Component Value Date    BUN 19 2021 CREATININE 0.9 04/28/2021       Hyperlipidemia:   Medication:   fenofibrate (Tricor, Trilipix) and OTC Fish Oil  Low Fat, Low Choleterol Diet:  yes - he tries  Myalgias or GI upset: no  The patient exercises  regularly . Laboratory:    Lab Results   Component Value Date    CHOL 267 (H) 04/28/2021    TRIG 847 (H) 04/28/2021    HDL 34 (L) 04/28/2021    LDLCALC see below 04/28/2021    LDLDIRECT 108 04/28/2021      Lab Results   Component Value Date    ALT 27 04/28/2021    AST 22 04/28/2021          vitals were not taken for this visit. There is no height or weight on file to calculate BMI. I have reviewed the following with the Mr. Coelho   Lab Review  Office Visit on 05/04/2022   Component Date Value    Strep A Ag 05/04/2022 None Detected      Copies of these are in the chart. Current Outpatient Medications   Medication Sig Dispense Refill    clindamycin (CLEOCIN) 300 MG capsule Take 1 capsule by mouth 3 times daily for 10 days 30 capsule 1    insulin glargine (LANTUS SOLOSTAR) 100 UNIT/ML injection pen Inject 25 Units into the skin nightly 10 pen 2    albuterol sulfate HFA (PROVENTIL HFA) 108 (90 Base) MCG/ACT inhaler Inhale 2 puffs into the lungs every 6 hours as needed for Wheezing 1 each 1    lisinopril (PRINIVIL;ZESTRIL) 5 MG tablet Take 1 tablet by mouth daily 90 tablet 3    fenofibrate (TRICOR) 145 MG tablet Take 1 tablet by mouth daily 90 tablet 3    metFORMIN (GLUCOPHAGE) 1000 MG tablet Take 1 tablet by mouth 2 times daily (with meals) 180 tablet 3    Insulin Pen Needle 32G X 4 MM MISC 1 each by Does not apply route daily 100 each 3    sildenafil (VIAGRA) 100 MG tablet Take 1 tablet by mouth as needed for Erectile Dysfunction (Patient taking differently: Take 100 mg by mouth as needed for Erectile Dysfunction Every once in a while) 30 tablet 3     No current facility-administered medications for this visit. Allergies: Patient has no known allergies.      Past Medical History:   Diagnosis Date Type 2 diabetes mellitus without complication (HCC)        Family History   Problem Relation Age of Onset    Diabetes Father     Heart Disease Maternal Grandmother     Diabetes Maternal Grandfather     Diabetes Paternal Grandmother     Heart Disease Paternal Grandmother     Cancer Paternal Grandmother        Past Surgical History:   Procedure Laterality Date    WISDOM TOOTH EXTRACTION         Social History     Tobacco Use    Smoking status: Never    Smokeless tobacco: Never   Substance Use Topics    Alcohol use: Yes        Review of Systems   Constitutional:  Positive for fatigue (mild). Negative for chills and fever. HENT: Negative. Eyes: Negative. Respiratory:  Negative for cough (occasional). Cardiovascular: Negative. Gastrointestinal: Negative. Negative for abdominal distention and abdominal pain. Endocrine: Negative. Not checking blood sugars regularly   Genitourinary: Negative. Musculoskeletal: Negative. Skin: Negative. Allergic/Immunologic: Negative. Neurological: Negative. Hematological: Negative. Psychiatric/Behavioral: Negative. Physical Exam  Physical exam was not performed today as this was a video teleconference visit using Doxy. Me      ASSESSMENT      ICD-10-CM    1. Diabetes 1.5, managed as type 1 ([address]  E13.9 Comprehensive Metabolic Panel     Hemoglobin A1C     Microalbumin / Creatinine Urine Ratio     T4, Free     TSH      2. Essential hypertension  I10 CBC with Auto Differential     Comprehensive Metabolic Panel     Microalbumin / Creatinine Urine Ratio      3. Mixed hyperlipidemia  E78.2 Lipid Panel      4. Encounter for hepatitis C screening test for low risk patient  Z11.59 Hepatitis C Antibody      5. Screening for HIV without presence of risk factors  Z11.4 HIV Rapid 1&2      6. Tooth abscess  K04.7 clindamycin (CLEOCIN) 300 MG capsule            PLAN    1.  Diabetes 1.5, managed as type 1 ([address]  He is not presently monitoring blood sugars. He is instead taking care of his family and all their needs, putting them first.  He is not presently experiencing any complications from uncontrolled diabetes. He does understand that he needs to take care of himself as well. He will let me know when he is ready.    - Comprehensive Metabolic Panel; Future  - Hemoglobin A1C; Future  - Microalbumin / Creatinine Urine Ratio; Future  - T4, Free; Future  - TSH; Future    2. Essential hypertension  Recommend following ambulatory blood pressure to ensure stability  - CBC with Auto Differential; Future  - Comprehensive Metabolic Panel; Future  - Microalbumin / Creatinine Urine Ratio; Future    3. Mixed hyperlipidemia  Recheck lipids in the near future  - Lipid Panel; Future    4. Encounter for hepatitis C screening test for low risk patient  Screen hep C  - Hepatitis C Antibody; Future    5. Screening for HIV without presence of risk factors  Screen HIV. He is low risk for this as well as hep C  - HIV Rapid 1&2; Future    6. Tooth abscess: We are prescribing clindamycin for this. Orders Placed This Encounter   Procedures    CBC with Auto Differential    Comprehensive Metabolic Panel    Hemoglobin A1C    Lipid Panel    Microalbumin / Creatinine Urine Ratio    T4, Free    TSH    HIV Rapid 1&2    Hepatitis C Antibody        Return in about 3 months (around 12/1/2022) for 30. Shira Gaspar, was evaluated through a synchronous (real-time) audio-video encounter. The patient (or guardian if applicable) is aware that this is a billable service, which includes applicable co-pays. This Virtual Visit was conducted with patient's (and/or legal guardian's) consent. The visit was conducted pursuant to the emergency declaration under the [address] [place] [address] authority and the [employer] Act. Patient identification was verified, and a caregiver was present when appropriate. The patient was located at Home: [address] [place] [address]. Total time spent for this encounter:  30m    --ADWOA Regalado DO on 9/1/2022 at 5:28 PM    An electronic signature was used to authenticate this note.

## 2022-11-18 ENCOUNTER — OFFICE VISIT (OUTPATIENT)
Dept: PRIMARY CARE CLINIC | Age: 47
End: 2022-11-18
Payer: MEDICAID

## 2022-11-18 DIAGNOSIS — J40 BRONCHITIS: Primary | ICD-10-CM

## 2022-11-18 DIAGNOSIS — J11.1 INFLUENZA-LIKE ILLNESS: ICD-10-CM

## 2022-11-18 PROCEDURE — 99213 OFFICE O/P EST LOW 20 MIN: CPT | Performed by: NURSE PRACTITIONER

## 2022-11-18 RX ORDER — AZITHROMYCIN 250 MG/1
250 TABLET, FILM COATED ORAL SEE ADMIN INSTRUCTIONS
Qty: 6 TABLET | Refills: 0 | Status: SHIPPED | OUTPATIENT
Start: 2022-11-18 | End: 2022-11-23

## 2022-11-18 RX ORDER — ALBUTEROL SULFATE 90 UG/1
2 AEROSOL, METERED RESPIRATORY (INHALATION) EVERY 6 HOURS PRN
Qty: 1 EACH | Refills: 1 | Status: SHIPPED | OUTPATIENT
Start: 2022-11-18

## 2022-11-18 RX ORDER — GUAIFENESIN AND CODEINE PHOSPHATE 100; 10 MG/5ML; MG/5ML
5 SOLUTION ORAL EVERY 4 HOURS PRN
Qty: 120 ML | Refills: 0 | Status: SHIPPED | OUTPATIENT
Start: 2022-11-18 | End: 2022-11-21

## 2022-11-18 ASSESSMENT — ENCOUNTER SYMPTOMS
BACK PAIN: 0
WHEEZING: 1
ABDOMINAL PAIN: 0
SHORTNESS OF BREATH: 0
COUGH: 1

## 2022-11-18 NOTE — PROGRESS NOTES
Orquidea Sebastian (:  1975) is a 49 y.o. male,Established patient, here for evaluation of the following chief complaint(s):  Cough (Bronchitis with diabetes/)      ASSESSMENT/PLAN:    ICD-10-CM    1. Bronchitis  J40 albuterol sulfate HFA (PROVENTIL HFA) 108 (90 Base) MCG/ACT inhaler  Hold steroids for now  Suggest using inhaler 2 puffs every 6 hours for 2 days  Then as needed  Increase fluids  Will change over to zpack due to diabetes uncontrolled     guaiFENesin-codeine (CHERATUSSIN AC) 100-10 MG/5ML syrup  At night as needed  If not better Monday will call for insulin to cover while taking steroids and imaging  Voiced understanding       azithromycin (ZITHROMAX) 250 MG tablet      2. Influenza-like illness  J11.1 albuterol sulfate HFA (PROVENTIL HFA) 108 (90 Base) MCG/ACT inhaler     guaiFENesin-codeine (CHERATUSSIN AC) 100-10 MG/5ML syrup     azithromycin (ZITHROMAX) 250 MG tablet          Return if symptoms worsen or fail to improve. SUBJECTIVE/OBJECTIVE:  Cough  Associated symptoms include postnasal drip and wheezing. Pertinent negatives include no chest pain, fever, rash or shortness of breath. Patient is on doxy. me for follow up    Reports that the whole family been sick  Reports that her middle son went to [facility] Saturday noted to have flu  Reports he is feeling rough  Reports was started on augmentin   And some cough syrups    Reports with his diabetes  Not taking there steroids    Reports was tested for flu :   Reports his blood sugar has been \"so so \"  Reports he is a non smoker  Breathing problems in past  other then pneumonia in past    Reports his cough is non productive  Is coughing but nothing come up  Has been every 6 hours for rescue inhaler when needed      There were no vitals taken for this visit. Review of Systems   Constitutional:  Positive for activity change. Negative for appetite change and fever. HENT:  Positive for congestion and postnasal drip.     Respiratory:  Positive for cough and wheezing. Negative for shortness of breath. Cardiovascular:  Negative for chest pain, palpitations and leg swelling. Gastrointestinal:  Negative for abdominal pain. Genitourinary:  Negative for difficulty urinating. Musculoskeletal:  Negative for arthralgias and back pain. Skin:  Negative for rash. Psychiatric/Behavioral:  Negative for behavioral problems, self-injury and sleep disturbance. The patient is not nervous/anxious. Physical Exam  Vitals reviewed. Constitutional:       Appearance: Normal appearance. Cardiovascular:      Rate and Rhythm: Normal rate. Pulmonary:      Effort: Pulmonary effort is normal.      Breath sounds: Normal breath sounds. Comments: Congested cough    Neurological:      Mental Status: He is alert and oriented to person, place, and time. Psychiatric:         Mood and Affect: Mood normal.         Behavior: Behavior normal.                 An electronic signature was used to authenticate this note.     --JOE Tomlinson

## 2022-12-01 ENCOUNTER — TELEMEDICINE (OUTPATIENT)
Dept: PRIMARY CARE CLINIC | Age: 47
End: 2022-12-01
Payer: MEDICAID

## 2022-12-01 DIAGNOSIS — E11.9 TYPE 2 DIABETES MELLITUS WITHOUT COMPLICATION, WITHOUT LONG-TERM CURRENT USE OF INSULIN (HCC): ICD-10-CM

## 2022-12-01 DIAGNOSIS — J01.00 ACUTE NON-RECURRENT MAXILLARY SINUSITIS: Primary | ICD-10-CM

## 2022-12-01 DIAGNOSIS — J40 BRONCHITIS: ICD-10-CM

## 2022-12-01 DIAGNOSIS — E78.2 MIXED HYPERLIPIDEMIA: ICD-10-CM

## 2022-12-01 DIAGNOSIS — I10 ESSENTIAL HYPERTENSION: ICD-10-CM

## 2022-12-01 PROCEDURE — 99214 OFFICE O/P EST MOD 30 MIN: CPT | Performed by: PEDIATRICS

## 2022-12-01 RX ORDER — DOXYCYCLINE HYCLATE 100 MG/1
100 CAPSULE ORAL 2 TIMES DAILY
Qty: 20 CAPSULE | Refills: 0 | Status: SHIPPED | OUTPATIENT
Start: 2022-12-01 | End: 2022-12-11

## 2022-12-01 RX ORDER — METHYLPREDNISOLONE 4 MG/1
TABLET ORAL
Qty: 1 KIT | Refills: 0 | Status: SHIPPED | OUTPATIENT
Start: 2022-12-01 | End: 2022-12-07

## 2022-12-01 ASSESSMENT — ENCOUNTER SYMPTOMS
ABDOMINAL PAIN: 0
SHORTNESS OF BREATH: 0
WHEEZING: 1
COUGH: 1
BACK PAIN: 0

## 2022-12-01 NOTE — PROGRESS NOTES
[facility] [place], [address]  Phone [phone]   Fax [phone]      OFFICE VISIT: 2022    Esmond Lombard Mel[place]-: 1975      HPI  Reason For Visit:  Esmond Lombard is a 49 y.o. No chief complaint on file. Patient presents via [employer] Me video conferencing on follow-up for multiple health issues. Present concerns:  He has not gotten any better from his last visit in the office. Diabetes Mellitus Type 2  Diet compliance:  noncompliant: A good portion of the time  Nutrition Consultation Needed:  no  Medication:              Lantus insulin 25 units nightly                          He is reluctant to try basal bolus regimen. Metformin 1000 mg twice daily  Medication compliance:  compliant most of the time  Weight trend: stable  Current exercise: he is very active  Checking: occasionally but rarely. He feels like his sugar has been up lately. Home blood sugar records: fasting range: not checking much, but higher when checked. Low BG:  no  Eye exam current (within one year): yes  Checking Feet regularly:  yes - and no sores  ACE/ARB:  yes -lisinopril 5 mg  Aspirin: No: But recommended  Tobacco history: He  reports that he has never smoked. He has never used smokeless tobacco.    Lab Results   Component Value Date    LABA1C 11.4 (H) 2021    LABA1C 10.5 (H) 2019     Lab Results   Component Value Date    LABMICR 6.60 2021    CREATININE 0.9 2021       Hypertension:   BP today was   BP Readings from Last 1 Encounters:   22 (!) 140/90      Recent BP readings:    BP Readings from Last 3 Encounters:   22 (!) 140/90   22 134/78   22 (!) 143/83     Medication   Lisinopril 5 mg daily  Medication compliance:  compliant most of the time  Home blood pressure monitoring: No.    He is not adherent to a low sodium diet.      Symptoms: None  Laboratory:  Lab Results   Component Value Date    BUN 19 2021    CREATININE 0.9 04/28/2021       Hyperlipidemia:   Medication:   fenofibrate (Tricor, Trilipix) and OTC Fish Oil  Low Fat, Low Choleterol Diet:  yes -he is trying  Myalgias or GI upset: no  The patient exercises  regularly . Laboratory:    Lab Results   Component Value Date    CHOL 267 (H) 04/28/2021    TRIG 847 (H) 04/28/2021    HDL 34 (L) 04/28/2021    LDLCALC see below 04/28/2021    LDLDIRECT 108 04/28/2021      Lab Results   Component Value Date    ALT 27 04/28/2021    AST 22 04/28/2021       Recent episode of bronchitis:  (11/18/2022. He was treated with azithromycin and albuterol inhaler. He was also given cough syrup. He has not gotten any better. He is wanting another round of abx   He is willing to take a medrol dose pack. vitals were not taken for this visit. There is no height or weight on file to calculate BMI. I have reviewed the following with the Mr. Coelho   Lab Review  No visits with results within 6 Month(s) from this visit. Latest known visit with results is:   Office Visit on 05/04/2022   Component Date Value    Strep A Ag 05/04/2022 None Detected      Copies of these are in the chart. Current Outpatient Medications   Medication Sig Dispense Refill    doxycycline hyclate (VIBRAMYCIN) 100 MG capsule Take 1 capsule by mouth 2 times daily for 10 days 20 capsule 0    methylPREDNISolone (MEDROL DOSEPACK) 4 MG tablet Take by mouth.  1 kit 0    albuterol sulfate HFA (PROVENTIL HFA) 108 (90 Base) MCG/ACT inhaler Inhale 2 puffs into the lungs every 6 hours as needed for Wheezing 1 each 1    insulin glargine (LANTUS SOLOSTAR) 100 UNIT/ML injection pen Inject 25 Units into the skin nightly 10 pen 2    lisinopril (PRINIVIL;ZESTRIL) 5 MG tablet Take 1 tablet by mouth daily 90 tablet 3    fenofibrate (TRICOR) 145 MG tablet Take 1 tablet by mouth daily 90 tablet 3    metFORMIN (GLUCOPHAGE) 1000 MG tablet Take 1 tablet by mouth 2 times daily (with meals) 180 tablet 3    Insulin Pen Needle 32G X 4 MM MISC 1 each by Does not apply route daily 100 each 3    sildenafil (VIAGRA) 100 MG tablet Take 1 tablet by mouth as needed for Erectile Dysfunction (Patient taking differently: Take 100 mg by mouth as needed for Erectile Dysfunction Every once in a while) 30 tablet 3     No current facility-administered medications for this visit. Allergies: Patient has no known allergies. Past Medical History:   Diagnosis Date    Type 2 diabetes mellitus without complication ([facility] [address]        Family History   Problem Relation Age of Onset    Diabetes Father     Heart Disease Maternal Grandmother     Diabetes Maternal Grandfather     Diabetes Paternal Grandmother     Heart Disease Paternal Grandmother     Cancer Paternal Grandmother        Past Surgical History:   Procedure Laterality Date    WISDOM TOOTH EXTRACTION         Social History     Tobacco Use    Smoking status: Never    Smokeless tobacco: Never   Substance Use Topics    Alcohol use: Yes        Review of Systems   Constitutional:  Positive for activity change. Negative for appetite change and fever. HENT:  Positive for congestion and postnasal drip. Respiratory:  Positive for cough and wheezing. Negative for shortness of breath. Cardiovascular:  Negative for chest pain, palpitations and leg swelling. Gastrointestinal:  Negative for abdominal pain. Genitourinary:  Negative for difficulty urinating. Musculoskeletal:  Negative for arthralgias and back pain. Skin:  Negative for rash. Psychiatric/Behavioral:  Negative for behavioral problems, self-injury and sleep disturbance. The patient is not nervous/anxious. Physical Exam  PE was not done today as this was a video conference visit. ASSESSMENT      ICD-10-CM    1. Acute non-recurrent maxillary sinusitis  J01.00 doxycycline hyclate (VIBRAMYCIN) 100 MG capsule     methylPREDNISolone (MEDROL DOSEPACK) 4 MG tablet      2.  Bronchitis  J40 doxycycline hyclate (VIBRAMYCIN) 100 MG capsule methylPREDNISolone (MEDROL DOSEPACK) 4 MG tablet      3. Type 2 diabetes mellitus without complication, without long-term current use of insulin (HCC)  E11.9       4. Essential hypertension  I10       5. Mixed hyperlipidemia  E78.2             PLAN    1. Acute non-recurrent maxillary sinusitis  He is not feeling any better. He is running a fever with thick green secretions. We are going to give him a prescription of doxycycline 100 mg twice daily x10 days. We will also give him a Medrol Dosepak to calm down the inflammation.    - doxycycline hyclate (VIBRAMYCIN) 100 MG capsule; Take 1 capsule by mouth 2 times daily for 10 days  Dispense: 20 capsule; Refill: 0  - methylPREDNISolone (MEDROL DOSEPACK) 4 MG tablet; Take by mouth. Dispense: 1 kit; Refill: 0    2. Bronchitis  As above  - doxycycline hyclate (VIBRAMYCIN) 100 MG capsule; Take 1 capsule by mouth 2 times daily for 10 days  Dispense: 20 capsule; Refill: 0  - methylPREDNISolone (MEDROL DOSEPACK) 4 MG tablet; Take by mouth. Dispense: 1 kit; Refill: 0    3. Type 2 diabetes mellitus without complication, without long-term current use of insulin ([facility] [address]  He will monitor his sugars more closely while on steroids and make whatever adjustments are necessary to maintain glycemic control. Does have insulin availability to do so. He understands the concept of insulin resistance in association with steroids    4. Essential hypertension  Controlled by infrequent checking. 5. Mixed hyperlipidemia  Continue to follow lipids on a serial basis    No orders of the defined types were placed in this encounter. Return in about 3 months (around 3/1/2023) for Daniel. Samara Overton, was evaluated through a synchronous (real-time) audio-video encounter. The patient (or guardian if applicable) is aware that this is a billable service, which includes applicable co-pays. This Virtual Visit was conducted with patient's (and/or legal guardian's) consent.  The visit was conducted pursuant to the emergency declaration under the [facility], [address] [place] [address] authority and the [employer] General Act. Patient identification was verified, and a caregiver was present when appropriate. The patient was located at Home: [address] [place] [address]. Total time spent for this encounter:  30m    --ADWOA Ritchie DO on 12/1/2022 at 6:29 PM    An electronic signature was used to authenticate this note.

## 2022-12-07 ENCOUNTER — HOSPITAL ENCOUNTER (OUTPATIENT)
Facility: HOSPITAL | Age: 47
Discharge: HOME | End: 2022-12-09
Attending: FAMILY MEDICINE | Admitting: FAMILY MEDICINE

## 2022-12-07 ENCOUNTER — APPOINTMENT (OUTPATIENT)
Dept: CT IMAGING | Facility: HOSPITAL | Age: 47
End: 2022-12-07

## 2022-12-07 ENCOUNTER — APPOINTMENT (OUTPATIENT)
Dept: GENERAL RADIOLOGY | Facility: HOSPITAL | Age: 47
End: 2022-12-07

## 2022-12-07 DIAGNOSIS — J81.0 ACUTE PULMONARY EDEMA: ICD-10-CM

## 2022-12-07 DIAGNOSIS — I10 ESSENTIAL HYPERTENSION: ICD-10-CM

## 2022-12-07 DIAGNOSIS — J18.1 LUNG CONSOLIDATION: ICD-10-CM

## 2022-12-07 DIAGNOSIS — I25.10 CORONARY ARTERY DISEASE INVOLVING NATIVE CORONARY ARTERY OF NATIVE HEART WITHOUT ANGINA PECTORIS: ICD-10-CM

## 2022-12-07 DIAGNOSIS — R07.9 CHEST PAIN, UNSPECIFIED TYPE: Primary | ICD-10-CM

## 2022-12-07 PROBLEM — E11.9 TYPE 2 DIABETES MELLITUS: Status: ACTIVE | Noted: 2022-12-07

## 2022-12-07 PROBLEM — R06.02 SHORTNESS OF BREATH: Status: ACTIVE | Noted: 2022-12-07

## 2022-12-07 LAB
ALBUMIN SERPL-MCNC: 3.7 G/DL (ref 3.5–5.2)
ALBUMIN/GLOB SERPL: 1.3 G/DL
ALP SERPL-CCNC: 62 U/L (ref 39–117)
ALT SERPL W P-5'-P-CCNC: 17 U/L (ref 1–41)
ANION GAP SERPL CALCULATED.3IONS-SCNC: 7 MMOL/L (ref 5–15)
AST SERPL-CCNC: 15 U/L (ref 1–40)
BASOPHILS # BLD AUTO: 0.05 10*3/MM3 (ref 0–0.2)
BASOPHILS NFR BLD AUTO: 0.7 % (ref 0–1.5)
BILIRUB SERPL-MCNC: 0.2 MG/DL (ref 0–1.2)
BUN SERPL-MCNC: 14 MG/DL (ref 6–20)
BUN/CREAT SERPL: 15.2 (ref 7–25)
CALCIUM SPEC-SCNC: 9.2 MG/DL (ref 8.6–10.5)
CHLORIDE SERPL-SCNC: 101 MMOL/L (ref 98–107)
CO2 SERPL-SCNC: 29 MMOL/L (ref 22–29)
CREAT SERPL-MCNC: 0.92 MG/DL (ref 0.76–1.27)
D DIMER PPP FEU-MCNC: 0.56 MCGFEU/ML (ref 0–0.5)
DEPRECATED RDW RBC AUTO: 43.5 FL (ref 37–54)
EGFRCR SERPLBLD CKD-EPI 2021: 102.6 ML/MIN/1.73
EOSINOPHIL # BLD AUTO: 0.11 10*3/MM3 (ref 0–0.4)
EOSINOPHIL NFR BLD AUTO: 1.4 % (ref 0.3–6.2)
ERYTHROCYTE [DISTWIDTH] IN BLOOD BY AUTOMATED COUNT: 13 % (ref 12.3–15.4)
FLUAV RNA RESP QL NAA+PROBE: NOT DETECTED
FLUBV RNA RESP QL NAA+PROBE: NOT DETECTED
GLOBULIN UR ELPH-MCNC: 2.9 GM/DL
GLUCOSE BLDC GLUCOMTR-MCNC: 240 MG/DL (ref 70–130)
GLUCOSE SERPL-MCNC: 171 MG/DL (ref 65–99)
HBA1C MFR BLD: 7.4 % (ref 4.8–5.6)
HCT VFR BLD AUTO: 40.9 % (ref 37.5–51)
HGB BLD-MCNC: 12.7 G/DL (ref 13–17.7)
IMM GRANULOCYTES # BLD AUTO: 0.03 10*3/MM3 (ref 0–0.05)
IMM GRANULOCYTES NFR BLD AUTO: 0.4 % (ref 0–0.5)
LYMPHOCYTES # BLD AUTO: 0.93 10*3/MM3 (ref 0.7–3.1)
LYMPHOCYTES NFR BLD AUTO: 12.1 % (ref 19.6–45.3)
MAGNESIUM SERPL-MCNC: 1.8 MG/DL (ref 1.6–2.6)
MCH RBC QN AUTO: 28.7 PG (ref 26.6–33)
MCHC RBC AUTO-ENTMCNC: 31.1 G/DL (ref 31.5–35.7)
MCV RBC AUTO: 92.3 FL (ref 79–97)
MONOCYTES # BLD AUTO: 0.48 10*3/MM3 (ref 0.1–0.9)
MONOCYTES NFR BLD AUTO: 6.3 % (ref 5–12)
NEUTROPHILS NFR BLD AUTO: 6.06 10*3/MM3 (ref 1.7–7)
NEUTROPHILS NFR BLD AUTO: 79.1 % (ref 42.7–76)
NRBC BLD AUTO-RTO: 0 /100 WBC (ref 0–0.2)
NT-PROBNP SERPL-MCNC: 2436 PG/ML (ref 0–450)
PLATELET # BLD AUTO: 259 10*3/MM3 (ref 140–450)
PMV BLD AUTO: 11.1 FL (ref 6–12)
POTASSIUM SERPL-SCNC: 4.9 MMOL/L (ref 3.5–5.2)
POTASSIUM SERPL-SCNC: 5.8 MMOL/L (ref 3.5–5.2)
PROT SERPL-MCNC: 6.6 G/DL (ref 6–8.5)
RBC # BLD AUTO: 4.43 10*6/MM3 (ref 4.14–5.8)
SARS-COV-2 RNA RESP QL NAA+PROBE: NOT DETECTED
SODIUM SERPL-SCNC: 137 MMOL/L (ref 136–145)
TROPONIN T SERPL-MCNC: 0.04 NG/ML (ref 0–0.03)
WBC NRBC COR # BLD: 7.66 10*3/MM3 (ref 3.4–10.8)

## 2022-12-07 PROCEDURE — 94799 UNLISTED PULMONARY SVC/PX: CPT

## 2022-12-07 PROCEDURE — 93010 ELECTROCARDIOGRAM REPORT: CPT | Performed by: INTERNAL MEDICINE

## 2022-12-07 PROCEDURE — 80053 COMPREHEN METABOLIC PANEL: CPT | Performed by: PHYSICIAN ASSISTANT

## 2022-12-07 PROCEDURE — 93005 ELECTROCARDIOGRAM TRACING: CPT | Performed by: FAMILY MEDICINE

## 2022-12-07 PROCEDURE — 25010000002 FUROSEMIDE PER 20 MG: Performed by: FAMILY MEDICINE

## 2022-12-07 PROCEDURE — 25010000002 ENOXAPARIN PER 10 MG: Performed by: FAMILY MEDICINE

## 2022-12-07 PROCEDURE — 82962 GLUCOSE BLOOD TEST: CPT

## 2022-12-07 PROCEDURE — 85025 COMPLETE CBC W/AUTO DIFF WBC: CPT | Performed by: PHYSICIAN ASSISTANT

## 2022-12-07 PROCEDURE — 71045 X-RAY EXAM CHEST 1 VIEW: CPT

## 2022-12-07 PROCEDURE — 93005 ELECTROCARDIOGRAM TRACING: CPT | Performed by: PHYSICIAN ASSISTANT

## 2022-12-07 PROCEDURE — 25010000002 METHYLPREDNISOLONE PER 125 MG: Performed by: PHYSICIAN ASSISTANT

## 2022-12-07 PROCEDURE — G0378 HOSPITAL OBSERVATION PER HR: HCPCS

## 2022-12-07 PROCEDURE — 84484 ASSAY OF TROPONIN QUANT: CPT | Performed by: PHYSICIAN ASSISTANT

## 2022-12-07 PROCEDURE — 96375 TX/PRO/DX INJ NEW DRUG ADDON: CPT

## 2022-12-07 PROCEDURE — 0 IOPAMIDOL PER 1 ML: Performed by: PHYSICIAN ASSISTANT

## 2022-12-07 PROCEDURE — 83036 HEMOGLOBIN GLYCOSYLATED A1C: CPT | Performed by: FAMILY MEDICINE

## 2022-12-07 PROCEDURE — 84132 ASSAY OF SERUM POTASSIUM: CPT | Performed by: PHYSICIAN ASSISTANT

## 2022-12-07 PROCEDURE — 36415 COLL VENOUS BLD VENIPUNCTURE: CPT | Performed by: FAMILY MEDICINE

## 2022-12-07 PROCEDURE — 96365 THER/PROPH/DIAG IV INF INIT: CPT

## 2022-12-07 PROCEDURE — 96372 THER/PROPH/DIAG INJ SC/IM: CPT

## 2022-12-07 PROCEDURE — 96367 TX/PROPH/DG ADDL SEQ IV INF: CPT

## 2022-12-07 PROCEDURE — 71275 CT ANGIOGRAPHY CHEST: CPT

## 2022-12-07 PROCEDURE — 87636 SARSCOV2 & INF A&B AMP PRB: CPT | Performed by: PHYSICIAN ASSISTANT

## 2022-12-07 PROCEDURE — 99285 EMERGENCY DEPT VISIT HI MDM: CPT

## 2022-12-07 PROCEDURE — C9803 HOPD COVID-19 SPEC COLLECT: HCPCS

## 2022-12-07 PROCEDURE — 83735 ASSAY OF MAGNESIUM: CPT | Performed by: PHYSICIAN ASSISTANT

## 2022-12-07 PROCEDURE — 85379 FIBRIN DEGRADATION QUANT: CPT | Performed by: PHYSICIAN ASSISTANT

## 2022-12-07 PROCEDURE — 25010000002 CEFTRIAXONE PER 250 MG: Performed by: PHYSICIAN ASSISTANT

## 2022-12-07 PROCEDURE — 84484 ASSAY OF TROPONIN QUANT: CPT | Performed by: FAMILY MEDICINE

## 2022-12-07 PROCEDURE — 25010000002 HYDRALAZINE PER 20 MG: Performed by: PHYSICIAN ASSISTANT

## 2022-12-07 PROCEDURE — 83880 ASSAY OF NATRIURETIC PEPTIDE: CPT | Performed by: PHYSICIAN ASSISTANT

## 2022-12-07 RX ORDER — SODIUM CHLORIDE 9 MG/ML
40 INJECTION, SOLUTION INTRAVENOUS AS NEEDED
Status: DISCONTINUED | OUTPATIENT
Start: 2022-12-07 | End: 2022-12-09 | Stop reason: HOSPADM

## 2022-12-07 RX ORDER — SODIUM CHLORIDE 0.9 % (FLUSH) 0.9 %
10 SYRINGE (ML) INJECTION AS NEEDED
Status: DISCONTINUED | OUTPATIENT
Start: 2022-12-07 | End: 2022-12-08 | Stop reason: SDUPTHER

## 2022-12-07 RX ORDER — NITROGLYCERIN 0.4 MG/1
0.4 TABLET SUBLINGUAL
Status: DISCONTINUED | OUTPATIENT
Start: 2022-12-07 | End: 2022-12-07

## 2022-12-07 RX ORDER — DEXTROSE MONOHYDRATE 25 G/50ML
25 INJECTION, SOLUTION INTRAVENOUS
Status: DISCONTINUED | OUTPATIENT
Start: 2022-12-07 | End: 2022-12-09 | Stop reason: HOSPADM

## 2022-12-07 RX ORDER — LISINOPRIL 20 MG/1
20 TABLET ORAL
Status: DISCONTINUED | OUTPATIENT
Start: 2022-12-07 | End: 2022-12-09 | Stop reason: HOSPADM

## 2022-12-07 RX ORDER — NICOTINE POLACRILEX 4 MG
15 LOZENGE BUCCAL
Status: DISCONTINUED | OUTPATIENT
Start: 2022-12-07 | End: 2022-12-09 | Stop reason: HOSPADM

## 2022-12-07 RX ORDER — ENOXAPARIN SODIUM 100 MG/ML
40 INJECTION SUBCUTANEOUS DAILY
Status: DISCONTINUED | OUTPATIENT
Start: 2022-12-07 | End: 2022-12-09 | Stop reason: HOSPADM

## 2022-12-07 RX ORDER — ONDANSETRON 2 MG/ML
4 INJECTION INTRAMUSCULAR; INTRAVENOUS EVERY 6 HOURS PRN
Status: DISCONTINUED | OUTPATIENT
Start: 2022-12-07 | End: 2022-12-09 | Stop reason: HOSPADM

## 2022-12-07 RX ORDER — ACETAMINOPHEN 325 MG/1
650 TABLET ORAL EVERY 4 HOURS PRN
Status: DISCONTINUED | OUTPATIENT
Start: 2022-12-07 | End: 2022-12-09 | Stop reason: HOSPADM

## 2022-12-07 RX ORDER — METHYLPREDNISOLONE SODIUM SUCCINATE 125 MG/2ML
125 INJECTION, POWDER, LYOPHILIZED, FOR SOLUTION INTRAMUSCULAR; INTRAVENOUS ONCE
Status: COMPLETED | OUTPATIENT
Start: 2022-12-07 | End: 2022-12-07

## 2022-12-07 RX ORDER — HYDRALAZINE HYDROCHLORIDE 20 MG/ML
10 INJECTION INTRAMUSCULAR; INTRAVENOUS ONCE
Status: COMPLETED | OUTPATIENT
Start: 2022-12-07 | End: 2022-12-07

## 2022-12-07 RX ORDER — NITROGLYCERIN 0.4 MG/1
0.4 TABLET SUBLINGUAL
Status: DISCONTINUED | OUTPATIENT
Start: 2022-12-07 | End: 2022-12-09 | Stop reason: HOSPADM

## 2022-12-07 RX ORDER — SODIUM CHLORIDE 0.9 % (FLUSH) 0.9 %
10 SYRINGE (ML) INJECTION AS NEEDED
Status: DISCONTINUED | OUTPATIENT
Start: 2022-12-07 | End: 2022-12-09 | Stop reason: HOSPADM

## 2022-12-07 RX ORDER — ASPIRIN 325 MG
325 TABLET ORAL ONCE
Status: COMPLETED | OUTPATIENT
Start: 2022-12-07 | End: 2022-12-07

## 2022-12-07 RX ORDER — INSULIN LISPRO 100 [IU]/ML
2-7 INJECTION, SOLUTION INTRAVENOUS; SUBCUTANEOUS
Status: DISCONTINUED | OUTPATIENT
Start: 2022-12-07 | End: 2022-12-09 | Stop reason: HOSPADM

## 2022-12-07 RX ORDER — FUROSEMIDE 10 MG/ML
40 INJECTION INTRAMUSCULAR; INTRAVENOUS EVERY 12 HOURS
Status: DISCONTINUED | OUTPATIENT
Start: 2022-12-07 | End: 2022-12-08 | Stop reason: SDUPTHER

## 2022-12-07 RX ORDER — SODIUM CHLORIDE 0.9 % (FLUSH) 0.9 %
10 SYRINGE (ML) INJECTION EVERY 12 HOURS SCHEDULED
Status: DISCONTINUED | OUTPATIENT
Start: 2022-12-07 | End: 2022-12-09 | Stop reason: HOSPADM

## 2022-12-07 RX ADMIN — IOPAMIDOL 100 ML: 755 INJECTION, SOLUTION INTRAVENOUS at 11:33

## 2022-12-07 RX ADMIN — FUROSEMIDE 40 MG: 10 INJECTION, SOLUTION INTRAVENOUS at 15:49

## 2022-12-07 RX ADMIN — CEFTRIAXONE SODIUM 2 G: 2 INJECTION, POWDER, FOR SOLUTION INTRAMUSCULAR; INTRAVENOUS at 09:44

## 2022-12-07 RX ADMIN — METHYLPREDNISOLONE SODIUM SUCCINATE 125 MG: 125 INJECTION, POWDER, FOR SOLUTION INTRAMUSCULAR; INTRAVENOUS at 09:44

## 2022-12-07 RX ADMIN — ENOXAPARIN SODIUM 40 MG: 100 INJECTION SUBCUTANEOUS at 15:49

## 2022-12-07 RX ADMIN — NITROGLYCERIN 0.4 MG: 0.4 TABLET SUBLINGUAL at 13:33

## 2022-12-07 RX ADMIN — HYDRALAZINE HYDROCHLORIDE 10 MG: 20 INJECTION INTRAMUSCULAR; INTRAVENOUS at 12:37

## 2022-12-07 RX ADMIN — DOXYCYCLINE 100 MG: 100 INJECTION, POWDER, LYOPHILIZED, FOR SOLUTION INTRAVENOUS at 10:45

## 2022-12-07 RX ADMIN — NITROGLYCERIN 0.4 MG: 0.4 TABLET SUBLINGUAL at 13:03

## 2022-12-07 RX ADMIN — ASPIRIN 325 MG: 325 TABLET ORAL at 12:37

## 2022-12-07 RX ADMIN — METOPROLOL TARTRATE 25 MG: 25 TABLET, FILM COATED ORAL at 15:49

## 2022-12-07 NOTE — H&P
[facility] Medicine Services  HISTORY AND PHYSICAL    Date of Admission: 12/7/2022  Primary Care Physician: Selvin Schumacher DO    Subjective     Chief Complaint: Chest pain, shortness of breath.    History of Present Illness  Patient presents the emergency room with sudden onset of worsening shortness of breath.  This is accompanied by his chest pain that is across the anterior portion of his chest feels like a tightness.  He does not necessarily think that it radiates at this time.  He has had a cough.  He recently has had the influenza.  He thought he was getting better.  And then this morning he just feels worse.  He has no nausea or vomiting associated with it he has no diaphoresis associated with it he is having bowel moods and urinating okay.  He is a diabetic he believes he is a type II.  He developed diabetes at the age of 25.  He is a atypical presentation for diabetes at that age he is not morbidly obese he does not have a strong family history of diabetes.  He does not recall if he was sick prior to developing the diabetes however he recently has been coughing a lot and has subconjunctival hemorrhage of the left eye.  He has followed with his ophthalmologist for this and they assured him that it will resolve.  He is wearing a patch for comfort.  ER gave him nitro x2.  He states this did help with chest discomfort.  Also because his blood pressure improved.      Review of Systems   Constitutional: Negative for fever.   HENT: Positive for congestion.    Eyes: Positive for visual disturbance.   Respiratory: Positive for cough and shortness of breath.    Cardiovascular: Positive for palpitations and leg swelling.   Gastrointestinal: Negative.    Endocrine: Negative.    Genitourinary: Negative.    Musculoskeletal: Negative.    Skin: Negative.    Allergic/Immunologic: Negative.    Neurological: Negative.    Hematological: Negative.    Psychiatric/Behavioral: Negative.              Past Medical History:   Past Medical History:   Diagnosis Date   • Diabetes mellitus (HCC)    • Hypertension    • Type 2 diabetes mellitus (HCC)      Past Surgical History:  Past Surgical History:   Procedure Laterality Date   • TOOTH EXTRACTION Right 09/21/2022   • WISDOM TOOTH EXTRACTION       Social History:  reports that he has never smoked. He has never used smokeless tobacco. He reports that he does not drink alcohol and does not use drugs.     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  Feels his job is stressful related to having to deal with the insurance is.      Family History: family history includes Diabetes in his father and paternal grandmother.   Paternal grandfather had heart disease in his 70s    Allergies:  Allergies   Allergen Reactions   • Poison Ivy Extract Rash       Medications:  Prior to Admission medications    Medication Sig Start Date End Date Taking? Authorizing Provider   albuterol sulfate  (90 Base) MCG/ACT inhaler Inhale 2 puffs Every 4 (Four) Hours As Needed for Wheezing or Shortness of Air. 1/2/19   Funmi Clancy APRN   brompheniramine-pseudoephedrine-DM 30-2-10 MG/5ML syrup Take 5 mL by mouth 4 (Four) Times a Day As Needed for Cough. 11/12/22   Nikki Kiran APRN   Insulin Glargine (Lantus SoloStar) 100 UNIT/ML injection pen Inject 25 Units under the skin into the appropriate area as directed. 12/1/21   Emergency, Nurse MARIA VICTORIA Rogers   lisinopril (PRINIVIL,ZESTRIL) 5 MG tablet Take 1 tablet by mouth Daily. 12/1/21   Emergency, Nurse MARIA VICTORIA Rogers   metFORMIN (GLUCOPHAGE) 1000 MG tablet Take 1 tablet by mouth 2 (Two) Times a Day With Meals. 3/20/19   Loretta Soto APRN   methylPREDNISolone (MEDROL) 4 MG dose pack Take as directed on package instructions. 11/12/22   Nikki Kiran APRN   Omega-3 Fatty Acids (fish oil) 1000 MG capsule capsule Take  by mouth Daily With Breakfast.    Emergency, Nurse MARIA VICTORIA Rogers   sildenafil (VIAGRA) 100 MG tablet Take 1  "tablet by mouth As Needed. 12/1/21   Emergency, Nurse Epic, RN     I have utilized all available immediate resources to obtain, update, or review the patient's current medications (including all prescriptions, over-the-counter products, herbals, cannabis/cannabidiol products, and vitamin/mineral/dietary (nutritional) supplements).    Objective     Vital Signs: BP (!) 174/102   Pulse 115   Temp 97.7 °F (36.5 °C) (Oral)   Resp 20   Ht 182.9 cm (72\")   Wt 83.9 kg (185 lb)   SpO2 94%   BMI 25.09 kg/m²   Physical Exam  Vitals and nursing note reviewed.   Constitutional:       Appearance: Normal appearance.   HENT:      Head: Normocephalic and atraumatic.      Right Ear: External ear normal.      Left Ear: External ear normal.      Nose: Nose normal.      Mouth/Throat:      Mouth: Mucous membranes are moist.   Eyes:      Extraocular Movements: Extraocular movements intact.      Conjunctiva/sclera: Conjunctivae normal.      Pupils: Pupils are equal, round, and reactive to light.   Cardiovascular:      Rate and Rhythm: Normal rate and regular rhythm.      Pulses: Normal pulses.      Heart sounds: No murmur heard.    No friction rub. No gallop.   Pulmonary:      Effort: Pulmonary effort is normal.      Comments: Basilar crackles  Abdominal:      General: Bowel sounds are normal.      Palpations: Abdomen is soft.   Musculoskeletal:         General: Normal range of motion.      Cervical back: Normal range of motion and neck supple.      Right lower leg: Edema present.      Left lower leg: Edema present.      Comments: 2+ bilateral pretibial edema pitting   Skin:     General: Skin is warm and dry.      Capillary Refill: Capillary refill takes less than 2 seconds.   Neurological:      General: No focal deficit present.      Mental Status: He is alert and oriented to person, place, and time.      Cranial Nerves: No cranial nerve deficit.   Psychiatric:         Mood and Affect: Mood normal.         Behavior: Behavior " normal.              Results Reviewed:  Lab Results (last 24 hours)     Procedure Component Value Units Date/Time    Hemoglobin A1c [[id number]] Collected: 12/07/22 0950    Specimen: Blood Updated: 12/07/22 1417    Troponin [[id number]]  (Abnormal) Collected: 12/07/22 1214    Specimen: Blood Updated: 12/07/22 1247     Troponin T 0.036 ng/mL     Narrative:      Troponin T Reference Range:  <= 0.03 ng/mL-   Negative for AMI  >0.03 ng/mL-     Abnormal for myocardial necrosis.  Clinicians would have to utilize clinical acumen, EKG, Troponin and serial changes to determine if it is an Acute Myocardial Infarction or myocardial injury due to an underlying chronic condition.       Results may be falsely decreased if patient taking Biotin.      Potassium [[id number]]  (Normal) Collected: 12/07/22 1214    Specimen: Blood Updated: 12/07/22 1241     Potassium 4.9 mmol/L     Magnesium [[id number]]  (Normal) Collected: 12/07/22 1040    Specimen: Blood Updated: 12/07/22 1221     Magnesium 1.8 mg/dL     COVID PRE-OP / PRE-PROCEDURE SCREENING ORDER (NO ISOLATION) - Swab, Nasopharynx [[id number]]  (Normal) Collected: 12/07/22 1042    Specimen: Swab from Nasopharynx Updated: 12/07/22 1137    Narrative:      The following orders were created for panel order COVID PRE-OP / PRE-PROCEDURE SCREENING ORDER (NO ISOLATION) - Swab, Nasopharynx.  Procedure                               Abnormality         Status                     ---------                               -----------         ------                     COVID-19 and FLU A/B PCR...[[id number]]  Normal              Final result                 Please view results for these tests on the individual orders.    COVID-19 and FLU A/B PCR - Swab, Nasopharynx [[id number]]  (Normal) Collected: 12/07/22 1042    Specimen: Swab from Nasopharynx Updated: 12/07/22 1137     COVID19 Not Detected     Influenza A PCR Not Detected     Influenza B PCR Not Detected    Narrative:      Fact sheet for providers:  https://www.fda.gov/media/536227/download    Fact sheet for patients: https://www.fda.gov/media/003075/download    Test performed by PCR.    Troponin [[id number]]  (Abnormal) Collected: 12/07/22 1040    Specimen: Blood Updated: 12/07/22 1111     Troponin T 0.038 ng/mL     Narrative:      Troponin T Reference Range:  <= 0.03 ng/mL-   Negative for AMI  >0.03 ng/mL-     Abnormal for myocardial necrosis.  Clinicians would have to utilize clinical acumen, EKG, Troponin and serial changes to determine if it is an Acute Myocardial Infarction or myocardial injury due to an underlying chronic condition.       Results may be falsely decreased if patient taking Biotin.      Comprehensive Metabolic Panel [[id number]]  (Abnormal) Collected: 12/07/22 1040    Specimen: Blood Updated: 12/07/22 1110     Glucose 171 mg/dL      BUN 14 mg/dL      Creatinine 0.92 mg/dL      Sodium 137 mmol/L      Potassium 5.8 mmol/L      Chloride 101 mmol/L      CO2 29.0 mmol/L      Calcium 9.2 mg/dL      Total Protein 6.6 g/dL      Albumin 3.70 g/dL      ALT (SGPT) 17 U/L      AST (SGOT) 15 U/L      Alkaline Phosphatase 62 U/L      Total Bilirubin 0.2 mg/dL      Globulin 2.9 gm/dL      A/G Ratio 1.3 g/dL      BUN/Creatinine Ratio 15.2     Anion Gap 7.0 mmol/L      eGFR 102.6 mL/min/1.73      Comment: National Kidney Foundation and American Society of Nephrology (ASN) Task Force recommended calculation based on the Chronic Kidney Disease Epidemiology Collaboration (CKD-EPI) equation refit without adjustment for race.       Narrative:      GFR Normal >60  Chronic Kidney Disease <60  Kidney Failure <15      BNP [[id number]]  (Abnormal) Collected: 12/07/22 1040    Specimen: Blood Updated: 12/07/22 1106     proBNP 2,436.0 pg/mL     Narrative:      Among patients with dyspnea, NT-proBNP is highly sensitive for the detection of acute congestive heart failure. In addition NT-proBNP of <300 pg/ml effectively rules out acute congestive heart failure with 99%  "negative predictive value.      D-dimer, Quantitative [[id number]]  (Abnormal) Collected: 12/07/22 1040    Specimen: Blood Updated: 12/07/22 1057     D-Dimer, Quantitative 0.56 MCGFEU/mL     Narrative:      According to the assay 's published package insert, a normal (<0.50 MCGFEU/mL) D-dimer result in conjunction with a non-high clinical probability assessment, excludes deep vein thrombosis (DVT) and pulmonary embolism (PE) with high sensitivity.    D-dimer values increase with age and this can make VTE exclusion of an older population difficult. To address this, the American College of Physicians, based on best available evidence and recent guidelines, recommends that clinicians use age-adjusted D-dimer thresholds in patients greater than 50 years of age with: a) a low probability of PE who do not meet all Pulmonary Embolism Rule Out Criteria, or b) in those with intermediate probability of PE.   The formula for an age-adjusted D-dimer cut-off is \"age/100\".  For example, a 60 year old patient would have an age-adjusted cut-off of 0.60 MCGFEU/mL and an 80 year old 0.80 MCGFEU/mL.    CBC & Differential [[id number]]  (Abnormal) Collected: 12/07/22 0950    Specimen: Blood Updated: 12/07/22 1002    Narrative:      The following orders were created for panel order CBC & Differential.  Procedure                               Abnormality         Status                     ---------                               -----------         ------                     CBC Auto Differential[[id number]]        Abnormal            Final result                 Please view results for these tests on the individual orders.    CBC Auto Differential [[id number]]  (Abnormal) Collected: 12/07/22 0950    Specimen: Blood Updated: 12/07/22 1002     WBC 7.66 10*3/mm3      RBC 4.43 10*6/mm3      Hemoglobin 12.7 g/dL      Hematocrit 40.9 %      MCV 92.3 fL      MCH 28.7 pg      MCHC 31.1 g/dL      RDW 13.0 %      RDW-SD 43.5 fl      MPV " 11.1 fL      Platelets 259 10*3/mm3      Neutrophil % 79.1 %      Lymphocyte % 12.1 %      Monocyte % 6.3 %      Eosinophil % 1.4 %      Basophil % 0.7 %      Immature Grans % 0.4 %      Neutrophils, Absolute 6.06 10*3/mm3      Lymphocytes, Absolute 0.93 10*3/mm3      Monocytes, Absolute 0.48 10*3/mm3      Eosinophils, Absolute 0.11 10*3/mm3      Basophils, Absolute 0.05 10*3/mm3      Immature Grans, Absolute 0.03 10*3/mm3      nRBC 0.0 /100 WBC         Imaging Results (Last 24 Hours)     Procedure Component Value Units Date/Time    CT Angiogram Chest [[id number]] Collected: 12/07/22 1140     Updated: 12/07/22 1154    Narrative:      EXAMINATION: CT ANGIOGRAM CHEST-      12/7/2022 11:16 AM CST     HISTORY: chest pain sob     In order to have a CT radiation dose as low as reasonably achievable  Automated Exposure Control was utilized for adjustment of the mA and/or  KV according to patient size.     DLP in mGycm= 191     The CT Angiography of the chest is performed after intravenous contrast  enhancement.     Images are acquired in axial plane and subsequent 2-D reconstruction in  coronal and sagittal planes and 3-D maximum intensity projection  reconstruction.     There is no previous similar study for comparison. Correlation made with  radiographs of the chest obtained earlier today.     There is normal opacification of pulmonary arteries and branches  bilaterally. No filling defects in the opacified pulmonary arterial bed  bilaterally.     The RV/LV ratio is 29/49 which is normal. No finding to suggest right  heart strain.     Atheromatous changes thoracic aorta is seen. No aneurysmal dilatation or  dissection.     Atheromatous changes of coronary arteries are noted.     There is no evidence of mediastinal or hilar mass or lymphadenopathy.     The limited visualized thyroid gland is unremarkable.     There is no axillary lymphadenopathy.     The lungs are poorly expanded. There are significant interlobular  septal  thickening suggesting fluid retention/pulmonary edema. There are  scattered areas of small to moderate size foci of pulmonary  consolidation, more pronounced in the upper lobes, left more than the  right. These probably represent areas of discoid atelectasis or  pneumonic consolidation/inflammatory/infectious process. The largest  area of consolidation is in the lingular segment of the left upper lobe  measuring 1.2 x 2.9 cm.     There is bilateral moderate pleural effusion, right more than the left.     A few calcified granulomas are seen in the lungs bilaterally, more so in  the right middle lobe.     There is moderate splenomegaly. The spleen measured 30.1 x 13 x 9 cm.  Limited visualized liver is unremarkable. No gallstones. The adrenal  glands are normal. The pancreas and kidneys are incompletely included  and not well evaluated.     Images are reviewed in bone window show no acute bony abnormality or  bone lesion.       Impression:      1. No evidence of pulmonary embolism. No aortic aneurysm or dissection.  Atheromatous changes of coronary arteries.  2. Evidence of pulmonary edema.  3. Foci of consolidation the lungs bilaterally probably represent  superimposed inflammatory/infectious process. This needs to be further  evaluated by a follow-up examination.  4. Moderate bibasal pleural effusion, right more than the left.  5. The splenomegaly.                                   This report was finalized on 12/07/2022 11:51 by Dr. Roxanne Wilder MD.    XR Chest 1 View [[id number]] Collected: 12/07/22 0909     Updated: 12/07/22 0914    Narrative:      XR CHEST 1 VW- 12/7/2022 9:03 AM CST     HISTORY: Chest pain     COMPARISON: Chest exam dated 05/09/2018.     FINDINGS:      Bilateral interstitial coarsening and peribronchial thickening. Hazy  airspace opacities are identified in both lung bases. Lungs appear well  expanded. There is no consolidation or obvious pleural effusion. Heart  size appears normal.  Pulmonary vasculature are nondilated.       Impression:      1. Bilateral interstitial thickening with bibasilar hazy airspace  opacities. Central pulmonary vasculature are nondilated. Primary  consideration would be atypical pneumonia/viral pneumonia. Second  differential consideration would be an early developing pulmonary edema.        This report was finalized on 12/07/2022 09:11 by Dr Antonio Contreras, .        I have personally reviewed and interpreted the radiology studies and ECG obtained at time of admission.     Assessment / Plan     Assessment:   Active Hospital Problems    Diagnosis    • **Chest pain, unspecified type    • Type 2 diabetes mellitus (HCC)    • Essential hypertension    • Shortness of breath    Concern for pulmonary edema  Elevated troponin    Plan:      Admit observation  Serial enzymes  Echocardiogram today  Lasix 40 mg IV every 12 hours  Progress echocardiogram in a.m.  Carbohydrate consistent healthy heart diet      I confirmed that the patient's Advance Care Plan is present, code status is documented, or surrogate decision maker is listed in the patient's medical record.     Code Status/Advanced Care Plan: Full    The patient's surrogate decision maker is wife.     I discussed my findings and recommendations with the patient.    Estimated length of stay is 24 to 48 hours.     The patient was seen and examined by me on 12/7/2022 at 1400 hrs.    Electronically signed by Trina Mcintosh DO, 12/07/22, 14:25 CST.

## 2022-12-07 NOTE — ED PROVIDER NOTES
Subjective   History of Present Illness     Patient is a pleasant 47-year-old gentleman with chief complaint of persistent cough with new onset chest comfort and shortness of breath.  The patient describes for nearly a month now, he has developed this cough.  He was exposed to close family members who had the flu.  He was last went to get it.  He assumed he had the flu as well.  He had a productive cough with green phlegm, fever, and little bit of upper respiratory infection.  He did seek medical attention and had swabs completed at an outlying clinic.  He also was started on amoxicillin and some type of cough medicine.  His symptoms continued so he sought medical attention his primary care provider recently.  He completed azithromycin.  His cough continued so he is now is on doxycycline.  He felt like his cough had actually improved but this morning, he woke up feeling increased shortness of breath worse with exertion as well some chest discomfort also worse with exertion.  He denies any cardiological history.  He has had pneumonia in the remote past that felt similarly.  He denies any hemoptysis. He denies any leg pain or swelling.  He denies smoking.  He reports he is otherwise healthy.  He did take a breathing treatment earlier this morning and he believes that is why he is tachycardic.  He is also took some Mucinex.    Review of Systems   Constitutional: Positive for activity change.   HENT: Negative.    Respiratory: Positive for cough and shortness of breath.    Cardiovascular: Positive for chest pain.   Gastrointestinal: Negative.    Genitourinary: Negative.    Musculoskeletal: Negative.    Skin: Negative.    Neurological: Negative.    Psychiatric/Behavioral: Negative.    All other systems reviewed and are negative.      Past Medical History:   Diagnosis Date   • Diabetes mellitus (HCC)    • Hypertension    • Type 2 diabetes mellitus (HCC)        Allergies   Allergen Reactions   • Poison Ivy Extract Rash        Past Surgical History:   Procedure Laterality Date   • TOOTH EXTRACTION Right 09/21/2022   • WISDOM TOOTH EXTRACTION         Family History   Problem Relation Age of Onset   • Diabetes Father    • Diabetes Paternal Grandmother        Social History     Socioeconomic History   • Marital status:    Tobacco Use   • Smoking status: Never   • Smokeless tobacco: Never   Vaping Use   • Vaping Use: Never used   Substance and Sexual Activity   • Alcohol use: No   • Drug use: No   • Sexual activity: Defer       Prior to Admission medications    Medication Sig Start Date End Date Taking? Authorizing Provider   albuterol sulfate  (90 Base) MCG/ACT inhaler Inhale 2 puffs Every 4 (Four) Hours As Needed for Wheezing or Shortness of Air. 1/2/19   Funmi Clancy APRN   brompheniramine-pseudoephedrine-DM 30-2-10 MG/5ML syrup Take 5 mL by mouth 4 (Four) Times a Day As Needed for Cough. 11/12/22   Nikki Kiran APRN   Insulin Glargine (Lantus SoloStar) 100 UNIT/ML injection pen Inject 25 Units under the skin into the appropriate area as directed. 12/1/21   Emergency, Nurse Ken, RN   lisinopril (PRINIVIL,ZESTRIL) 5 MG tablet Take 1 tablet by mouth Daily. 12/1/21   Emergency, Nurse Ken RN   metFORMIN (GLUCOPHAGE) 1000 MG tablet Take 1 tablet by mouth 2 (Two) Times a Day With Meals. 3/20/19   Loretta Soto APRN   methylPREDNISolone (MEDROL) 4 MG dose pack Take as directed on package instructions. 11/12/22   Nikki Kiran APRN   Omega-3 Fatty Acids (fish oil) 1000 MG capsule capsule Take  by mouth Daily With Breakfast.    Emergency, Nurse Ken, RN   sildenafil (VIAGRA) 100 MG tablet Take 1 tablet by mouth As Needed. 12/1/21   Emergency, Nurse Epic, RN       Medications   sodium chloride 0.9 % flush 10 mL (has no administration in time range)   nitroglycerin (NITROSTAT) SL tablet 0.4 mg (0.4 mg Sublingual Given 12/7/22 1303)   cefTRIAXone (ROCEPHIN) 2 g in sodium chloride 0.9 % 100 mL IVPB-VTB (0 g  "Intravenous Stopped 12/7/22 1037)   doxycycline (VIBRAMYCIN) 100 mg/100 mL 0.9% NS MBP (0 mg Intravenous Stopped 12/7/22 1215)   methylPREDNISolone sodium succinate (SOLU-Medrol) injection 125 mg (125 mg Intravenous Given 12/7/22 0944)   iopamidol (ISOVUE-370) 76 % injection 100 mL (100 mL Intravenous Given 12/7/22 1133)   hydrALAZINE (APRESOLINE) injection 10 mg (10 mg Intravenous Given 12/7/22 1237)   aspirin tablet 325 mg (325 mg Oral Given 12/7/22 1237)       BP (!) 190/123   Pulse 116   Temp 97.7 °F (36.5 °C) (Oral)   Resp 21   Ht 182.9 cm (72\")   Wt 83.9 kg (185 lb)   SpO2 94%   BMI 25.09 kg/m²       Objective   Physical Exam  Vitals reviewed.   Constitutional:       Appearance: He is well-developed.   HENT:      Head: Normocephalic and atraumatic.      Right Ear: External ear normal.      Left Ear: External ear normal.      Nose: Nose normal.   Eyes:      Conjunctiva/sclera: Conjunctivae normal.      Comments: Patch over his left eye.  Patient describes he coughed so much that he has a subconjunctival hemorrhage on the left eye.   Neck:      Trachea: No tracheal deviation.   Cardiovascular:      Rate and Rhythm: Normal rate and regular rhythm.      Heart sounds: Normal heart sounds. No murmur heard.    No friction rub. No gallop.   Pulmonary:      Effort: Pulmonary effort is normal. No respiratory distress.      Breath sounds: Normal breath sounds. No wheezing, rhonchi or rales.   Chest:      Chest wall: No tenderness.   Abdominal:      General: Bowel sounds are normal. There is no distension.      Palpations: Abdomen is soft. There is no mass.      Tenderness: There is no abdominal tenderness. There is no guarding or rebound.   Musculoskeletal:         General: No tenderness or deformity. Normal range of motion.      Cervical back: Normal range of motion and neck supple.      Right lower leg: No tenderness. No edema.      Left lower leg: No tenderness. No edema.   Skin:     General: Skin is warm and " dry.      Capillary Refill: Capillary refill takes less than 2 seconds.      Coloration: Skin is not pale.      Findings: No erythema or rash.   Neurological:      General: No focal deficit present.      Mental Status: He is alert and oriented to person, place, and time.   Psychiatric:         Mood and Affect: Mood normal.         Behavior: Behavior normal.         Thought Content: Thought content normal.         Judgment: Judgment normal.         Procedures         Lab Results (last 24 hours)     Procedure Component Value Units Date/Time    CBC & Differential [[id number]]  (Abnormal) Collected: 12/07/22 0950    Specimen: Blood Updated: 12/07/22 1002    Narrative:      The following orders were created for panel order CBC & Differential.  Procedure                               Abnormality         Status                     ---------                               -----------         ------                     CBC Auto Differential[[id number]]        Abnormal            Final result                 Please view results for these tests on the individual orders.    CBC Auto Differential [[id number]]  (Abnormal) Collected: 12/07/22 0950    Specimen: Blood Updated: 12/07/22 1002     WBC 7.66 10*3/mm3      RBC 4.43 10*6/mm3      Hemoglobin 12.7 g/dL      Hematocrit 40.9 %      MCV 92.3 fL      MCH 28.7 pg      MCHC 31.1 g/dL      RDW 13.0 %      RDW-SD 43.5 fl      MPV 11.1 fL      Platelets 259 10*3/mm3      Neutrophil % 79.1 %      Lymphocyte % 12.1 %      Monocyte % 6.3 %      Eosinophil % 1.4 %      Basophil % 0.7 %      Immature Grans % 0.4 %      Neutrophils, Absolute 6.06 10*3/mm3      Lymphocytes, Absolute 0.93 10*3/mm3      Monocytes, Absolute 0.48 10*3/mm3      Eosinophils, Absolute 0.11 10*3/mm3      Basophils, Absolute 0.05 10*3/mm3      Immature Grans, Absolute 0.03 10*3/mm3      nRBC 0.0 /100 WBC     Comprehensive Metabolic Panel [[id number]]  (Abnormal) Collected: 12/07/22 1040    Specimen: Blood Updated:  12/07/22 1110     Glucose 171 mg/dL      BUN 14 mg/dL      Creatinine 0.92 mg/dL      Sodium 137 mmol/L      Potassium 5.8 mmol/L      Chloride 101 mmol/L      CO2 29.0 mmol/L      Calcium 9.2 mg/dL      Total Protein 6.6 g/dL      Albumin 3.70 g/dL      ALT (SGPT) 17 U/L      AST (SGOT) 15 U/L      Alkaline Phosphatase 62 U/L      Total Bilirubin 0.2 mg/dL      Globulin 2.9 gm/dL      A/G Ratio 1.3 g/dL      BUN/Creatinine Ratio 15.2     Anion Gap 7.0 mmol/L      eGFR 102.6 mL/min/1.73      Comment: National Kidney Foundation and American Society of Nephrology (ASN) Task Force recommended calculation based on the Chronic Kidney Disease Epidemiology Collaboration (CKD-EPI) equation refit without adjustment for race.       Narrative:      GFR Normal >60  Chronic Kidney Disease <60  Kidney Failure <15      BNP [[id number]]  (Abnormal) Collected: 12/07/22 1040    Specimen: Blood Updated: 12/07/22 1106     proBNP 2,436.0 pg/mL     Narrative:      Among patients with dyspnea, NT-proBNP is highly sensitive for the detection of acute congestive heart failure. In addition NT-proBNP of <300 pg/ml effectively rules out acute congestive heart failure with 99% negative predictive value.      D-dimer, Quantitative [[id number]]  (Abnormal) Collected: 12/07/22 1040    Specimen: Blood Updated: 12/07/22 1057     D-Dimer, Quantitative 0.56 MCGFEU/mL     Narrative:      According to the assay 's published package insert, a normal (<0.50 MCGFEU/mL) D-dimer result in conjunction with a non-high clinical probability assessment, excludes deep vein thrombosis (DVT) and pulmonary embolism (PE) with high sensitivity.    D-dimer values increase with age and this can make VTE exclusion of an older population difficult. To address this, the American College of [employer], based on best available evidence and recent guidelines, recommends that clinicians use age-adjusted D-dimer thresholds in patients greater than 50 years of age with:  "a) a low probability of PE who do not meet all Pulmonary Embolism Rule Out Criteria, or b) in those with intermediate probability of PE.   The formula for an age-adjusted D-dimer cut-off is \"age/100\".  For example, a 60 year old patient would have an age-adjusted cut-off of 0.60 MCGFEU/mL and an 80 year old 0.80 MCGFEU/mL.    Troponin [[id number]]  (Abnormal) Collected: 12/07/22 1040    Specimen: Blood Updated: 12/07/22 1111     Troponin T 0.038 ng/mL     Narrative:      Troponin T Reference Range:  <= 0.03 ng/mL-   Negative for AMI  >0.03 ng/mL-     Abnormal for myocardial necrosis.  Clinicians would have to utilize clinical acumen, EKG, Troponin and serial changes to determine if it is an Acute Myocardial Infarction or myocardial injury due to an underlying chronic condition.       Results may be falsely decreased if patient taking Biotin.      Magnesium [[id number]]  (Normal) Collected: 12/07/22 1040    Specimen: Blood Updated: 12/07/22 1221     Magnesium 1.8 mg/dL     COVID PRE-OP / PRE-PROCEDURE SCREENING ORDER (NO ISOLATION) - Swab, Nasopharynx [[id number]]  (Normal) Collected: 12/07/22 1042    Specimen: Swab from Nasopharynx Updated: 12/07/22 1137    Narrative:      The following orders were created for panel order COVID PRE-OP / PRE-PROCEDURE SCREENING ORDER (NO ISOLATION) - Swab, Nasopharynx.  Procedure                               Abnormality         Status                     ---------                               -----------         ------                     COVID-19 and FLU A/B PCR...[[id number]]  Normal              Final result                 Please view results for these tests on the individual orders.    COVID-19 and FLU A/B PCR - Swab, Nasopharynx [[id number]]  (Normal) Collected: 12/07/22 1042    Specimen: Swab from Nasopharynx Updated: 12/07/22 1137     COVID19 Not Detected     Influenza A PCR Not Detected     Influenza B PCR Not Detected    Narrative:      Fact sheet for providers: " https://www.fda.gov/media/136544/download    Fact sheet for patients: https://www.fda.gov/media/459269/download    Test performed by PCR.    Troponin [[id number]]  (Abnormal) Collected: 12/07/22 1214    Specimen: Blood Updated: 12/07/22 1247     Troponin T 0.036 ng/mL     Narrative:      Troponin T Reference Range:  <= 0.03 ng/mL-   Negative for AMI  >0.03 ng/mL-     Abnormal for myocardial necrosis.  Clinicians would have to utilize clinical acumen, EKG, Troponin and serial changes to determine if it is an Acute Myocardial Infarction or myocardial injury due to an underlying chronic condition.       Results may be falsely decreased if patient taking Biotin.      Potassium [[id number]]  (Normal) Collected: 12/07/22 1214    Specimen: Blood Updated: 12/07/22 1241     Potassium 4.9 mmol/L           XR Chest 1 View    Result Date: 12/7/2022  Narrative: XR CHEST 1 VW- 12/7/2022 9:03 AM CST  HISTORY: Chest pain  COMPARISON: Chest exam dated 05/09/2018.  FINDINGS:  Bilateral interstitial coarsening and peribronchial thickening. Hazy airspace opacities are identified in both lung bases. Lungs appear well expanded. There is no consolidation or obvious pleural effusion. Heart size appears normal. Pulmonary vasculature are nondilated.      Impression: 1. Bilateral interstitial thickening with bibasilar hazy airspace opacities. Central pulmonary vasculature are nondilated. Primary consideration would be atypical pneumonia/viral pneumonia. Second differential consideration would be an early developing pulmonary edema.   This report was finalized on 12/07/2022 09:11 by Dr Antonio Contreras, .    CT Angiogram Chest    Result Date: 12/7/2022  Narrative: EXAMINATION: CT ANGIOGRAM CHEST-   12/7/2022 11:16 AM CST  HISTORY: chest pain sob  In order to have a CT radiation dose as low as reasonably achievable Automated Exposure Control was utilized for adjustment of the mA and/or KV according to patient size.  DLP in mGycm= 191  The CT  Angiography of the chest is performed after intravenous contrast enhancement.  Images are acquired in axial plane and subsequent 2-D reconstruction in coronal and sagittal planes and 3-D maximum intensity projection reconstruction.  There is no previous similar study for comparison. Correlation made with radiographs of the chest obtained earlier today.  There is normal opacification of pulmonary arteries and branches bilaterally. No filling defects in the opacified pulmonary arterial bed bilaterally.  The RV/LV ratio is 29/49 which is normal. No finding to suggest right heart strain.  Atheromatous changes thoracic aorta is seen. No aneurysmal dilatation or dissection.  Atheromatous changes of coronary arteries are noted.  There is no evidence of mediastinal or hilar mass or lymphadenopathy.  The limited visualized thyroid gland is unremarkable.  There is no axillary lymphadenopathy.  The lungs are poorly expanded. There are significant interlobular septal thickening suggesting fluid retention/pulmonary edema. There are scattered areas of small to moderate size foci of pulmonary consolidation, more pronounced in the upper lobes, left more than the right. These probably represent areas of discoid atelectasis or pneumonic consolidation/inflammatory/infectious process. The largest area of consolidation is in the lingular segment of the left upper lobe measuring 1.2 x 2.9 cm.  There is bilateral moderate pleural effusion, right more than the left.  A few calcified granulomas are seen in the lungs bilaterally, more so in the right middle lobe.  There is moderate splenomegaly. The spleen measured 30.1 x 13 x 9 cm. Limited visualized liver is unremarkable. No gallstones. The adrenal glands are normal. The pancreas and kidneys are incompletely included and not well evaluated.  Images are reviewed in bone window show no acute bony abnormality or bone lesion.      Impression: 1. No evidence of pulmonary embolism. No aortic  aneurysm or dissection. Atheromatous changes of coronary arteries. 2. Evidence of pulmonary edema. 3. Foci of consolidation the lungs bilaterally probably represent superimposed inflammatory/infectious process. This needs to be further evaluated by a follow-up examination. 4. Moderate bibasal pleural effusion, right more than the left. 5. The splenomegaly.            This report was finalized on 12/07/2022 11:51 by Dr. Roxanne Wilder MD.      ED Course  ED Course as of 12/07/22 1328   Wed Dec 07, 2022   1325 I have reassessed the patient on several occasions.  His O2 sats did drop down to 92% on room air at 1 point and the patient was tachypneic.  He felt anxious about all the information was giving him.  He understands troponin T was elevated as well as BNP.  Dimer was elevated so I did complete a CTA chest revealing evidence of pulmonary edema.  No PE.  No aortic dissection or aneurysm.  Foci of consolidation in the lungs bilaterally probably representing superimposed inflammatory/infectious process.  Moderate bibasilar pleural effusion, right more than left.  Splenomegaly. [TK]   1326 Patient has been educated on differential and concern with his recent respiratory infection and abnormal labs.  I have spoken with Dr. Reed, hospitalist on-call who is gracious to admit the patient under his care. [TK]      ED Course User Index  [TK] Renetta Damico PA          [facility]    Final diagnoses:   Chest pain, unspecified type   Acute pulmonary edema (HCC)   Lung consolidation (HCC)   Essential hypertension          Renetta Damico PA  12/07/22 1328

## 2022-12-08 ENCOUNTER — APPOINTMENT (OUTPATIENT)
Dept: CARDIOLOGY | Facility: HOSPITAL | Age: 47
End: 2022-12-08

## 2022-12-08 PROBLEM — I25.10 CORONARY ARTERY DISEASE INVOLVING NATIVE CORONARY ARTERY OF NATIVE HEART WITHOUT ANGINA PECTORIS: Status: ACTIVE | Noted: 2022-12-08

## 2022-12-08 PROBLEM — I50.21 ACUTE SYSTOLIC HEART FAILURE: Status: ACTIVE | Noted: 2022-12-08

## 2022-12-08 LAB
ALBUMIN SERPL-MCNC: 3.5 G/DL (ref 3.5–5.2)
ALBUMIN/GLOB SERPL: 1.5 G/DL
ALP SERPL-CCNC: 47 U/L (ref 39–117)
ALT SERPL W P-5'-P-CCNC: 14 U/L (ref 1–41)
ANION GAP SERPL CALCULATED.3IONS-SCNC: 9 MMOL/L (ref 5–15)
AST SERPL-CCNC: 13 U/L (ref 1–40)
BASOPHILS # BLD AUTO: 0.02 10*3/MM3 (ref 0–0.2)
BASOPHILS NFR BLD AUTO: 0.2 % (ref 0–1.5)
BH CV ECHO MEAS - ACS: 1.9 CM
BH CV ECHO MEAS - AO MAX PG: 3.3 MMHG
BH CV ECHO MEAS - AO MEAN PG: 2 MMHG
BH CV ECHO MEAS - AO ROOT DIAM: 3.6 CM
BH CV ECHO MEAS - AO V2 MAX: 90.4 CM/SEC
BH CV ECHO MEAS - AO V2 VTI: 19.4 CM
BH CV ECHO MEAS - AVA(I,D): 2.8 CM2
BH CV ECHO MEAS - EDV(CUBED): 116.9 ML
BH CV ECHO MEAS - EDV(MOD-SP2): 154 ML
BH CV ECHO MEAS - EDV(MOD-SP4): 162 ML
BH CV ECHO MEAS - EF(MOD-BP): 37.3 %
BH CV ECHO MEAS - EF(MOD-SP2): 35.8 %
BH CV ECHO MEAS - EF(MOD-SP4): 37 %
BH CV ECHO MEAS - EPSS: 1.4 CM
BH CV ECHO MEAS - ESV(CUBED): 67.4 ML
BH CV ECHO MEAS - ESV(MOD-SP2): 98.8 ML
BH CV ECHO MEAS - ESV(MOD-SP4): 102 ML
BH CV ECHO MEAS - FS: 16.8 %
BH CV ECHO MEAS - IVS/LVPW: 1.1 CM
BH CV ECHO MEAS - IVSD: 1.17 CM
BH CV ECHO MEAS - LA DIMENSION: 3.6 CM
BH CV ECHO MEAS - LAT PEAK E' VEL: 12.3 CM/SEC
BH CV ECHO MEAS - LV DIASTOLIC VOL/BSA (35-75): 78.6 CM2
BH CV ECHO MEAS - LV MASS(C)D: 203.6 GRAMS
BH CV ECHO MEAS - LV MAX PG: 1.8 MMHG
BH CV ECHO MEAS - LV MEAN PG: 1 MMHG
BH CV ECHO MEAS - LV SYSTOLIC VOL/BSA (12-30): 49.5 CM2
BH CV ECHO MEAS - LV V1 MAX: 67 CM/SEC
BH CV ECHO MEAS - LV V1 VTI: 12.9 CM
BH CV ECHO MEAS - LVIDD: 4.9 CM
BH CV ECHO MEAS - LVIDS: 4.1 CM
BH CV ECHO MEAS - LVOT AREA: 4.2 CM2
BH CV ECHO MEAS - LVOT DIAM: 2.3 CM
BH CV ECHO MEAS - LVPWD: 1.06 CM
BH CV ECHO MEAS - MED PEAK E' VEL: 5.8 CM/SEC
BH CV ECHO MEAS - MR MAX PG: 96.3 MMHG
BH CV ECHO MEAS - MR MAX VEL: 490.3 CM/SEC
BH CV ECHO MEAS - MR MEAN PG: 66 MMHG
BH CV ECHO MEAS - MR MEAN VEL: 384 CM/SEC
BH CV ECHO MEAS - MR VTI: 155 CM
BH CV ECHO MEAS - MV A MAX VEL: 38.6 CM/SEC
BH CV ECHO MEAS - MV DEC SLOPE: 1005 CM/SEC2
BH CV ECHO MEAS - MV DEC TIME: 0.11 MSEC
BH CV ECHO MEAS - MV E MAX VEL: 105 CM/SEC
BH CV ECHO MEAS - MV E/A: 2.7
BH CV ECHO MEAS - MV MAX PG: 7.4 MMHG
BH CV ECHO MEAS - MV MEAN PG: 3 MMHG
BH CV ECHO MEAS - MV P1/2T: 43.2 MSEC
BH CV ECHO MEAS - MV V2 VTI: 29.5 CM
BH CV ECHO MEAS - MVA(P1/2T): 5.1 CM2
BH CV ECHO MEAS - MVA(VTI): 1.82 CM2
BH CV ECHO MEAS - RAP SYSTOLE: 10 MMHG
BH CV ECHO MEAS - RF(MV,LVOT)(1DIAM): 0.87 CM
BH CV ECHO MEAS - RVDD: 2.8 CM
BH CV ECHO MEAS - RVSP: 40.5 MMHG
BH CV ECHO MEAS - SI(MOD-SP2): 26.8 ML/M2
BH CV ECHO MEAS - SI(MOD-SP4): 29.1 ML/M2
BH CV ECHO MEAS - SV(LVOT): 53.6 ML
BH CV ECHO MEAS - SV(MOD-SP2): 55.2 ML
BH CV ECHO MEAS - SV(MOD-SP4): 60 ML
BH CV ECHO MEAS - TAPSE (>1.6): 1.8 CM
BH CV ECHO MEAS - TR MAX PG: 30.5 MMHG
BH CV ECHO MEAS - TR MAX VEL: 276 CM/SEC
BH CV ECHO MEASUREMENTS AVERAGE E/E' RATIO: 11.6
BH CV XLRA - TDI S': 9.5 CM/SEC
BILIRUB SERPL-MCNC: 0.2 MG/DL (ref 0–1.2)
BUN SERPL-MCNC: 23 MG/DL (ref 6–20)
BUN/CREAT SERPL: 24 (ref 7–25)
CALCIUM SPEC-SCNC: 8.6 MG/DL (ref 8.6–10.5)
CHLORIDE SERPL-SCNC: 101 MMOL/L (ref 98–107)
CHOLEST SERPL-MCNC: 162 MG/DL (ref 0–200)
CO2 SERPL-SCNC: 27 MMOL/L (ref 22–29)
CREAT SERPL-MCNC: 0.96 MG/DL (ref 0.76–1.27)
DEPRECATED RDW RBC AUTO: 42.8 FL (ref 37–54)
EGFRCR SERPLBLD CKD-EPI 2021: 97.5 ML/MIN/1.73
EOSINOPHIL # BLD AUTO: 0.02 10*3/MM3 (ref 0–0.4)
EOSINOPHIL NFR BLD AUTO: 0.2 % (ref 0.3–6.2)
ERYTHROCYTE [DISTWIDTH] IN BLOOD BY AUTOMATED COUNT: 13 % (ref 12.3–15.4)
GLOBULIN UR ELPH-MCNC: 2.3 GM/DL
GLUCOSE BLDC GLUCOMTR-MCNC: 170 MG/DL (ref 70–130)
GLUCOSE BLDC GLUCOMTR-MCNC: 222 MG/DL (ref 70–130)
GLUCOSE SERPL-MCNC: 184 MG/DL (ref 65–99)
HCT VFR BLD AUTO: 35.5 % (ref 37.5–51)
HDLC SERPL-MCNC: 38 MG/DL (ref 40–60)
HGB BLD-MCNC: 11.1 G/DL (ref 13–17.7)
IMM GRANULOCYTES # BLD AUTO: 0.04 10*3/MM3 (ref 0–0.05)
IMM GRANULOCYTES NFR BLD AUTO: 0.4 % (ref 0–0.5)
LDLC SERPL CALC-MCNC: 94 MG/DL (ref 0–100)
LDLC/HDLC SERPL: 2.36 {RATIO}
LEFT ATRIUM VOLUME INDEX: 27.8 ML/M2
LEFT ATRIUM VOLUME: 57.2 ML
LYMPHOCYTES # BLD AUTO: 1.3 10*3/MM3 (ref 0.7–3.1)
LYMPHOCYTES NFR BLD AUTO: 13.4 % (ref 19.6–45.3)
MAXIMAL PREDICTED HEART RATE: 172 BPM
MCH RBC QN AUTO: 28.8 PG (ref 26.6–33)
MCHC RBC AUTO-ENTMCNC: 31.3 G/DL (ref 31.5–35.7)
MCV RBC AUTO: 92 FL (ref 79–97)
MONOCYTES # BLD AUTO: 0.76 10*3/MM3 (ref 0.1–0.9)
MONOCYTES NFR BLD AUTO: 7.9 % (ref 5–12)
NEUTROPHILS NFR BLD AUTO: 7.53 10*3/MM3 (ref 1.7–7)
NEUTROPHILS NFR BLD AUTO: 77.9 % (ref 42.7–76)
NRBC BLD AUTO-RTO: 0 /100 WBC (ref 0–0.2)
PLATELET # BLD AUTO: 217 10*3/MM3 (ref 140–450)
PMV BLD AUTO: 11.1 FL (ref 6–12)
POTASSIUM SERPL-SCNC: 4.3 MMOL/L (ref 3.5–5.2)
PROT SERPL-MCNC: 5.8 G/DL (ref 6–8.5)
RBC # BLD AUTO: 3.86 10*6/MM3 (ref 4.14–5.8)
SODIUM SERPL-SCNC: 137 MMOL/L (ref 136–145)
STRESS TARGET HR: 146 BPM
TRIGL SERPL-MCNC: 171 MG/DL (ref 0–150)
VLDLC SERPL-MCNC: 30 MG/DL (ref 5–40)
WBC NRBC COR # BLD: 9.67 10*3/MM3 (ref 3.4–10.8)

## 2022-12-08 PROCEDURE — 25010000002 MIDAZOLAM PER 1 MG: Performed by: INTERNAL MEDICINE

## 2022-12-08 PROCEDURE — 0 IOPAMIDOL PER 1 ML: Performed by: INTERNAL MEDICINE

## 2022-12-08 PROCEDURE — 25010000002 HEPARIN (PORCINE) 2000-0.9 UNIT/L-% SOLUTION: Performed by: INTERNAL MEDICINE

## 2022-12-08 PROCEDURE — 25010000002 FENTANYL CITRATE (PF) 50 MCG/ML SOLUTION: Performed by: INTERNAL MEDICINE

## 2022-12-08 PROCEDURE — 93306 TTE W/DOPPLER COMPLETE: CPT

## 2022-12-08 PROCEDURE — 93458 L HRT ARTERY/VENTRICLE ANGIO: CPT | Performed by: INTERNAL MEDICINE

## 2022-12-08 PROCEDURE — G0378 HOSPITAL OBSERVATION PER HR: HCPCS

## 2022-12-08 PROCEDURE — 93010 ELECTROCARDIOGRAM REPORT: CPT | Performed by: INTERNAL MEDICINE

## 2022-12-08 PROCEDURE — 25010000002 FUROSEMIDE PER 20 MG: Performed by: FAMILY MEDICINE

## 2022-12-08 PROCEDURE — 93005 ELECTROCARDIOGRAM TRACING: CPT | Performed by: FAMILY MEDICINE

## 2022-12-08 PROCEDURE — C1894 INTRO/SHEATH, NON-LASER: HCPCS | Performed by: INTERNAL MEDICINE

## 2022-12-08 PROCEDURE — 82962 GLUCOSE BLOOD TEST: CPT

## 2022-12-08 PROCEDURE — 80061 LIPID PANEL: CPT | Performed by: FAMILY MEDICINE

## 2022-12-08 PROCEDURE — 93306 TTE W/DOPPLER COMPLETE: CPT | Performed by: INTERNAL MEDICINE

## 2022-12-08 PROCEDURE — 63710000001 INSULIN LISPRO (HUMAN) PER 5 UNITS: Performed by: FAMILY MEDICINE

## 2022-12-08 PROCEDURE — 85025 COMPLETE CBC W/AUTO DIFF WBC: CPT | Performed by: FAMILY MEDICINE

## 2022-12-08 PROCEDURE — 96376 TX/PRO/DX INJ SAME DRUG ADON: CPT

## 2022-12-08 PROCEDURE — 99152 MOD SED SAME PHYS/QHP 5/>YRS: CPT | Performed by: INTERNAL MEDICINE

## 2022-12-08 PROCEDURE — 99254 IP/OBS CNSLTJ NEW/EST MOD 60: CPT | Performed by: INTERNAL MEDICINE

## 2022-12-08 PROCEDURE — 25010000002 HEPARIN (PORCINE) 1000-0.9 UT/500ML-% SOLUTION: Performed by: INTERNAL MEDICINE

## 2022-12-08 PROCEDURE — 80053 COMPREHEN METABOLIC PANEL: CPT | Performed by: FAMILY MEDICINE

## 2022-12-08 PROCEDURE — 25010000002 PERFLUTREN 6.52 MG/ML SUSPENSION: Performed by: FAMILY MEDICINE

## 2022-12-08 PROCEDURE — 25010000002 HEPARIN (PORCINE) PER 1000 UNITS: Performed by: INTERNAL MEDICINE

## 2022-12-08 PROCEDURE — 25010000002 DIPHENHYDRAMINE PER 50 MG: Performed by: INTERNAL MEDICINE

## 2022-12-08 RX ORDER — ONDANSETRON 2 MG/ML
4 INJECTION INTRAMUSCULAR; INTRAVENOUS EVERY 6 HOURS PRN
Status: DISCONTINUED | OUTPATIENT
Start: 2022-12-08 | End: 2022-12-08 | Stop reason: HOSPADM

## 2022-12-08 RX ORDER — NITROGLYCERIN 0.4 MG/1
0.4 TABLET SUBLINGUAL
Status: DISCONTINUED | OUTPATIENT
Start: 2022-12-08 | End: 2022-12-08 | Stop reason: HOSPADM

## 2022-12-08 RX ORDER — FENTANYL CITRATE 50 UG/ML
INJECTION, SOLUTION INTRAMUSCULAR; INTRAVENOUS
Status: DISCONTINUED | OUTPATIENT
Start: 2022-12-08 | End: 2022-12-08 | Stop reason: HOSPADM

## 2022-12-08 RX ORDER — SODIUM CHLORIDE 9 MG/ML
40 INJECTION, SOLUTION INTRAVENOUS AS NEEDED
Status: DISCONTINUED | OUTPATIENT
Start: 2022-12-08 | End: 2022-12-08 | Stop reason: HOSPADM

## 2022-12-08 RX ORDER — DIPHENHYDRAMINE HYDROCHLORIDE 50 MG/ML
INJECTION INTRAMUSCULAR; INTRAVENOUS
Status: DISCONTINUED | OUTPATIENT
Start: 2022-12-08 | End: 2022-12-08 | Stop reason: HOSPADM

## 2022-12-08 RX ORDER — SODIUM CHLORIDE 0.9 % (FLUSH) 0.9 %
3 SYRINGE (ML) INJECTION EVERY 12 HOURS SCHEDULED
Status: DISCONTINUED | OUTPATIENT
Start: 2022-12-08 | End: 2022-12-08 | Stop reason: HOSPADM

## 2022-12-08 RX ORDER — VERAPAMIL HYDROCHLORIDE 2.5 MG/ML
INJECTION, SOLUTION INTRAVENOUS
Status: DISCONTINUED | OUTPATIENT
Start: 2022-12-08 | End: 2022-12-08 | Stop reason: HOSPADM

## 2022-12-08 RX ORDER — ASPIRIN 81 MG/1
81 TABLET ORAL DAILY
Status: DISCONTINUED | OUTPATIENT
Start: 2022-12-08 | End: 2022-12-09 | Stop reason: HOSPADM

## 2022-12-08 RX ORDER — ASPIRIN 81 MG/1
324 TABLET, CHEWABLE ORAL ONCE
Status: DISCONTINUED | OUTPATIENT
Start: 2022-12-08 | End: 2022-12-08

## 2022-12-08 RX ORDER — FUROSEMIDE 10 MG/ML
40 INJECTION INTRAMUSCULAR; INTRAVENOUS EVERY 12 HOURS
Status: DISCONTINUED | OUTPATIENT
Start: 2022-12-08 | End: 2022-12-09 | Stop reason: HOSPADM

## 2022-12-08 RX ORDER — SODIUM CHLORIDE 0.9 % (FLUSH) 0.9 %
10 SYRINGE (ML) INJECTION AS NEEDED
Status: DISCONTINUED | OUTPATIENT
Start: 2022-12-08 | End: 2022-12-08 | Stop reason: HOSPADM

## 2022-12-08 RX ORDER — LIDOCAINE HYDROCHLORIDE 20 MG/ML
INJECTION, SOLUTION INFILTRATION; PERINEURAL
Status: DISCONTINUED | OUTPATIENT
Start: 2022-12-08 | End: 2022-12-08 | Stop reason: HOSPADM

## 2022-12-08 RX ORDER — MIDAZOLAM HYDROCHLORIDE 1 MG/ML
INJECTION INTRAMUSCULAR; INTRAVENOUS
Status: DISCONTINUED | OUTPATIENT
Start: 2022-12-08 | End: 2022-12-08 | Stop reason: HOSPADM

## 2022-12-08 RX ORDER — HEPARIN SODIUM 200 [USP'U]/100ML
INJECTION, SOLUTION INTRAVENOUS
Status: DISCONTINUED | OUTPATIENT
Start: 2022-12-08 | End: 2022-12-08 | Stop reason: HOSPADM

## 2022-12-08 RX ORDER — HEPARIN SODIUM 1000 [USP'U]/ML
INJECTION, SOLUTION INTRAVENOUS; SUBCUTANEOUS
Status: DISCONTINUED | OUTPATIENT
Start: 2022-12-08 | End: 2022-12-08 | Stop reason: HOSPADM

## 2022-12-08 RX ORDER — ASPIRIN 81 MG/1
81 TABLET ORAL DAILY
Status: DISCONTINUED | OUTPATIENT
Start: 2022-12-09 | End: 2022-12-08

## 2022-12-08 RX ORDER — ASPIRIN 81 MG/1
TABLET, CHEWABLE ORAL
Status: COMPLETED
Start: 2022-12-08 | End: 2022-12-08

## 2022-12-08 RX ADMIN — LISINOPRIL 20 MG: 20 TABLET ORAL at 09:13

## 2022-12-08 RX ADMIN — Medication 10 ML: at 20:18

## 2022-12-08 RX ADMIN — Medication 10 ML: at 02:42

## 2022-12-08 RX ADMIN — ASPIRIN 324 MG: 81 TABLET, CHEWABLE ORAL at 09:31

## 2022-12-08 RX ADMIN — METOPROLOL TARTRATE 25 MG: 25 TABLET, FILM COATED ORAL at 20:17

## 2022-12-08 RX ADMIN — PERFLUTREN 2 ML: 6.52 INJECTION, SUSPENSION INTRAVENOUS at 08:31

## 2022-12-08 RX ADMIN — Medication 3 ML: at 09:15

## 2022-12-08 RX ADMIN — METOPROLOL TARTRATE 25 MG: 25 TABLET, FILM COATED ORAL at 03:57

## 2022-12-08 RX ADMIN — Medication 10 ML: at 09:15

## 2022-12-08 RX ADMIN — METOPROLOL TARTRATE 25 MG: 25 TABLET, FILM COATED ORAL at 09:13

## 2022-12-08 RX ADMIN — FUROSEMIDE 40 MG: 10 INJECTION, SOLUTION INTRAMUSCULAR; INTRAVENOUS at 21:51

## 2022-12-08 RX ADMIN — FUROSEMIDE 40 MG: 10 INJECTION, SOLUTION INTRAVENOUS at 02:48

## 2022-12-08 RX ADMIN — ASPIRIN 324 MG: 81 TABLET, CHEWABLE ORAL at 09:14

## 2022-12-08 RX ADMIN — ASPIRIN 81 MG 324 MG: 81 TABLET ORAL at 09:31

## 2022-12-08 NOTE — ED NOTES
Pt resting quietly with no acute distress noted. States lisinopril was taken today here via day nurse.

## 2022-12-08 NOTE — PAYOR COMM NOTE
"ADMIT INPT 12-7-22  UR  [phone]    Speedy Hong (47 y.o. Male)     Date of Birth   1975    Social Security Number       Address   [place] [address]    Home Phone   [phone]    MRN   [id number]       Buddhist   Presbyterian    Marital Status                               Admission Date   12/7/22    Admission Type   Emergency    Admitting Provider   Trina Mcintosh DO    Attending Provider   Trina Mcintosh DO    Department, Room/Bed   [facility] Emergency Department, 42/42       Discharge Date       Discharge Disposition       Discharge Destination                               Attending Provider: Trina Mcintosh DO    Allergies: Poison Ivy Extract    Isolation: None   Infection: None   Code Status: CPR    Ht: 182.9 cm (72\")   Wt: 83.9 kg (185 lb)    Admission Cmt: None   Principal Problem: Chest pain, unspecified type [R07.9]                 Active Insurance as of 12/7/2022     Primary Coverage     Payor Plan Insurance Group Employer/Plan Group    [facility] [employer] [facility]      Payor Plan Address Payor Plan Phone Number Payor Plan Fax Number Effective Dates    [address]   7/1/2017 - None Entered    [facility] [id number]       Subscriber Name Subscriber Birth Date Member ID       SPEEDY HONG 1975 [id number]                 Emergency Contacts      (Rel.) Home Phone Work Phone Mobile Phone    Marilia Hong (Spouse) [phone] -- --               History & Physical      Trina Mcintosh DO at 12/07/22 [facility]              [facility] Medicine Services  HISTORY AND PHYSICAL    Date of Admission: 12/7/2022  Primary Care Physician: Selvin Schumacher DO    Subjective     Chief Complaint: Chest pain, shortness of breath.    History of Present Illness  Patient presents the emergency room with sudden onset of worsening shortness of breath.  This is accompanied by " his chest pain that is across the anterior portion of his chest feels like a tightness.  He does not necessarily think that it radiates at this time.  He has had a cough.  He recently has had the influenza.  He thought he was getting better.  And then this morning he just feels worse.  He has no nausea or vomiting associated with it he has no diaphoresis associated with it he is having bowel moods and urinating okay.  He is a diabetic he believes he is a type II.  He developed diabetes at the age of 25.  He is a atypical presentation for diabetes at that age he is not morbidly obese he does not have a strong family history of diabetes.  He does not recall if he was sick prior to developing the diabetes however he recently has been coughing a lot and has subconjunctival hemorrhage of the left eye.  He has followed with his ophthalmologist for this and they assured him that it will resolve.  He is wearing a patch for comfort.  ER gave him nitro x2.  He states this did help with chest discomfort.  Also because his blood pressure improved.      Review of Systems   Constitutional: Negative for fever.   HENT: Positive for congestion.    Eyes: Positive for visual disturbance.   Respiratory: Positive for cough and shortness of breath.    Cardiovascular: Positive for palpitations and leg swelling.   Gastrointestinal: Negative.    Endocrine: Negative.    Genitourinary: Negative.    Musculoskeletal: Negative.    Skin: Negative.    Allergic/Immunologic: Negative.    Neurological: Negative.    Hematological: Negative.    Psychiatric/Behavioral: Negative.             Past Medical History:   Past Medical History:   Diagnosis Date   • Diabetes mellitus (HCC)    • Hypertension    • Type 2 diabetes mellitus (HCC)      Past Surgical History:  Past Surgical History:   Procedure Laterality Date   • TOOTH EXTRACTION Right 09/21/2022   • WISDOM TOOTH EXTRACTION       Social History:  reports that he has never smoked. He has never used  smokeless tobacco. He reports that he does not drink alcohol and does not use drugs.     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  Feels his job is stressful related to having to deal with the insurance is.      Family History: family history includes Diabetes in his father and paternal grandmother.   Paternal grandfather had heart disease in his 70s    Allergies:  Allergies   Allergen Reactions   • Poison Ivy Extract Rash       Medications:  Prior to Admission medications    Medication Sig Start Date End Date Taking? Authorizing Provider   albuterol sulfate  (90 Base) MCG/ACT inhaler Inhale 2 puffs Every 4 (Four) Hours As Needed for Wheezing or Shortness of Air. 1/2/19   Funmi Clancy APRN   brompheniramine-pseudoephedrine-DM 30-2-10 MG/5ML syrup Take 5 mL by mouth 4 (Four) Times a Day As Needed for Cough. 11/12/22   Nikki Kiran APRN   Insulin Glargine (Lantus SoloStar) 100 UNIT/ML injection pen Inject 25 Units under the skin into the appropriate area as directed. 12/1/21   Emergency, Nurse Ken RN   lisinopril (PRINIVIL,ZESTRIL) 5 MG tablet Take 1 tablet by mouth Daily. 12/1/21   Emergency, Nurse Ken RN   metFORMIN (GLUCOPHAGE) 1000 MG tablet Take 1 tablet by mouth 2 (Two) Times a Day With Meals. 3/20/19   Loretta Soto APRN   methylPREDNISolone (MEDROL) 4 MG dose pack Take as directed on package instructions. 11/12/22   Nikki Kiran APRN   Omega-3 Fatty Acids (fish oil) 1000 MG capsule capsule Take  by mouth Daily With Breakfast.    Emergency, Nurse Ken RN   sildenafil (VIAGRA) 100 MG tablet Take 1 tablet by mouth As Needed. 12/1/21   Emergency, Nurse Epic, RN     I have utilized all available immediate resources to obtain, update, or review the patient's current medications (including all prescriptions, over-the-counter products, herbals, cannabis/cannabidiol products, and vitamin/mineral/dietary (nutritional) supplements).    Objective     Vital Signs: BP (!) 174/102   " Pulse 115   Temp 97.7 °F (36.5 °C) (Oral)   Resp 20   Ht 182.9 cm (72\")   Wt 83.9 kg (185 lb)   SpO2 94%   BMI 25.09 kg/m²   Physical Exam  Vitals and nursing note reviewed.   Constitutional:       Appearance: Normal appearance.   HENT:      Head: Normocephalic and atraumatic.      Right Ear: External ear normal.      Left Ear: External ear normal.      Nose: Nose normal.      Mouth/Throat:      Mouth: Mucous membranes are moist.   Eyes:      Extraocular Movements: Extraocular movements intact.      Conjunctiva/sclera: Conjunctivae normal.      Pupils: Pupils are equal, round, and reactive to light.   Cardiovascular:      Rate and Rhythm: Normal rate and regular rhythm.      Pulses: Normal pulses.      Heart sounds: No murmur heard.    No friction rub. No gallop.   Pulmonary:      Effort: Pulmonary effort is normal.      Comments: Basilar crackles  Abdominal:      General: Bowel sounds are normal.      Palpations: Abdomen is soft.   Musculoskeletal:         General: Normal range of motion.      Cervical back: Normal range of motion and neck supple.      Right lower leg: Edema present.      Left lower leg: Edema present.      Comments: 2+ bilateral pretibial edema pitting   Skin:     General: Skin is warm and dry.      Capillary Refill: Capillary refill takes less than 2 seconds.   Neurological:      General: No focal deficit present.      Mental Status: He is alert and oriented to person, place, and time.      Cranial Nerves: No cranial nerve deficit.   Psychiatric:         Mood and Affect: Mood normal.         Behavior: Behavior normal.              Results Reviewed:  Lab Results (last 24 hours)     Procedure Component Value Units Date/Time    Hemoglobin A1c [[id number]] Collected: 12/07/22 0950    Specimen: Blood Updated: 12/07/22 1417    Troponin [[id number]]  (Abnormal) Collected: 12/07/22 1214    Specimen: Blood Updated: 12/07/22 1247     Troponin T 0.036 ng/mL     Narrative:      Troponin T Reference " Range:  <= 0.03 ng/mL-   Negative for AMI  >0.03 ng/mL-     Abnormal for myocardial necrosis.  Clinicians would have to utilize clinical acumen, EKG, Troponin and serial changes to determine if it is an Acute Myocardial Infarction or myocardial injury due to an underlying chronic condition.       Results may be falsely decreased if patient taking Biotin.      Potassium [[id number]]  (Normal) Collected: 12/07/22 1214    Specimen: Blood Updated: 12/07/22 1241     Potassium 4.9 mmol/L     Magnesium [[id number]]  (Normal) Collected: 12/07/22 1040    Specimen: Blood Updated: 12/07/22 1221     Magnesium 1.8 mg/dL     COVID PRE-OP / PRE-PROCEDURE SCREENING ORDER (NO ISOLATION) - Swab, Nasopharynx [[id number]]  (Normal) Collected: 12/07/22 1042    Specimen: Swab from Nasopharynx Updated: 12/07/22 1137    Narrative:      The following orders were created for panel order COVID PRE-OP / PRE-PROCEDURE SCREENING ORDER (NO ISOLATION) - Swab, Nasopharynx.  Procedure                               Abnormality         Status                     ---------                               -----------         ------                     COVID-19 and FLU A/B PCR...[[id number]]  Normal              Final result                 Please view results for these tests on the individual orders.    COVID-19 and FLU A/B PCR - Swab, Nasopharynx [[id number]]  (Normal) Collected: 12/07/22 1042    Specimen: Swab from Nasopharynx Updated: 12/07/22 1137     COVID19 Not Detected     Influenza A PCR Not Detected     Influenza B PCR Not Detected    Narrative:      Fact sheet for providers: https://www.fda.gov/media/625003/download    Fact sheet for patients: https://www.fda.gov/media/546844/download    Test performed by PCR.    Troponin [[id number]]  (Abnormal) Collected: 12/07/22 1040    Specimen: Blood Updated: 12/07/22 1111     Troponin T 0.038 ng/mL     Narrative:      Troponin T Reference Range:  <= 0.03 ng/mL-   Negative for AMI  >0.03 ng/mL-     Abnormal for  myocardial necrosis.  Clinicians would have to utilize clinical acumen, EKG, Troponin and serial changes to determine if it is an Acute Myocardial Infarction or myocardial injury due to an underlying chronic condition.       Results may be falsely decreased if patient taking Biotin.      Comprehensive Metabolic Panel [[id number]]  (Abnormal) Collected: 12/07/22 1040    Specimen: Blood Updated: 12/07/22 1110     Glucose 171 mg/dL      BUN 14 mg/dL      Creatinine 0.92 mg/dL      Sodium 137 mmol/L      Potassium 5.8 mmol/L      Chloride 101 mmol/L      CO2 29.0 mmol/L      Calcium 9.2 mg/dL      Total Protein 6.6 g/dL      Albumin 3.70 g/dL      ALT (SGPT) 17 U/L      AST (SGOT) 15 U/L      Alkaline Phosphatase 62 U/L      Total Bilirubin 0.2 mg/dL      Globulin 2.9 gm/dL      A/G Ratio 1.3 g/dL      BUN/Creatinine Ratio 15.2     Anion Gap 7.0 mmol/L      eGFR 102.6 mL/min/1.73      Comment: National Kidney Foundation and American Society of Nephrology (ASN) Task Force recommended calculation based on the Chronic Kidney Disease Epidemiology Collaboration (CKD-EPI) equation refit without adjustment for race.       Narrative:      GFR Normal >60  Chronic Kidney Disease <60  Kidney Failure <15      BNP [[id number]]  (Abnormal) Collected: 12/07/22 1040    Specimen: Blood Updated: 12/07/22 1106     proBNP 2,436.0 pg/mL     Narrative:      Among patients with dyspnea, NT-proBNP is highly sensitive for the detection of acute congestive heart failure. In addition NT-proBNP of <300 pg/ml effectively rules out acute congestive heart failure with 99% negative predictive value.      D-dimer, Quantitative [[id number]]  (Abnormal) Collected: 12/07/22 1040    Specimen: Blood Updated: 12/07/22 1057     D-Dimer, Quantitative 0.56 MCGFEU/mL     Narrative:      According to the assay 's published package insert, a normal (<0.50 MCGFEU/mL) D-dimer result in conjunction with a non-high clinical probability assessment, excludes  "deep vein thrombosis (DVT) and pulmonary embolism (PE) with high sensitivity.    D-dimer values increase with age and this can make VTE exclusion of an older population difficult. To address this, the American College of Physicians, based on best available evidence and recent guidelines, recommends that clinicians use age-adjusted D-dimer thresholds in patients greater than 50 years of age with: a) a low probability of PE who do not meet all Pulmonary Embolism Rule Out Criteria, or b) in those with intermediate probability of PE.   The formula for an age-adjusted D-dimer cut-off is \"age/100\".  For example, a 60 year old patient would have an age-adjusted cut-off of 0.60 MCGFEU/mL and an 80 year old 0.80 MCGFEU/mL.    CBC & Differential [[id number]]  (Abnormal) Collected: 12/07/22 0950    Specimen: Blood Updated: 12/07/22 1002    Narrative:      The following orders were created for panel order CBC & Differential.  Procedure                               Abnormality         Status                     ---------                               -----------         ------                     CBC Auto Differential[[id number]]        Abnormal            Final result                 Please view results for these tests on the individual orders.    CBC Auto Differential [[id number]]  (Abnormal) Collected: 12/07/22 0950    Specimen: Blood Updated: 12/07/22 1002     WBC 7.66 10*3/mm3      RBC 4.43 10*6/mm3      Hemoglobin 12.7 g/dL      Hematocrit 40.9 %      MCV 92.3 fL      MCH 28.7 pg      MCHC 31.1 g/dL      RDW 13.0 %      RDW-SD 43.5 fl      MPV 11.1 fL      Platelets 259 10*3/mm3      Neutrophil % 79.1 %      Lymphocyte % 12.1 %      Monocyte % 6.3 %      Eosinophil % 1.4 %      Basophil % 0.7 %      Immature Grans % 0.4 %      Neutrophils, Absolute 6.06 10*3/mm3      Lymphocytes, Absolute 0.93 10*3/mm3      Monocytes, Absolute 0.48 10*3/mm3      Eosinophils, Absolute 0.11 10*3/mm3      Basophils, Absolute 0.05 10*3/mm3      " Immature Grans, Absolute 0.03 10*3/mm3      nRBC 0.0 /100 WBC         Imaging Results (Last 24 Hours)     Procedure Component Value Units Date/Time    CT Angiogram Chest [[id number]] Collected: 12/07/22 1140     Updated: 12/07/22 1154    Narrative:      EXAMINATION: CT ANGIOGRAM CHEST-      12/7/2022 11:16 AM CST     HISTORY: chest pain sob     In order to have a CT radiation dose as low as reasonably achievable  Automated Exposure Control was utilized for adjustment of the mA and/or  KV according to patient size.     DLP in mGycm= 191     The CT Angiography of the chest is performed after intravenous contrast  enhancement.     Images are acquired in axial plane and subsequent 2-D reconstruction in  coronal and sagittal planes and 3-D maximum intensity projection  reconstruction.     There is no previous similar study for comparison. Correlation made with  radiographs of the chest obtained earlier today.     There is normal opacification of pulmonary arteries and branches  bilaterally. No filling defects in the opacified pulmonary arterial bed  bilaterally.     The RV/LV ratio is 29/49 which is normal. No finding to suggest right  heart strain.     Atheromatous changes thoracic aorta is seen. No aneurysmal dilatation or  dissection.     Atheromatous changes of coronary arteries are noted.     There is no evidence of mediastinal or hilar mass or lymphadenopathy.     The limited visualized thyroid gland is unremarkable.     There is no axillary lymphadenopathy.     The lungs are poorly expanded. There are significant interlobular septal  thickening suggesting fluid retention/pulmonary edema. There are  scattered areas of small to moderate size foci of pulmonary  consolidation, more pronounced in the upper lobes, left more than the  right. These probably represent areas of discoid atelectasis or  pneumonic consolidation/inflammatory/infectious process. The largest  area of consolidation is in the lingular segment of the  left upper lobe  measuring 1.2 x 2.9 cm.     There is bilateral moderate pleural effusion, right more than the left.     A few calcified granulomas are seen in the lungs bilaterally, more so in  the right middle lobe.     There is moderate splenomegaly. The spleen measured 30.1 x 13 x 9 cm.  Limited visualized liver is unremarkable. No gallstones. The adrenal  glands are normal. The pancreas and kidneys are incompletely included  and not well evaluated.     Images are reviewed in bone window show no acute bony abnormality or  bone lesion.       Impression:      1. No evidence of pulmonary embolism. No aortic aneurysm or dissection.  Atheromatous changes of coronary arteries.  2. Evidence of pulmonary edema.  3. Foci of consolidation the lungs bilaterally probably represent  superimposed inflammatory/infectious process. This needs to be further  evaluated by a follow-up examination.  4. Moderate bibasal pleural effusion, right more than the left.  5. The splenomegaly.                                   This report was finalized on 12/07/2022 11:51 by Dr. Roxanne Wilder MD.    XR Chest 1 View [[id number]] Collected: 12/07/22 0909     Updated: 12/07/22 0914    Narrative:      XR CHEST 1 VW- 12/7/2022 9:03 AM CST     HISTORY: Chest pain     COMPARISON: Chest exam dated 05/09/2018.     FINDINGS:      Bilateral interstitial coarsening and peribronchial thickening. Hazy  airspace opacities are identified in both lung bases. Lungs appear well  expanded. There is no consolidation or obvious pleural effusion. Heart  size appears normal. Pulmonary vasculature are nondilated.       Impression:      1. Bilateral interstitial thickening with bibasilar hazy airspace  opacities. Central pulmonary vasculature are nondilated. Primary  consideration would be atypical pneumonia/viral pneumonia. Second  differential consideration would be an early developing pulmonary edema.        This report was finalized on 12/07/2022 09:11 by   Antonio Contreras, .        I have personally reviewed and interpreted the radiology studies and ECG obtained at time of admission.     Assessment / Plan     Assessment:   Active Hospital Problems    Diagnosis    • **Chest pain, unspecified type    • Type 2 diabetes mellitus (HCC)    • Essential hypertension    • Shortness of breath    Concern for pulmonary edema  Elevated troponin    Plan:      Admit observation  Serial enzymes  Echocardiogram today  Lasix 40 mg IV every 12 hours  Progress echocardiogram in a.m.  Carbohydrate consistent healthy heart diet      I confirmed that the patient's Advance Care Plan is present, code status is documented, or surrogate decision maker is listed in the patient's medical record.     Code Status/Advanced Care Plan: Full    The patient's surrogate decision maker is wife.     I discussed my findings and recommendations with the patient.    Estimated length of stay is 24 to 48 hours.     The patient was seen and examined by me on 12/7/2022 at 1400 hrs.    Electronically signed by Trina Mcintosh DO, 12/07/22, 14:25 CST.                Electronically signed by Trina Mcintosh DO at 12/07/22 1429          Emergency Department Notes      Renetta Damico PA at 12/07/22 0855          Subjective   History of Present Illness     Patient is a pleasant 47-year-old gentleman with chief complaint of persistent cough with new onset chest comfort and shortness of breath.  The patient describes for nearly a month now, he has developed this cough.  He was exposed to close family members who had the flu.  He was last went to get it.  He assumed he had the flu as well.  He had a productive cough with green phlegm, fever, and little bit of upper respiratory infection.  He did seek medical attention and had swabs completed at an outlying clinic.  He also was started on amoxicillin and some type of cough medicine.  His symptoms continued so he sought medical attention his primary care  provider recently.  He completed azithromycin.  His cough continued so he is now is on doxycycline.  He felt like his cough had actually improved but this morning, he woke up feeling increased shortness of breath worse with exertion as well some chest discomfort also worse with exertion.  He denies any cardiological history.  He has had pneumonia in the remote past that felt similarly.  He denies any hemoptysis. He denies any leg pain or swelling.  He denies smoking.  He reports he is otherwise healthy.  He did take a breathing treatment earlier this morning and he believes that is why he is tachycardic.  He is also took some Mucinex.    Review of Systems   Constitutional: Positive for activity change.   HENT: Negative.    Respiratory: Positive for cough and shortness of breath.    Cardiovascular: Positive for chest pain.   Gastrointestinal: Negative.    Genitourinary: Negative.    Musculoskeletal: Negative.    Skin: Negative.    Neurological: Negative.    Psychiatric/Behavioral: Negative.    All other systems reviewed and are negative.      Past Medical History:   Diagnosis Date   • Diabetes mellitus (HCC)    • Hypertension    • Type 2 diabetes mellitus (HCC)        Allergies   Allergen Reactions   • Poison Ivy Extract Rash       Past Surgical History:   Procedure Laterality Date   • TOOTH EXTRACTION Right 09/21/2022   • WISDOM TOOTH EXTRACTION         Family History   Problem Relation Age of Onset   • Diabetes Father    • Diabetes Paternal Grandmother        Social History     Socioeconomic History   • Marital status:    Tobacco Use   • Smoking status: Never   • Smokeless tobacco: Never   Vaping Use   • Vaping Use: Never used   Substance and Sexual Activity   • Alcohol use: No   • Drug use: No   • Sexual activity: Defer       Prior to Admission medications    Medication Sig Start Date End Date Taking? Authorizing Provider   albuterol sulfate  (90 Base) MCG/ACT inhaler Inhale 2 puffs Every 4 (Four)  "Hours As Needed for Wheezing or Shortness of Air. 1/2/19   Funmi Clancy APRN   brompheniramine-pseudoephedrine-DM 30-2-10 MG/5ML syrup Take 5 mL by mouth 4 (Four) Times a Day As Needed for Cough. 11/12/22   Nikki Kiran APRN   Insulin Glargine (Lantus SoloStar) 100 UNIT/ML injection pen Inject 25 Units under the skin into the appropriate area as directed. 12/1/21   Emergency, Nurse Epic, RN   lisinopril (PRINIVIL,ZESTRIL) 5 MG tablet Take 1 tablet by mouth Daily. 12/1/21   Emergency, Nurse Ken RN   metFORMIN (GLUCOPHAGE) 1000 MG tablet Take 1 tablet by mouth 2 (Two) Times a Day With Meals. 3/20/19   Loretta Soto APRN   methylPREDNISolone (MEDROL) 4 MG dose pack Take as directed on package instructions. 11/12/22   Nikki Kiran APRN   Omega-3 Fatty Acids (fish oil) 1000 MG capsule capsule Take  by mouth Daily With Breakfast.    Emergency, Nurse Ken, RN   sildenafil (VIAGRA) 100 MG tablet Take 1 tablet by mouth As Needed. 12/1/21   Emergency, Nurse Epic, RN       Medications   sodium chloride 0.9 % flush 10 mL (has no administration in time range)   nitroglycerin (NITROSTAT) SL tablet 0.4 mg (0.4 mg Sublingual Given 12/7/22 1303)   cefTRIAXone (ROCEPHIN) 2 g in sodium chloride 0.9 % 100 mL IVPB-VTB (0 g Intravenous Stopped 12/7/22 1037)   doxycycline (VIBRAMYCIN) 100 mg/100 mL 0.9% NS MBP (0 mg Intravenous Stopped 12/7/22 1215)   methylPREDNISolone sodium succinate (SOLU-Medrol) injection 125 mg (125 mg Intravenous Given 12/7/22 0944)   iopamidol (ISOVUE-370) 76 % injection 100 mL (100 mL Intravenous Given 12/7/22 1133)   hydrALAZINE (APRESOLINE) injection 10 mg (10 mg Intravenous Given 12/7/22 1237)   aspirin tablet 325 mg (325 mg Oral Given 12/7/22 1237)       BP (!) 190/123   Pulse 116   Temp 97.7 °F (36.5 °C) (Oral)   Resp 21   Ht 182.9 cm (72\")   Wt 83.9 kg (185 lb)   SpO2 94%   BMI 25.09 kg/m²       Objective   Physical Exam  Vitals reviewed.   Constitutional:       Appearance: " He is well-developed.   HENT:      Head: Normocephalic and atraumatic.      Right Ear: External ear normal.      Left Ear: External ear normal.      Nose: Nose normal.   Eyes:      Conjunctiva/sclera: Conjunctivae normal.      Comments: Patch over his left eye.  Patient describes he coughed so much that he has a subconjunctival hemorrhage on the left eye.   Neck:      Trachea: No tracheal deviation.   Cardiovascular:      Rate and Rhythm: Normal rate and regular rhythm.      Heart sounds: Normal heart sounds. No murmur heard.    No friction rub. No gallop.   Pulmonary:      Effort: Pulmonary effort is normal. No respiratory distress.      Breath sounds: Normal breath sounds. No wheezing, rhonchi or rales.   Chest:      Chest wall: No tenderness.   Abdominal:      General: Bowel sounds are normal. There is no distension.      Palpations: Abdomen is soft. There is no mass.      Tenderness: There is no abdominal tenderness. There is no guarding or rebound.   Musculoskeletal:         General: No tenderness or deformity. Normal range of motion.      Cervical back: Normal range of motion and neck supple.      Right lower leg: No tenderness. No edema.      Left lower leg: No tenderness. No edema.   Skin:     General: Skin is warm and dry.      Capillary Refill: Capillary refill takes less than 2 seconds.      Coloration: Skin is not pale.      Findings: No erythema or rash.   Neurological:      General: No focal deficit present.      Mental Status: He is alert and oriented to person, place, and time.   Psychiatric:         Mood and Affect: Mood normal.         Behavior: Behavior normal.         Thought Content: Thought content normal.         Judgment: Judgment normal.         Procedures        Lab Results (last 24 hours)     Procedure Component Value Units Date/Time    CBC & Differential [[id number]]  (Abnormal) Collected: 12/07/22 0950    Specimen: Blood Updated: 12/07/22 1002    Narrative:      The following orders were  created for panel order CBC & Differential.  Procedure                               Abnormality         Status                     ---------                               -----------         ------                     CBC Auto Differential[[id number]]        Abnormal            Final result                 Please view results for these tests on the individual orders.    CBC Auto Differential [[id number]]  (Abnormal) Collected: 12/07/22 0950    Specimen: Blood Updated: 12/07/22 1002     WBC 7.66 10*3/mm3      RBC 4.43 10*6/mm3      Hemoglobin 12.7 g/dL      Hematocrit 40.9 %      MCV 92.3 fL      MCH 28.7 pg      MCHC 31.1 g/dL      RDW 13.0 %      RDW-SD 43.5 fl      MPV 11.1 fL      Platelets 259 10*3/mm3      Neutrophil % 79.1 %      Lymphocyte % 12.1 %      Monocyte % 6.3 %      Eosinophil % 1.4 %      Basophil % 0.7 %      Immature Grans % 0.4 %      Neutrophils, Absolute 6.06 10*3/mm3      Lymphocytes, Absolute 0.93 10*3/mm3      Monocytes, Absolute 0.48 10*3/mm3      Eosinophils, Absolute 0.11 10*3/mm3      Basophils, Absolute 0.05 10*3/mm3      Immature Grans, Absolute 0.03 10*3/mm3      nRBC 0.0 /100 WBC     Comprehensive Metabolic Panel [[id number]]  (Abnormal) Collected: 12/07/22 1040    Specimen: Blood Updated: 12/07/22 1110     Glucose 171 mg/dL      BUN 14 mg/dL      Creatinine 0.92 mg/dL      Sodium 137 mmol/L      Potassium 5.8 mmol/L      Chloride 101 mmol/L      CO2 29.0 mmol/L      Calcium 9.2 mg/dL      Total Protein 6.6 g/dL      Albumin 3.70 g/dL      ALT (SGPT) 17 U/L      AST (SGOT) 15 U/L      Alkaline Phosphatase 62 U/L      Total Bilirubin 0.2 mg/dL      Globulin 2.9 gm/dL      A/G Ratio 1.3 g/dL      BUN/Creatinine Ratio 15.2     Anion Gap 7.0 mmol/L      eGFR 102.6 mL/min/1.73      Comment: National Kidney Foundation and American Society of Nephrology (ASN) Task Force recommended calculation based on the Chronic Kidney Disease Epidemiology Collaboration (CKD-EPI) equation refit without  "adjustment for race.       Narrative:      GFR Normal >60  Chronic Kidney Disease <60  Kidney Failure <15      BNP [[id number]]  (Abnormal) Collected: 12/07/22 1040    Specimen: Blood Updated: 12/07/22 1106     proBNP 2,436.0 pg/mL     Narrative:      Among patients with dyspnea, NT-proBNP is highly sensitive for the detection of acute congestive heart failure. In addition NT-proBNP of <300 pg/ml effectively rules out acute congestive heart failure with 99% negative predictive value.      D-dimer, Quantitative [[id number]]  (Abnormal) Collected: 12/07/22 1040    Specimen: Blood Updated: 12/07/22 1057     D-Dimer, Quantitative 0.56 MCGFEU/mL     Narrative:      According to the assay 's published package insert, a normal (<0.50 MCGFEU/mL) D-dimer result in conjunction with a non-high clinical probability assessment, excludes deep vein thrombosis (DVT) and pulmonary embolism (PE) with high sensitivity.    D-dimer values increase with age and this can make VTE exclusion of an older population difficult. To address this, the American College of [employer], based on best available evidence and recent guidelines, recommends that clinicians use age-adjusted D-dimer thresholds in patients greater than 50 years of age with: a) a low probability of PE who do not meet all Pulmonary Embolism Rule Out Criteria, or b) in those with intermediate probability of PE.   The formula for an age-adjusted D-dimer cut-off is \"age/100\".  For example, a 60 year old patient would have an age-adjusted cut-off of 0.60 MCGFEU/mL and an 80 year old 0.80 MCGFEU/mL.    Troponin [[id number]]  (Abnormal) Collected: 12/07/22 1040    Specimen: Blood Updated: 12/07/22 1111     Troponin T 0.038 ng/mL     Narrative:      Troponin T Reference Range:  <= 0.03 ng/mL-   Negative for AMI  >0.03 ng/mL-     Abnormal for myocardial necrosis.  Clinicians would have to utilize clinical acumen, EKG, Troponin and serial changes to determine if it is an Acute " Myocardial Infarction or myocardial injury due to an underlying chronic condition.       Results may be falsely decreased if patient taking Biotin.      Magnesium [[id number]]  (Normal) Collected: 12/07/22 1040    Specimen: Blood Updated: 12/07/22 1221     Magnesium 1.8 mg/dL     COVID PRE-OP / PRE-PROCEDURE SCREENING ORDER (NO ISOLATION) - Swab, Nasopharynx [[id number]]  (Normal) Collected: 12/07/22 1042    Specimen: Swab from Nasopharynx Updated: 12/07/22 1137    Narrative:      The following orders were created for panel order COVID PRE-OP / PRE-PROCEDURE SCREENING ORDER (NO ISOLATION) - Swab, Nasopharynx.  Procedure                               Abnormality         Status                     ---------                               -----------         ------                     COVID-19 and FLU A/B PCR...[[id number]]  Normal              Final result                 Please view results for these tests on the individual orders.    COVID-19 and FLU A/B PCR - Swab, Nasopharynx [[id number]]  (Normal) Collected: 12/07/22 1042    Specimen: Swab from Nasopharynx Updated: 12/07/22 1137     COVID19 Not Detected     Influenza A PCR Not Detected     Influenza B PCR Not Detected    Narrative:      Fact sheet for providers: https://www.fda.gov/media/152901/download    Fact sheet for patients: https://www.fda.gov/media/500615/download    Test performed by PCR.    Troponin [[id number]]  (Abnormal) Collected: 12/07/22 1214    Specimen: Blood Updated: 12/07/22 1247     Troponin T 0.036 ng/mL     Narrative:      Troponin T Reference Range:  <= 0.03 ng/mL-   Negative for AMI  >0.03 ng/mL-     Abnormal for myocardial necrosis.  Clinicians would have to utilize clinical acumen, EKG, Troponin and serial changes to determine if it is an Acute Myocardial Infarction or myocardial injury due to an underlying chronic condition.       Results may be falsely decreased if patient taking Biotin.      Potassium [[id number]]  (Normal) Collected:  12/07/22 1214    Specimen: Blood Updated: 12/07/22 1241     Potassium 4.9 mmol/L           XR Chest 1 View    Result Date: 12/7/2022  Narrative: XR CHEST 1 VW- 12/7/2022 9:03 AM CST  HISTORY: Chest pain  COMPARISON: Chest exam dated 05/09/2018.  FINDINGS:  Bilateral interstitial coarsening and peribronchial thickening. Hazy airspace opacities are identified in both lung bases. Lungs appear well expanded. There is no consolidation or obvious pleural effusion. Heart size appears normal. Pulmonary vasculature are nondilated.      Impression: 1. Bilateral interstitial thickening with bibasilar hazy airspace opacities. Central pulmonary vasculature are nondilated. Primary consideration would be atypical pneumonia/viral pneumonia. Second differential consideration would be an early developing pulmonary edema.   This report was finalized on 12/07/2022 09:11 by Dr Antonio Contreras, .    CT Angiogram Chest    Result Date: 12/7/2022  Narrative: EXAMINATION: CT ANGIOGRAM CHEST-   12/7/2022 11:16 AM CST  HISTORY: chest pain sob  In order to have a CT radiation dose as low as reasonably achievable Automated Exposure Control was utilized for adjustment of the mA and/or KV according to patient size.  DLP in mGycm= 191  The CT Angiography of the chest is performed after intravenous contrast enhancement.  Images are acquired in axial plane and subsequent 2-D reconstruction in coronal and sagittal planes and 3-D maximum intensity projection reconstruction.  There is no previous similar study for comparison. Correlation made with radiographs of the chest obtained earlier today.  There is normal opacification of pulmonary arteries and branches bilaterally. No filling defects in the opacified pulmonary arterial bed bilaterally.  The RV/LV ratio is 29/49 which is normal. No finding to suggest right heart strain.  Atheromatous changes thoracic aorta is seen. No aneurysmal dilatation or dissection.  Atheromatous changes of coronary arteries  are noted.  There is no evidence of mediastinal or hilar mass or lymphadenopathy.  The limited visualized thyroid gland is unremarkable.  There is no axillary lymphadenopathy.  The lungs are poorly expanded. There are significant interlobular septal thickening suggesting fluid retention/pulmonary edema. There are scattered areas of small to moderate size foci of pulmonary consolidation, more pronounced in the upper lobes, left more than the right. These probably represent areas of discoid atelectasis or pneumonic consolidation/inflammatory/infectious process. The largest area of consolidation is in the lingular segment of the left upper lobe measuring 1.2 x 2.9 cm.  There is bilateral moderate pleural effusion, right more than the left.  A few calcified granulomas are seen in the lungs bilaterally, more so in the right middle lobe.  There is moderate splenomegaly. The spleen measured 30.1 x 13 x 9 cm. Limited visualized liver is unremarkable. No gallstones. The adrenal glands are normal. The pancreas and kidneys are incompletely included and not well evaluated.  Images are reviewed in bone window show no acute bony abnormality or bone lesion.      Impression: 1. No evidence of pulmonary embolism. No aortic aneurysm or dissection. Atheromatous changes of coronary arteries. 2. Evidence of pulmonary edema. 3. Foci of consolidation the lungs bilaterally probably represent superimposed inflammatory/infectious process. This needs to be further evaluated by a follow-up examination. 4. Moderate bibasal pleural effusion, right more than the left. 5. The splenomegaly.            This report was finalized on 12/07/2022 11:51 by Dr. Roxanne Wilder MD.      ED Course  ED Course as of 12/07/22 1328   Wed Dec 07, 2022   1325 I have reassessed the patient on several occasions.  His O2 sats did drop down to 92% on room air at 1 point and the patient was tachypneic.  He felt anxious about all the information was giving him.  He  understands troponin T was elevated as well as BNP.  Dimer was elevated so I did complete a CTA chest revealing evidence of pulmonary edema.  No PE.  No aortic dissection or aneurysm.  Foci of consolidation in the lungs bilaterally probably representing superimposed inflammatory/infectious process.  Moderate bibasilar pleural effusion, right more than left.  Splenomegaly. [TK]   1326 Patient has been educated on differential and concern with his recent respiratory infection and abnormal labs.  I have spoken with Dr. Reed, hospitalist on-call who is gracious to admit the patient under his care. [TK]      ED Course User Index  [TK] Renetta Damico PA          MDM    Final diagnoses:   Chest pain, unspecified type   Acute pulmonary edema (HCC)   Lung consolidation (HCC)   Essential hypertension          Renetta Damico PA  12/07/22 1328      Electronically signed by Renetta Damico PA at 12/07/22 1328     Alden Lozada RN at 12/07/22 1941        Pt resting quietly with no acute distress noted. States lisinopril was taken today here via day nurse.    Electronically signed by Alden Lozada RN at 12/07/22 1942         Facility-Administered Medications as of 12/7/2022   Medication Dose Route Frequency Provider Last Rate Last Admin   • acetaminophen (TYLENOL) tablet 650 mg  650 mg Oral Q4H PRN Trina Mcintosh,        • [COMPLETED] aspirin tablet 325 mg  325 mg Oral Once Renetta Damico PA   325 mg at 12/07/22 1237   • [COMPLETED] cefTRIAXone (ROCEPHIN) 2 g in sodium chloride 0.9 % 100 mL IVPB-VTB  2 g Intravenous Once Renetta Damico PA   Stopped at 12/07/22 1037   • dextrose (D50W) (25 g/50 mL) IV injection 25 g  25 g Intravenous Q15 Min PRN Trina Mcintosh DO       • dextrose (GLUTOSE) oral gel 15 g  15 g Oral Q15 Min PRN Trina Mcintosh,        • [COMPLETED] doxycycline (VIBRAMYCIN) 100 mg/100 mL 0.9% NS MBP  100 mg Intravenous Once Renetta Damico  XinJASS Juarez   Stopped at 12/07/22 1215   • Enoxaparin Sodium (LOVENOX) syringe 40 mg  40 mg Subcutaneous Daily Trina Mcintosh DO   40 mg at 12/07/22 1549   • furosemide (LASIX) injection 40 mg  40 mg Intravenous Q12H Trina Mcintosh DO   40 mg at 12/08/22 0248   • glucagon (human recombinant) (GLUCAGEN DIAGNOSTIC) injection 1 mg  1 mg Intramuscular Q15 Min PRN Trina Mcintosh DO       • [COMPLETED] hydrALAZINE (APRESOLINE) injection 10 mg  10 mg Intravenous Once [facility] PA   10 mg at 12/07/22 1237   • Insulin Lispro (humaLOG) injection 2-7 Units  2-7 Units Subcutaneous TID AC Trina Mcintosh DO       • [COMPLETED] iopamidol (ISOVUE-370) 76 % injection 100 mL  100 mL Intravenous Once in imaging [facility] PA   100 mL at 12/07/22 1133   • lisinopril (PRINIVIL,ZESTRIL) tablet 20 mg  20 mg Oral Q24H Trina Mcintosh DO       • [COMPLETED] methylPREDNISolone sodium succinate (SOLU-Medrol) injection 125 mg  125 mg Intravenous Once [facility] PA   125 mg at 12/07/22 0944   • metoprolol tartrate (LOPRESSOR) tablet 25 mg  25 mg Oral Q12H Trina Mcintosh DO   25 mg at 12/08/22 0357   • nitroglycerin (NITROSTAT) SL tablet 0.4 mg  0.4 mg Sublingual Q5 Min PRN Trina Mcintosh DO       • ondansetron (ZOFRAN) injection 4 mg  4 mg Intravenous Q6H PRN Trina Mcintosh DO       • sodium chloride 0.9 % flush 10 mL  10 mL Intravenous PRN [facility], PA       • sodium chloride 0.9 % flush 10 mL  10 mL Intravenous Q12H Trina Mcintosh DO   10 mL at 12/08/22 0242   • sodium chloride 0.9 % flush 10 mL  10 mL Intravenous PRN Trina Mcintosh DO       • sodium chloride 0.9 % infusion 40 mL  40 mL Intravenous PRN Dayna, Trina Una, DO         Orders (all)      Start     Ordered    12/08/22 0600  Lipid Panel  Morning Draw         12/07/22 1415    12/08/22 0600  Comprehensive Metabolic Panel  Morning Draw         12/07/22 1415     12/08/22 0600  CBC Auto Differential  Morning Draw         12/07/22 1415    12/07/22 2010  Troponin  Once         12/07/22 1415    12/07/22 1802  POC Glucose Once  PROCEDURE ONCE         12/07/22 1749    12/07/22 1800  Oral Care  2 Times Daily       12/07/22 1415    12/07/22 1730  Insulin Lispro (humaLOG) injection 2-7 Units  3 Times Daily Before Meals         12/07/22 1415    12/07/22 1700  POC Glucose TID AC  3 Times Daily Before Meals       12/07/22 1415    12/07/22 1600  Vital Signs  Every 4 Hours       12/07/22 1415    12/07/22 1432  furosemide (LASIX) injection 40 mg  Every 12 Hours         12/07/22 1430    12/07/22 1432  lisinopril (PRINIVIL,ZESTRIL) tablet 20 mg  Every 24 Hours Scheduled         12/07/22 1430    12/07/22 1432  metoprolol tartrate (LOPRESSOR) tablet 25 mg  Every 12 Hours Scheduled         12/07/22 1430    12/07/22 1431  Daily Weights  Daily       12/07/22 1430    12/07/22 1417  sodium chloride 0.9 % flush 10 mL  Every 12 Hours Scheduled         12/07/22 1415    12/07/22 1417  Enoxaparin Sodium (LOVENOX) syringe 40 mg  Daily         12/07/22 1415    12/07/22 1416  Daily Weights  Daily       12/07/22 1415    12/07/22 1416  Inpatient Cardiology Consult  Once        Specialty:  Cardiology  Provider:  (Not yet assigned)    12/07/22 1416    12/07/22 1414  Adult Transthoracic Echo Complete W/ Cont if Necessary Per Protocol  Once         12/07/22 1415    12/07/22 1413  ondansetron (ZOFRAN) injection 4 mg  Every 6 Hours PRN         12/07/22 1415    12/07/22 1413  acetaminophen (TYLENOL) tablet 650 mg  Every 4 Hours PRN         12/07/22 1415    12/07/22 1413  Hemoglobin A1c  Once         12/07/22 1415    12/07/22 1412  Telemetry - Maintain IV Access  Continuous         12/07/22 1415    12/07/22 1412  Continuous Cardiac Monitoring  Continuous        Comments: Follow Standing Orders As Outlined in Process Instructions (Open Order Report to View Full Instructions)    12/07/22 1415    12/07/22 1412  May Be  Off Telemetry for Tests  Continuous         12/07/22 1415    12/07/22 1412  Notify Provider if ACLS Protocol Activated  Until Discontinued         12/07/22 1415    12/07/22 1412  Activity - Ad Charlene  Until Discontinued         12/07/22 1415    12/07/22 1412  Diet: Cardiac Diets, Diabetic Diets; Healthy Heart (2-3 Na+); Consistent Carbohydrate; Texture: Regular Texture (IDDSI 7); Fluid Consistency: Thin (IDDSI 0)  Diet Effective Now         12/07/22 1415    12/07/22 1411  nitroglycerin (NITROSTAT) SL tablet 0.4 mg  Every 5 Minutes PRN         12/07/22 1415    12/07/22 1411  Code Status and Medical Interventions:  Continuous         12/07/22 1415    12/07/22 1410  Intake & Output  Every Shift       12/07/22 1415    12/07/22 1410  Weigh Patient  Once         12/07/22 1415    12/07/22 1410  Oxygen Therapy- Nasal Cannula; Titrate for SPO2: 90% - 95%  Continuous         12/07/22 1415    12/07/22 1410  Insert Peripheral IV  Once         12/07/22 1415    12/07/22 1410  Saline Lock & Maintain IV Access  Continuous,   Status:  Canceled         12/07/22 1415    12/07/22 1410  Do NOT Hold Basal or Correction Scale Insulin When Patient is NPO, Hold Scheduled Mealtime (Bolus) Insulin if NPO  Continuous         12/07/22 1415    12/07/22 1410  Vital Signs Every 15 Minutes Until Stable, Then Every 4 Hours  Continuous         12/07/22 1415    12/07/22 1410  Cardiac Monitoring  Continuous,   Status:  Canceled        Comments: Follow Standing Orders As Outlined in Process Instructions (Open Order Report to View Full Instructions)    12/07/22 1415    12/07/22 1410  ECG 12 Lead Chest Pain  Now Then Every 6 Hours       12/07/22 1415    12/07/22 1410  Notify Physician For Unrelieved Chest Pain  Until Discontinued         12/07/22 1415    12/07/22 1410  Troponin  Now Then Every 2 Hours       12/07/22 1415    12/07/22 1410  Nurse to Order Troponin As Needed For Unrelieved or New Chest Pain  Continuous        Comments: Nurse to Order Troponin  As Needed For Unrelieved or New Chest Pain    12/07/22 1415    12/07/22 1409  sodium chloride 0.9 % flush 10 mL  As Needed         12/07/22 1415    12/07/22 1409  sodium chloride 0.9 % infusion 40 mL  As Needed         12/07/22 1415    12/07/22 1409  dextrose (GLUTOSE) oral gel 15 g  Every 15 Minutes PRN         12/07/22 1415    12/07/22 1409  dextrose (D50W) (25 g/50 mL) IV injection 25 g  Every 15 Minutes PRN         12/07/22 1415    12/07/22 1409  glucagon (human recombinant) (GLUCAGEN DIAGNOSTIC) injection 1 mg  Every 15 Minutes PRN         12/07/22 1415    12/07/22 1328  Inpatient Admission  Once         12/07/22 1328    12/07/22 1212  Magnesium  STAT         12/07/22 1211    12/07/22 1211  Blood Gas, Arterial -  Once,   Status:  Canceled         12/07/22 1210    12/07/22 1211  Potassium  STAT         12/07/22 1211    12/07/22 1207  aspirin tablet 325 mg  Once         12/07/22 1205    12/07/22 1206  hydrALAZINE (APRESOLINE) injection 10 mg  Once         12/07/22 1205    12/07/22 1206  Monitor Blood Pressure  Per Hospital Policy         12/07/22 1205    12/07/22 1206  Cardiac Monitoring  Continuous,   Status:  Canceled        Comments: Follow Standing Orders As Outlined in Process Instructions (Open Order Report to View Full Instructions)    12/07/22 1205    12/07/22 1205  nitroglycerin (NITROSTAT) SL tablet 0.4 mg  Every 5 Minutes PRN,   Status:  Discontinued         12/07/22 1205    12/07/22 1118  iopamidol (ISOVUE-370) 76 % injection 100 mL  Once in Imaging         12/07/22 1116    12/07/22 1107  CT Angiogram Chest  1 Time Imaging         12/07/22 1106    12/07/22 1008  Comprehensive Metabolic Panel  Once         12/07/22 0855    12/07/22 1008  BNP  Once         12/07/22 0855    12/07/22 1007  D-dimer, Quantitative  Once         12/07/22 0855    12/07/22 0921  cefTRIAXone (ROCEPHIN) 2 g in sodium chloride 0.9 % 100 mL IVPB-VTB  Once         12/07/22 0919    12/07/22 0921  doxycycline (VIBRAMYCIN) 100 mg/100  mL 0.9% NS MBP  Once         12/07/22 0919    12/07/22 0921  methylPREDNISolone sodium succinate (SOLU-Medrol) injection 125 mg  Once         12/07/22 0919    12/07/22 0855  CBC & Differential  Once         12/07/22 0855    12/07/22 0855  Troponin  Now Then Every 2 Hours       12/07/22 0855    12/07/22 0855  ECG 12 Lead Chest Pain  Now & in 2 Hours       12/07/22 0855    12/07/22 0855  XR Chest 1 View  1 Time Imaging         12/07/22 0855    12/07/22 0855  Insert Peripheral IV  Once        See Hyperspace for full Linked Orders Report.    12/07/22 0855    12/07/22 0855  Cardiac Monitoring  Continuous,   Status:  Canceled        Comments: Follow Standing Orders As Outlined in Process Instructions (Open Order Report to View Full Instructions)    12/07/22 0855    12/07/22 0855  COVID PRE-OP / PRE-PROCEDURE SCREENING ORDER (NO ISOLATION) - Swab, Nasopharynx  Once         12/07/22 0855    12/07/22 0855  CBC Auto Differential  PROCEDURE ONCE         12/07/22 0855    12/07/22 0855  COVID-19 and FLU A/B PCR - Swab, Nasopharynx  PROCEDURE ONCE         12/07/22 0855    12/07/22 0854  sodium chloride 0.9 % flush 10 mL  As Needed        See Hyperspace for full Linked Orders Report.    12/07/22 0855    12/07/22 0843  ECG 12 Lead Dyspnea  STAT         12/07/22 0843    Unscheduled  Follow Hypoglycemia Standing Orders For Blood Glucose <70 & Notify Provider of Treatment  As Needed      Comments: Follow Hypoglycemia Orders As Outlined in Process Instructions (Open Order Report to View Full Instructions)  Notify Provider Any Time Hypoglycemia Treatment is Administered    12/07/22 1415    Unscheduled  ECG 12 Lead Chest Pain  As Needed      Comments: Nurse to Release ECG Order for Unrelieved or New Chest Pain    12/07/22 1415    Unscheduled  Telemetry - Pulse Oximetry  Continuous PRN      Comments: If Patient Develops Unresponsiveness, Acute Dyspnea, Cyanosis or Suspected Hypoxemia Start Continuous Pulse Ox Monitoring, Apply Oxygen &  Notify Provider    12/07/22 1415    Unscheduled  Oxygen Therapy- Nasal Cannula; Titrate for SPO2: 90% - 95%  Continuous PRN      Comments: If Patient Develops Unresponsiveness, Acute Dyspnea, Cyanosis or Suspected Hypoxemia Start Continuous Pulse Ox Monitoring, Apply Oxygen & Notify Provider    12/07/22 1415    Unscheduled  ECG 12 Lead Other; Acute Chest Pain or Dysrhythmia  As Needed      Comments: Nurse to Release if Patient Expericences Acute Chest Pain or Dysrhythmias    12/07/22 1415    Unscheduled  Potassium  As Needed      Comments: For Ventricular Arrhythmias      12/07/22 1415    Unscheduled  Magnesium  As Needed      Comments: For Ventricular Arrhythmias      12/07/22 1415    Unscheduled  Troponin  As Needed      Comments: For Chest Pain      12/07/22 1415    Unscheduled  Blood Gas, Arterial -  As Needed      Comments: Draw for Acute Dyspnea, Cyanosis, Suspected Hypoxemia or UnresponsivenessNotify Provider of Results      12/07/22 1415    Unscheduled  Initiate ACLS Protocol For Life Threatening Dysrhythmias (If Appropriate for Patient)  As Needed       12/07/22 1415                Ventilator/Non-Invasive Ventilation Settings (From admission, onward)    None        Physician Progress Notes (last 7 days)  Notes from 12/01/22 0642 through 12/08/22 0642   No notes of this type exist for this encounter.         Consult Notes (last 72 hours)  Notes from 12/05/22 0642 through 12/08/22 [id number]   No notes of this type exist for this [email or web address]

## 2022-12-08 NOTE — CONSULTS
Referring Provider: No Known Provider    Reason for Consultation: Chest pain    Chief complaint:   Chief Complaint   Patient presents with   • Bronchitis   • Shortness of Breath       Subjective .     History of present illness:  Speedy Nichols is a 47 y.o. male with Type 2 Diabetes and hypertension presented to [facility] ER complaining of shortness of breath and chest pain. Patient reports that he was diagnosed with the flu about a month ago. He reports that he has been having a lot of coughing since. He has a subconjunctival hemorrhage that he has seen ophthalmology about and has been reassured that it will resolve. He reports that yesterday his shortness of breath was worse. He reports that he has had some dyspnea on exertion that past couple of days that has had some accompanying chest tightness. Patient also reports some mild leg swelling. He has had some difficulty sleeping due to postnasal drainage. ER work up revealed Troponin 0.038, 0.036, 0.042. EKG with no acute changes. D dimer 0.56, BNP 2,436. CXR showed atypical/viral pneumonia and early developing pulmonary edema. CTA chest negative for PE with evidence of pulmonary edema. Patient has been given lasix in the ER; he reports that he has had good urine output. He states that his shortness of breath has been a little better today. He denies any chest pain since arrival to ER. Patient reports that his blood pressure has been elevated lately but he has a stressful job that he felt was the cause.     History  Past Medical History:   Diagnosis Date   • Diabetes mellitus (HCC)    • Hypertension    • Type 2 diabetes mellitus (HCC)    ,   Past Surgical History:   Procedure Laterality Date   • TOOTH EXTRACTION Right 09/21/2022   • WISDOM TOOTH EXTRACTION     ,   Family History   Problem Relation Age of Onset   • Diabetes Father    • Diabetes Paternal Grandmother    ,   Social History     Tobacco Use   • Smoking status: Never   • Smokeless tobacco: Never   Vaping Use    • Vaping Use: Never used   Substance Use Topics   • Alcohol use: No   • Drug use: No   ,     Medications    Prior to Admission medications    Medication Sig Start Date End Date Taking? Authorizing Provider   metFORMIN (GLUCOPHAGE) 1000 MG tablet Take 1 tablet by mouth 2 (Two) Times a Day With Meals. 3/20/19  Yes Loretta Soto APRN   albuterol sulfate  (90 Base) MCG/ACT inhaler Inhale 2 puffs Every 4 (Four) Hours As Needed for Wheezing or Shortness of Air. 1/2/19   Funmi Clancy APRN   brompheniramine-pseudoephedrine-DM 30-2-10 MG/5ML syrup Take 5 mL by mouth 4 (Four) Times a Day As Needed for Cough. 11/12/22   Nikki Kiran APRN   Insulin Glargine (Lantus SoloStar) 100 UNIT/ML injection pen Inject 25 Units under the skin into the appropriate area as directed. 12/1/21   Emergency, Nurse Ken, RN   lisinopril (PRINIVIL,ZESTRIL) 5 MG tablet Take 1 tablet by mouth Daily. 12/1/21   Emergency, Nurse Ken, RN   methylPREDNISolone (MEDROL) 4 MG dose pack Take as directed on package instructions. 11/12/22   Nikki Kiran APRN   Omega-3 Fatty Acids (fish oil) 1000 MG capsule capsule Take  by mouth Daily With Breakfast.    Emergency, Nurse Ken, RN   sildenafil (VIAGRA) 100 MG tablet Take 1 tablet by mouth As Needed. 12/1/21   Emergency, Nurse Epic, RN       Current Facility-Administered Medications   Medication Dose Route Frequency Provider Last Rate Last Admin   • acetaminophen (TYLENOL) tablet 650 mg  650 mg Oral Q4H PRN Trina Mcintosh DO       • aspirin EC tablet 81 mg  81 mg Oral Daily Antoine Serna APRN       • dextrose (D50W) (25 g/50 mL) IV injection 25 g  25 g Intravenous Q15 Min PRN Trina Mcintosh DO       • dextrose (GLUTOSE) oral gel 15 g  15 g Oral Q15 Min PRN Trina Mcintosh DO       • Enoxaparin Sodium (LOVENOX) syringe 40 mg  40 mg Subcutaneous Daily Trina Mcintosh DO   40 mg at 12/07/22 1549   • furosemide (LASIX) injection 40 mg  40 mg Intravenous Q12H  Trina Mcintosh DO   40 mg at 12/08/22 0248   • glucagon (human recombinant) (GLUCAGEN DIAGNOSTIC) injection 1 mg  1 mg Intramuscular Q15 Min PRN Trina Mcintosh DO       • Insulin Lispro (humaLOG) injection 2-7 Units  2-7 Units Subcutaneous TID AC Trina Mcintosh DO       • lisinopril (PRINIVIL,ZESTRIL) tablet 20 mg  20 mg Oral Q24H Trina Mcintsoh DO       • metoprolol tartrate (LOPRESSOR) tablet 25 mg  25 mg Oral Q12H Trina Mcintosh DO   25 mg at 12/08/22 0357   • nitroglycerin (NITROSTAT) SL tablet 0.4 mg  0.4 mg Sublingual Q5 Min PRN Trina Mcintosh DO       • nitroglycerin (NITROSTAT) SL tablet 0.4 mg  0.4 mg Sublingual Q5 Min PRN Antoine Serna, APRSHADI       • ondansetron (ZOFRAN) injection 4 mg  4 mg Intravenous Q6H PRN Trina Mcintosh DO       • ondansetron (ZOFRAN) injection 4 mg  4 mg Intravenous Q6H PRN Antoine Serna, APRN       • sodium chloride 0.9 % flush 10 mL  10 mL Intravenous PRN Renetta Damico PA       • sodium chloride 0.9 % flush 10 mL  10 mL Intravenous Q12H Trina Mcintosh DO   10 mL at 12/08/22 0242   • sodium chloride 0.9 % flush 10 mL  10 mL Intravenous PRN Trina Mcintosh DO       • sodium chloride 0.9 % flush 10 mL  10 mL Intravenous PRN Antoine Serna, APRSHADI       • sodium chloride 0.9 % flush 3 mL  3 mL Intravenous Q12H Antoine Serna, APRN       • sodium chloride 0.9 % infusion 40 mL  40 mL Intravenous PRN Trina Mcintosh DO       • sodium chloride 0.9 % infusion 40 mL  40 mL Intravenous PRN Antoine Serna, APRSHADI         Current Outpatient Medications   Medication Sig Dispense Refill   • metFORMIN (GLUCOPHAGE) 1000 MG tablet Take 1 tablet by mouth 2 (Two) Times a Day With Meals. 60 tablet 0   • albuterol sulfate  (90 Base) MCG/ACT inhaler Inhale 2 puffs Every 4 (Four) Hours As Needed for Wheezing or Shortness of Air. 1 inhaler 0   • brompheniramine-pseudoephedrine-DM 30-2-10 MG/5ML syrup Take 5 mL by mouth 4 (Four)  "Times a Day As Needed for Cough. 118 mL 0   • Insulin Glargine (Lantus SoloStar) 100 UNIT/ML injection pen Inject 25 Units under the skin into the appropriate area as directed.     • lisinopril (PRINIVIL,ZESTRIL) 5 MG tablet Take 1 tablet by mouth Daily.     • methylPREDNISolone (MEDROL) 4 MG dose pack Take as directed on package instructions. 21 tablet 0   • Omega-3 Fatty Acids (fish oil) 1000 MG capsule capsule Take  by mouth Daily With Breakfast.     • sildenafil (VIAGRA) 100 MG tablet Take 1 tablet by mouth As Needed.         Allergies:  Poison ivy extract    Review of Systems  Review of Systems   Constitutional: Negative for malaise/fatigue.   Cardiovascular: Positive for chest pain, dyspnea on exertion and leg swelling (mild). Negative for near-syncope, orthopnea, palpitations, paroxysmal nocturnal dyspnea and syncope.   Respiratory: Positive for shortness of breath.    All other systems reviewed and are negative.      Objective     Physical Exam:  Patient Vitals for the past 24 hrs:   BP Temp Temp src Pulse Resp SpO2 Height Weight   12/08/22 0727 -- 97.6 °F (36.4 °C) -- -- -- -- -- --   12/08/22 0700 141/90 -- -- 82 -- 97 % -- --   12/08/22 0356 148/91 98.5 °F (36.9 °C) Oral 90 20 96 % -- --   12/08/22 0331 148/91 -- -- 91 -- 96 % -- --   12/08/22 0302 157/94 -- -- 98 -- 98 % -- --   12/08/22 0207 133/87 -- -- -- -- -- -- --   12/08/22 0031 -- -- -- -- -- 99 % -- --   12/08/22 0018 -- -- -- -- -- (!) 88 % -- --   12/08/22 0015 -- -- -- -- -- (!) 88 % -- --   12/07/22 1730 142/80 98.1 °F (36.7 °C) Oral 91 15 96 % -- --   12/07/22 1549 161/89 98.1 °F (36.7 °C) Oral 107 18 -- -- --   12/07/22 1531 161/89 -- -- 107 20 94 % -- --   12/07/22 1501 159/91 -- -- 108 20 94 % -- --   12/07/22 1401 169/92 -- -- 115 20 93 % -- --   12/07/22 1333 (!) 174/102 -- -- 115 -- -- -- --   12/07/22 1301 172/97 -- -- 117 20 94 % -- --   12/07/22 1240 -- -- -- -- -- -- 182.9 cm (72\") 83.9 kg (185 lb)   12/07/22 1238 -- -- -- 116 21 " 94 % -- --   12/07/22 1237 (!) 190/123 -- -- 116 24 92 % -- --   12/07/22 1231 (!) 190/123 -- -- 118 22 95 % -- --       Intake/Output Summary (Last 24 hours) at 12/8/2022 0859  Last data filed at 12/7/2022 1215  Gross per 24 hour   Intake 200 ml   Output --   Net 200 ml       Vitals reviewed.   Constitutional:       General: Not in acute distress.     Appearance: Normal appearance. Well-developed.   Eyes:      Pupils: Pupils are equal, round, and reactive to light.   HENT:      Head: Normocephalic and atraumatic.      Nose: Nose normal.   Neck:      Vascular: No carotid bruit.   Pulmonary:      Effort: Pulmonary effort is normal. No respiratory distress.      Breath sounds: Normal breath sounds. No wheezing. No rales.   Cardiovascular:      Normal rate. Regular rhythm.      Murmurs: There is no murmur.   Edema:     Peripheral edema present.     Pretibial: bilateral trace edema of the pretibial area.     Ankle: bilateral trace edema of the ankle.  Abdominal:      General: There is no distension.      Palpations: Abdomen is soft.   Musculoskeletal: Normal range of motion.      Cervical back: Normal range of motion and neck supple. Skin:     General: Skin is warm.      Findings: No erythema or rash.   Neurological:      General: No focal deficit present.      Mental Status: Alert and oriented to person, place, and time.   Psychiatric:         Attention and Perception: Attention normal.         Mood and Affect: Mood normal.         Speech: Speech normal.         Behavior: Behavior normal.         Thought Content: Thought content normal.         Judgment: Judgment normal.         Results Review:   I reviewed the patient's new clinical results.    Lab Results (last 72 hours)     Procedure Component Value Units Date/Time    POC Glucose Once [[id number]]  (Abnormal) Collected: 12/08/22 0736    Specimen: Blood Updated: 12/08/22 0747     Glucose 170 mg/dL      Comment: : [id number] Ariadna SarahMeter ID: [id number]        Comprehensive Metabolic Panel [[id number]]  (Abnormal) Collected: 12/08/22 0246    Specimen: Blood Updated: 12/08/22 0458     Glucose 184 mg/dL      BUN 23 mg/dL      Creatinine 0.96 mg/dL      Sodium 137 mmol/L      Potassium 4.3 mmol/L      Chloride 101 mmol/L      CO2 27.0 mmol/L      Calcium 8.6 mg/dL      Total Protein 5.8 g/dL      Albumin 3.50 g/dL      ALT (SGPT) 14 U/L      AST (SGOT) 13 U/L      Alkaline Phosphatase 47 U/L      Total Bilirubin 0.2 mg/dL      Globulin 2.3 gm/dL      A/G Ratio 1.5 g/dL      BUN/Creatinine Ratio 24.0     Anion Gap 9.0 mmol/L      eGFR 97.5 mL/min/1.73      Comment: National Kidney Foundation and American Society of Nephrology (ASN) Task Force recommended calculation based on the Chronic Kidney Disease Epidemiology Collaboration (CKD-EPI) equation refit without adjustment for race.       Narrative:      GFR Normal >60  Chronic Kidney Disease <60  Kidney Failure <15      Lipid Panel [[id number]]  (Abnormal) Collected: 12/08/22 0246    Specimen: Blood Updated: 12/08/22 0449     Total Cholesterol 162 mg/dL      Triglycerides 171 mg/dL      HDL Cholesterol 38 mg/dL      LDL Cholesterol  94 mg/dL      VLDL Cholesterol 30 mg/dL      LDL/HDL Ratio 2.36    Narrative:      Cholesterol Reference Ranges  (U.S. Department of Health and Human Services ATP III Classifications)    Desirable          <200 mg/dL  Borderline High    200-239 mg/dL  High Risk          >240 mg/dL      Triglyceride Reference Ranges  (U.S. Department of Health and Human Services ATP III Classifications)    Normal           <150 mg/dL  Borderline High  150-199 mg/dL  High             200-499 mg/dL  Very High        >500 mg/dL    HDL Reference Ranges  (U.S. Department of Health and Human Services ATP III Classifications)    Low     <40 mg/dl (major risk factor for CHD)  High    >60 mg/dl ('negative' risk factor for CHD)        LDL Reference Ranges  (U.S. Department of Health and Human Services ATP III  Classifications)    Optimal          <100 mg/dL  Near Optimal     100-129 mg/dL  Borderline High  130-159 mg/dL  High             160-189 mg/dL  Very High        >189 mg/dL    CBC Auto Differential [[id number]]  (Abnormal) Collected: 12/08/22 0246    Specimen: Blood Updated: 12/08/22 0429     WBC 9.67 10*3/mm3      RBC 3.86 10*6/mm3      Hemoglobin 11.1 g/dL      Hematocrit 35.5 %      MCV 92.0 fL      MCH 28.8 pg      MCHC 31.3 g/dL      RDW 13.0 %      RDW-SD 42.8 fl      MPV 11.1 fL      Platelets 217 10*3/mm3      Neutrophil % 77.9 %      Lymphocyte % 13.4 %      Monocyte % 7.9 %      Eosinophil % 0.2 %      Basophil % 0.2 %      Immature Grans % 0.4 %      Neutrophils, Absolute 7.53 10*3/mm3      Lymphocytes, Absolute 1.30 10*3/mm3      Monocytes, Absolute 0.76 10*3/mm3      Eosinophils, Absolute 0.02 10*3/mm3      Basophils, Absolute 0.02 10*3/mm3      Immature Grans, Absolute 0.04 10*3/mm3      nRBC 0.0 /100 WBC     Troponin [[id number]]  (Abnormal) Collected: 12/07/22 2236    Specimen: Blood Updated: 12/07/22 2316     Troponin T 0.042 ng/mL     Narrative:      Troponin T Reference Range:  <= 0.03 ng/mL-   Negative for AMI  >0.03 ng/mL-     Abnormal for myocardial necrosis.  Clinicians would have to utilize clinical acumen, EKG, Troponin and serial changes to determine if it is an Acute Myocardial Infarction or myocardial injury due to an underlying chronic condition.       Results may be falsely decreased if patient taking Biotin.      POC Glucose Once [[id number]]  (Abnormal) Collected: 12/07/22 1749    Specimen: Blood Updated: 12/07/22 1801     Glucose 240 mg/dL      Comment: : [id number] Hall ZenonMeter ID: [id number]       Hemoglobin A1c [[id number]]  (Abnormal) Collected: 12/07/22 0950    Specimen: Blood Updated: 12/07/22 1430     Hemoglobin A1C 7.40 %     Narrative:      Hemoglobin A1C Ranges:    Increased Risk for Diabetes  5.7% to 6.4%  Diabetes                     >= 6.5%  Diabetic Goal                 < 7.0%    Troponin [[id number]]  (Abnormal) Collected: 12/07/22 1214    Specimen: Blood Updated: 12/07/22 1247     Troponin T 0.036 ng/mL     Narrative:      Troponin T Reference Range:  <= 0.03 ng/mL-   Negative for AMI  >0.03 ng/mL-     Abnormal for myocardial necrosis.  Clinicians would have to utilize clinical acumen, EKG, Troponin and serial changes to determine if it is an Acute Myocardial Infarction or myocardial injury due to an underlying chronic condition.       Results may be falsely decreased if patient taking Biotin.      Potassium [[id number]]  (Normal) Collected: 12/07/22 1214    Specimen: Blood Updated: 12/07/22 1241     Potassium 4.9 mmol/L     Magnesium [[id number]]  (Normal) Collected: 12/07/22 1040    Specimen: Blood Updated: 12/07/22 1221     Magnesium 1.8 mg/dL     COVID PRE-OP / PRE-PROCEDURE SCREENING ORDER (NO ISOLATION) - Swab, Nasopharynx [[id number]]  (Normal) Collected: 12/07/22 1042    Specimen: Swab from Nasopharynx Updated: 12/07/22 1137    Narrative:      The following orders were created for panel order COVID PRE-OP / PRE-PROCEDURE SCREENING ORDER (NO ISOLATION) - Swab, Nasopharynx.  Procedure                               Abnormality         Status                     ---------                               -----------         ------                     COVID-19 and FLU A/B PCR...[[id number]]  Normal              Final result                 Please view results for these tests on the individual orders.    COVID-19 and FLU A/B PCR - Swab, Nasopharynx [[id number]]  (Normal) Collected: 12/07/22 1042    Specimen: Swab from Nasopharynx Updated: 12/07/22 1137     COVID19 Not Detected     Influenza A PCR Not Detected     Influenza B PCR Not Detected    Narrative:      Fact sheet for providers: https://www.fda.gov/media/727396/download    Fact sheet for patients: https://www.fda.gov/media/091375/download    Test performed by PCR.    Troponin [[id number]]  (Abnormal) Collected: 12/07/22 1040     Specimen: Blood Updated: 12/07/22 1111     Troponin T 0.038 ng/mL     Narrative:      Troponin T Reference Range:  <= 0.03 ng/mL-   Negative for AMI  >0.03 ng/mL-     Abnormal for myocardial necrosis.  Clinicians would have to utilize clinical acumen, EKG, Troponin and serial changes to determine if it is an Acute Myocardial Infarction or myocardial injury due to an underlying chronic condition.       Results may be falsely decreased if patient taking Biotin.      Comprehensive Metabolic Panel [[id number]]  (Abnormal) Collected: 12/07/22 1040    Specimen: Blood Updated: 12/07/22 1110     Glucose 171 mg/dL      BUN 14 mg/dL      Creatinine 0.92 mg/dL      Sodium 137 mmol/L      Potassium 5.8 mmol/L      Chloride 101 mmol/L      CO2 29.0 mmol/L      Calcium 9.2 mg/dL      Total Protein 6.6 g/dL      Albumin 3.70 g/dL      ALT (SGPT) 17 U/L      AST (SGOT) 15 U/L      Alkaline Phosphatase 62 U/L      Total Bilirubin 0.2 mg/dL      Globulin 2.9 gm/dL      A/G Ratio 1.3 g/dL      BUN/Creatinine Ratio 15.2     Anion Gap 7.0 mmol/L      eGFR 102.6 mL/min/1.73      Comment: National Kidney Foundation and American Society of Nephrology (ASN) Task Force recommended calculation based on the Chronic Kidney Disease Epidemiology Collaboration (CKD-EPI) equation refit without adjustment for race.       Narrative:      GFR Normal >60  Chronic Kidney Disease <60  Kidney Failure <15      BNP [[id number]]  (Abnormal) Collected: 12/07/22 1040    Specimen: Blood Updated: 12/07/22 1106     proBNP 2,436.0 pg/mL     Narrative:      Among patients with dyspnea, NT-proBNP is highly sensitive for the detection of acute congestive heart failure. In addition NT-proBNP of <300 pg/ml effectively rules out acute congestive heart failure with 99% negative predictive value.      D-dimer, Quantitative [[id number]]  (Abnormal) Collected: 12/07/22 1040    Specimen: Blood Updated: 12/07/22 1057     D-Dimer, Quantitative 0.56 MCGFEU/mL     Narrative:    "   According to the assay 's published package insert, a normal (<0.50 MCGFEU/mL) D-dimer result in conjunction with a non-high clinical probability assessment, excludes deep vein thrombosis (DVT) and pulmonary embolism (PE) with high sensitivity.    D-dimer values increase with age and this can make VTE exclusion of an older population difficult. To address this, the American College of Physicians, based on best available evidence and recent guidelines, recommends that clinicians use age-adjusted D-dimer thresholds in patients greater than 50 years of age with: a) a low probability of PE who do not meet all Pulmonary Embolism Rule Out Criteria, or b) in those with intermediate probability of PE.   The formula for an age-adjusted D-dimer cut-off is \"age/100\".  For example, a 60 year old patient would have an age-adjusted cut-off of 0.60 MCGFEU/mL and an 80 year old 0.80 MCGFEU/mL.    CBC & Differential [[id number]]  (Abnormal) Collected: 12/07/22 0950    Specimen: Blood Updated: 12/07/22 1002    Narrative:      The following orders were created for panel order CBC & Differential.  Procedure                               Abnormality         Status                     ---------                               -----------         ------                     CBC Auto Differential[[id number]]        Abnormal            Final result                 Please view results for these tests on the individual orders.    CBC Auto Differential [[id number]]  (Abnormal) Collected: 12/07/22 0950    Specimen: Blood Updated: 12/07/22 1002     WBC 7.66 10*3/mm3      RBC 4.43 10*6/mm3      Hemoglobin 12.7 g/dL      Hematocrit 40.9 %      MCV 92.3 fL      MCH 28.7 pg      MCHC 31.1 g/dL      RDW 13.0 %      RDW-SD 43.5 fl      MPV 11.1 fL      Platelets 259 10*3/mm3      Neutrophil % 79.1 %      Lymphocyte % 12.1 %      Monocyte % 6.3 %      Eosinophil % 1.4 %      Basophil % 0.7 %      Immature Grans % 0.4 %      Neutrophils, " Absolute 6.06 10*3/mm3      Lymphocytes, Absolute 0.93 10*3/mm3      Monocytes, Absolute 0.48 10*3/mm3      Eosinophils, Absolute 0.11 10*3/mm3      Basophils, Absolute 0.05 10*3/mm3      Immature Grans, Absolute 0.03 10*3/mm3      nRBC 0.0 /100 WBC           No results found for: ECHOEFEST    Imaging Results (Last 72 Hours)     Procedure Component Value Units Date/Time    CT Angiogram Chest [[id number]] Collected: 12/07/22 1140     Updated: 12/07/22 1154    Narrative:      EXAMINATION: CT ANGIOGRAM CHEST-      12/7/2022 11:16 AM CST     HISTORY: chest pain sob     In order to have a CT radiation dose as low as reasonably achievable  Automated Exposure Control was utilized for adjustment of the mA and/or  KV according to patient size.     DLP in mGycm= 191     The CT Angiography of the chest is performed after intravenous contrast  enhancement.     Images are acquired in axial plane and subsequent 2-D reconstruction in  coronal and sagittal planes and 3-D maximum intensity projection  reconstruction.     There is no previous similar study for comparison. Correlation made with  radiographs of the chest obtained earlier today.     There is normal opacification of pulmonary arteries and branches  bilaterally. No filling defects in the opacified pulmonary arterial bed  bilaterally.     The RV/LV ratio is 29/49 which is normal. No finding to suggest right  heart strain.     Atheromatous changes thoracic aorta is seen. No aneurysmal dilatation or  dissection.     Atheromatous changes of coronary arteries are noted.     There is no evidence of mediastinal or hilar mass or lymphadenopathy.     The limited visualized thyroid gland is unremarkable.     There is no axillary lymphadenopathy.     The lungs are poorly expanded. There are significant interlobular septal  thickening suggesting fluid retention/pulmonary edema. There are  scattered areas of small to moderate size foci of pulmonary  consolidation, more pronounced in  the upper lobes, left more than the  right. These probably represent areas of discoid atelectasis or  pneumonic consolidation/inflammatory/infectious process. The largest  area of consolidation is in the lingular segment of the left upper lobe  measuring 1.2 x 2.9 cm.     There is bilateral moderate pleural effusion, right more than the left.     A few calcified granulomas are seen in the lungs bilaterally, more so in  the right middle lobe.     There is moderate splenomegaly. The spleen measured 30.1 x 13 x 9 cm.  Limited visualized liver is unremarkable. No gallstones. The adrenal  glands are normal. The pancreas and kidneys are incompletely included  and not well evaluated.     Images are reviewed in bone window show no acute bony abnormality or  bone lesion.       Impression:      1. No evidence of pulmonary embolism. No aortic aneurysm or dissection.  Atheromatous changes of coronary arteries.  2. Evidence of pulmonary edema.  3. Foci of consolidation the lungs bilaterally probably represent  superimposed inflammatory/infectious process. This needs to be further  evaluated by a follow-up examination.  4. Moderate bibasal pleural effusion, right more than the left.  5. The splenomegaly.                                   This report was finalized on 12/07/2022 11:51 by Dr. Roxanne Wilder MD.    XR Chest 1 View [[id number]] Collected: 12/07/22 0909     Updated: 12/07/22 0914    Narrative:      XR CHEST 1 VW- 12/7/2022 9:03 AM CST     HISTORY: Chest pain     COMPARISON: Chest exam dated 05/09/2018.     FINDINGS:      Bilateral interstitial coarsening and peribronchial thickening. Hazy  airspace opacities are identified in both lung bases. Lungs appear well  expanded. There is no consolidation or obvious pleural effusion. Heart  size appears normal. Pulmonary vasculature are nondilated.       Impression:      1. Bilateral interstitial thickening with bibasilar hazy airspace  opacities. Central pulmonary vasculature  are nondilated. Primary  consideration would be atypical pneumonia/viral pneumonia. Second  differential consideration would be an early developing pulmonary edema.        This report was finalized on 12/07/2022 09:11 by Dr Antonio Contreras, .          Assessment & Plan     Chest pain: Troponin 0.038, 0.036, 0.042. EKG showed no acute changes. Echo has been done and results are pending. Will proceed with [facility] today.     Type 2 DM: A1C 7.4    Hypertension: Better today; lisinopril has been increased    Plan:  - echo pending  - Will proceed with [facility] today   - Further orders following results of echo and [facility]      Further orders per Dr King    Thank you for asking us to follow this patient with you.     Please note this cardiology consultation note is the result of a face to face consultation with the patient, in addition to reviewing medical records at length by myself, ANUPAM Taveras.    Electronically signed by ANUPAM Taveras, 12/08/22, 8:24 AM CST.

## 2022-12-08 NOTE — PROGRESS NOTES
[facility] Medicine Services  INPATIENT PROGRESS NOTE    Patient Name: Speedy Nichols  Date of Admission: 12/7/2022  Today's Date: 12/08/22  Length of Stay: 1  Primary Care Physician: Selvin Schumacher DO    Subjective   Chief Complaint: Wants to go home.  HPI   Patient denies chest pain.  No nausea.  No increased shortness of breath.   Still with occasional cough.   Has been to Echo.  Plans for heart cath per nursing report.     Review of Systems     All pertinent negatives and positives are as above. All other systems have been reviewed and are negative unless otherwise stated.     Objective    Temp:  [97.6 °F (36.4 °C)-98.5 °F (36.9 °C)] 97.6 °F (36.4 °C)  Heart Rate:  [] 80  Resp:  [14-24] 14  BP: (120-190)/() 120/75  Physical Exam  Vitals and nursing note reviewed.   Constitutional:       Appearance: Normal appearance.   HENT:      Head: Normocephalic and atraumatic.      Right Ear: External ear normal.      Left Ear: External ear normal.      Nose: Nose normal.      Mouth/Throat:      Mouth: Mucous membranes are moist.   Eyes:      Extraocular Movements: Extraocular movements intact.      Conjunctiva/sclera: Conjunctivae normal.      Pupils: Pupils are equal, round, and reactive to light.   Cardiovascular:      Rate and Rhythm: Normal rate and regular rhythm.      Pulses: Normal pulses.      Heart sounds: No murmur heard.    No friction rub. No gallop.   Pulmonary:      Effort: Pulmonary effort is normal.      Breath sounds: Normal breath sounds.   Abdominal:      General: Bowel sounds are normal.      Palpations: Abdomen is soft.   Musculoskeletal:         General: Normal range of motion.      Cervical back: Normal range of motion and neck supple.      Right lower leg: Edema present.      Left lower leg: Edema present.   Skin:     General: Skin is warm and dry.      Capillary Refill: Capillary refill takes less than 2 seconds.   Neurological:      General:  No focal deficit present.      Mental Status: He is alert and oriented to person, place, and time.      Cranial Nerves: No cranial nerve deficit.   Psychiatric:         Mood and Affect: Mood normal.         Behavior: Behavior normal.             Results Review:  I have reviewed the labs, radiology results, and diagnostic studies.    Laboratory Data:   Results from last 7 days   Lab Units 12/08/22  0246 12/07/22  0950   WBC 10*3/mm3 9.67 7.66   HEMOGLOBIN g/dL 11.1* 12.7*   HEMATOCRIT % 35.5* 40.9   PLATELETS 10*3/mm3 217 259        Results from last 7 days   Lab Units 12/08/22  0246 12/07/22  1214 12/07/22  1040   SODIUM mmol/L 137  --  137   POTASSIUM mmol/L 4.3 4.9 5.8*   CHLORIDE mmol/L 101  --  101   CO2 mmol/L 27.0  --  29.0   BUN mg/dL 23*  --  14   CREATININE mg/dL 0.96  --  0.92   CALCIUM mg/dL 8.6  --  9.2   BILIRUBIN mg/dL 0.2  --  0.2   ALK PHOS U/L 47  --  62   ALT (SGPT) U/L 14  --  17   AST (SGOT) U/L 13  --  15   GLUCOSE mg/dL 184*  --  171*       Culture Data:   No results found for: BLOODCX, URINECX, WOUNDCX, MRSACX, RESPCX, STOOLCX    Radiology Data:   Imaging Results (Last 24 Hours)     ** No results found for the last 24 hours. **          I have reviewed the patient's current medications.     Assessment/Plan     Active Hospital Problems    Diagnosis    • **Chest pain, unspecified type    • Acute systolic heart failure (HCC)    • Coronary artery disease involving native coronary artery of native heart without angina pectoris    • Type 2 diabetes mellitus (HCC)    • Essential hypertension    • Shortness of breath        Plan:  Heart cath today reveals severe three vessel disease.   Cardiac recommendations pending.   Education re heart failure.   Did discuss with patient availability of endocrinology as outpatient in [place],         Discharge Planning: I expect the patient to be discharged to home in ? days.    Electronically signed by Trina Mcintosh DO, 12/08/22, 12:21 CST.

## 2022-12-09 VITALS
WEIGHT: 176.4 LBS | RESPIRATION RATE: 16 BRPM | BODY MASS INDEX: 23.89 KG/M2 | HEART RATE: 74 BPM | OXYGEN SATURATION: 96 % | SYSTOLIC BLOOD PRESSURE: 138 MMHG | DIASTOLIC BLOOD PRESSURE: 79 MMHG | HEIGHT: 72 IN | TEMPERATURE: 98.5 F

## 2022-12-09 LAB
GLUCOSE BLDC GLUCOMTR-MCNC: 172 MG/DL (ref 70–130)
GLUCOSE BLDC GLUCOMTR-MCNC: 194 MG/DL (ref 70–130)

## 2022-12-09 PROCEDURE — 63710000001 INSULIN LISPRO (HUMAN) PER 5 UNITS: Performed by: INTERNAL MEDICINE

## 2022-12-09 PROCEDURE — 25010000002 FUROSEMIDE PER 20 MG: Performed by: FAMILY MEDICINE

## 2022-12-09 PROCEDURE — 82962 GLUCOSE BLOOD TEST: CPT

## 2022-12-09 PROCEDURE — 99232 SBSQ HOSP IP/OBS MODERATE 35: CPT | Performed by: PHYSICIAN ASSISTANT

## 2022-12-09 PROCEDURE — 25010000002 ENOXAPARIN PER 10 MG: Performed by: INTERNAL MEDICINE

## 2022-12-09 PROCEDURE — G0378 HOSPITAL OBSERVATION PER HR: HCPCS

## 2022-12-09 RX ORDER — ASPIRIN 81 MG/1
81 TABLET ORAL DAILY
Start: 2022-12-10

## 2022-12-09 RX ORDER — POTASSIUM CHLORIDE 1500 MG/1
20 TABLET, FILM COATED, EXTENDED RELEASE ORAL 2 TIMES DAILY
Qty: 30 TABLET | Refills: 0 | Status: SHIPPED | OUTPATIENT
Start: 2022-12-09 | End: 2022-12-27 | Stop reason: SDUPTHER

## 2022-12-09 RX ORDER — FUROSEMIDE 40 MG/1
40 TABLET ORAL DAILY
Qty: 30 TABLET | Refills: 0 | Status: SHIPPED | OUTPATIENT
Start: 2022-12-09 | End: 2023-01-03 | Stop reason: SDUPTHER

## 2022-12-09 RX ORDER — ATORVASTATIN CALCIUM 40 MG/1
40 TABLET, FILM COATED ORAL DAILY
Qty: 30 TABLET | Refills: 0 | Status: SHIPPED | OUTPATIENT
Start: 2022-12-09 | End: 2023-01-03 | Stop reason: SDUPTHER

## 2022-12-09 RX ORDER — NITROGLYCERIN 0.4 MG/1
0.4 TABLET SUBLINGUAL
Qty: 25 TABLET | Refills: 0 | Status: SHIPPED | OUTPATIENT
Start: 2022-12-09 | End: 2023-01-03 | Stop reason: SDUPTHER

## 2022-12-09 RX ORDER — LISINOPRIL 20 MG/1
20 TABLET ORAL
Qty: 30 TABLET | Refills: 0 | Status: SHIPPED | OUTPATIENT
Start: 2022-12-10 | End: 2023-01-03 | Stop reason: SDUPTHER

## 2022-12-09 RX ADMIN — LISINOPRIL 20 MG: 20 TABLET ORAL at 09:48

## 2022-12-09 RX ADMIN — METOPROLOL TARTRATE 25 MG: 25 TABLET, FILM COATED ORAL at 09:48

## 2022-12-09 RX ADMIN — INSULIN LISPRO 2 UNITS: 100 INJECTION, SOLUTION INTRAVENOUS; SUBCUTANEOUS at 09:48

## 2022-12-09 RX ADMIN — ASPIRIN 81 MG: 81 TABLET, COATED ORAL at 09:48

## 2022-12-09 RX ADMIN — ENOXAPARIN SODIUM 40 MG: 100 INJECTION SUBCUTANEOUS at 09:48

## 2022-12-09 RX ADMIN — FUROSEMIDE 40 MG: 10 INJECTION, SOLUTION INTRAMUSCULAR; INTRAVENOUS at 09:48

## 2022-12-09 RX ADMIN — Medication 10 ML: at 09:48

## 2022-12-09 NOTE — CONSULTS
Heart Failure Education Consult      Type of Heart Failure: Systolic     Length of diagnosis: New Diagnosis      Current HF knowledge: Fair     Have you had HF education/teaching in the past? No  Do you check your weight daily? No    Current weight        12/07/22  1240 12/08/22 2005   Weight: 83.9 kg (185 lb) 80 kg (176 lb 6.4 oz)          Most recent EF:  36-40%    Date:  12/08/2022     Most recent ProBNP:  2436.0 pg/mL  Date:  12/07/2022  Barriers to learning: None    Materials Provided:Living with HF Book, HF Action Plan, Daily Weight Monitoring , 2 Gm Na+ diet   and Fluid Restrictions   Referral candidate for HF Clinic:Yes     Met with patient for heart failure education.  We discussed the importance of keeping scheduled follow up appointments, taking all medications as directed, weighing daily/keeping a daily weight log, and reducing sodium in his daily diet.  Patient was provided with a scale, heart failure education booklet, daily weight log, and self-check plan (see below).   He actively listened to all education and asked appropriate questions regarding the information he received.  He expressed understanding over all topics discussed.          Thank you for this consult. Please let me know if I can be of any assistance with heart failure education for this patient.    30 minutes was spent on this visit.    Electronically signed by,   Christiana Cha RN  Heart Failure Nurse Navigator  12/09/22 14:23 CST

## 2022-12-09 NOTE — PLAN OF CARE
Goal Outcome Evaluation:  Plan of Care Reviewed With: (P) patient        Progress: (P) improving  Outcome Evaluation: (P) VSS, alert and oriented x4, R radial site ya, no c/o pain or discomfort, IV lasix given as ordered, blood glucose 222 lantus held as ordered, will continue to monitor.

## 2022-12-09 NOTE — PROGRESS NOTES
[facility] Medicine Services  INPATIENT PROGRESS NOTE    Patient Name: Speedy Nichols  Date of Admission: 12/7/2022  Today's Date: 12/09/22  Length of Stay: 0  Primary Care Physician: Selvin Schumacher DO    Subjective   Chief Complaint: Patient still has a little chest discomfort  Occasional cough.  HPI   Anxious to go home.  No nausea or vomiting.  Does not feel particular short of breath at this time.  Feels legs are less swollen.    Patient did have heart cath yesterday which revealed severe three-vessel disease.        Review of Systems     All pertinent negatives and positives are as above. All other systems have been reviewed and are negative unless otherwise stated.     Objective    Temp:  [97.8 °F (36.6 °C)-98.7 °F (37.1 °C)] 98.4 °F (36.9 °C)  Heart Rate:  [73-89] 82  Resp:  [7-20] 16  BP: (117-157)/() 144/80  Physical Exam  Vitals and nursing note reviewed.   Constitutional:       Appearance: Normal appearance.   HENT:      Head: Normocephalic and atraumatic.      Right Ear: External ear normal.      Left Ear: External ear normal.      Nose: Nose normal.      Mouth/Throat:      Mouth: Mucous membranes are moist.   Eyes:      Extraocular Movements: Extraocular movements intact.      Conjunctiva/sclera: Conjunctivae normal.      Pupils: Pupils are equal, round, and reactive to light.   Cardiovascular:      Rate and Rhythm: Normal rate and regular rhythm.      Pulses: Normal pulses.      Heart sounds: No murmur heard.    No friction rub. No gallop.   Pulmonary:      Effort: Pulmonary effort is normal.      Breath sounds: Normal breath sounds.   Abdominal:      General: Bowel sounds are normal.      Palpations: Abdomen is soft.   Musculoskeletal:         General: Normal range of motion.      Cervical back: Normal range of motion and neck supple.      Right lower leg: Edema present.      Left lower leg: Edema present.      Comments: Lower extremity edema has  improved since admission.   Skin:     General: Skin is warm and dry.      Capillary Refill: Capillary refill takes less than 2 seconds.   Neurological:      General: No focal deficit present.      Mental Status: He is alert and oriented to person, place, and time.      Cranial Nerves: No cranial nerve deficit.   Psychiatric:         Mood and Affect: Mood normal.         Behavior: Behavior normal.             Results Review:  I have reviewed the labs, radiology results, and diagnostic studies.    Laboratory Data:   Results from last 7 days   Lab Units 12/08/22  0246 12/07/22  0950   WBC 10*3/mm3 9.67 7.66   HEMOGLOBIN g/dL 11.1* 12.7*   HEMATOCRIT % 35.5* 40.9   PLATELETS 10*3/mm3 217 259        Results from last 7 days   Lab Units 12/08/22  0246 12/07/22  1214 12/07/22  1040   SODIUM mmol/L 137  --  137   POTASSIUM mmol/L 4.3 4.9 5.8*   CHLORIDE mmol/L 101  --  101   CO2 mmol/L 27.0  --  29.0   BUN mg/dL 23*  --  14   CREATININE mg/dL 0.96  --  0.92   CALCIUM mg/dL 8.6  --  9.2   BILIRUBIN mg/dL 0.2  --  0.2   ALK PHOS U/L 47  --  62   ALT (SGPT) U/L 14  --  17   AST (SGOT) U/L 13  --  15   GLUCOSE mg/dL 184*  --  171*       Culture Data:   No results found for: BLOODCX, URINECX, WOUNDCX, MRSACX, RESPCX, STOOLCX    Radiology Data:   Imaging Results (Last 24 Hours)     ** No results found for the last 24 hours. **          I have reviewed the patient's current medications.     Assessment/Plan     Active Hospital Problems    Diagnosis    • **Chest pain, unspecified type    • Acute systolic heart failure (HCC)    • Coronary artery disease involving native coronary artery of native heart without angina pectoris    • Type 2 diabetes mellitus (HCC)    • Essential hypertension    • Shortness of breath        Plan:  Continue diuresis  Consult cardiothoracic surgery    Patient is currently on lisinopril 20 mg daily, metoprolol 25 mg every 12 hours.  Lasix continues to be 40 mg IV every 12 hours    Resume Lantus    Discharge  Planning: I expect the patient to be discharged to home in 1 day.    Electronically signed by Trina Mcintosh DO, 12/09/22, 10:11 CST.

## 2022-12-09 NOTE — PROGRESS NOTES
"  [facility] PROGRESS NOTE     LOS: 0 days   Patient Care Team:  Selvin Schumacher DO as PCP - General (Pediatrics)    Chief Complaint:  mulitvessel CAD, CHF    Subjective     Patient is doing well today.  He had a \"twinge\" of chest pain this morning while at rest.  He states it is different from the discomfort with which he presented.  The pain did not radiate, and there were no associated symptoms.  He denies any exertional chest pain.  He denies significant shortness of breath or orthopnea.  Swelling has resolved.  He would like to be discharged home and follow up with CTS after the new year.    Review of Systems:   A 10-point review of systems is obtained and negative except for otherwise mentioned above.    Objective     Vital Sign Min/Max for last 24 hours  Temp  Min: 97.8 °F (36.6 °C)  Max: 98.7 °F (37.1 °C)   BP  Min: 117/72  Max: 157/92   Pulse  Min: 73  Max: 89   Resp  Min: 7  Max: 20   SpO2  Min: 88 %  Max: 100 %   No data recorded   Weight  Min: 80 kg (176 lb 6.4 oz)  Max: 80 kg (176 lb 6.4 oz)     Flowsheet Rows    Flowsheet Row First Filed Value   Admission Height 182.9 cm (72\") Documented at 12/07/2022 1240   Admission Weight 83.9 kg (185 lb) Documented at 12/07/2022 1240          Physical Exam:   General Appearance: NAD  Lungs: CTAB  Heart: RRR  Extremities: no edema in BLE; right radial pulse is intact and cath site is free of oozing or drainage.     Results Review:     I reviewed the patient's new clinical results.    Results from last 7 days   Lab Units 12/08/22  0246   WBC 10*3/mm3 9.67   HEMOGLOBIN g/dL 11.1*   HEMATOCRIT % 35.5*   PLATELETS 10*3/mm3 217     Results from last 7 days   Lab Units 12/08/22  0246   SODIUM mmol/L 137   POTASSIUM mmol/L 4.3   CHLORIDE mmol/L 101   CO2 mmol/L 27.0   BUN mg/dL 23*   CREATININE mg/dL 0.96   GLUCOSE mg/dL 184*   CALCIUM mg/dL 8.6     Results from last 7 days   Lab Units 12/08/22  0246   SODIUM mmol/L 137   POTASSIUM mmol/L 4.3 "   CHLORIDE mmol/L 101   CO2 mmol/L 27.0   BUN mg/dL 23*   CREATININE mg/dL 0.96   CALCIUM mg/dL 8.6   BILIRUBIN mg/dL 0.2   ALK PHOS U/L 47   ALT (SGPT) U/L 14   AST (SGOT) U/L 13   GLUCOSE mg/dL 184*     Results from last 7 days   Lab Units 12/07/22  2236 12/07/22  1214 12/07/22  1040   TROPONIN T ng/mL 0.042* 0.036* 0.038*         Results from last 7 days   Lab Units 12/08/22  0246   CHOLESTEROL mg/dL 162   TRIGLYCERIDES mg/dL 171*   HDL CHOL mg/dL 38*   LDL CHOL mg/dL 94       Medication Review: yes    Assessment & Plan     Multivessel CAD:  He would like to see CTS in January.  He should follow up with ANUPAM Landaverde in 2 weeks.  He will present to ER with recurrence of chest pain.  Continue Aspirin, Lopressor, and lisinopril.  Start Lipitor 40 mg with LDL goal <70.  Repeat lipids in 3 months.    Systolic CHF:  He is clinically euvolemic.  Continue Lopressor and lisinopril.  Consider transition to Entresto at outpatient follow up as well as the addition of other GDMTs.      Type 2 diabetes mellitus    Essential hypertension:  Continue same rx.    Nonrheumatic mitral valve regurgitation    Patient is ok to home from cardiology standpoint.      Angi Roman PA-C  12/09/22  10:10 CST

## 2022-12-09 NOTE — DISCHARGE SUMMARY
[facility] Medicine Services  DISCHARGE SUMMARY       Date of Admission: 12/7/2022  Date of Discharge:  12/9/2022  Primary Care Physician: Selvin Schumacher DO    Presenting Problem/History of Present Illness:  Chest pain, shortness of breath    Final Discharge Diagnoses:  Severe three-vessel coronary artery disease  Acute systolic congestive heart failure  Type 2 diabetes mellitus  Essential hypertension    Consults: Cardiology    Procedures Performed: Cardiac catheterization    Pertinent Test Results:   Imaging Results (Last 7 Days)     Procedure Component Value Units Date/Time    CT Angiogram Chest [[id number]] Collected: 12/07/22 1140     Updated: 12/07/22 1154    Narrative:      EXAMINATION: CT ANGIOGRAM CHEST-      12/7/2022 11:16 AM CST     HISTORY: chest pain sob     In order to have a CT radiation dose as low as reasonably achievable  Automated Exposure Control was utilized for adjustment of the mA and/or  KV according to patient size.     DLP in mGycm= 191     The CT Angiography of the chest is performed after intravenous contrast  enhancement.     Images are acquired in axial plane and subsequent 2-D reconstruction in  coronal and sagittal planes and 3-D maximum intensity projection  reconstruction.     There is no previous similar study for comparison. Correlation made with  radiographs of the chest obtained earlier today.     There is normal opacification of pulmonary arteries and branches  bilaterally. No filling defects in the opacified pulmonary arterial bed  bilaterally.     The RV/LV ratio is 29/49 which is normal. No finding to suggest right  heart strain.     Atheromatous changes thoracic aorta is seen. No aneurysmal dilatation or  dissection.     Atheromatous changes of coronary arteries are noted.     There is no evidence of mediastinal or hilar mass or lymphadenopathy.     The limited visualized thyroid gland is unremarkable.     There is no axillary  lymphadenopathy.     The lungs are poorly expanded. There are significant interlobular septal  thickening suggesting fluid retention/pulmonary edema. There are  scattered areas of small to moderate size foci of pulmonary  consolidation, more pronounced in the upper lobes, left more than the  right. These probably represent areas of discoid atelectasis or  pneumonic consolidation/inflammatory/infectious process. The largest  area of consolidation is in the lingular segment of the left upper lobe  measuring 1.2 x 2.9 cm.     There is bilateral moderate pleural effusion, right more than the left.     A few calcified granulomas are seen in the lungs bilaterally, more so in  the right middle lobe.     There is moderate splenomegaly. The spleen measured 30.1 x 13 x 9 cm.  Limited visualized liver is unremarkable. No gallstones. The adrenal  glands are normal. The pancreas and kidneys are incompletely included  and not well evaluated.     Images are reviewed in bone window show no acute bony abnormality or  bone lesion.       Impression:      1. No evidence of pulmonary embolism. No aortic aneurysm or dissection.  Atheromatous changes of coronary arteries.  2. Evidence of pulmonary edema.  3. Foci of consolidation the lungs bilaterally probably represent  superimposed inflammatory/infectious process. This needs to be further  evaluated by a follow-up examination.  4. Moderate bibasal pleural effusion, right more than the left.  5. The splenomegaly.                                   This report was finalized on 12/07/2022 11:51 by Dr. Roxanne Wilder MD.    XR Chest 1 View [[id number]] Collected: 12/07/22 0909     Updated: 12/07/22 0914    Narrative:      XR CHEST 1 VW- 12/7/2022 9:03 AM CST     HISTORY: Chest pain     COMPARISON: Chest exam dated 05/09/2018.     FINDINGS:      Bilateral interstitial coarsening and peribronchial thickening. Hazy  airspace opacities are identified in both lung bases. Lungs appear  well  expanded. There is no consolidation or obvious pleural effusion. Heart  size appears normal. Pulmonary vasculature are nondilated.       Impression:      1. Bilateral interstitial thickening with bibasilar hazy airspace  opacities. Central pulmonary vasculature are nondilated. Primary  consideration would be atypical pneumonia/viral pneumonia. Second  differential consideration would be an early developing pulmonary edema.        This report was finalized on 12/07/2022 09:11 by Dr Antonio Contreras, .        Lab Results (last 7 days)     Procedure Component Value Units Date/Time    POC Glucose Once [[id number]]  (Abnormal) Collected: 12/09/22 1147    Specimen: Blood Updated: 12/09/22 1208     Glucose 194 mg/dL      Comment: : [id number] Corrigan HeatherMeter ID: [id number]       POC Glucose Once [[id number]]  (Abnormal) Collected: 12/09/22 0743    Specimen: Blood Updated: 12/09/22 0802     Glucose 172 mg/dL      Comment: : [id number] Corrigan HeatherMeter ID: [id number]       POC Glucose Once [[id number]]  (Abnormal) Collected: 12/08/22 2008    Specimen: Blood Updated: 12/08/22 2019     Glucose 222 mg/dL      Comment: : [id number] Hemalartemio CassandraMeter ID: [id number]       POC Glucose Once [[id number]]  (Abnormal) Collected: 12/08/22 0736    Specimen: Blood Updated: 12/08/22 0747     Glucose 170 mg/dL      Comment: : [id number] Rosenthal SarahMeter ID: [id number]       Comprehensive Metabolic Panel [[id number]]  (Abnormal) Collected: 12/08/22 0246    Specimen: Blood Updated: 12/08/22 0458     Glucose 184 mg/dL      BUN 23 mg/dL      Creatinine 0.96 mg/dL      Sodium 137 mmol/L      Potassium 4.3 mmol/L      Chloride 101 mmol/L      CO2 27.0 mmol/L      Calcium 8.6 mg/dL      Total Protein 5.8 g/dL      Albumin 3.50 g/dL      ALT (SGPT) 14 U/L      AST (SGOT) 13 U/L      Alkaline Phosphatase 47 U/L      Total Bilirubin 0.2 mg/dL      Globulin 2.3 gm/dL      A/G Ratio 1.5 g/dL      BUN/Creatinine Ratio 24.0      Anion Gap 9.0 mmol/L      eGFR 97.5 mL/min/1.73      Comment: National Kidney Foundation and American Society of Nephrology (ASN) Task Force recommended calculation based on the Chronic Kidney Disease Epidemiology Collaboration (CKD-EPI) equation refit without adjustment for race.       Narrative:      GFR Normal >60  Chronic Kidney Disease <60  Kidney Failure <15      Lipid Panel [[id number]]  (Abnormal) Collected: 12/08/22 0246    Specimen: Blood Updated: 12/08/22 0449     Total Cholesterol 162 mg/dL      Triglycerides 171 mg/dL      HDL Cholesterol 38 mg/dL      LDL Cholesterol  94 mg/dL      VLDL Cholesterol 30 mg/dL      LDL/HDL Ratio 2.36    Narrative:      Cholesterol Reference Ranges  (U.S. Department of Health and Human Services ATP III Classifications)    Desirable          <200 mg/dL  Borderline High    200-239 mg/dL  High Risk          >240 mg/dL      Triglyceride Reference Ranges  (U.S. Department of Health and Human Services ATP III Classifications)    Normal           <150 mg/dL  Borderline High  150-199 mg/dL  High             200-499 mg/dL  Very High        >500 mg/dL    HDL Reference Ranges  (U.S. Department of Health and Human Services ATP III Classifications)    Low     <40 mg/dl (major risk factor for CHD)  High    >60 mg/dl ('negative' risk factor for CHD)        LDL Reference Ranges  (U.S. Department of Health and Human Services ATP III Classifications)    Optimal          <100 mg/dL  Near Optimal     100-129 mg/dL  Borderline High  130-159 mg/dL  High             160-189 mg/dL  Very High        >189 mg/dL    CBC Auto Differential [[id number]]  (Abnormal) Collected: 12/08/22 0246    Specimen: Blood Updated: 12/08/22 0429     WBC 9.67 10*3/mm3      RBC 3.86 10*6/mm3      Hemoglobin 11.1 g/dL      Hematocrit 35.5 %      MCV 92.0 fL      MCH 28.8 pg      MCHC 31.3 g/dL      RDW 13.0 %      RDW-SD 42.8 fl      MPV 11.1 fL      Platelets 217 10*3/mm3      Neutrophil % 77.9 %      Lymphocyte % 13.4 %       Monocyte % 7.9 %      Eosinophil % 0.2 %      Basophil % 0.2 %      Immature Grans % 0.4 %      Neutrophils, Absolute 7.53 10*3/mm3      Lymphocytes, Absolute 1.30 10*3/mm3      Monocytes, Absolute 0.76 10*3/mm3      Eosinophils, Absolute 0.02 10*3/mm3      Basophils, Absolute 0.02 10*3/mm3      Immature Grans, Absolute 0.04 10*3/mm3      nRBC 0.0 /100 WBC     Troponin [[id number]]  (Abnormal) Collected: 12/07/22 2236    Specimen: Blood Updated: 12/07/22 2316     Troponin T 0.042 ng/mL     Narrative:      Troponin T Reference Range:  <= 0.03 ng/mL-   Negative for AMI  >0.03 ng/mL-     Abnormal for myocardial necrosis.  Clinicians would have to utilize clinical acumen, EKG, Troponin and serial changes to determine if it is an Acute Myocardial Infarction or myocardial injury due to an underlying chronic condition.       Results may be falsely decreased if patient taking Biotin.      POC Glucose Once [[id number]]  (Abnormal) Collected: 12/07/22 1749    Specimen: Blood Updated: 12/07/22 1801     Glucose 240 mg/dL      Comment: : [id number] Geoff KellseyMeter ID: [id number]       Hemoglobin A1c [[id number]]  (Abnormal) Collected: 12/07/22 0950    Specimen: Blood Updated: 12/07/22 1430     Hemoglobin A1C 7.40 %     Narrative:      Hemoglobin A1C Ranges:    Increased Risk for Diabetes  5.7% to 6.4%  Diabetes                     >= 6.5%  Diabetic Goal                < 7.0%    Troponin [[id number]]  (Abnormal) Collected: 12/07/22 1214    Specimen: Blood Updated: 12/07/22 1247     Troponin T 0.036 ng/mL     Narrative:      Troponin T Reference Range:  <= 0.03 ng/mL-   Negative for AMI  >0.03 ng/mL-     Abnormal for myocardial necrosis.  Clinicians would have to utilize clinical acumen, EKG, Troponin and serial changes to determine if it is an Acute Myocardial Infarction or myocardial injury due to an underlying chronic condition.       Results may be falsely decreased if patient taking Biotin.      Potassium [[id number]]   (Normal) Collected: 12/07/22 1214    Specimen: Blood Updated: 12/07/22 1241     Potassium 4.9 mmol/L     Magnesium [[id number]]  (Normal) Collected: 12/07/22 1040    Specimen: Blood Updated: 12/07/22 1221     Magnesium 1.8 mg/dL     COVID PRE-OP / PRE-PROCEDURE SCREENING ORDER (NO ISOLATION) - Swab, Nasopharynx [[id number]]  (Normal) Collected: 12/07/22 1042    Specimen: Swab from Nasopharynx Updated: 12/07/22 1137    Narrative:      The following orders were created for panel order COVID PRE-OP / PRE-PROCEDURE SCREENING ORDER (NO ISOLATION) - Swab, Nasopharynx.  Procedure                               Abnormality         Status                     ---------                               -----------         ------                     COVID-19 and FLU A/B PCR...[[id number]]  Normal              Final result                 Please view results for these tests on the individual orders.    COVID-19 and FLU A/B PCR - Swab, Nasopharynx [[id number]]  (Normal) Collected: 12/07/22 1042    Specimen: Swab from Nasopharynx Updated: 12/07/22 1137     COVID19 Not Detected     Influenza A PCR Not Detected     Influenza B PCR Not Detected    Narrative:      Fact sheet for providers: https://www.fda.gov/media/167412/download    Fact sheet for patients: https://www.fda.gov/media/513265/download    Test performed by PCR.    Troponin [[id number]]  (Abnormal) Collected: 12/07/22 1040    Specimen: Blood Updated: 12/07/22 1111     Troponin T 0.038 ng/mL     Narrative:      Troponin T Reference Range:  <= 0.03 ng/mL-   Negative for AMI  >0.03 ng/mL-     Abnormal for myocardial necrosis.  Clinicians would have to utilize clinical acumen, EKG, Troponin and serial changes to determine if it is an Acute Myocardial Infarction or myocardial injury due to an underlying chronic condition.       Results may be falsely decreased if patient taking Biotin.      Comprehensive Metabolic Panel [[id number]]  (Abnormal) Collected: 12/07/22 1040    Specimen:  Blood Updated: 12/07/22 1110     Glucose 171 mg/dL      BUN 14 mg/dL      Creatinine 0.92 mg/dL      Sodium 137 mmol/L      Potassium 5.8 mmol/L      Chloride 101 mmol/L      CO2 29.0 mmol/L      Calcium 9.2 mg/dL      Total Protein 6.6 g/dL      Albumin 3.70 g/dL      ALT (SGPT) 17 U/L      AST (SGOT) 15 U/L      Alkaline Phosphatase 62 U/L      Total Bilirubin 0.2 mg/dL      Globulin 2.9 gm/dL      A/G Ratio 1.3 g/dL      BUN/Creatinine Ratio 15.2     Anion Gap 7.0 mmol/L      eGFR 102.6 mL/min/1.73      Comment: National Kidney Foundation and American Society of Nephrology (ASN) Task Force recommended calculation based on the Chronic Kidney Disease Epidemiology Collaboration (CKD-EPI) equation refit without adjustment for race.       Narrative:      GFR Normal >60  Chronic Kidney Disease <60  Kidney Failure <15      BNP [[id number]]  (Abnormal) Collected: 12/07/22 1040    Specimen: Blood Updated: 12/07/22 1106     proBNP 2,436.0 pg/mL     Narrative:      Among patients with dyspnea, NT-proBNP is highly sensitive for the detection of acute congestive heart failure. In addition NT-proBNP of <300 pg/ml effectively rules out acute congestive heart failure with 99% negative predictive value.      D-dimer, Quantitative [[id number]]  (Abnormal) Collected: 12/07/22 1040    Specimen: Blood Updated: 12/07/22 1057     D-Dimer, Quantitative 0.56 MCGFEU/mL     Narrative:      According to the assay 's published package insert, a normal (<0.50 MCGFEU/mL) D-dimer result in conjunction with a non-high clinical probability assessment, excludes deep vein thrombosis (DVT) and pulmonary embolism (PE) with high sensitivity.    D-dimer values increase with age and this can make VTE exclusion of an older population difficult. To address this, the American College of Physicians, based on best available evidence and recent guidelines, recommends that clinicians use age-adjusted D-dimer thresholds in patients greater than 50  "years of age with: a) a low probability of PE who do not meet all Pulmonary Embolism Rule Out Criteria, or b) in those with intermediate probability of PE.   The formula for an age-adjusted D-dimer cut-off is \"age/100\".  For example, a 60 year old patient would have an age-adjusted cut-off of 0.60 MCGFEU/mL and an 80 year old 0.80 MCGFEU/mL.    CBC & Differential [[id number]]  (Abnormal) Collected: 12/07/22 0950    Specimen: Blood Updated: 12/07/22 1002    Narrative:      The following orders were created for panel order CBC & Differential.  Procedure                               Abnormality         Status                     ---------                               -----------         ------                     CBC Auto Differential[[id number]]        Abnormal            Final result                 Please view results for these tests on the individual orders.    CBC Auto Differential [[id number]]  (Abnormal) Collected: 12/07/22 0950    Specimen: Blood Updated: 12/07/22 1002     WBC 7.66 10*3/mm3      RBC 4.43 10*6/mm3      Hemoglobin 12.7 g/dL      Hematocrit 40.9 %      MCV 92.3 fL      MCH 28.7 pg      MCHC 31.1 g/dL      RDW 13.0 %      RDW-SD 43.5 fl      MPV 11.1 fL      Platelets 259 10*3/mm3      Neutrophil % 79.1 %      Lymphocyte % 12.1 %      Monocyte % 6.3 %      Eosinophil % 1.4 %      Basophil % 0.7 %      Immature Grans % 0.4 %      Neutrophils, Absolute 6.06 10*3/mm3      Lymphocytes, Absolute 0.93 10*3/mm3      Monocytes, Absolute 0.48 10*3/mm3      Eosinophils, Absolute 0.11 10*3/mm3      Basophils, Absolute 0.05 10*3/mm3      Immature Grans, Absolute 0.03 10*3/mm3      nRBC 0.0 /100 WBC         Hospital Course:  The patient is a 47 y.o. male who presented to [facility] with chest pain and shortness of breath.  He was evaluated in the emergency room.  We were asked admit him to the hospital.  He is admitted to telemetry unit.  Lab data was obtained including serial " "enzymes.  Echocardiogram was performed.  He was diuresed.  Based on the results of echocardiogram cardiology  did decide to take him to the Cath Lab..    He was found to have severe three-vessel disease along with a reduced left ventricular ejection fraction.  Returned from diuresis was continued.  He tolerated well.  Shortness of breath improved.  Chest pain resolved.  On day of discharge the recommendations for follow-up with cardiothoracic surgery in January were made by cardiology.  He is to follow-up with them in 1 week.    Physical Exam on Discharge:  /80 (BP Location: Left arm, Patient Position: Lying)   Pulse 82   Temp 98.4 °F (36.9 °C) (Oral)   Resp 16   Ht 182.9 cm (72\")   Wt 80 kg (176 lb 6.4 oz)   SpO2 95%   BMI 23.92 kg/m²   Physical Exam  Vitals and nursing note reviewed.   Constitutional:       Appearance: Normal appearance.   HENT:      Head: Normocephalic and atraumatic.      Right Ear: External ear normal.      Left Ear: External ear normal.      Nose: Nose normal.      Mouth/Throat:      Mouth: Mucous membranes are moist.   Eyes:      Extraocular Movements: Extraocular movements intact.      Conjunctiva/sclera: Conjunctivae normal.      Pupils: Pupils are equal, round, and reactive to light.   Cardiovascular:      Rate and Rhythm: Normal rate and regular rhythm.      Pulses: Normal pulses.      Heart sounds: No murmur heard.    No friction rub. No gallop.   Pulmonary:      Effort: Pulmonary effort is normal.      Breath sounds: Normal breath sounds.   Abdominal:      General: Bowel sounds are normal.      Palpations: Abdomen is soft.   Musculoskeletal:         General: Normal range of motion.      Cervical back: Normal range of motion and neck supple.      Right lower leg: Edema present.      Left lower leg: Edema present.      Comments: Lower extremity edema has improved since admission.   Skin:     General: Skin is warm and dry.      Capillary Refill: Capillary refill takes less " than 2 seconds.   Neurological:      General: No focal deficit present.      Mental Status: He is alert and oriented to person, place, and time.      Cranial Nerves: No cranial nerve deficit.   Psychiatric:         Mood and Affect: Mood normal.         Behavior: Behavior normal.        Condition on Discharge: Stable improved    Discharge Disposition:  Home    Discharge Medications:     Discharge Medications      New Medications      Instructions Start Date   atorvastatin 40 MG tablet  Commonly known as: Lipitor   40 mg, Oral, Daily      furosemide 40 MG tablet  Commonly known as: Lasix   40 mg, Oral, Daily      potassium chloride ER 20 MEQ tablet controlled-release ER tablet  Commonly known as: K-TAB   20 mEq, Oral, 2 Times Daily         ASK your doctor about these medications      Instructions Start Date   albuterol sulfate  (90 Base) MCG/ACT inhaler  Commonly known as: PROVENTIL HFA;VENTOLIN HFA;PROAIR HFA   2 puffs, Inhalation, Every 4 Hours PRN      fish oil 1000 MG capsule capsule   Oral, Daily With Breakfast      Lantus SoloStar 100 UNIT/ML injection pen  Generic drug: Insulin Glargine   25 Units, Subcutaneous      lisinopril 5 MG tablet  Commonly known as: PRINIVIL,ZESTRIL   1 tablet, Oral, Daily      metFORMIN 1000 MG tablet  Commonly known as: GLUCOPHAGE   1,000 mg, Oral, 2 Times Daily With Meals      sildenafil 100 MG tablet  Commonly known as: VIAGRA   1 tablet, Oral, As Needed           Discharge Diet:   Activity at Discharge:     Follow-up Appointments:   Future Appointments   Date Time Provider Department Center   12/14/2022 11:30 AM Cheng Koehler MD [facility]  [facility]       Test Results Pending at Discharge: None    Trina Mcintosh DO  12/09/22  12:14 CST    Time: 40 minutes.

## 2022-12-10 ENCOUNTER — READMISSION MANAGEMENT (OUTPATIENT)
Dept: CALL CENTER | Facility: HOSPITAL | Age: 47
End: 2022-12-10

## 2022-12-10 NOTE — OUTREACH NOTE
Prep Survey    Flowsheet Row Responses   [facility] facility patient discharged from? [facility]   Is LACE score < 7 ? No   Emergency Room discharge w/ pulse ox? No   Eligibility Readm Mgmt   Discharge diagnosis Severe three-vessel coronary artery disease,  Acute systolic congestive heart failure   Does the patient have one of the following disease processes/diagnoses(primary or secondary)? CHF   Does the patient have Home health ordered? No   Is there a DME ordered? No   Prep survey completed? Yes          ANTONIO BLANCO - Registered Nurse

## 2022-12-12 ENCOUNTER — TELEPHONE (OUTPATIENT)
Dept: CARDIOLOGY | Facility: CLINIC | Age: 47
End: 2022-12-12

## 2022-12-12 LAB
QT INTERVAL: 318 MS
QT INTERVAL: 330 MS
QT INTERVAL: 346 MS
QTC INTERVAL: 438 MS
QTC INTERVAL: 441 MS
QTC INTERVAL: 462 MS

## 2022-12-12 NOTE — TELEPHONE ENCOUNTER
"Pt calling.  He has a new pt appt sched with Dr Koehler for Wednesday but he is calling to see if this can be changed to Dr Wilburn.  Pt states \"nothing against Dr Koehler but I have heard a lot of good things about Dr Wilburn\".  Advised pt I would put in a message to ask if OK to change providers and call pt back.  Can reach him at #[phone]/varsha  "

## 2022-12-12 NOTE — PAYOR COMM NOTE
"REF [id number]      [facility]  DIGNA,  [phone]  OR  FAX   [phone]      SimoneSpeedy Brendon (47 y.o. Male)     Date of Birth   1975    Social Security Number       Address   [place] [address]    Home Phone   [phone]    MRN   [id number]       [employer]ist   [employer]    Marital Status                               Admission Date   12/7/22    Admission Type   Emergency    Admitting Provider   Trina Mcintosh DO    Attending Provider       Department, Room/Bed   [facility] 4B, 410/1       Discharge Date   12/9/2022    Discharge Disposition   Home or Self Care    Discharge Destination                               Attending Provider: (none)   Allergies: Poison Ivy Extract    Isolation: None   Infection: None   Code Status: Prior    Ht: 182.9 cm (72\")   Wt: 80 kg (176 lb 6.4 oz)    Admission Cmt: None   Principal Problem: Chest pain, unspecified type [R07.9]                 Active Insurance as of 12/7/2022     Primary Coverage     Payor Plan Insurance Group Employer/Plan Group    [employer] [facility]      Payor Plan Address Payor Plan Phone Number Payor Plan Fax Number Effective Dates    [address]   7/1/2017 - None Entered    [facility] [phone]       Subscriber Name Subscriber Birth Date Member ID       SPEEDY HONG 1975 [id number]                 Emergency Contacts      (Rel.) Home Phone Work Phone Mobile Phone    Marilia Hong (Spouse) [phone] -- --               Discharge Summary      Trina Mcintosh DO at 12/09/22 1213                [facility] Hospital Medicine Services  DISCHARGE SUMMARY       Date of Admission: 12/7/2022  Date of Discharge:  12/9/2022  Primary Care Physician: Selvin Schumacher DO    Presenting Problem/History of Present Illness:  Chest pain, shortness of breath    Final Discharge Diagnoses:  Severe three-vessel coronary artery " disease  Acute systolic congestive heart failure  Type 2 diabetes mellitus  Essential hypertension    Consults: Cardiology    Procedures Performed: Cardiac catheterization    Pertinent Test Results:   Imaging Results (Last 7 Days)     Procedure Component Value Units Date/Time    CT Angiogram Chest [[id number]] Collected: 12/07/22 1140     Updated: 12/07/22 1154    Narrative:      EXAMINATION: CT ANGIOGRAM CHEST-      12/7/2022 11:16 AM CST     HISTORY: chest pain sob     In order to have a CT radiation dose as low as reasonably achievable  Automated Exposure Control was utilized for adjustment of the mA and/or  KV according to patient size.     DLP in mGycm= 191     The CT Angiography of the chest is performed after intravenous contrast  enhancement.     Images are acquired in axial plane and subsequent 2-D reconstruction in  coronal and sagittal planes and 3-D maximum intensity projection  reconstruction.     There is no previous similar study for comparison. Correlation made with  radiographs of the chest obtained earlier today.     There is normal opacification of pulmonary arteries and branches  bilaterally. No filling defects in the opacified pulmonary arterial bed  bilaterally.     The RV/LV ratio is 29/49 which is normal. No finding to suggest right  heart strain.     Atheromatous changes thoracic aorta is seen. No aneurysmal dilatation or  dissection.     Atheromatous changes of coronary arteries are noted.     There is no evidence of mediastinal or hilar mass or lymphadenopathy.     The limited visualized thyroid gland is unremarkable.     There is no axillary lymphadenopathy.     The lungs are poorly expanded. There are significant interlobular septal  thickening suggesting fluid retention/pulmonary edema. There are  scattered areas of small to moderate size foci of pulmonary  consolidation, more pronounced in the upper lobes, left more than the  right. These probably represent areas of discoid atelectasis  or  pneumonic consolidation/inflammatory/infectious process. The largest  area of consolidation is in the lingular segment of the left upper lobe  measuring 1.2 x 2.9 cm.     There is bilateral moderate pleural effusion, right more than the left.     A few calcified granulomas are seen in the lungs bilaterally, more so in  the right middle lobe.     There is moderate splenomegaly. The spleen measured 30.1 x 13 x 9 cm.  Limited visualized liver is unremarkable. No gallstones. The adrenal  glands are normal. The pancreas and kidneys are incompletely included  and not well evaluated.     Images are reviewed in bone window show no acute bony abnormality or  bone lesion.       Impression:      1. No evidence of pulmonary embolism. No aortic aneurysm or dissection.  Atheromatous changes of coronary arteries.  2. Evidence of pulmonary edema.  3. Foci of consolidation the lungs bilaterally probably represent  superimposed inflammatory/infectious process. This needs to be further  evaluated by a follow-up examination.  4. Moderate bibasal pleural effusion, right more than the left.  5. The splenomegaly.                                   This report was finalized on 12/07/2022 11:51 by Dr. Roxanne Wilder MD.    XR Chest 1 View [[id number]] Collected: 12/07/22 0909     Updated: 12/07/22 0914    Narrative:      XR CHEST 1 VW- 12/7/2022 9:03 AM CST     HISTORY: Chest pain     COMPARISON: Chest exam dated 05/09/2018.     FINDINGS:      Bilateral interstitial coarsening and peribronchial thickening. Hazy  airspace opacities are identified in both lung bases. Lungs appear well  expanded. There is no consolidation or obvious pleural effusion. Heart  size appears normal. Pulmonary vasculature are nondilated.       Impression:      1. Bilateral interstitial thickening with bibasilar hazy airspace  opacities. Central pulmonary vasculature are nondilated. Primary  consideration would be atypical pneumonia/viral pneumonia.  Second  differential consideration would be an early developing pulmonary edema.        This report was finalized on 12/07/2022 09:11 by Dr Antonio Contreras, .        Lab Results (last 7 days)     Procedure Component Value Units Date/Time    POC Glucose Once [[id number]]  (Abnormal) Collected: 12/09/22 1147    Specimen: Blood Updated: 12/09/22 1208     Glucose 194 mg/dL      Comment: : [id number] Corrigan HeatherMeter ID: [id number]       POC Glucose Once [[id number]]  (Abnormal) Collected: 12/09/22 0743    Specimen: Blood Updated: 12/09/22 0802     Glucose 172 mg/dL      Comment: : [id number] Corrigan HeatherMeter ID: [id number]       POC Glucose Once [[id number]]  (Abnormal) Collected: 12/08/22 2008    Specimen: Blood Updated: 12/08/22 2019     Glucose 222 mg/dL      Comment: : [id number] Nisreen CassandraMeter ID: [id number]       POC Glucose Once [[id number]]  (Abnormal) Collected: 12/08/22 0736    Specimen: Blood Updated: 12/08/22 0747     Glucose 170 mg/dL      Comment: : [id number] Ariadna SarahMeter ID: [id number]       Comprehensive Metabolic Panel [[id number]]  (Abnormal) Collected: 12/08/22 0246    Specimen: Blood Updated: 12/08/22 0458     Glucose 184 mg/dL      BUN 23 mg/dL      Creatinine 0.96 mg/dL      Sodium 137 mmol/L      Potassium 4.3 mmol/L      Chloride 101 mmol/L      CO2 27.0 mmol/L      Calcium 8.6 mg/dL      Total Protein 5.8 g/dL      Albumin 3.50 g/dL      ALT (SGPT) 14 U/L      AST (SGOT) 13 U/L      Alkaline Phosphatase 47 U/L      Total Bilirubin 0.2 mg/dL      Globulin 2.3 gm/dL      A/G Ratio 1.5 g/dL      BUN/Creatinine Ratio 24.0     Anion Gap 9.0 mmol/L      eGFR 97.5 mL/min/1.73      Comment: National Kidney Foundation and American Society of Nephrology (ASN) Task Force recommended calculation based on the Chronic Kidney Disease Epidemiology Collaboration (CKD-EPI) equation refit without adjustment for race.       Narrative:      GFR Normal >60  Chronic Kidney Disease  <60  Kidney Failure <15      Lipid Panel [[id number]]  (Abnormal) Collected: 12/08/22 0246    Specimen: Blood Updated: 12/08/22 0449     Total Cholesterol 162 mg/dL      Triglycerides 171 mg/dL      HDL Cholesterol 38 mg/dL      LDL Cholesterol  94 mg/dL      VLDL Cholesterol 30 mg/dL      LDL/HDL Ratio 2.36    Narrative:      Cholesterol Reference Ranges  (U.S. Department of Health and Human Services ATP III Classifications)    Desirable          <200 mg/dL  Borderline High    200-239 mg/dL  High Risk          >240 mg/dL      Triglyceride Reference Ranges  (U.S. Department of Health and Human Services ATP III Classifications)    Normal           <150 mg/dL  Borderline High  150-199 mg/dL  High             200-499 mg/dL  Very High        >500 mg/dL    HDL Reference Ranges  (U.S. Department of Health and Human Services ATP III Classifications)    Low     <40 mg/dl (major risk factor for CHD)  High    >60 mg/dl ('negative' risk factor for CHD)        LDL Reference Ranges  (U.S. Department of Health and Human Services ATP III Classifications)    Optimal          <100 mg/dL  Near Optimal     100-129 mg/dL  Borderline High  130-159 mg/dL  High             160-189 mg/dL  Very High        >189 mg/dL    CBC Auto Differential [[id number]]  (Abnormal) Collected: 12/08/22 0246    Specimen: Blood Updated: 12/08/22 0429     WBC 9.67 10*3/mm3      RBC 3.86 10*6/mm3      Hemoglobin 11.1 g/dL      Hematocrit 35.5 %      MCV 92.0 fL      MCH 28.8 pg      MCHC 31.3 g/dL      RDW 13.0 %      RDW-SD 42.8 fl      MPV 11.1 fL      Platelets 217 10*3/mm3      Neutrophil % 77.9 %      Lymphocyte % 13.4 %      Monocyte % 7.9 %      Eosinophil % 0.2 %      Basophil % 0.2 %      Immature Grans % 0.4 %      Neutrophils, Absolute 7.53 10*3/mm3      Lymphocytes, Absolute 1.30 10*3/mm3      Monocytes, Absolute 0.76 10*3/mm3      Eosinophils, Absolute 0.02 10*3/mm3      Basophils, Absolute 0.02 10*3/mm3      Immature Grans, Absolute 0.04 10*3/mm3       nRBC 0.0 /100 WBC     Troponin [[id number]]  (Abnormal) Collected: 12/07/22 2236    Specimen: Blood Updated: 12/07/22 2316     Troponin T 0.042 ng/mL     Narrative:      Troponin T Reference Range:  <= 0.03 ng/mL-   Negative for AMI  >0.03 ng/mL-     Abnormal for myocardial necrosis.  Clinicians would have to utilize clinical acumen, EKG, Troponin and serial changes to determine if it is an Acute Myocardial Infarction or myocardial injury due to an underlying chronic condition.       Results may be falsely decreased if patient taking Biotin.      POC Glucose Once [[id number]]  (Abnormal) Collected: 12/07/22 1749    Specimen: Blood Updated: 12/07/22 1801     Glucose 240 mg/dL      Comment: : [id number] GoodmanlseyMeter ID: [id number]       Hemoglobin A1c [[id number]]  (Abnormal) Collected: 12/07/22 0950    Specimen: Blood Updated: 12/07/22 1430     Hemoglobin A1C 7.40 %     Narrative:      Hemoglobin A1C Ranges:    Increased Risk for Diabetes  5.7% to 6.4%  Diabetes                     >= 6.5%  Diabetic Goal                < 7.0%    Troponin [[id number]]  (Abnormal) Collected: 12/07/22 1214    Specimen: Blood Updated: 12/07/22 1247     Troponin T 0.036 ng/mL     Narrative:      Troponin T Reference Range:  <= 0.03 ng/mL-   Negative for AMI  >0.03 ng/mL-     Abnormal for myocardial necrosis.  Clinicians would have to utilize clinical acumen, EKG, Troponin and serial changes to determine if it is an Acute Myocardial Infarction or myocardial injury due to an underlying chronic condition.       Results may be falsely decreased if patient taking Biotin.      Potassium [[id number]]  (Normal) Collected: 12/07/22 1214    Specimen: Blood Updated: 12/07/22 1241     Potassium 4.9 mmol/L     Magnesium [[id number]]  (Normal) Collected: 12/07/22 1040    Specimen: Blood Updated: 12/07/22 1221     Magnesium 1.8 mg/dL     COVID PRE-OP / PRE-PROCEDURE SCREENING ORDER (NO ISOLATION) - Swab, Nasopharynx [[id number]]  (Normal)  Collected: 12/07/22 1042    Specimen: Swab from Nasopharynx Updated: 12/07/22 1137    Narrative:      The following orders were created for panel order COVID PRE-OP / PRE-PROCEDURE SCREENING ORDER (NO ISOLATION) - Swab, Nasopharynx.  Procedure                               Abnormality         Status                     ---------                               -----------         ------                     COVID-19 and FLU A/B PCR...[[id number]]  Normal              Final result                 Please view results for these tests on the individual orders.    COVID-19 and FLU A/B PCR - Swab, Nasopharynx [[id number]]  (Normal) Collected: 12/07/22 1042    Specimen: Swab from Nasopharynx Updated: 12/07/22 1137     COVID19 Not Detected     Influenza A PCR Not Detected     Influenza B PCR Not Detected    Narrative:      Fact sheet for providers: https://www.fda.gov/media/632335/download    Fact sheet for patients: https://www.fda.gov/media/896317/download    Test performed by PCR.    Troponin [[id number]]  (Abnormal) Collected: 12/07/22 1040    Specimen: Blood Updated: 12/07/22 1111     Troponin T 0.038 ng/mL     Narrative:      Troponin T Reference Range:  <= 0.03 ng/mL-   Negative for AMI  >0.03 ng/mL-     Abnormal for myocardial necrosis.  Clinicians would have to utilize clinical acumen, EKG, Troponin and serial changes to determine if it is an Acute Myocardial Infarction or myocardial injury due to an underlying chronic condition.       Results may be falsely decreased if patient taking Biotin.      Comprehensive Metabolic Panel [[id number]]  (Abnormal) Collected: 12/07/22 1040    Specimen: Blood Updated: 12/07/22 1110     Glucose 171 mg/dL      BUN 14 mg/dL      Creatinine 0.92 mg/dL      Sodium 137 mmol/L      Potassium 5.8 mmol/L      Chloride 101 mmol/L      CO2 29.0 mmol/L      Calcium 9.2 mg/dL      Total Protein 6.6 g/dL      Albumin 3.70 g/dL      ALT (SGPT) 17 U/L      AST (SGOT) 15 U/L      Alkaline Phosphatase  "62 U/L      Total Bilirubin 0.2 mg/dL      Globulin 2.9 gm/dL      A/G Ratio 1.3 g/dL      BUN/Creatinine Ratio 15.2     Anion Gap 7.0 mmol/L      eGFR 102.6 mL/min/1.73      Comment: National Kidney Foundation and American Society of Nephrology (ASN) Task Force recommended calculation based on the Chronic Kidney Disease Epidemiology Collaboration (CKD-EPI) equation refit without adjustment for race.       Narrative:      GFR Normal >60  Chronic Kidney Disease <60  Kidney Failure <15      BNP [[id number]]  (Abnormal) Collected: 12/07/22 1040    Specimen: Blood Updated: 12/07/22 1106     proBNP 2,436.0 pg/mL     Narrative:      Among patients with dyspnea, NT-proBNP is highly sensitive for the detection of acute congestive heart failure. In addition NT-proBNP of <300 pg/ml effectively rules out acute congestive heart failure with 99% negative predictive value.      D-dimer, Quantitative [[id number]]  (Abnormal) Collected: 12/07/22 1040    Specimen: Blood Updated: 12/07/22 1057     D-Dimer, Quantitative 0.56 MCGFEU/mL     Narrative:      According to the assay 's published package insert, a normal (<0.50 MCGFEU/mL) D-dimer result in conjunction with a non-high clinical probability assessment, excludes deep vein thrombosis (DVT) and pulmonary embolism (PE) with high sensitivity.    D-dimer values increase with age and this can make VTE exclusion of an older population difficult. To address this, the American College of Physicians, based on best available evidence and recent guidelines, recommends that clinicians use age-adjusted D-dimer thresholds in patients greater than 50 years of age with: a) a low probability of PE who do not meet all Pulmonary Embolism Rule Out Criteria, or b) in those with intermediate probability of PE.   The formula for an age-adjusted D-dimer cut-off is \"age/100\".  For example, a 60 year old patient would have an age-adjusted cut-off of 0.60 MCGFEU/mL and an 80 year old 0.80 " MCGFEU/mL.    CBC & Differential [[id number]]  (Abnormal) Collected: 12/07/22 0950    Specimen: Blood Updated: 12/07/22 1002    Narrative:      The following orders were created for panel order CBC & Differential.  Procedure                               Abnormality         Status                     ---------                               -----------         ------                     CBC Auto Differential[[id number]]        Abnormal            Final result                 Please view results for these tests on the individual orders.    CBC Auto Differential [[id number]]  (Abnormal) Collected: 12/07/22 0950    Specimen: Blood Updated: 12/07/22 1002     WBC 7.66 10*3/mm3      RBC 4.43 10*6/mm3      Hemoglobin 12.7 g/dL      Hematocrit 40.9 %      MCV 92.3 fL      MCH 28.7 pg      MCHC 31.1 g/dL      RDW 13.0 %      RDW-SD 43.5 fl      MPV 11.1 fL      Platelets 259 10*3/mm3      Neutrophil % 79.1 %      Lymphocyte % 12.1 %      Monocyte % 6.3 %      Eosinophil % 1.4 %      Basophil % 0.7 %      Immature Grans % 0.4 %      Neutrophils, Absolute 6.06 10*3/mm3      Lymphocytes, Absolute 0.93 10*3/mm3      Monocytes, Absolute 0.48 10*3/mm3      Eosinophils, Absolute 0.11 10*3/mm3      Basophils, Absolute 0.05 10*3/mm3      Immature Grans, Absolute 0.03 10*3/mm3      nRBC 0.0 /100 WBC         Hospital Course:  The patient is a 47 y.o. male who presented to [facility] with chest pain and shortness of breath.  He was evaluated in the emergency room.  We were asked admit him to the hospital.  He is admitted to telemetry unit.  Lab data was obtained including serial enzymes.  Echocardiogram was performed.  He was diuresed.  Based on the results of echocardiogram cardiology  did decide to take him to the Cath Lab..    He was found to have severe three-vessel disease along with a reduced left ventricular ejection fraction.  Returned from diuresis was continued.  He tolerated well.  Shortness of breath improved.  Chest  "pain resolved.  On day of discharge the recommendations for follow-up with cardiothoracic surgery in January were made by cardiology.  He is to follow-up with them in 1 week.    Physical Exam on Discharge:  /80 (BP Location: Left arm, Patient Position: Lying)   Pulse 82   Temp 98.4 °F (36.9 °C) (Oral)   Resp 16   Ht 182.9 cm (72\")   Wt 80 kg (176 lb 6.4 oz)   SpO2 95%   BMI 23.92 kg/m²   Physical Exam  Vitals and nursing note reviewed.   Constitutional:       Appearance: Normal appearance.   HENT:      Head: Normocephalic and atraumatic.      Right Ear: External ear normal.      Left Ear: External ear normal.      Nose: Nose normal.      Mouth/Throat:      Mouth: Mucous membranes are moist.   Eyes:      Extraocular Movements: Extraocular movements intact.      Conjunctiva/sclera: Conjunctivae normal.      Pupils: Pupils are equal, round, and reactive to light.   Cardiovascular:      Rate and Rhythm: Normal rate and regular rhythm.      Pulses: Normal pulses.      Heart sounds: No murmur heard.    No friction rub. No gallop.   Pulmonary:      Effort: Pulmonary effort is normal.      Breath sounds: Normal breath sounds.   Abdominal:      General: Bowel sounds are normal.      Palpations: Abdomen is soft.   Musculoskeletal:         General: Normal range of motion.      Cervical back: Normal range of motion and neck supple.      Right lower leg: Edema present.      Left lower leg: Edema present.      Comments: Lower extremity edema has improved since admission.   Skin:     General: Skin is warm and dry.      Capillary Refill: Capillary refill takes less than 2 seconds.   Neurological:      General: No focal deficit present.      Mental Status: He is alert and oriented to person, place, and time.      Cranial Nerves: No cranial nerve deficit.   Psychiatric:         Mood and Affect: Mood normal.         Behavior: Behavior normal.        Condition on Discharge: Stable improved    Discharge " Disposition:  Home    Discharge Medications:     Discharge Medications      New Medications      Instructions Start Date   atorvastatin 40 MG tablet  Commonly known as: Lipitor   40 mg, Oral, Daily      furosemide 40 MG tablet  Commonly known as: Lasix   40 mg, Oral, Daily      potassium chloride ER 20 MEQ tablet controlled-release ER tablet  Commonly known as: K-TAB   20 mEq, Oral, 2 Times Daily         ASK your doctor about these medications      Instructions Start Date   albuterol sulfate  (90 Base) MCG/ACT inhaler  Commonly known as: PROVENTIL HFA;VENTOLIN HFA;PROAIR HFA   2 puffs, Inhalation, Every 4 Hours PRN      fish oil 1000 MG capsule capsule   Oral, Daily With Breakfast      Lantus SoloStar 100 UNIT/ML injection pen  Generic drug: Insulin Glargine   25 Units, Subcutaneous      lisinopril 5 MG tablet  Commonly known as: PRINIVIL,ZESTRIL   1 tablet, Oral, Daily      metFORMIN 1000 MG tablet  Commonly known as: GLUCOPHAGE   1,000 mg, Oral, 2 Times Daily With Meals      sildenafil 100 MG tablet  Commonly known as: VIAGRA   1 tablet, Oral, As Needed           Discharge Diet:   Activity at Discharge:     Follow-up Appointments:   Future Appointments   Date Time Provider Department Center   12/14/2022 11:30 AM Cheng Koehler MD [facility]  [facility]       Test Results Pending at Discharge: None    Taye Mcintosh DO  12/09/22  12:14 CST    Time: 40 minutes.      Electronically signed by Taye Mcintosh DO at 12/09/22 1217       Discharge Order (From admission, onward)     Start     Ordered    12/09/22 1205  Discharge patient  Once        Expected Discharge Date: 12/09/22    Discharge Disposition: Home or Self Care    Physician of Record for Attribution - Please select from Treatment Team: TAYE MCINTOSH [[id number]]    Review needed by CMO to determine Physician of Record: No       Question Answer Comment   Physician of Record for Attribution - Please select from Treatment Team TAYE MCINTOSH     Review needed by CMO to determine Physician of Record No        12/09/22 0690

## 2022-12-13 ENCOUNTER — READMISSION MANAGEMENT (OUTPATIENT)
Dept: CALL CENTER | Facility: HOSPITAL | Age: 47
End: 2022-12-13

## 2022-12-13 NOTE — OUTREACH NOTE
CHF Week 1 Survey    Flowsheet Row Responses   [facility] patient discharged from? [place]   Does the patient have one of the following disease processes/diagnoses(primary or secondary)? CHF   CHF Week 1 attempt successful? Yes   Call start time 1640   Call end time 1644   Discharge diagnosis Severe three-vessel coronary artery disease,  Acute systolic congestive heart failure   Person spoke with today (if not patient) and relationship patient   Comments regarding appointments Pt will see Dr. Wilburn on thursday.  Cardiology later in the month   Does the patient have a primary care provider?  Yes   Does the patient have an appointment with their PCP within 7 days of discharge? Greater than 7 days   Comments regarding PCP Dr. Schumacher-will see PCP on 12/29   What is preventing the patient from scheduling follow up appointments within 7 days of discharge? Earlier appointment not available   Nursing Interventions Verified appointment date/time/provider   Pulse Ox monitoring None   Psychosocial issues? No   Did the patient receive a copy of their discharge instructions? Yes   Nursing interventions Reviewed instructions with patient   What is the patient's perception of their health status since discharge? Improving   If the patient is a current smoker, are they able to teach back resources for cessation? Not a smoker   Is the patient/caregiver able to teach back the hierarchy of who to call/visit for symptoms/problems? PCP, Specialist, Home health nurse, Urgent Care, ED, 911 Yes   Is the patient able to teach back Heart Failure Zones? Yes   CHF Nursing Interventions Education provided on various zones   CHF Zone this Call Green Zone   Green Zone Patient reports doing well, No new or worsening shortness of breath, Physical activity level is normal for you   Green Zone Interventions Meds as directed    CHF Week 1 call completed? Yes   Is the patient interested in additional calls from an ambulatory ?   NOTE:  applies to high risk patients requiring additional follow-up. No   Would this patient benefit from a Referral to [facility] Social Work? No   Wrap up additional comments Pt doing ok at this time.  He has meds.  Made appts.  No needs.   Call end time [phone]          BERTO DALY - Registered Nurse

## 2022-12-22 ENCOUNTER — OFFICE VISIT (OUTPATIENT)
Dept: CARDIAC SURGERY | Facility: CLINIC | Age: 47
End: 2022-12-22
Payer: COMMERCIAL

## 2022-12-22 VITALS
DIASTOLIC BLOOD PRESSURE: 92 MMHG | SYSTOLIC BLOOD PRESSURE: 158 MMHG | HEART RATE: 78 BPM | BODY MASS INDEX: 23.92 KG/M2 | HEIGHT: 72 IN | WEIGHT: 176.6 LBS | OXYGEN SATURATION: 98 %

## 2022-12-22 DIAGNOSIS — I25.10 CORONARY ARTERY DISEASE INVOLVING NATIVE CORONARY ARTERY OF NATIVE HEART WITHOUT ANGINA PECTORIS: Primary | ICD-10-CM

## 2022-12-22 PROCEDURE — 99205 OFFICE O/P NEW HI 60 MIN: CPT | Performed by: SURGERY

## 2022-12-27 ENCOUNTER — TELEPHONE (OUTPATIENT)
Dept: CARDIAC SURGERY | Facility: CLINIC | Age: 47
End: 2022-12-27

## 2022-12-27 RX ORDER — POTASSIUM CHLORIDE 1500 MG/1
20 TABLET, FILM COATED, EXTENDED RELEASE ORAL 2 TIMES DAILY
Qty: 60 TABLET | Refills: 0 | Status: SHIPPED | OUTPATIENT
Start: 2022-12-27 | End: 2023-01-03 | Stop reason: SDUPTHER

## 2022-12-27 NOTE — TELEPHONE ENCOUNTER
Called to schedule MRI Cardiac Function Complete With & Without Morphology With Viability. Spoke with Marilia at Ext [phone]. Pt is currently scheduled for 01/13/2023, check in at 2:00 for 2:30pm appointment, NPO 4 hours prior. Address is [address], [place].    Pt is not yet aware of this appointment, as his insurance is out of network with this facility. Marilia is keeping pt on the schedule while we discuss options with Dr. Wilburn.

## 2022-12-28 ENCOUNTER — HOSPITAL ENCOUNTER (OUTPATIENT)
Dept: ULTRASOUND IMAGING | Facility: HOSPITAL | Age: 47
Discharge: HOME | End: 2022-12-28

## 2022-12-28 DIAGNOSIS — I25.10 CORONARY ARTERY DISEASE INVOLVING NATIVE CORONARY ARTERY OF NATIVE HEART WITHOUT ANGINA PECTORIS: ICD-10-CM

## 2022-12-28 PROCEDURE — 93970 EXTREMITY STUDY: CPT | Performed by: SURGERY

## 2022-12-28 PROCEDURE — 93880 EXTRACRANIAL BILAT STUDY: CPT

## 2022-12-28 PROCEDURE — 93880 EXTRACRANIAL BILAT STUDY: CPT | Performed by: SURGERY

## 2022-12-28 PROCEDURE — 93970 EXTREMITY STUDY: CPT

## 2022-12-29 ENCOUNTER — OFFICE VISIT (OUTPATIENT)
Dept: PRIMARY CARE CLINIC | Age: 47
End: 2022-12-29
Payer: MEDICAID

## 2022-12-29 VITALS
HEART RATE: 83 BPM | OXYGEN SATURATION: 99 % | BODY MASS INDEX: 23.16 KG/M2 | DIASTOLIC BLOOD PRESSURE: 90 MMHG | SYSTOLIC BLOOD PRESSURE: 140 MMHG | HEIGHT: 72 IN | TEMPERATURE: 97.1 F | WEIGHT: 171 LBS

## 2022-12-29 DIAGNOSIS — I10 ESSENTIAL HYPERTENSION: ICD-10-CM

## 2022-12-29 DIAGNOSIS — R53.83 FATIGUE, UNSPECIFIED TYPE: ICD-10-CM

## 2022-12-29 DIAGNOSIS — I50.21 ACUTE SYSTOLIC CONGESTIVE HEART FAILURE (HCC): ICD-10-CM

## 2022-12-29 DIAGNOSIS — J90 PLEURAL EFFUSION: ICD-10-CM

## 2022-12-29 DIAGNOSIS — E78.2 MIXED HYPERLIPIDEMIA: ICD-10-CM

## 2022-12-29 DIAGNOSIS — R16.1 SPLENOMEGALY: ICD-10-CM

## 2022-12-29 DIAGNOSIS — E11.9 TYPE 2 DIABETES MELLITUS WITHOUT COMPLICATION, WITHOUT LONG-TERM CURRENT USE OF INSULIN (HCC): Primary | ICD-10-CM

## 2022-12-29 DIAGNOSIS — I25.118 CORONARY ARTERY DISEASE OF NATIVE ARTERY OF NATIVE HEART WITH STABLE ANGINA PECTORIS (HCC): ICD-10-CM

## 2022-12-29 PROCEDURE — 3078F DIAST BP <80 MM HG: CPT | Performed by: PEDIATRICS

## 2022-12-29 PROCEDURE — 99214 OFFICE O/P EST MOD 30 MIN: CPT | Performed by: PEDIATRICS

## 2022-12-29 PROCEDURE — 3074F SYST BP LT 130 MM HG: CPT | Performed by: PEDIATRICS

## 2022-12-29 RX ORDER — BLOOD-GLUCOSE TRANSMITTER
EACH MISCELLANEOUS
Qty: 1 EACH | Refills: 3 | Status: SHIPPED | OUTPATIENT
Start: 2022-12-29

## 2022-12-29 RX ORDER — BLOOD-GLUCOSE,RECEIVER,CONT
EACH MISCELLANEOUS
Qty: 1 EACH | Refills: 0 | Status: SHIPPED | OUTPATIENT
Start: 2022-12-29

## 2022-12-29 RX ORDER — NITROGLYCERIN 0.4 MG/1
1 TABLET SUBLINGUAL PRN
COMMUNITY
Start: 2022-12-09

## 2022-12-29 RX ORDER — POTASSIUM CHLORIDE 1500 MG/1
1 TABLET, FILM COATED, EXTENDED RELEASE ORAL DAILY
COMMUNITY
Start: 2022-12-27

## 2022-12-29 RX ORDER — METOPROLOL SUCCINATE 50 MG/1
50 TABLET, EXTENDED RELEASE ORAL DAILY
Qty: 30 TABLET | Refills: 5 | Status: SHIPPED | OUTPATIENT
Start: 2022-12-29

## 2022-12-29 RX ORDER — ASPIRIN 81 MG/1
81 TABLET ORAL DAILY
COMMUNITY

## 2022-12-29 RX ORDER — NITROGLYCERIN 0.4 MG/1
0.4 TABLET SUBLINGUAL EVERY 5 MIN PRN
Qty: 25 TABLET | Refills: 3 | Status: CANCELLED | OUTPATIENT
Start: 2022-12-29

## 2022-12-29 RX ORDER — BLOOD-GLUCOSE SENSOR
EACH MISCELLANEOUS
Qty: 3 EACH | Refills: 11 | Status: SHIPPED | OUTPATIENT
Start: 2022-12-29

## 2022-12-29 RX ORDER — ATORVASTATIN CALCIUM 40 MG/1
1 TABLET, FILM COATED ORAL NIGHTLY
COMMUNITY
Start: 2022-12-09

## 2022-12-29 RX ORDER — FUROSEMIDE 40 MG/1
1 TABLET ORAL DAILY
COMMUNITY
Start: 2022-12-09

## 2022-12-29 ASSESSMENT — ENCOUNTER SYMPTOMS
WHEEZING: 0
SHORTNESS OF BREATH: 1
BACK PAIN: 0
COUGH: 1
ABDOMINAL PAIN: 0

## 2022-12-29 NOTE — PROGRESS NOTES
[facility] [address], [address]  Phone [phone]   Fax [phone]      OFFICE VISIT: 2022    Diana Villanueva [place]-: 1975      HPI  Reason For Visit:  Diana Villanueva is a 49 y.o. Follow-Up from Hospital (Admitted to [facility] - due to heart issues. They plan to due a bypass. He is currently getting testing before scheduling. )    Patient presents on hospital follow-up. He was admitted to [facility] from 2022 to 2022. On that admission he was noted to have severe three-vessel coronary artery disease and was noted to have acute systolic congestive heart failure. Ejection fraction was 36 to 40%   He also had elevated right ventricular pressures at 35 to 45 mmHg  His presentation was chest pain and shortness of breath and was noted to have an elevated troponin in the emergency department. proBNP was markedly elevated at 2436    He did not have any known coronary artery disease prior to this evaluation. He has had a history of uncontrolled diabetes mellitus, somewhat poorly controlled blood pressure, and marked hyperlipidemia. Incidentally he was noted on CT scan to have some calcified granulomas in the lungs bilaterally, bilateral pleural effusions as well as splenomegaly. He has been noncompliant for blood work over the past year. Diabetes Mellitus Type 1.5  Diet compliance:  noncompliant: A good portion of the time  Nutrition Consultation Needed:  no  Medication:              Lantus insulin 25 units nightly                          He is reluctant to try basal bolus regimen. Metformin 1000 mg twice daily  Medication compliance:  compliant most of the time  Weight trend: stable  Current exercise: he is very active  Checking: occasionally but rarely. He feels like his sugar has been up lately.   Home blood sugar records: fasting range: not checked since he left the hospital.  Low BG:  no  Eye exam current (within one year): yes  Checking Feet regularly:  yes - and no sores  ACE/ARB:  yes -lisinopril 5 mg  Aspirin: Yes, 81mg daily  Tobacco history: He  reports that he has never smoked. He has never used smokeless tobacco.    Lab Results   Component Value Date    LABA1C 11.4 (H) 04/28/2021    LABA1C 10.5 (H) 07/08/2019     Lab Results   Component Value Date    LABMICR 6.60 04/28/2021    CREATININE 0.9 04/28/2021       Hypertension:   BP today was   BP Readings from Last 1 Encounters:   12/29/22 (!) 140/90      Recent BP readings:    BP Readings from Last 3 Encounters:   12/29/22 (!) 140/90   05/04/22 (!) 140/90   05/03/22 134/78     Medication   Lisinopril 5 mg daily   Metroprolol tartrate 25 mg twice daily  Medication compliance:  compliant most of the time  Home blood pressure monitoring: No  He  is somewhat  adherent to a low sodium diet. Symptoms: None  Laboratory:  Lab Results   Component Value Date    BUN 19 04/28/2021    CREATININE 0.9 04/28/2021       Hyperlipidemia:   Medication:   Atorvastatin 40mg nightly, fenofibrate (Tricor, Trilipix) 145 mcg daily and OTC Fish Oil  Low Fat, Low Choleterol Diet:  yes -to some degree  Myalgias or GI upset: no  The patient exercises daily. Laboratory:    Lab Results   Component Value Date    CHOL 267 (H) 04/28/2021    TRIG 847 (H) 04/28/2021    HDL 34 (L) 04/28/2021    LDLCALC see below 04/28/2021    LDLDIRECT 108 04/28/2021      Lab Results   Component Value Date    ALT 27 04/28/2021    AST 22 04/28/2021       Coronary Artery Disease:  Medications:   Beta-blockade: Metoprolol tartrate 25 mg twice daily   RAAS Therapy: Lisinopril 5 mg daily   Lipid Management: Atorvastatin 40 mg nightly      Fenofibrate 145 mg daily      Fish oil   Platelet Inhibition: Aspirin 81 mg daily   Anti-anginal:  None  Symptoms: no chest pain since discharge  Nitroglycerin use: none  Cardiology follow-up: following with [facility]  Additional studies: multiple as above.       Chf:         height is 6' (1.829 m) and weight is 171 lb (77.6 kg). His temporal temperature is 97.1 °F (36.2 °C). His blood pressure is 140/90 (abnormal) and his pulse is 83. His oxygen saturation is 99%. Body mass index is 23.19 kg/m². I have reviewed the following with the Mr. Coelho   Lab Review  No visits with results within 6 Month(s) from this visit. Latest known visit with results is:   Office Visit on 05/04/2022   Component Date Value    Strep A Ag 05/04/2022 None Detected      Copies of these are in the chart. Current Outpatient Medications   Medication Sig Dispense Refill    furosemide (LASIX) 40 MG tablet Take 1 tablet by mouth daily      atorvastatin (LIPITOR) 40 MG tablet Take 1 tablet by mouth at bedtime      metoprolol tartrate (LOPRESSOR) 25 MG tablet Take 1 tablet by mouth in the morning and 1 tablet in the evening. nitroGLYCERIN (NITROSTAT) 0.4 MG SL tablet Place 1 tablet under the tongue as needed      potassium chloride (KLOR-CON M) 20 MEQ TBCR extended release tablet Take 1 tablet by mouth daily      aspirin 81 MG EC tablet Take 81 mg by mouth daily      Continuous Blood Gluc Sensor (DEXCOM G6 SENSOR) MISC Use with dexcom system 3 each 11    Continuous Blood Gluc Transmit (DEXCOM G6 TRANSMITTER) MISC Use with dexcom system 1 each 3    Continuous Blood Gluc  (DEXCOM G6 ) TIMMY Use with dexcom system.  1 each 0    metoprolol succinate (TOPROL XL) 50 MG extended release tablet Take 1 tablet by mouth daily 30 tablet 5    albuterol sulfate HFA (PROVENTIL HFA) 108 (90 Base) MCG/ACT inhaler Inhale 2 puffs into the lungs every 6 hours as needed for Wheezing 1 each 1    lisinopril (PRINIVIL;ZESTRIL) 5 MG tablet Take 1 tablet by mouth daily 90 tablet 3    fenofibrate (TRICOR) 145 MG tablet Take 1 tablet by mouth daily 90 tablet 3    metFORMIN (GLUCOPHAGE) 1000 MG tablet Take 1 tablet by mouth 2 times daily (with meals) 180 tablet 3    sildenafil (VIAGRA) 100 MG tablet Take 1 tablet by mouth as needed for Erectile Dysfunction (Patient taking differently: Take 100 mg by mouth as needed for Erectile Dysfunction Every once in a while) 30 tablet 3     No current facility-administered medications for this visit. Allergies: Patient has no known allergies. Past Medical History:   Diagnosis Date    Type 2 diabetes mellitus without complication ([address]        Family History   Problem Relation Age of Onset    Diabetes Father     Heart Disease Maternal Grandmother     Diabetes Maternal Grandfather     Diabetes Paternal Grandmother     Heart Disease Paternal Grandmother     Cancer Paternal Grandmother        Past Surgical History:   Procedure Laterality Date    WISDOM TOOTH EXTRACTION         Social History     Tobacco Use    Smoking status: Never    Smokeless tobacco: Never   Substance Use Topics    Alcohol use: Yes        Review of Systems   Constitutional:  Positive for activity change. Negative for appetite change and fever. HENT:  Negative for congestion and postnasal drip. Respiratory:  Positive for cough and shortness of breath (improving). Negative for wheezing. Cardiovascular:  Negative for chest pain, palpitations and leg swelling. Gastrointestinal:  Negative for abdominal pain. Genitourinary:  Negative for difficulty urinating. Musculoskeletal:  Negative for arthralgias and back pain. Skin:  Negative for rash. Psychiatric/Behavioral:  Negative for behavioral problems, self-injury and sleep disturbance. The patient is not nervous/anxious. Physical Exam  Vitals and nursing note reviewed. Constitutional:       General: He is not in acute distress. Appearance: He is well-developed. HENT:      Head: Normocephalic and atraumatic. Right Ear: External ear normal.      Left Ear: External ear normal.      Nose: Nose normal.      Mouth/Throat:      Pharynx: No oropharyngeal exudate. Eyes:      General:         Right eye: No discharge. Left eye: No discharge. Conjunctiva/sclera: Conjunctivae normal.   Neck:      Thyroid: No thyromegaly. Vascular: No JVD. Cardiovascular:      Rate and Rhythm: Normal rate and regular rhythm. Heart sounds: Normal heart sounds. No murmur heard. No friction rub. No gallop. Pulmonary:      Effort: Pulmonary effort is normal. No respiratory distress. Breath sounds: Normal breath sounds. No wheezing or rales. Abdominal:      General: Bowel sounds are normal. There is no distension. Palpations: Abdomen is soft. There is no mass. Tenderness: There is no abdominal tenderness. Musculoskeletal:         General: No tenderness. Normal range of motion. Cervical back: Normal range of motion and neck supple. Lymphadenopathy:      Cervical: No cervical adenopathy. Skin:     General: Skin is warm and dry. Findings: No rash. Neurological:      Mental Status: He is alert and oriented to person, place, and time. Motor: No abnormal muscle tone. ASSESSMENT      ICD-10-CM    1. Type 2 diabetes mellitus without complication, without long-term current use of insulin ([facility])  E11.9 Comprehensive Metabolic Panel     Hemoglobin A1C     Microalbumin / Creatinine Urine Ratio     Continuous Blood Gluc Sensor (DEXCOM G6 SENSOR) MISC     Continuous Blood Gluc Transmit (DEXCOM G6 TRANSMITTER) MISC     Continuous Blood Gluc  (DEXCOM G6 ) TIMMY      2. Essential hypertension  I10 CBC with Auto Differential     Comprehensive Metabolic Panel     Microalbumin / Creatinine Urine Ratio     metoprolol succinate (TOPROL XL) 50 MG extended release tablet      3. Mixed hyperlipidemia  E78.2 Comprehensive Metabolic Panel     Lipid Panel      4. Coronary artery disease of native artery of native heart with stable angina pectoris ([facility])  I25.118 metoprolol succinate (TOPROL XL) 50 MG extended release tablet      5. Pleural effusion  J90       6. Splenomegaly  R16.1 CBC with Auto Differential      7.  Acute systolic congestive heart failure (HCC)  I50.21       8. Fatigue, unspecified type  R53.83 T4, Free     TSH            PLAN    1. Type 2 diabetes mellitus without complication, without long-term current use of insulin ([facility])  He is not checking blood sugars at all. We did discuss the potential of using a Dexcom since he is on insulin. We are going to pursue this as it is going to be crucial in managing his blood sugars to prevent progression heart disease. We will see how well he can control his blood sugars on present medication regimen. Strongly encouraged him to restart his Lantus insulin. He states that he has not taken any since his hospitalization. He has also not checked his blood sugars. We did discuss the importance of compliance and glycemic control in order to maintain his health  - Comprehensive Metabolic Panel; Future  - Hemoglobin A1C; Future  - Microalbumin / Creatinine Urine Ratio; Future  - Continuous Blood Gluc Sensor (DEXCOM G6 SENSOR) MISC; Use with dexcom system  Dispense: 3 each; Refill: 11  - Continuous Blood Gluc Transmit (DEXCOM G6 TRANSMITTER) MISC; Use with dexcom system  Dispense: 1 each; Refill: 3  - Continuous Blood Gluc  (DEXCOM G6 ) TIMMY; Use with dexcom system. Dispense: 1 each; Refill: 0    2. Essential hypertension  Pressure has been running on the high side of normal.  We will monitor ambulatory blood pressure and increase lisinopril if needed  - CBC with Auto Differential; Future  - Comprehensive Metabolic Panel; Future  - Microalbumin / Creatinine Urine Ratio; Future  - metoprolol succinate (TOPROL XL) 50 MG extended release tablet; Take 1 tablet by mouth daily  Dispense: 30 tablet; Refill: 5    3. Mixed hyperlipidemia  Will need to check lipids in the near future now that he has started atorvastatin  - Comprehensive Metabolic Panel; Future  - Lipid Panel; Future    4.  Coronary artery disease of native artery of native heart with stable angina pectoris [facility])  We are going to switch to metoprolol succinate 50 mg daily instead of 25 mg twice daily. Is difficulty taking the medication twice daily  - metoprolol succinate (TOPROL XL) 50 MG extended release tablet; Take 1 tablet by mouth daily  Dispense: 30 tablet; Refill: 5    5. Pleural effusion  This is clinically resolved by auscultation    6. Splenomegaly  Follow CBC with the splenomegaly. This may be an incidental finding at that time  - CBC with Auto Differential; Future    7. Acute systolic congestive heart failure ([address]  He is on appropriate diuretics and beta-blockade as well as RAAS therapy. We may need to modify his medication depending on how he does with this present regimen and if he improves post bypass    8. Fatigue, unspecified type  Check thyroid with labs  - T4, Free; Future  - TSH; Future    Orders Placed This Encounter   Procedures    CBC with Auto Differential    Comprehensive Metabolic Panel    Hemoglobin A1C    Lipid Panel    Microalbumin / Creatinine Urine Ratio    T4, Free    TSH          Return in about 3 months (around 3/29/2023) for 30. This was an in-house visit.

## 2023-01-03 ENCOUNTER — OFFICE VISIT (OUTPATIENT)
Dept: CARDIOLOGY | Facility: CLINIC | Age: 48
End: 2023-01-03
Payer: COMMERCIAL

## 2023-01-03 VITALS
HEIGHT: 72 IN | WEIGHT: 169 LBS | SYSTOLIC BLOOD PRESSURE: 130 MMHG | BODY MASS INDEX: 22.89 KG/M2 | DIASTOLIC BLOOD PRESSURE: 75 MMHG | RESPIRATION RATE: 18 BRPM | HEART RATE: 75 BPM | OXYGEN SATURATION: 98 %

## 2023-01-03 DIAGNOSIS — E78.2 MIXED HYPERLIPIDEMIA: ICD-10-CM

## 2023-01-03 DIAGNOSIS — I25.10 CORONARY ARTERY DISEASE INVOLVING NATIVE CORONARY ARTERY OF NATIVE HEART WITHOUT ANGINA PECTORIS: Primary | Chronic | ICD-10-CM

## 2023-01-03 DIAGNOSIS — I50.22 CHRONIC SYSTOLIC HEART FAILURE: Chronic | ICD-10-CM

## 2023-01-03 DIAGNOSIS — I10 ESSENTIAL HYPERTENSION: Chronic | ICD-10-CM

## 2023-01-03 DIAGNOSIS — E11.65 TYPE 2 DIABETES MELLITUS WITH HYPERGLYCEMIA, WITH LONG-TERM CURRENT USE OF INSULIN: Chronic | ICD-10-CM

## 2023-01-03 DIAGNOSIS — Z79.4 TYPE 2 DIABETES MELLITUS WITH HYPERGLYCEMIA, WITH LONG-TERM CURRENT USE OF INSULIN: Chronic | ICD-10-CM

## 2023-01-03 PROBLEM — R07.9 CHEST PAIN, UNSPECIFIED TYPE: Status: RESOLVED | Noted: 2022-12-07 | Resolved: 2023-01-03

## 2023-01-03 PROBLEM — R06.02 SHORTNESS OF BREATH: Status: RESOLVED | Noted: 2022-12-07 | Resolved: 2023-01-03

## 2023-01-03 PROCEDURE — 1160F RVW MEDS BY RX/DR IN RCRD: CPT | Performed by: NURSE PRACTITIONER

## 2023-01-03 PROCEDURE — 99214 OFFICE O/P EST MOD 30 MIN: CPT | Performed by: NURSE PRACTITIONER

## 2023-01-03 PROCEDURE — 1159F MED LIST DOCD IN RCRD: CPT | Performed by: NURSE PRACTITIONER

## 2023-01-03 RX ORDER — ATORVASTATIN CALCIUM 40 MG/1
40 TABLET, FILM COATED ORAL DAILY
Qty: 90 TABLET | Refills: 3 | Status: SHIPPED | OUTPATIENT
Start: 2023-01-03

## 2023-01-03 RX ORDER — NITROGLYCERIN 0.4 MG/1
0.4 TABLET SUBLINGUAL
Qty: 25 TABLET | Refills: 2 | Status: SHIPPED | OUTPATIENT
Start: 2023-01-03

## 2023-01-03 RX ORDER — METOPROLOL SUCCINATE 50 MG/1
50 TABLET, EXTENDED RELEASE ORAL DAILY
COMMUNITY
End: 2023-01-03 | Stop reason: SDUPTHER

## 2023-01-03 RX ORDER — METOPROLOL SUCCINATE 50 MG/1
50 TABLET, EXTENDED RELEASE ORAL DAILY
Qty: 90 TABLET | Refills: 3 | Status: SHIPPED | OUTPATIENT
Start: 2023-01-03

## 2023-01-03 RX ORDER — LISINOPRIL 20 MG/1
20 TABLET ORAL
Qty: 90 TABLET | Refills: 3 | Status: SHIPPED | OUTPATIENT
Start: 2023-01-03

## 2023-01-03 RX ORDER — FUROSEMIDE 40 MG/1
40 TABLET ORAL DAILY
Qty: 90 TABLET | Refills: 3 | Status: SHIPPED | OUTPATIENT
Start: 2023-01-03

## 2023-01-03 RX ORDER — POTASSIUM CHLORIDE 1500 MG/1
20 TABLET, FILM COATED, EXTENDED RELEASE ORAL 2 TIMES DAILY
Qty: 180 TABLET | Refills: 1 | Status: SHIPPED | OUTPATIENT
Start: 2023-01-03

## 2023-01-03 NOTE — PROGRESS NOTES
Subjective:     Encounter Date:01/03/2023      Patient ID: Speedy Nichols is a 47 y.o. male with known coronary artery disease in need of revascularization, chronic systolic congestive heart failure, hypertension, hyperlipidemia, uncontrolled insulin requiring diabetes mellitus who presents to the office for follow up. He was last seen by cardiology during his hospitlaization where he was diagnosed with the above problems.     Chief Complaint: CHF Follow up   Coronary Artery Disease  Presents for follow-up visit. Symptoms include chest pain. Pertinent negatives include no chest pressure, chest tightness, dizziness, leg swelling, palpitations, shortness of breath or weight gain. His past medical history is significant for CHF. The symptoms have been stable. Compliance with diet is good. Compliance with exercise is good. Compliance with medications is good.   Congestive Heart Failure  Presents for follow-up visit. Associated symptoms include chest pain. Pertinent negatives include no chest pressure, edema, fatigue, near-syncope, orthopnea, palpitations, paroxysmal nocturnal dyspnea, shortness of breath or unexpected weight change. The symptoms have been stable. His past medical history is significant for CAD.     He was seen for chest pain and shortness of breath in early December 2022. He was found to have an elevated troponin and reduced LV systolic function. He required treatment for acute heart failure. Coronary angiography noted severe multivessel coronary artery disease and recommendations included evaluation by CTS. He has been following with Dr. Wilburn as an outpatient. Most recent plans were to obtain a viability study, however this was out of network with insurance. New testing has not yet been ordered.    In terms of symptoms he has been stable. He reports chest tightness noted during cold days where he was nervous and took SL NTG. He had improvement of his symptoms with use of SL NTG. He has not had to  take SL recently (subsequently the weather has been warmer). We discussed recurrent use of SL NTG often should be communicated to us and consider Imdur Rx.    He denies any shortness of breath, weight gain, orthopnea, PND. He tells me that he has been compliant with his prescriptions since discharge. He is in need of refills. He takes them as prescribed but had stopped insulin. He resumed this recently after discussion with his PCP. He endorses stable fatigue.     He denies any lightheadedness, dizziness, near syncope, or syncope. He has no complaints. He is asking about why he was told by his insurance company that his hospital stay was not medically necessary.     The following portions of the patient's history were reviewed and updated as appropriate: allergies, current medications, past family history, past medical history, past social history and past surgical history.     Allergies   Allergen Reactions   • Poison Ivy Extract Rash       Current Outpatient Medications:   •  albuterol sulfate  (90 Base) MCG/ACT inhaler, Inhale 2 puffs Every 4 (Four) Hours As Needed for Wheezing or Shortness of Air., Disp: 1 inhaler, Rfl: 0  •  aspirin 81 MG EC tablet, Take 1 tablet by mouth Daily., Disp: , Rfl:   •  atorvastatin (Lipitor) 40 MG tablet, Take 1 tablet by mouth Daily., Disp: 90 tablet, Rfl: 3  •  furosemide (Lasix) 40 MG tablet, Take 1 tablet by mouth Daily., Disp: 90 tablet, Rfl: 3  •  Insulin Glargine (Lantus SoloStar) 100 UNIT/ML injection pen, Inject 25 Units under the skin into the appropriate area as directed., Disp: , Rfl:   •  lisinopril (PRINIVIL,ZESTRIL) 20 MG tablet, Take 1 tablet by mouth Daily., Disp: 90 tablet, Rfl: 3  •  metFORMIN (GLUCOPHAGE) 1000 MG tablet, Take 1 tablet by mouth 2 (Two) Times a Day With Meals., Disp: 60 tablet, Rfl: 0  •  metoprolol succinate XL (TOPROL-XL) 50 MG 24 hr tablet, Take 1 tablet by mouth Daily., Disp: 90 tablet, Rfl: 3  •  nitroglycerin (NITROSTAT) 0.4 MG SL  tablet, Place 1 tablet under the tongue Every 5 (Five) Minutes As Needed for Chest Pain (Only if SBP Greater Than 100). Take no more than 3 doses in 15 minutes., Disp: 25 tablet, Rfl: 2  •  Omega-3 Fatty Acids (fish oil) 1000 MG capsule capsule, Take  by mouth Daily With Breakfast., Disp: , Rfl:   •  potassium chloride ER (K-TAB) 20 MEQ tablet controlled-release ER tablet, Take 1 tablet by mouth 2 (Two) Times a Day., Disp: 180 tablet, Rfl: 1  •  sildenafil (VIAGRA) 100 MG tablet, Take 1 tablet by mouth As Needed., Disp: , Rfl:   Current outpatient and discharge medications have been reconciled for the patient.  Reviewed by: ANUPAM Morejon      Review of Systems   Constitutional: Positive for malaise/fatigue. Negative for diaphoresis, fatigue, fever, unexpected weight change and weight gain.   Cardiovascular: Positive for chest pain. Negative for dyspnea on exertion, irregular heartbeat, leg swelling, near-syncope, orthopnea, palpitations, paroxysmal nocturnal dyspnea and syncope.   Respiratory: Negative for chest tightness, cough, shortness of breath, sputum production and wheezing.    Hematologic/Lymphatic: Negative for bleeding problem.   Gastrointestinal: Negative for bloating, nausea and vomiting.   Neurological: Negative for dizziness, focal weakness, headaches, light-headedness, loss of balance and weakness.   Psychiatric/Behavioral: Negative for altered mental status.       Procedures  /75   Pulse 75   Resp 18   Ht 182.9 cm (72\")   Wt 76.7 kg (169 lb)   SpO2 98%   BMI 22.92 kg/m²        Objective:     Vitals reviewed.   Constitutional:       General: Awake. Not in acute distress.     Appearance: Normal and healthy appearance. Well-developed, well-groomed, normal weight and not in distress. Not ill-appearing.   HENT:      Head: Normocephalic and atraumatic.   Pulmonary:      Effort: Pulmonary effort is normal.      Breath sounds: Normal breath sounds. No wheezing. No rales.   Cardiovascular:       Normal rate. Regular rhythm.      Murmurs: There is no murmur.      No gallop. No rub.   Edema:     Peripheral edema absent.   Musculoskeletal:      Cervical back: Normal range of motion and neck supple. Skin:     General: Skin is warm and dry.   Neurological:      Mental Status: Alert, oriented to person, place, and time and oriented to person, place and time.   Psychiatric:         Attention and Perception: Attention normal.         Mood and Affect: Mood normal.         Speech: Speech normal.         Behavior: Behavior normal. Behavior is cooperative.         Thought Content: Thought content normal.         Cognition and Memory: Cognition normal.         Judgment: Judgment normal.         Lab Review:   Reviewed echo and lab results from recent hospital stay.     Results for orders placed during the hospital encounter of 12/07/22    Adult Transthoracic Echo Complete W/ Cont if Necessary Per Protocol    Interpretation Summary  •  Left ventricular systolic function is moderately decreased. Left ventricular ejection fraction appears to be 36 - 40%.  •  Left ventricular wall thickness is consistent with mild concentric hypertrophy.  •  Normal right ventricular cavity size and systolic function noted.  •  Mild to moderate mitral valve regurgitation is present.      Lab Results   Component Value Date    CHOL 162 12/08/2022    CHLPL 267 (H) 04/28/2021    TRIG 171 (H) 12/08/2022    HDL 38 (L) 12/08/2022    LDL 94 12/08/2022           Assessment:          Diagnosis Plan   1. Coronary artery disease involving native coronary artery of native heart without angina pectoris        2. Chronic systolic heart failure (HCC)        3. Essential hypertension        4. Mixed hyperlipidemia        5. Type 2 diabetes mellitus with hyperglycemia, with long-term current use of insulin (HCC)               Plan:       - CAD: stable symptoms. No recent chest pain. Does have SL NTG for use if needed. We discussed that if he has recurrent  chest pain to call our office and consider imdur for management. He is being seen and considered for surgical revascularization by CTS. Plans were for viability study. Insurance would not pay out of network. Unsure resolution. Continue aspirin, statin, and beta blocker therapies.     - CHF: treated for acute heart failure during admission last month. Stable now without shortness of breath, rapid weight gain, edema, orthopnea, or PND. Currently managed on lisinopril, Toprol XL, and lasix. Discussed optimization of CHF Meds. He is currently pleased with his medicines - we discussed if he has worsening symptoms changes to Entresto or other medications would be ideal such as spironolactone or even SGLT2 inhibitor. Low sodium diet, Monitor weights.    - HTN: continue current medications.    - HLD: LDL goal < 70 given known CAD. Continue high intensity statin. Most recent LDL referenced above.     - T2DM: on insulin. Hg A1C > 7%. Follows with PCP. Also on metformin currently.       Continue current regimen with close attention to his symptoms.   Call with chest pain, shortness of breath, or weight gain.  Follow up in 3 months, sooner if needed.   Defer revascularization to CTS office at this time.

## 2023-01-06 ENCOUNTER — HOSPITAL ENCOUNTER (OUTPATIENT)
Dept: CARDIOLOGY | Facility: HOSPITAL | Age: 48
Discharge: HOME | End: 2023-01-06
Payer: COMMERCIAL

## 2023-01-06 ENCOUNTER — HOSPITAL ENCOUNTER (OUTPATIENT)
Dept: PULMONOLOGY | Facility: HOSPITAL | Age: 48
Discharge: HOME | End: 2023-01-06
Payer: COMMERCIAL

## 2023-01-06 VITALS
DIASTOLIC BLOOD PRESSURE: 94 MMHG | HEIGHT: 72 IN | WEIGHT: 169 LBS | BODY MASS INDEX: 22.89 KG/M2 | SYSTOLIC BLOOD PRESSURE: 154 MMHG

## 2023-01-06 DIAGNOSIS — I25.10 CORONARY ARTERY DISEASE INVOLVING NATIVE CORONARY ARTERY OF NATIVE HEART WITHOUT ANGINA PECTORIS: ICD-10-CM

## 2023-01-06 LAB
ARTERIAL PATENCY WRIST A: POSITIVE
ATMOSPHERIC PRESS: 756 MMHG
BASE EXCESS BLDA CALC-SCNC: 1.9 MMOL/L (ref 0–2)
BDY SITE: ABNORMAL
BH CV ECHO MEAS - AO MAX PG: 6.5 MMHG
BH CV ECHO MEAS - AO MEAN PG: 3 MMHG
BH CV ECHO MEAS - AO ROOT DIAM: 3.5 CM
BH CV ECHO MEAS - AO V2 MAX: 127 CM/SEC
BH CV ECHO MEAS - AO V2 VTI: 22.3 CM
BH CV ECHO MEAS - AVA(I,D): 3.4 CM2
BH CV ECHO MEAS - EDV(CUBED): 91.1 ML
BH CV ECHO MEAS - EDV(MOD-SP4): 90.7 ML
BH CV ECHO MEAS - EF(MOD-SP4): 50.1 %
BH CV ECHO MEAS - ESV(CUBED): 29.8 ML
BH CV ECHO MEAS - ESV(MOD-SP4): 45.3 ML
BH CV ECHO MEAS - FS: 31.1 %
BH CV ECHO MEAS - IVS/LVPW: 0.92 CM
BH CV ECHO MEAS - IVSD: 1.1 CM
BH CV ECHO MEAS - LA DIMENSION: 3.5 CM
BH CV ECHO MEAS - LAT PEAK E' VEL: 6.9 CM/SEC
BH CV ECHO MEAS - LV DIASTOLIC VOL/BSA (35-75): 45.7 CM2
BH CV ECHO MEAS - LV MASS(C)D: 186.4 GRAMS
BH CV ECHO MEAS - LV MAX PG: 3.6 MMHG
BH CV ECHO MEAS - LV MEAN PG: 2 MMHG
BH CV ECHO MEAS - LV SYSTOLIC VOL/BSA (12-30): 22.8 CM2
BH CV ECHO MEAS - LV V1 MAX: 94.6 CM/SEC
BH CV ECHO MEAS - LV V1 VTI: 16.7 CM
BH CV ECHO MEAS - LVIDD: 4.5 CM
BH CV ECHO MEAS - LVIDS: 3.1 CM
BH CV ECHO MEAS - LVOT AREA: 4.5 CM2
BH CV ECHO MEAS - LVOT DIAM: 2.4 CM
BH CV ECHO MEAS - LVPWD: 1.2 CM
BH CV ECHO MEAS - MED PEAK E' VEL: 4.4 CM/SEC
BH CV ECHO MEAS - MV A MAX VEL: 97 CM/SEC
BH CV ECHO MEAS - MV DEC TIME: 0.19 MSEC
BH CV ECHO MEAS - MV E MAX VEL: 57.7 CM/SEC
BH CV ECHO MEAS - MV E/A: 0.59
BH CV ECHO MEAS - PA V2 MAX: 85.9 CM/SEC
BH CV ECHO MEAS - RAP SYSTOLE: 5 MMHG
BH CV ECHO MEAS - RVSP: 26.3 MMHG
BH CV ECHO MEAS - SI(MOD-SP4): 22.9 ML/M2
BH CV ECHO MEAS - SV(LVOT): 75.5 ML
BH CV ECHO MEAS - SV(MOD-SP4): 45.4 ML
BH CV ECHO MEAS - TR MAX PG: 21.3 MMHG
BH CV ECHO MEAS - TR MAX VEL: 231 CM/SEC
BH CV ECHO MEASUREMENTS AVERAGE E/E' RATIO: 10.21
BODY TEMPERATURE: 37 C
HCO3 BLDA-SCNC: 26.5 MMOL/L (ref 20–26)
LEFT ATRIUM VOLUME INDEX: 18.8 ML/M2
LEFT ATRIUM VOLUME: 37.2 ML
Lab: ABNORMAL
MAXIMAL PREDICTED HEART RATE: 172 BPM
MODALITY: ABNORMAL
PCO2 BLDA: 40.3 MM HG (ref 35–45)
PCO2 TEMP ADJ BLD: 40.3 MM HG (ref 35–45)
PH BLDA: 7.43 PH UNITS (ref 7.35–7.45)
PH, TEMP CORRECTED: 7.43 PH UNITS (ref 7.35–7.45)
PO2 BLDA: 102 MM HG (ref 83–108)
PO2 TEMP ADJ BLD: 102 MM HG (ref 83–108)
SAO2 % BLDCOA: 99.1 % (ref 94–99)
STRESS TARGET HR: 146 BPM
VENTILATOR MODE: ABNORMAL

## 2023-01-06 PROCEDURE — 36600 WITHDRAWAL OF ARTERIAL BLOOD: CPT

## 2023-01-06 PROCEDURE — 93306 TTE W/DOPPLER COMPLETE: CPT

## 2023-01-06 PROCEDURE — 94729 DIFFUSING CAPACITY: CPT | Performed by: INTERNAL MEDICINE

## 2023-01-06 PROCEDURE — 94726 PLETHYSMOGRAPHY LUNG VOLUMES: CPT

## 2023-01-06 PROCEDURE — 94729 DIFFUSING CAPACITY: CPT

## 2023-01-06 PROCEDURE — 82803 BLOOD GASES ANY COMBINATION: CPT

## 2023-01-06 PROCEDURE — 94726 PLETHYSMOGRAPHY LUNG VOLUMES: CPT | Performed by: INTERNAL MEDICINE

## 2023-01-06 PROCEDURE — 94060 EVALUATION OF WHEEZING: CPT

## 2023-01-06 PROCEDURE — 94060 EVALUATION OF WHEEZING: CPT | Performed by: INTERNAL MEDICINE

## 2023-01-06 PROCEDURE — 93306 TTE W/DOPPLER COMPLETE: CPT | Performed by: INTERNAL MEDICINE

## 2023-01-06 RX ORDER — ALBUTEROL SULFATE 2.5 MG/3ML
2.5 SOLUTION RESPIRATORY (INHALATION) ONCE
Status: COMPLETED | OUTPATIENT
Start: 2023-01-06 | End: 2023-01-06

## 2023-01-06 RX ADMIN — ALBUTEROL SULFATE 2.5 MG: 2.5 SOLUTION RESPIRATORY (INHALATION) at 08:10

## 2023-01-09 ENCOUNTER — TELEPHONE (OUTPATIENT)
Dept: CARDIAC SURGERY | Facility: CLINIC | Age: 48
End: 2023-01-09
Payer: COMMERCIAL

## 2023-01-09 NOTE — TELEPHONE ENCOUNTER
Pt returned call. He would like a return call from Mavis montgomery: the above. He states that our office will have to \"send off for a special collections,\" but he would not be more specific than that. Informed him that I will message providers to return call. Pt can be reached at [phone].

## 2023-01-09 NOTE — TELEPHONE ENCOUNTER
Called the [facility] number listed above. Left message requesting a return call regarding special blood collections form we need faxed to our office. When call is returned, need to inform that pt will need 2-4 units of RBCs, 1-2 units of fresh frozen platelets (FFPs), and 2 units of platelets. We must also know a time frame as pt is wanting to donate his own blood and blood products.

## 2023-01-09 NOTE — TELEPHONE ENCOUNTER
Awais from [facility] returned call to confirm what is actually needed. Informed him of all of the above. He does states that he does not have an exact time frame for the collection of the amount of blood and blood products needed. He states he will find out more information regarding this and the exact form needed, and call our office back.

## 2023-01-09 NOTE — TELEPHONE ENCOUNTER
I spoke with blood bank who states that known donor blood must be done through the [facility].  I have advised the patient that this may take a week or more to get the blood and that blood is only good for 28 days.  He states he would like to speak with the [facility] about donating his own blood for this procedure.  I have advised him to call me after he speaks with the [facility] that way we can coordinate surgery timing in relation to blood.  He verbalizes understanding and will call me back with more information.

## 2023-01-12 NOTE — TELEPHONE ENCOUNTER
Called to follow-up with this as we have not yet heard back from Awais at [facility]. No answer. LM requesting call back re: the above.

## 2023-01-13 ENCOUNTER — TELEPHONE (OUTPATIENT)
Dept: CARDIAC SURGERY | Facility: CLINIC | Age: 48
End: 2023-01-13
Payer: COMMERCIAL

## 2023-01-13 NOTE — TELEPHONE ENCOUNTER
Caller: Speedy Nichols    Relationship: Self    Best call back number: [phone]    What form or medical record are you requesting: STATING THAT HE IS HAVING HEART FAILURE, HE IS SUPPOSED TO BE HAVING SURGERY, AND THAT IS HE CURRENTLY NOT WORKING AT THIS TIME DUE TO HIS CONDITION.     Who is requesting this form or medical record from you: [facility]     How would you like to receive the form or medical records (pick-up, mail, fax):   If fax, what is the fax number: [phone] [facility]   If mail, what is the address:   If pick-up, provide patient with address and location details    Timeframe paperwork needed: ASAP OR THE FIRST OF NEXT WEEK.

## 2023-01-16 NOTE — TELEPHONE ENCOUNTER
Called pt to discuss requested paper for child support enforcement (see other telephone encounter). Also informed pt that we have attempted to contact [facility] times with 1 call back and Awais was supposed to call our office back with more information, and has yet to do so. Pt states that he will try to call the [facility], himself, tomorrow as he believes they are probably closed due to P[employer] Damico [employer] Day. Pt has no further questions at this time.

## 2023-01-16 NOTE — TELEPHONE ENCOUNTER
I discussed this with Dr. Wilburn.  He cannot give a work note until after surgery.  He will need to discuss this with his primary care provider.

## 2023-01-16 NOTE — TELEPHONE ENCOUNTER
Called the [facility] again. No answer. LM for them to return our call re: the above. This is the 3rd attempt of trying to reach them.

## 2023-01-17 NOTE — TELEPHONE ENCOUNTER
Cierra with [facility] called re: this. She states this method is highly contraindicated. She states that this must be both physician approved and [facility] director approved. [facility] director has denied this as this is only approved in extreme cases such as no other match in the country. She states this is extremely dangerous to do this and the blood and blood products must be collected 7-10 days prior to surgery. She states they do not collect autologous platelets. Cierra states that pt would also be responsible for his own type and screen and cross match. She also mentions there is no evidence to support Covid vaccines and antigens transfused via blood. She does again stress that [facility] director has not approved this and even if it was approved, collecting this amount of blood and blood prodcucts within 7-10 days is extremely dangerous - as he would need his blood immediately after donating. This would be entirely unsafe prior to surgery.

## 2023-01-17 NOTE — TELEPHONE ENCOUNTER
[facility] contacted our office and notified that we do not accept donations from the [facility]. If pt would still like to self-donate, he must go to [facility] and they will not draw more than 1 unit at a time. There may be a charge for this as well.

## 2023-01-18 NOTE — TELEPHONE ENCOUNTER
Dr. Wilburn spoke with patient.  He explained that autologous blood donation is not an option.  See above messages.  He explained that the patient will need to ok with transfusion of both PRBC as well as other blood products if he were to proceed with surgery.  The patient would like to discuss this over with his family and call back and let us know his decision.

## 2023-01-23 NOTE — PROGRESS NOTES
Cardiac Surgery Consultation    Referring Physician: Dr. Gopal King    Primary Care Physician: Dr. Selvin Schumacher    Chief Complaint   Patient presents with   • Coronary Artery Disease     New patient referred from Dr. Mcintosh         Subjective     History of Present Illness  48 yo male who presents with severe multivessel CAD, LV systolic dysfunction, heart failure symptoms.  He has history of diabetes and is on insulin.  He was admitted for 1 week to the hospital with heart failure exacerbation, swollen legs and SOB.  1 month prior to that he had the flu and thought it was just extension of that.  Currently he gets SOB with activity and has some chest pain mainly with activity but occasionally at rest.  He has never had surgery on his heart lungs or chest.  Never had TIA/Stroke, no history of cancer.  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  He has family history of CAD in multiple family members.  His workup found EF of 36%, coronary angiography showed severe CAD and he was referred to me for consideration of CABG.  He is very worried about getting blood products from COVID vaccinated donors after surgery.        Review of Systems   Constitutional: Positive for activity change and fatigue. Negative for unexpected weight change.   Respiratory: Positive for chest tightness and shortness of breath.    Cardiovascular: Positive for chest pain and leg swelling.        A complete review of systems was performed, is negative except stated above.    Past Medical History:   Diagnosis Date   • Diabetes mellitus (HCC)    • Hypertension    • Type 2 diabetes mellitus (HCC)      Past Surgical History:   Procedure Laterality Date   • CARDIAC CATHETERIZATION N/A 12/8/2022    Procedure: Left Heart Cath;  Surgeon: Gopal King MD;  Location: [facility] CATH INVASIVE LOC[facility];  Service: Cardiology;  Laterality: N/A;   • TOOTH EXTRACTION Right 09/21/2022   • WISDOM TOOTH EXTRACTION        Family History   Problem Relation Age of Onset   • Diabetes Father    • Diabetes Paternal Grandmother      Social History     Tobacco Use   • Smoking status: Never   • Smokeless tobacco: Never   Vaping Use   • Vaping Use: Never used   Substance Use Topics   • Alcohol use: No     Comment: 0   • Drug use: No     Current Outpatient Medications   Medication Sig Dispense Refill   • albuterol sulfate  (90 Base) MCG/ACT inhaler Inhale 2 puffs Every 4 (Four) Hours As Needed for Wheezing or Shortness of Air. 1 inhaler 0   • aspirin 81 MG EC tablet Take 1 tablet by mouth Daily.     • Insulin Glargine (Lantus SoloStar) 100 UNIT/ML injection pen Inject 25 Units under the skin into the appropriate area as directed.     • metFORMIN (GLUCOPHAGE) 1000 MG tablet Take 1 tablet by mouth 2 (Two) Times a Day With Meals. 60 tablet 0   • Omega-3 Fatty Acids (fish oil) 1000 MG capsule capsule Take  by mouth Daily With Breakfast.     • sildenafil (VIAGRA) 100 MG tablet Take 1 tablet by mouth As Needed.     • atorvastatin (Lipitor) 40 MG tablet Take 1 tablet by mouth Daily. 90 tablet 3   • furosemide (Lasix) 40 MG tablet Take 1 tablet by mouth Daily. 90 tablet 3   • lisinopril (PRINIVIL,ZESTRIL) 20 MG tablet Take 1 tablet by mouth Daily. 90 tablet 3   • metoprolol succinate XL (TOPROL-XL) 50 MG 24 hr tablet Take 1 tablet by mouth Daily. 90 tablet 3   • nitroglycerin (NITROSTAT) 0.4 MG SL tablet Place 1 tablet under the tongue Every 5 (Five) Minutes As Needed for Chest Pain (Only if SBP Greater Than 100). Take no more than 3 doses in 15 minutes. 25 tablet 2   • potassium chloride ER (K-TAB) 20 MEQ tablet controlled-release ER tablet Take 1 tablet by mouth 2 (Two) Times a Day. 180 tablet 1     No current facility-administered medications for this visit.     Allergies:  Poison ivy extract    Objective      Vital Signs  Visit Vitals  /92 (BP Location: Right arm, Patient Position: Sitting, Cuff Size: Adult)   Pulse 78   Ht 182.9  "cm (72\")   Wt 80.1 kg (176 lb 9.6 oz)   SpO2 98%   BMI 23.95 kg/m²         Physical Exam  Constitutional:       General: He is not in acute distress.     Appearance: He is well-developed. He is not diaphoretic.   HENT:      Head: Normocephalic and atraumatic.      Right Ear: External ear normal.      Left Ear: External ear normal.   Eyes:      General:         Right eye: No discharge.         Left eye: No discharge.      Pupils: Pupils are equal, round, and reactive to light.   Neck:      Vascular: No JVD.      Trachea: No tracheal deviation.   Cardiovascular:      Rate and Rhythm: Normal rate and regular rhythm.      Heart sounds: Normal heart sounds. No murmur heard.  Pulmonary:      Effort: Pulmonary effort is normal. No respiratory distress.      Breath sounds: Normal breath sounds. No stridor. No wheezing.   Abdominal:      General: There is no distension.      Palpations: Abdomen is soft.      Tenderness: There is no abdominal tenderness. There is no guarding.   Musculoskeletal:         General: No tenderness or deformity. Normal range of motion.      Cervical back: Normal range of motion and neck supple.   Skin:     General: Skin is warm and dry.      Capillary Refill: Capillary refill takes less than 2 seconds.      Coloration: Skin is not pale.      Findings: No erythema or rash.   Neurological:      Mental Status: He is alert and oriented to person, place, and time.      Motor: No abnormal muscle tone.      Coordination: Coordination normal.   Psychiatric:         Behavior: Behavior normal.         Thought Content: Thought content normal.         Judgment: Judgment normal.         Results Review:     WBC   Date Value Ref Range Status   12/08/2022 9.67 3.40 - 10.80 10*3/mm3 Final   04/28/2021 5.7 4.8 - 10.8 K/uL Final     RBC   Date Value Ref Range Status   12/08/2022 3.86 (L) 4.14 - 5.80 10*6/mm3 Final   04/28/2021 4.78 4.70 - 6.10 M/uL Final     Hemoglobin   Date Value Ref Range Status   12/08/2022 11.1 " (L) 13.0 - 17.7 g/dL Final   04/28/2021 14.1 14.0 - 18.0 g/dL Final     Hematocrit   Date Value Ref Range Status   12/08/2022 35.5 (L) 37.5 - 51.0 % Final   04/28/2021 42.5 42.0 - 52.0 % Final     MCV   Date Value Ref Range Status   12/08/2022 92.0 79.0 - 97.0 fL Final   04/28/2021 88.9 80.0 - 94.0 fL Final     MCH   Date Value Ref Range Status   12/08/2022 28.8 26.6 - 33.0 pg Final   04/28/2021 29.5 27.0 - 31.0 pg Final     MCHC   Date Value Ref Range Status   12/08/2022 31.3 (L) 31.5 - 35.7 g/dL Final   04/28/2021 33.2 33.0 - 37.0 g/dL Final     RDW   Date Value Ref Range Status   12/08/2022 13.0 12.3 - 15.4 % Final   04/28/2021 12.3 11.5 - 14.5 % Final     RDW-SD   Date Value Ref Range Status   12/08/2022 42.8 37.0 - 54.0 fl Final     MPV   Date Value Ref Range Status   12/08/2022 11.1 6.0 - 12.0 fL Final   04/28/2021 11.7 9.4 - 12.4 fL Final     Platelets   Date Value Ref Range Status   12/08/2022 217 140 - 450 10*3/mm3 Final   04/28/2021 162 130 - 400 K/uL Final     Neutrophil Rel %   Date Value Ref Range Status   04/28/2021 63.3 50.0 - 65.0 % Final     Neutrophil %   Date Value Ref Range Status   12/08/2022 77.9 (H) 42.7 - 76.0 % Final     Lymphocyte Rel %   Date Value Ref Range Status   04/28/2021 25.1 20.0 - 40.0 % Final     Lymphocyte %   Date Value Ref Range Status   12/08/2022 13.4 (L) 19.6 - 45.3 % Final     Monocyte Rel %   Date Value Ref Range Status   04/28/2021 7.1 0.0 - 10.0 % Final     Monocyte %   Date Value Ref Range Status   12/08/2022 7.9 5.0 - 12.0 % Final     Eosinophil Rel %   Date Value Ref Range Status   04/28/2021 3.2 0.0 - 5.0 % Final     Eosinophil %   Date Value Ref Range Status   12/08/2022 0.2 (L) 0.3 - 6.2 % Final     Basophil Rel %   Date Value Ref Range Status   04/28/2021 0.9 0.0 - 1.0 % Final     Basophil %   Date Value Ref Range Status   12/08/2022 0.2 0.0 - 1.5 % Final     Immature Grans %   Date Value Ref Range Status   12/08/2022 0.4 0.0 - 0.5 % Final     Neutrophils  Absolute   Date Value Ref Range Status   04/28/2021 3.6 1.5 - 7.5 K/uL Final     Neutrophils, Absolute   Date Value Ref Range Status   12/08/2022 7.53 (H) 1.70 - 7.00 10*3/mm3 Final     Lymphocytes Absolute   Date Value Ref Range Status   04/28/2021 1.4 1.1 - 4.5 K/uL Final     Lymphocytes, Absolute   Date Value Ref Range Status   12/08/2022 1.30 0.70 - 3.10 10*3/mm3 Final     Monocytes Absolute   Date Value Ref Range Status   04/28/2021 0.40 0.00 - 0.90 K/uL Final     Monocytes, Absolute   Date Value Ref Range Status   12/08/2022 0.76 0.10 - 0.90 10*3/mm3 Final     Eosinophils Absolute   Date Value Ref Range Status   04/28/2021 0.20 0.00 - 0.60 K/uL Final     Eosinophils, Absolute   Date Value Ref Range Status   12/08/2022 0.02 0.00 - 0.40 10*3/mm3 Final     Basophils Absolute   Date Value Ref Range Status   04/28/2021 0.10 0.00 - 0.20 K/uL Final     Basophils, Absolute   Date Value Ref Range Status   12/08/2022 0.02 0.00 - 0.20 10*3/mm3 Final     Immature Grans, Absolute   Date Value Ref Range Status   12/08/2022 0.04 0.00 - 0.05 10*3/mm3 Final   04/28/2021 0.0 K/uL Final     nRBC   Date Value Ref Range Status   12/08/2022 0.0 0.0 - 0.2 /100 WBC Final     Glucose   Date Value Ref Range Status   12/08/2022 184 (H) 65 - 99 mg/dL Final   04/28/2021 312 (H) 74 - 109 mg/dL Final     Sodium   Date Value Ref Range Status   12/08/2022 137 136 - 145 mmol/L Final   04/28/2021 133 (L) 136 - 145 mmol/L Final     Potassium   Date Value Ref Range Status   12/08/2022 4.3 3.5 - 5.2 mmol/L Final   04/28/2021 4.6 3.5 - 5.0 mmol/L Final     CO2   Date Value Ref Range Status   12/08/2022 27.0 22.0 - 29.0 mmol/L Final   04/28/2021 25 22 - 29 mmol/L Final     Chloride   Date Value Ref Range Status   12/08/2022 101 98 - 107 mmol/L Final   04/28/2021 93 (L) 98 - 111 mmol/L Final     Anion Gap   Date Value Ref Range Status   12/08/2022 9.0 5.0 - 15.0 mmol/L Final   04/28/2021 15 7 - 19 mmol/L Final     Creatinine   Date Value Ref Range  Status   12/08/2022 0.96 0.76 - 1.27 mg/dL Final   04/28/2021 0.9 0.5 - 1.2 mg/dL Final     BUN   Date Value Ref Range Status   12/08/2022 23 (H) 6 - 20 mg/dL Final   04/28/2021 19 6 - 20 mg/dL Final     BUN/Creatinine Ratio   Date Value Ref Range Status   12/08/2022 24.0 7.0 - 25.0 Final     Calcium   Date Value Ref Range Status   12/08/2022 8.6 8.6 - 10.5 mg/dL Final   04/28/2021 9.1 8.6 - 10.0 mg/dL Final     eGFR Non  Am   Date Value Ref Range Status   04/28/2021 >60 >60 Final     Comment:     This calculation may be inaccurate for patients under the age of 18 years.  For ages 18 and older, a GFR >60 mL/min/1.73m2 (not corrected for weight) is  valid for stable renal function.     Alkaline Phosphatase   Date Value Ref Range Status   12/08/2022 47 39 - 117 U/L Final   04/28/2021 89 40 - 130 U/L Final     Total Protein   Date Value Ref Range Status   12/08/2022 5.8 (L) 6.0 - 8.5 g/dL Final   04/28/2021 6.9 6.6 - 8.7 g/dL Final     ALT (SGPT)   Date Value Ref Range Status   12/08/2022 14 1 - 41 U/L Final   04/28/2021 27 5 - 41 U/L Final     AST (SGOT)   Date Value Ref Range Status   12/08/2022 13 1 - 40 U/L Final   04/28/2021 22 5 - 40 U/L Final     Total Bilirubin   Date Value Ref Range Status   12/08/2022 0.2 0.0 - 1.2 mg/dL Final   04/28/2021 0.3 0.2 - 1.2 mg/dL Final     Albumin   Date Value Ref Range Status   12/08/2022 3.50 3.50 - 5.20 g/dL Final   04/28/2021 4.2 3.5 - 5.2 g/dL Final     Globulin   Date Value Ref Range Status   12/08/2022 2.3 gm/dL Final        I reviewed the patient's clinical results and discussed with patient.    CT Chest:  There is no previous similar study for comparison. Correlation made with  radiographs of the chest obtained earlier today.     There is normal opacification of pulmonary arteries and branches  bilaterally. No filling defects in the opacified pulmonary arterial bed  bilaterally.     The RV/LV ratio is 29/49 which is normal. No finding to suggest right  heart  strain.     Atheromatous changes thoracic aorta is seen. No aneurysmal dilatation or  dissection.     Atheromatous changes of coronary arteries are noted.     There is no evidence of mediastinal or hilar mass or lymphadenopathy.     The limited visualized thyroid gland is unremarkable.     There is no axillary lymphadenopathy.     The lungs are poorly expanded. There are significant interlobular septal  thickening suggesting fluid retention/pulmonary edema. There are  scattered areas of small to moderate size foci of pulmonary  consolidation, more pronounced in the upper lobes, left more than the  right. These probably represent areas of discoid atelectasis or  pneumonic consolidation/inflammatory/infectious process. The largest  area of consolidation is in the lingular segment of the left upper lobe  measuring 1.2 x 2.9 cm.     There is bilateral moderate pleural effusion, right more than the left.     A few calcified granulomas are seen in the lungs bilaterally, more so in  the right middle lobe.     There is moderate splenomegaly. The spleen measured 30.1 x 13 x 9 cm.  Limited visualized liver is unremarkable. No gallstones. The adrenal  glands are normal. The pancreas and kidneys are incompletely included  and not well evaluated.     Images are reviewed in bone window show no acute bony abnormality or  bone lesion.     IMPRESSION:  1. No evidence of pulmonary embolism. No aortic aneurysm or dissection.  Atheromatous changes of coronary arteries.  2. Evidence of pulmonary edema.  3. Foci of consolidation the lungs bilaterally probably represent  superimposed inflammatory/infectious process. This needs to be further  evaluated by a follow-up examination.  4. Moderate bibasal pleural effusion, right more than the left.  5. The splenomegaly.                This report was finalized on 12/07/2022 11:51 by Dr. Roxanne Wilder MD.  ECHO:  Interpretation Summary       •  Left ventricular systolic function is low normal.  Left ventricular ejection fraction appears to be 51 - 55% (improved compared to 12/2022).  •  There is mild hypokinesis of the anterior and anteroseptal walls; however, there is noted thickening, suggesting viability.  •  Left ventricular wall thickness is consistent with mild concentric hypertrophy.  •  Normal right ventricular cavity size and systolic function noted.  •  No significant valvular abnormalities noted.    Cath:  Results:     Selective Coronary Angiography:    Left Main Coronary Artery: The left main coronary artery arises from the left coronary cusp and bifurcates into the LAD and left circumflex arteries. Luminal irregularities are present, but no significant disease.     Left Anterior Descending Artery: The left anterior descending artery arises from the distal left main coronary artery and supplies 1 diagonal branch before becoming totally occluded.  The diagonal branch contains a severe proximal stenosis.  The distal portion of this branch contains mild irregularities.  The distal LAD is seen filling very late, likely by left to left collaterals or bridging collaterals.  The distal vessel is grossly underfilled.     Left Circumflex: The left circumflex artery arises from the distal left main artery and supplies 3 OM branches.  The first obtuse marginal branch contains a severe mid vessel stenosis of greater than 80%. The second obtuse marginal branch also contains a severe proximal stenosis of 80%.  The third obtuse marginal/PL branch is a small caliber vessel with mild diffuse disease.     Right Coronary Artery: The right coronary artery arises from the right coronary cusp and is dominant for the posterior circulation.  The right coronary artery is diffusely diseased.  The posterior descending artery has 2 tandem lesions in the proximal third of the branch that appear to be severe, 80% or greater.  The distal two thirds of the PDA contain mild disease.    Left Heart Catheterization:  - LV: 110/30  mmHg  - Ao: 110/70 mmHg     Total contrast: 100 mL     Fluoroscopy time: 2.1 min     X-ray exposure: 396 mGy     Estimated Blood Loss: Minimal    Specimens: None    Complications: None    Impression:  1.  Acute systolic heart failure, diagnosed this admission, considered ischemic cardiomyopathy given today's angiographic findings.  2.  Significant native multivessel coronary artery disease as outlined above.  3.  Significant elevation of left ventricular end-diastolic pressure, consistent with the patient's presentation of acute systolic heart failure.     Plan:  1.  Routine post procedure orders, including gentle IV fluids for a few hours then discontinue, TR band per protocol, 2 hours bedrest.  2.  Continue IV diuretics, goal-directed medical therapy.  3.  Long-term, consider for coronary artery bypass grafting, however the patient will need further optimization of heart failure prior to considering for revascularization.  Revascularization may be considered as an outpatient.  At this time, we will focus on medical therapy for the patient's heart failure, which is likely the major cause for his presentation.  Coronary artery disease will be addressed later in the patient's course.    I personally reviewed images of following exams, the following is my interpretation:    CT Chest: No calcium at clamp proximal cannulation sites no evidence of aneurysm some pleural effusions likely related to volume overload and heart failure  ECHO: Low normal LV function no significant valvular disease  CATH: Right dominant coronary system, severe proximal RCA disease as well as severe disease in the proximal portion of PDA the mid LAD is occluded with left to left collaterals filling and appears graftable distally, large diagonal branch with severe proximal stenosis, OM 1 and 2 both with severe proximal disease        Assessment & Plan   Mr. Nichols is a 47 year old male who presents with unstable angina, chronic ischemic related  heart failure.  He had a hospital admission where his initial EF was found to be 35% but after medical optimization is improved to 50 to 55%.  He is a never smoker but does have diabetes on insulin.  His last hemoglobin A1c was 7.4.  He is otherwise a very good candidate for CABG.    Today we discussed the natural history of coronary disease such as theirs.  We discussed treatment options including medical management, PCI, and coronary artery bypass grafting.  We agreed that coronary bypass grafting is the best treatment option.  I discussed with them preoperative work-up, operative expectations and postoperative expectations, they understand and agree to proceed.  The patient is very concerned about receiving donor blood from a COVID vaccinated person, I explained in length that there is no way to predict this or select this.  He was inquiring about having [employer] donation or having people he knows donate, we contacted the bone back and this is not a option.  I discussed with him that he would need to be agreeable for transfusion to have surgery.  He wants to think about this but is going to get back to us.    We discussed the risk of surgery including but not limited to bleeding, infection, injury to major organs or vessels, chronic heart failure, arrhythmias, need for pacemaker, prolonged ventilation, renal failure, stroke, risk of anesthesia, risk of sternal wound or bone healing problems, reoperation, and/or death.  I calculate her patient specific STS risk score for isolated CABG, I discussed this with him they understand and agree to proceed with surgery.    Thank you for trusting me with the care of Mr. Nichols.  Please do not hesitate to call with any questions or concerns.    Papi Wilburn MD  Cardiothoracic Surgeon    I spent a total of 64 minutes today before during and after visit reviewing records educating the patient about their disease and ordering test.

## 2023-01-27 ENCOUNTER — PATIENT ROUNDING (BHMG ONLY) (OUTPATIENT)
Dept: CARDIAC SURGERY | Facility: CLINIC | Age: 48
End: 2023-01-27
Payer: COMMERCIAL

## 2023-01-27 NOTE — PROGRESS NOTES
A [employer] message has been sent to the patient for PATIENT ROUNDING with [facility].

## 2023-02-02 NOTE — TELEPHONE ENCOUNTER
Pt has called and states that he has people that want to donate blood and he wants to proceed with surgery. Please advise.

## 2023-02-03 NOTE — TELEPHONE ENCOUNTER
It was my understanding that this wasn't an option either as [facility] does not accept donations from the [facility]?

## 2023-02-03 NOTE — TELEPHONE ENCOUNTER
I discussed this with Dr. Wilburn.  He will need 4 units available at the time of surgery and if more are needed during surgery, he must be agreeable to additional units that are not known doner units.  This includes all blood products (platelets, FFP, etc)  If he is agreeable to this, per Dr. Wilburn he will sign the order if patient will get us all needed paperwork.

## 2023-02-03 NOTE — TELEPHONE ENCOUNTER
Spoke with Saskia in [facility]. She states pt will need to contact the [facility] ([facility]) at [phone] and ask how to set up direct donation. He will need doctor orders for directed donations. Pt will need to know surgery date. They will set up a date/time for the donations. There will be a charge. They will have to make sure donator's blood matches with his (charge still applies if there is no match). This process will either be done in [place] or [place] - depending on where they set up his appointment.

## 2023-02-06 NOTE — TELEPHONE ENCOUNTER
"Called pt and discussed the above. Provided phone number to [facility] to discuss process of setting everything up as well as the need for 4 units RBCs, 2 units FFPs, and 2 units of platelets. He does say that his mother will be undergoing surgery in [place] and he needs to know if he has to be around for that. He will call our office to let us know when he is ready to begin the process of setting up a surgery date and coordinate that with [facility]. He also confirms that he will accept blood products from non-known donors because \"that will mean that something serious is happening.\" Informed him that I will let Dr. Wilburn know and we will look forward to his call. Pt voiced understanding.  "

## 2023-02-24 DIAGNOSIS — E11.9 TYPE 2 DIABETES MELLITUS WITHOUT COMPLICATION, WITHOUT LONG-TERM CURRENT USE OF INSULIN (HCC): ICD-10-CM

## 2023-02-24 NOTE — TELEPHONE ENCOUNTER
Received fax from pharmacy requesting refill on pts medication(s). Pt was last seen in office on 12/29/22  and has a follow up scheduled for 3/29/2023. Will send request to  Dr. Alaina Dubon  for patient.      Requested Prescriptions     Pending Prescriptions Disp Refills    metFORMIN (GLUCOPHAGE) 1000 MG tablet 180 tablet 3     Sig: Take 1 tablet by mouth 2 times daily (with meals)

## 2023-02-28 NOTE — TELEPHONE ENCOUNTER
"Cierra with [facility] called this morning and states that upon reviewing the submitted Physician's Request for Nonhomologous Blood, pt must first know his surgery date, which has not yet been set. Pt must know his blood type and have donors that match, as [facility] does not perform Type & Screen. If the donors are not a match for pt, that blood, this is a volunteer program and that blood is donated to someone in need that is a match. She also states that FFP's and Platelets are going to be nearly impossible to obtain. She states that pt will need to already be a platelet donor and he will need several donors to meet the required amount for surgery. Last item that needs to be changed is the box \"Autologous and/or Directed\" needs to be changed to \"Directed Only.\" She also explains that because this is a volunteer program, there will be no charge for the donations, but he will likely have a charge on the hospital bill for the transfusions. We also discussed that some of this information was given by personnel in [facility] who informed our office they discussed with [facility], so there was some conflicting information.    Cierra states that she will call pt and discuss this with him. She and/or he will call our office back after discussing this.  "

## 2023-03-01 ENCOUNTER — TELEPHONE (OUTPATIENT)
Dept: CARDIOLOGY | Facility: CLINIC | Age: 48
End: 2023-03-01
Payer: COMMERCIAL

## 2023-03-01 ENCOUNTER — TELEPHONE (OUTPATIENT)
Dept: FAMILY MEDICINE CLINIC | Age: 48
End: 2023-03-01

## 2023-03-01 DIAGNOSIS — Z01.818 PRE-OP TESTING: Primary | ICD-10-CM

## 2023-03-01 NOTE — TELEPHONE ENCOUNTER
----- Message from Chris Palacio sent at 3/1/2023  9:55 AM CST -----  Subject: Message to Provider    QUESTIONS  Information for Provider? pt is needing his blood type, he is having   surgery and will need it please follow up   ---------------------------------------------------------------------------  --------------  [address] [employer]  [phone]; OK to leave message on voicemail  ---------------------------------------------------------------------------  --------------  SCRIPT ANSWERS  Relationship to Patient?  Self

## 2023-03-01 NOTE — TELEPHONE ENCOUNTER
Called and LM that we do not type blood unless requested. That this is something they should do prior to his surgery so they know incase something happens during surgery.

## 2023-03-01 NOTE — TELEPHONE ENCOUNTER
----- Message from Speedy Nichols sent at 3/1/2023  7:09 AM CST -----  Regarding: Blood type  Contact: [phone]  I was wondering if my blood type is on file with my chart? If so can I get it? If not I can put in orders to get my blood typed for my surgical needs. Thanks

## 2023-03-01 NOTE — TELEPHONE ENCOUNTER
Patient called stating that he is getting private blood donations from people he can trust and needs to know his blood type so he can get this aligned prior to surgery. Please advise if we can order a blood type lab and if we can what order should be placed.

## 2023-03-06 DIAGNOSIS — E11.9 TYPE 2 DIABETES MELLITUS WITHOUT COMPLICATION, WITHOUT LONG-TERM CURRENT USE OF INSULIN (HCC): ICD-10-CM

## 2023-03-06 DIAGNOSIS — I10 ESSENTIAL HYPERTENSION: ICD-10-CM

## 2023-03-06 DIAGNOSIS — R53.83 FATIGUE, UNSPECIFIED TYPE: ICD-10-CM

## 2023-03-06 DIAGNOSIS — E78.2 MIXED HYPERLIPIDEMIA: ICD-10-CM

## 2023-03-06 DIAGNOSIS — Z01.818 PRE-OP TESTING: ICD-10-CM

## 2023-03-06 DIAGNOSIS — R16.1 SPLENOMEGALY: ICD-10-CM

## 2023-03-06 LAB
ABO/RH: NORMAL
ANTIBODY SCREEN: NORMAL

## 2023-03-07 LAB
BASOPHILS ABSOLUTE: 0.1 K/UL (ref 0–0.2)
BASOPHILS RELATIVE PERCENT: 0.8 % (ref 0–1)
EOSINOPHILS ABSOLUTE: 0.2 K/UL (ref 0–0.6)
EOSINOPHILS RELATIVE PERCENT: 2.8 % (ref 0–5)
HBA1C MFR BLD: 11 % (ref 4–6)
HCT VFR BLD CALC: 34.4 % (ref 42–52)
HEMOGLOBIN: 11.1 G/DL (ref 14–18)
IMMATURE GRANULOCYTES #: 0 K/UL
LYMPHOCYTES ABSOLUTE: 1.4 K/UL (ref 1.1–4.5)
LYMPHOCYTES RELATIVE PERCENT: 18.6 % (ref 20–40)
MCH RBC QN AUTO: 29.8 PG (ref 27–31)
MCHC RBC AUTO-ENTMCNC: 32.3 G/DL (ref 33–37)
MCV RBC AUTO: 92.2 FL (ref 80–94)
MONOCYTES ABSOLUTE: 0.5 K/UL (ref 0–0.9)
MONOCYTES RELATIVE PERCENT: 6.8 % (ref 0–10)
NEUTROPHILS ABSOLUTE: 5.5 K/UL (ref 1.5–7.5)
NEUTROPHILS RELATIVE PERCENT: 70.7 % (ref 50–65)
PDW BLD-RTO: 14.6 % (ref 11.5–14.5)
PLATELET # BLD: 153 K/UL (ref 130–400)
PMV BLD AUTO: 11.7 FL (ref 9.4–12.4)
RBC # BLD: 3.73 M/UL (ref 4.7–6.1)
WBC # BLD: 7.7 K/UL (ref 4.8–10.8)

## 2023-03-08 ENCOUNTER — TELEPHONE (OUTPATIENT)
Dept: FAMILY MEDICINE CLINIC | Age: 48
End: 2023-03-08

## 2023-03-08 NOTE — TELEPHONE ENCOUNTER
----- Message from [address] [facility],[address], DO sent at 3/7/2023  7:47 PM CST -----  Blood sugars continue to be uncontrolled. Please schedule an appointment so we can discuss get this under better control  CBC shows anemia of nonspecific type.   We will need to explore this as well  Blood type is B+

## 2023-03-08 NOTE — TELEPHONE ENCOUNTER
I spoke with the patient about this. He has an appointment scheduled with Dr. Rigoberto Cha on 03/29/2023.

## 2023-03-15 ENCOUNTER — TELEPHONE (OUTPATIENT)
Dept: CARDIAC SURGERY | Facility: CLINIC | Age: 48
End: 2023-03-15

## 2023-03-15 NOTE — TELEPHONE ENCOUNTER
Discussed with patient and provided tentative surgery date of 4/25/2023 pending all blood donation including platelets and FFP is arranged prior to this.  Patient verbalizes understanding to this.  He also states that his primary care provider recently told him that his glucose was too high.  I explained to the patient we will need to repeat A1c prior to proceeding with surgery as well.  He verbalizes understanding to this also.  We will follow-up with patient regarding status of blood preparation.

## 2023-03-15 NOTE — TELEPHONE ENCOUNTER
Caller: Speedy Nichols    Relationship: Self    Best call back number: [phone]    What is the best time to reach you: ANY    Who are you requesting to speak with (clinical staff, provider,  specific staff member): CLINICAL     Do you know the name of the person who called: SPEEDY    What was the call regarding: THE PATIENT IS READY TO SCHEDULE HIS SURGERY.     Do you require a callback: YES

## 2023-03-15 NOTE — TELEPHONE ENCOUNTER
Spoke with patient.  He states that everything is set up with [facility].  I have advised him that we cannot proceed with surgery unless all blood products are available.  He cannot proceed with finalizing blood products without a surgery date.  Tentative surgery date of 4/25/2023 and the patient is agreeable.  He also states that his PCP just [facility] labs and advised him that his glucose was elevated.  Most recent A1C here was 3 months ago and was less than 8.  I have advised patient that we will recheck A1C prior to surgery and that Dr. Wilburn would like this to be below 8 still in order to proceed.  He verbalizes understanding.

## 2023-03-17 NOTE — TELEPHONE ENCOUNTER
Cierra from [facility] called today and states that she spoke with pt and went over everything in detail, pt wrote this down and repeated it back to her. He was informed that there is no guarantee that all products can be obtained (such as plasma and platelets). His donors for platelets must have donated with [facility] before or must donate blood first before doing a plasma/platelet draw. Pt previously stated to Dr. Wilburn that he is agreeable to have shelf blood if need be. Informed Cierra [facility] physician request form has been faxed to her and she verbalized understanding.

## 2023-04-03 ENCOUNTER — TELEMEDICINE (OUTPATIENT)
Dept: FAMILY MEDICINE CLINIC | Age: 48
End: 2023-04-03
Payer: MEDICAID

## 2023-04-03 DIAGNOSIS — I10 ESSENTIAL HYPERTENSION: Primary | ICD-10-CM

## 2023-04-03 DIAGNOSIS — E11.9 TYPE 2 DIABETES MELLITUS WITHOUT COMPLICATION, WITHOUT LONG-TERM CURRENT USE OF INSULIN (HCC): ICD-10-CM

## 2023-04-03 DIAGNOSIS — I25.118 CORONARY ARTERY DISEASE OF NATIVE ARTERY OF NATIVE HEART WITH STABLE ANGINA PECTORIS (HCC): ICD-10-CM

## 2023-04-03 DIAGNOSIS — E78.2 MIXED HYPERLIPIDEMIA: ICD-10-CM

## 2023-04-03 PROCEDURE — 3046F HEMOGLOBIN A1C LEVEL >9.0%: CPT | Performed by: PEDIATRICS

## 2023-04-03 PROCEDURE — 99214 OFFICE O/P EST MOD 30 MIN: CPT | Performed by: PEDIATRICS

## 2023-04-03 RX ORDER — PROCHLORPERAZINE 25 MG/1
SUPPOSITORY RECTAL
Qty: 1 EACH | Refills: 3 | Status: SHIPPED | OUTPATIENT
Start: 2023-04-03

## 2023-04-03 ASSESSMENT — ENCOUNTER SYMPTOMS
ABDOMINAL PAIN: 0
COUGH: 0
SHORTNESS OF BREATH: 1
WHEEZING: 0
BACK PAIN: 0

## 2023-04-03 NOTE — PROGRESS NOTES
authority and the [employer] and [facility] ([employer]) General Act. Patient identification was verified, and a caregiver was present when appropriate. The patient was located at Home: [address]  Provider was located at [facility] ([place] [address]): [address],  [address]         Total time spent for this encounter:  30m    --ADWOA Treviño DO on 4/3/2023 at 6:48 PM    An electronic signature was used to authenticate this note.

## 2023-04-10 NOTE — TELEPHONE ENCOUNTER
Called [facility] and spoke with Cierra again. She discussed that this has been a very difficult process with pt as most of his donors have been female and unable to donate platelets to a male if they have been pregnant. She does confirm that pt has a male donor coming in later this week to try to donate platelets, however they will likely not be able to collect all products requested. She states that they have had to call pt multiple times for written consent to donate on behalf of him, but on multiple occasions, including when the donors are present and waiting at [facility], he has forgotten to provide this consent. She also states that she has explained in detail the process to the patient multiple times over several phone calls. She states that they have gone above and beyond at this point and reiterates that they likely will not be able to collect all requested blood products.

## 2023-04-10 NOTE — TELEPHONE ENCOUNTER
Can we call [facility] and confirm that patient is going to have blood set up and ready for surgery on 4/25/2023.  4 units PRBC, 2 units platelet, and 2 units FFP.  If blood is all set up and ready to go I will put surgery orders in

## 2023-04-11 ENCOUNTER — PREP FOR SURGERY (OUTPATIENT)
Dept: OTHER | Facility: HOSPITAL | Age: 48
End: 2023-04-11
Payer: COMMERCIAL

## 2023-04-11 DIAGNOSIS — I25.10 CORONARY ARTERY DISEASE INVOLVING NATIVE CORONARY ARTERY OF NATIVE HEART WITHOUT ANGINA PECTORIS: Primary | ICD-10-CM

## 2023-04-11 RX ORDER — SODIUM CHLORIDE 0.9 % (FLUSH) 0.9 %
10 SYRINGE (ML) INJECTION AS NEEDED
OUTPATIENT
Start: 2023-04-11

## 2023-04-11 RX ORDER — DEXTROSE MONOHYDRATE 25 G/50ML
10-50 INJECTION, SOLUTION INTRAVENOUS
OUTPATIENT
Start: 2023-04-11

## 2023-04-11 RX ORDER — NICOTINE POLACRILEX 4 MG
15 LOZENGE BUCCAL
OUTPATIENT
Start: 2023-04-11

## 2023-04-11 RX ORDER — SODIUM CHLORIDE 9 MG/ML
40 INJECTION, SOLUTION INTRAVENOUS AS NEEDED
OUTPATIENT
Start: 2023-04-11

## 2023-04-11 RX ORDER — ACETAMINOPHEN 500 MG
1000 TABLET ORAL ONCE
OUTPATIENT
Start: 2023-04-25

## 2023-04-11 RX ORDER — SODIUM CHLORIDE 0.9 % (FLUSH) 0.9 %
30 SYRINGE (ML) INJECTION ONCE AS NEEDED
OUTPATIENT
Start: 2023-04-11

## 2023-04-11 RX ORDER — SODIUM CHLORIDE 9 MG/ML
30 INJECTION, SOLUTION INTRAVENOUS CONTINUOUS PRN
OUTPATIENT
Start: 2023-04-11

## 2023-04-11 RX ORDER — SODIUM CHLORIDE 0.9 % (FLUSH) 0.9 %
10 SYRINGE (ML) INJECTION EVERY 12 HOURS SCHEDULED
OUTPATIENT
Start: 2023-04-11

## 2023-04-11 NOTE — TELEPHONE ENCOUNTER
I called patient and explained the above.  I explained to him that in order to proceed with surgery he must be okay with receiving blood from our blood bank if he requires units in addition to those that are supplied from [facility].  He states that he is agreeable to this.  Surgery orders RN for 4/25/2023.  Please call patient with prework information.  I confirmed with him that he does not take anticoagulation.

## 2023-04-11 NOTE — TELEPHONE ENCOUNTER
Patient aware of prework date/time and OR date/arrival time 0500/npo/no meds. Aware to  Bactroban for use on 4/24 @ 1700 - instructions given.

## 2023-04-19 ENCOUNTER — OFFICE VISIT (OUTPATIENT)
Dept: CARDIOLOGY | Facility: CLINIC | Age: 48
End: 2023-04-19
Payer: COMMERCIAL

## 2023-04-19 VITALS
WEIGHT: 178 LBS | OXYGEN SATURATION: 98 % | HEIGHT: 72 IN | BODY MASS INDEX: 24.11 KG/M2 | DIASTOLIC BLOOD PRESSURE: 75 MMHG | RESPIRATION RATE: 18 BRPM | SYSTOLIC BLOOD PRESSURE: 115 MMHG | HEART RATE: 75 BPM

## 2023-04-19 DIAGNOSIS — E11.65 TYPE 2 DIABETES MELLITUS WITH HYPERGLYCEMIA, WITH LONG-TERM CURRENT USE OF INSULIN: Chronic | ICD-10-CM

## 2023-04-19 DIAGNOSIS — Z79.4 TYPE 2 DIABETES MELLITUS WITH HYPERGLYCEMIA, WITH LONG-TERM CURRENT USE OF INSULIN: Chronic | ICD-10-CM

## 2023-04-19 DIAGNOSIS — I10 ESSENTIAL HYPERTENSION: Chronic | ICD-10-CM

## 2023-04-19 DIAGNOSIS — I25.10 CORONARY ARTERY DISEASE INVOLVING NATIVE CORONARY ARTERY OF NATIVE HEART WITHOUT ANGINA PECTORIS: Primary | Chronic | ICD-10-CM

## 2023-04-19 DIAGNOSIS — I50.22 CHRONIC SYSTOLIC HEART FAILURE: Chronic | ICD-10-CM

## 2023-04-19 DIAGNOSIS — E78.2 MIXED HYPERLIPIDEMIA: ICD-10-CM

## 2023-04-19 NOTE — PROGRESS NOTES
Subjective:     Encounter Date:04/19/2023      Patient ID: Speedy Nichols is a 48 y.o. male with known coronary artery disease in need of revascularization, chronic systolic congestive heart failure, hypertension, hyperlipidemia, uncontrolled insulin requiring diabetes mellitus who presents to the office for follow up.     Chief Complaint: CHF Follow up   Coronary Artery Disease  Presents for follow-up visit. Symptoms include shortness of breath. Pertinent negatives include no chest pain, chest pressure, chest tightness, dizziness, leg swelling, palpitations or weight gain. His past medical history is significant for CHF. The symptoms have been stable. Compliance with diet is good. Compliance with exercise is good. Compliance with medications is good.   Congestive Heart Failure  Presents for follow-up visit. Associated symptoms include shortness of breath. Pertinent negatives include no chest pain, chest pressure, edema, fatigue, near-syncope, orthopnea, palpitations, paroxysmal nocturnal dyspnea or unexpected weight change. The symptoms have been stable. His past medical history is significant for CAD. Compliance with total regimen is %. Compliance with diet is %. Compliance with exercise is %. Compliance with medications is %.     Mr. Nichols was seen for chest pain and shortness of breath in early December 2022. He was found to have an elevated troponin and reduced LV systolic function. He required treatment for acute heart failure. Coronary angiography noted severe multivessel coronary artery disease and recommendations included evaluation by CTS. He has been following with Dr. Wilburn as an outpatient.    He presents to the office today for routine follow-up.  He was seen in January in our office for evaluation of any symptoms concerning for congestive heart failure.  He had been compliant with his medications at that time no significant issues with volume overload but had been having  intermittent chest discomfort.  He was using sublingual nitroglycerin as needed for those episodes.  We discussed initiation of isosorbide with ongoing use of sublingual nitroglycerin.    Since his last visit he reports only a few uses of sublingual nitroglycerin but no significant episodes of chest discomfort.  He reports dyspnea with exertion but no complaints of shortness of breath at rest, orthopnea, PND.  He denies any rapid weight gain, abdominal bloating.  He has been working with his primary care office for improvement of his glycemic control.  He has been following outpatient with cardiothoracic surgery.  They have been trying to make arrangements for the patient based on his request for specific use of certain blood products if needed.  Ultimately, the patient is scheduled for revascularization next week.    The following portions of the patient's history were reviewed and updated as appropriate: allergies, current medications, past family history, past medical history, past social history and past surgical history.     Allergies   Allergen Reactions   • Poison Ivy Extract Rash       Current Outpatient Medications:   •  albuterol sulfate  (90 Base) MCG/ACT inhaler, Inhale 2 puffs Every 4 (Four) Hours As Needed for Wheezing or Shortness of Air., Disp: 1 inhaler, Rfl: 0  •  aspirin 81 MG EC tablet, Take 1 tablet by mouth Daily., Disp: , Rfl:   •  atorvastatin (Lipitor) 40 MG tablet, Take 1 tablet by mouth Daily., Disp: 90 tablet, Rfl: 3  •  furosemide (Lasix) 40 MG tablet, Take 1 tablet by mouth Daily., Disp: 90 tablet, Rfl: 3  •  Insulin Glargine (Lantus SoloStar) 100 UNIT/ML injection pen, Inject 25 Units under the skin into the appropriate area as directed., Disp: , Rfl:   •  lisinopril (PRINIVIL,ZESTRIL) 20 MG tablet, Take 1 tablet by mouth Daily., Disp: 90 tablet, Rfl: 3  •  metFORMIN (GLUCOPHAGE) 1000 MG tablet, Take 1 tablet by mouth 2 (Two) Times a Day With Meals., Disp: 60 tablet, Rfl: 0  •   "metoprolol succinate XL (TOPROL-XL) 50 MG 24 hr tablet, Take 1 tablet by mouth Daily., Disp: 90 tablet, Rfl: 3  •  [START ON 4/24/2023] mupirocin (BACTROBAN) 2 % nasal ointment, 1 application into the nostril(s) as directed by provider 1 (One) Time for 1 dose. Apply a pea-sized amount to a Q-tip and swab each nare x1 dose on 4/24/2023 at 1700, Disp: 1 each, Rfl: 0  •  nitroglycerin (NITROSTAT) 0.4 MG SL tablet, Place 1 tablet under the tongue Every 5 (Five) Minutes As Needed for Chest Pain (Only if SBP Greater Than 100). Take no more than 3 doses in 15 minutes., Disp: 25 tablet, Rfl: 2  •  Omega-3 Fatty Acids (fish oil) 1000 MG capsule capsule, Take  by mouth Daily With Breakfast., Disp: , Rfl:   •  potassium chloride ER (K-TAB) 20 MEQ tablet controlled-release ER tablet, Take 1 tablet by mouth 2 (Two) Times a Day., Disp: 180 tablet, Rfl: 1  •  sildenafil (VIAGRA) 100 MG tablet, Take 1 tablet by mouth As Needed., Disp: , Rfl:       Review of Systems   Constitutional: Positive for malaise/fatigue. Negative for diaphoresis, fatigue, fever, unexpected weight change and weight gain.   Cardiovascular: Positive for dyspnea on exertion. Negative for chest pain, irregular heartbeat, leg swelling, near-syncope, orthopnea, palpitations, paroxysmal nocturnal dyspnea and syncope.   Respiratory: Positive for shortness of breath. Negative for chest tightness, cough, sputum production and wheezing.    Hematologic/Lymphatic: Negative for bleeding problem.   Gastrointestinal: Negative for bloating, nausea and vomiting.   Neurological: Negative for dizziness, focal weakness, headaches, light-headedness, loss of balance and weakness.   Psychiatric/Behavioral: Negative for altered mental status.       Procedures  /75   Pulse 75   Resp 18   Ht 182.9 cm (72\")   Wt 80.7 kg (178 lb)   SpO2 98%   BMI 24.14 kg/m²        Objective:     Vitals reviewed.   Constitutional:       General: Awake. Not in acute distress.     Appearance: " Normal and healthy appearance. Well-developed, well-groomed, normal weight and not in distress. Not ill-appearing.   HENT:      Head: Normocephalic and atraumatic.   Pulmonary:      Effort: Pulmonary effort is normal.      Breath sounds: Normal breath sounds. No wheezing. No rales.   Cardiovascular:      Normal rate. Regular rhythm.      Murmurs: There is no murmur.      No gallop. No rub.   Edema:     Peripheral edema absent.   Musculoskeletal:      Cervical back: Normal range of motion and neck supple. Skin:     General: Skin is warm and dry.   Neurological:      Mental Status: Alert, oriented to person, place, and time and oriented to person, place and time.   Psychiatric:         Attention and Perception: Attention normal.         Mood and Affect: Mood normal.         Speech: Speech normal.         Behavior: Behavior normal. Behavior is cooperative.         Thought Content: Thought content normal.         Cognition and Memory: Cognition normal.         Judgment: Judgment normal.         Lab Review:   Reviewed echo and lab results from recent hospital stay.     Results for orders placed during the hospital encounter of 01/06/23    Adult Transthoracic Echo Complete w/ Color, Spectral and Contrast if necessary per protocol    Interpretation Summary  •  Left ventricular systolic function is low normal. Left ventricular ejection fraction appears to be 51 - 55% (improved compared to 12/2022).  •  There is mild hypokinesis of the anterior and anteroseptal walls; however, there is noted thickening, suggesting viability.  •  Left ventricular wall thickness is consistent with mild concentric hypertrophy.  •  Normal right ventricular cavity size and systolic function noted.  •  No significant valvular abnormalities noted.      Lab Results   Component Value Date    CHOL 162 12/08/2022    CHLPL 267 (H) 04/28/2021    TRIG 171 (H) 12/08/2022    HDL 38 (L) 12/08/2022    LDL 94 12/08/2022         Assessment:          Diagnosis  Plan   1. Coronary artery disease involving native coronary artery of native heart without angina pectoris        2. Chronic systolic heart failure        3. Essential hypertension        4. Mixed hyperlipidemia        5. Type 2 diabetes mellitus with hyperglycemia, with long-term current use of insulin               Plan:       - CAD: stable symptoms. No recent chest pain. Does have SL NTG for use if needed.  Since his initial diagnosis during his hospitalization in December he has been stable.  He has also been following with CT surgery as an outpatient with plans on surgical revascularization.  He now is scheduled for surgery next week.  Continue current medications including aspirin, statin, beta-blocker.    - CHF: He was previously treated for acute heart failure during his admission in December 2022.  He has been placed on guideline directed medical therapy with lisinopril, Toprol-XL, daily Lasix.  He has done well as an outpatient without clinical heart failure or symptoms concerning for heart failure.  He does report dyspnea on exertion which has been relatively stable since his discharge from the hospital.  Initial echocardiogram in December noted ventricular ejection fraction 36 to 40%.  Repeat echo the following month noted improvement, as referenced above, with estimated LVEF 51 to 55%.  Continue current medications without adjustments at this time.    - HTN: Stable.  Continue current medications.    - HLD: LDL goal < 70 given known CAD. Continue high intensity statin. Most recent LDL referenced above.     - T2DM: on insulin.  Follows with PCP for management.  He remains on Lantus at this time.      Continue current therapies.  Plans for surgical revascularization next week with Dr. Papi Wilburn.  We will plan for the patient to follow-up in our office pending the discharge of his surgery and released from CT surgery shortly thereafter as an outpatient.          I attest that all portions of this note  have been reviewed and updated to reflect the patient's current status.

## 2023-04-24 ENCOUNTER — HOSPITAL ENCOUNTER (OUTPATIENT)
Dept: GENERAL RADIOLOGY | Facility: HOSPITAL | Age: 48
Discharge: HOME | End: 2023-04-24
Payer: COMMERCIAL

## 2023-04-24 ENCOUNTER — TELEPHONE (OUTPATIENT)
Dept: CARDIAC SURGERY | Facility: CLINIC | Age: 48
End: 2023-04-24
Payer: COMMERCIAL

## 2023-04-24 ENCOUNTER — PRE-ADMISSION TESTING (OUTPATIENT)
Dept: PREADMISSION TESTING | Facility: HOSPITAL | Age: 48
End: 2023-04-24
Payer: COMMERCIAL

## 2023-04-24 VITALS
DIASTOLIC BLOOD PRESSURE: 89 MMHG | HEIGHT: 72 IN | RESPIRATION RATE: 16 BRPM | HEART RATE: 77 BPM | SYSTOLIC BLOOD PRESSURE: 178 MMHG | BODY MASS INDEX: 25.17 KG/M2 | OXYGEN SATURATION: 98 % | WEIGHT: 185.85 LBS

## 2023-04-24 DIAGNOSIS — Z79.4 TYPE 2 DIABETES MELLITUS WITH HYPERGLYCEMIA, WITH LONG-TERM CURRENT USE OF INSULIN: ICD-10-CM

## 2023-04-24 DIAGNOSIS — I25.10 CORONARY ARTERY DISEASE INVOLVING NATIVE CORONARY ARTERY OF NATIVE HEART WITHOUT ANGINA PECTORIS: Primary | ICD-10-CM

## 2023-04-24 DIAGNOSIS — I25.10 CORONARY ARTERY DISEASE INVOLVING NATIVE CORONARY ARTERY OF NATIVE HEART WITHOUT ANGINA PECTORIS: ICD-10-CM

## 2023-04-24 DIAGNOSIS — E11.65 TYPE 2 DIABETES MELLITUS WITH HYPERGLYCEMIA, WITH LONG-TERM CURRENT USE OF INSULIN: ICD-10-CM

## 2023-04-24 LAB
ABO GROUP BLD: NORMAL
ALBUMIN SERPL-MCNC: 4.6 G/DL (ref 3.5–5.2)
ALBUMIN/GLOB SERPL: 1.8 G/DL
ALP SERPL-CCNC: 71 U/L (ref 39–117)
ALT SERPL W P-5'-P-CCNC: 20 U/L (ref 1–41)
ANION GAP SERPL CALCULATED.3IONS-SCNC: 9 MMOL/L (ref 5–15)
APTT PPP: 29.9 SECONDS (ref 24.1–35)
AST SERPL-CCNC: 17 U/L (ref 1–40)
BASOPHILS # BLD AUTO: 0.04 10*3/MM3 (ref 0–0.2)
BASOPHILS NFR BLD AUTO: 0.7 % (ref 0–1.5)
BILIRUB SERPL-MCNC: 0.4 MG/DL (ref 0–1.2)
BLD GP AB SCN SERPL QL: NEGATIVE
BUN SERPL-MCNC: 32 MG/DL (ref 6–20)
BUN/CREAT SERPL: 26.4 (ref 7–25)
CALCIUM SPEC-SCNC: 9.5 MG/DL (ref 8.6–10.5)
CHLORIDE SERPL-SCNC: 103 MMOL/L (ref 98–107)
CO2 SERPL-SCNC: 24 MMOL/L (ref 22–29)
CREAT SERPL-MCNC: 1.21 MG/DL (ref 0.76–1.27)
DEPRECATED RDW RBC AUTO: 42.3 FL (ref 37–54)
EGFRCR SERPLBLD CKD-EPI 2021: 73.4 ML/MIN/1.73
EOSINOPHIL # BLD AUTO: 0.17 10*3/MM3 (ref 0–0.4)
EOSINOPHIL NFR BLD AUTO: 3.2 % (ref 0.3–6.2)
ERYTHROCYTE [DISTWIDTH] IN BLOOD BY AUTOMATED COUNT: 12.9 % (ref 12.3–15.4)
GLOBULIN UR ELPH-MCNC: 2.5 GM/DL
GLUCOSE SERPL-MCNC: 259 MG/DL (ref 65–99)
HBA1C MFR BLD: 9.7 % (ref 4.8–5.6)
HCT VFR BLD AUTO: 31.9 % (ref 37.5–51)
HGB BLD-MCNC: 10.6 G/DL (ref 13–17.7)
IMM GRANULOCYTES # BLD AUTO: 0.04 10*3/MM3 (ref 0–0.05)
IMM GRANULOCYTES NFR BLD AUTO: 0.7 % (ref 0–0.5)
INR PPP: 0.94 (ref 0.91–1.09)
LYMPHOCYTES # BLD AUTO: 1.25 10*3/MM3 (ref 0.7–3.1)
LYMPHOCYTES NFR BLD AUTO: 23.3 % (ref 19.6–45.3)
MCH RBC QN AUTO: 30.2 PG (ref 26.6–33)
MCHC RBC AUTO-ENTMCNC: 33.2 G/DL (ref 31.5–35.7)
MCV RBC AUTO: 90.9 FL (ref 79–97)
MONOCYTES # BLD AUTO: 0.4 10*3/MM3 (ref 0.1–0.9)
MONOCYTES NFR BLD AUTO: 7.4 % (ref 5–12)
NEUTROPHILS NFR BLD AUTO: 3.47 10*3/MM3 (ref 1.7–7)
NEUTROPHILS NFR BLD AUTO: 64.7 % (ref 42.7–76)
NRBC BLD AUTO-RTO: 0 /100 WBC (ref 0–0.2)
PLATELET # BLD AUTO: 154 10*3/MM3 (ref 140–450)
PMV BLD AUTO: 11.3 FL (ref 6–12)
POTASSIUM SERPL-SCNC: 5.9 MMOL/L (ref 3.5–5.2)
PROT SERPL-MCNC: 7.1 G/DL (ref 6–8.5)
PROTHROMBIN TIME: 12.7 SECONDS (ref 11.8–14.8)
RBC # BLD AUTO: 3.51 10*6/MM3 (ref 4.14–5.8)
RH BLD: POSITIVE
SARS-COV-2 RNA RESP QL NAA+PROBE: NOT DETECTED
SODIUM SERPL-SCNC: 136 MMOL/L (ref 136–145)
T&S EXPIRATION DATE: NORMAL
WBC NRBC COR # BLD: 5.37 10*3/MM3 (ref 3.4–10.8)

## 2023-04-24 PROCEDURE — C9803 HOPD COVID-19 SPEC COLLECT: HCPCS

## 2023-04-24 PROCEDURE — 93005 ELECTROCARDIOGRAM TRACING: CPT

## 2023-04-24 PROCEDURE — 86900 BLOOD TYPING SEROLOGIC ABO: CPT

## 2023-04-24 PROCEDURE — 87635 SARS-COV-2 COVID-19 AMP PRB: CPT

## 2023-04-24 PROCEDURE — 85730 THROMBOPLASTIN TIME PARTIAL: CPT

## 2023-04-24 PROCEDURE — 80053 COMPREHEN METABOLIC PANEL: CPT

## 2023-04-24 PROCEDURE — 85025 COMPLETE CBC W/AUTO DIFF WBC: CPT

## 2023-04-24 PROCEDURE — 85610 PROTHROMBIN TIME: CPT

## 2023-04-24 PROCEDURE — 86850 RBC ANTIBODY SCREEN: CPT

## 2023-04-24 PROCEDURE — 71046 X-RAY EXAM CHEST 2 VIEWS: CPT

## 2023-04-24 PROCEDURE — 86901 BLOOD TYPING SEROLOGIC RH(D): CPT

## 2023-04-24 PROCEDURE — 36415 COLL VENOUS BLD VENIPUNCTURE: CPT

## 2023-04-24 PROCEDURE — 83036 HEMOGLOBIN GLYCOSYLATED A1C: CPT

## 2023-04-24 NOTE — TELEPHONE ENCOUNTER
Hemoglobin A1c on prework today is 9.7.  Potassium is 5.9.  Dr. Wilburn called and discussed the risks of proceeding with CABG with [facility] and given patient's stability and symptoms, we will plan to allow additional time for glucose control.  Dr. Wilburn has advised him to follow-up with his PCP ASAP to discuss diabetic medication regimen.  In the interim he has been advised to hold potassium until he has follow-up with his PCP.  The patient is agreeable to this and although is disappointed he verbalizes understanding.  Plan for repeat A1c as well as BMP in 1 month and if hemoglobin A1c is less than 8, will schedule surgery at that time.  Patient verbalizes understanding to this and is agreeable.  Please place his surgery case in the [facility].

## 2023-04-24 NOTE — DISCHARGE INSTRUCTIONS
Before you come to the hospital        Arrival time: AS DIRECTED BY OFFICE     YOU MAY TAKE THE FOLLOWING MEDICATION(S) THE MORNING OF SURGERY WITH A SIP OF WATER: NONE UNLESS OTHERWISE SPECIFIED BY DR. SORIANO            ALL OTHER HOME MEDICATION CHECK WITH YOUR PHYSICIAN (especially if   you are taking diabetes medicines or blood thinners)    Do not take any Erectile Dysfunction medications (EX: CIALIS, VIAGRA) 24 hours prior to surgery.      If you were given and instructed to use a germ- killing soap, use as directed the night before surgery and again the morning of surgery or as directed by your surgeon. (Use one-half of the bottle with each shower.)   See attached information for How to Use Chlorhexidine for Bathing if applicable.                  MANAGING PAIN AFTER SURGERY    We know you are probably wondering what your pain will be like after surgery.  Following surgery it is unrealistic to expect you will not have pain.   Pain is how our bodies let us know that something is wrong or cautions us to be careful.  That said, our goal is to make your pain tolerable.    Methods we may use to treat your pain include (oral or IV medications, PCAs, epidurals, nerve blocks, etc.)   While some procedures require IV pain medications for a short time after surgery, transitioning to pain medications by mouth allows for better management of pain.   Your nurse will encourage you to take oral pain medications whenever possible.  IV medications work almost immediately, but only last a short while.  Taking medications by mouth allows for a more constant level of medication in your blood stream for a longer period of time.      Once your pain is out of control it is harder to get back under control.  It is important you are aware when your next dose of pain medication is due.  If you are admitted, your nurse may write the time of your next dose on the white board in your room to help you remember.      We are interested in your  pain and encourage you to inform us about aggravating factors during your visit.   Many times a simple repositioning every few hours can make a big difference.    If your physician says it is okay, do not let your pain prevent you from getting out of bed. Be sure to call your nurse for assistance prior to getting up so you do not fall.      Before surgery, please decide your tolerable pain goal.  These faces help describe the pain ratings we use on a 0-10 scale.   Be prepared to tell us your goal and whether or not you take pain or anxiety medications at home.          Preparing for Surgery  Preparing for surgery is an important part of your care. It can make things go more smoothly and help you avoid complications. The steps leading up to surgery may vary among hospitals. Follow all instructions given to you by your health care providers. Ask questions if you do not understand something. Talk about any concerns that you have.  Here are some questions to consider asking before your surgery:  If my surgery is not an emergency (is elective), when would be the best time to have the surgery?  What arrangements do I need to make for work, home, or school?  What will my recovery be like? How long will it be before I can return to normal activities?  Will I need to prepare my home? Will I need to arrange care for me or my children?  Should I expect to have pain after surgery? What are my pain management options? Are there nonmedical options that I can try for pain?  Tell a health care provider about:  Any allergies you have.  All medicines you are taking, including vitamins, herbs, eye drops, creams, and over-the-counter medicines.  Any problems you or family members have had with anesthetic medicines.  Any blood disorders you have.  Any surgeries you have had.  Any medical conditions you have.  Whether you are pregnant or may be pregnant.  What are the risks?  The risks and complications of surgery depend on the specific  procedure that you have. Discuss all the risks with your health care providers before your surgery. Ask about common surgical complications, which may include:  Infection.  Bleeding or a need for blood replacement (transfusion).  Allergic reactions to medicines.  Damage to surrounding nerves, tissues, or structures.  A blood clot.  Scarring.  Failure of the surgery to correct the problem.  Follow these instructions before the procedure:  Several days or weeks before your procedure  You may have a physical exam by your primary health care provider to make sure it is safe for you to have surgery.  You may have testing. This may include a chest X-ray, blood and urine tests, electrocardiogram (ECG), or other testing.  Ask your health care provider about:  Changing or stopping your regular medicines. This is especially important if you are taking diabetes medicines or blood thinners.  Taking medicines such as aspirin and ibuprofen. These medicines can thin your blood. Do not take these medicines unless your health care provider tells you to take them.  Taking over-the-counter medicines, vitamins, herbs, and supplements.  Do not use any products that contain nicotine or tobacco, such as cigarettes and e-cigarettes. If you need help quitting, ask your health care provider.  Avoid alcohol.  Ask your health care provider if there are exercises you can do to prepare for surgery.  Eat a healthy diet.   Plan to have someone 18 years of age or older to take you home from the hospital. We will need to verify your ride on the morning of surgery if you are being discharged home on the same day. Tell your ride to be expecting a call from the hospital prior to your procedure.   Plan to have a responsible adult care for you for at least 24 hours after you leave the hospital or clinic. This is important.  The day before your procedure  You may be given antibiotic medicine to take by mouth to help prevent infection. Take it as told by  your health care provider.  You may be asked to shower with a germ-killing soap.  Follow instructions from your health care provider about eating and drinking restrictions. This includes gum, mints and hard candy.  Pack comfortable clothes according to your procedure.   The day of your procedure  You may need to take another shower with a germ-killing soap before you leave home in the morning.  With a small sip of water, take only the medicines that you are told to take.  Remove all jewelry including rings.   Leave anything you consider valuable at home except hearing aids if needed.  You do not need to bring your home medications into the hospital.   Do not wear any makeup, nail polish, powder, deodorant, lotion, hair accessories, or anything on your skin or body except your clothes.  If you will be staying in the hospital, bring a case to hold your glasses, contacts, or dentures. You may also want to bring your robe and non-skid footwear.       (Do not use denture adhesives since you will be asked to remove them during  surgery).   If you wear oxygen at home, bring it with you the day of surgery.  If instructed by your health care provider, bring your sleep apnea device with you on the day of your surgery (if this applies to you).  You may want to leave your suitcase and sleep apnea device in the car until after surgery.   Arrive at the hospital as scheduled.  Bring a friend or family member with you who can help to answer questions and be present while you meet with your health care provider.  At the hospital  When you arrive at the hospital:  Go to registration located at the main entrance of the hospital. You will be registered and given a beeper and a sticker sheet. Take the stickers to the Outpatient nurses desk and place in the black tray. This is to notify staff that you have arrived. Then return to the lobby to wait.   When your beeper lights up and vibrates proceed through the double doors, under the  stairs, and a member of the Outpatient Surgery staff will escort you to your preoperative room.  You may have to wear compression sleeves. These help to prevent blood clots and reduce swelling in your legs.  An IV may be inserted into one of your veins.              In the operating room, you may be given one or more of the following:        A medicine to help you relax (sedative).        A medicine to numb the area (local anesthetic).        A medicine to make you fall asleep (general anesthetic).        A medicine that is injected into an area of your body to numb everything below the                      injection site (regional anesthetic).  You may be given an antibiotic through your IV to help prevent infection.  Your surgical site will be marked or identified.    Contact a health care provider if you:  Develop a fever of more than 100.4°F (38°C) or other feelings of illness during the 48 hours before your surgery.  Have symptoms that get worse.  Have questions or concerns about your surgery.  Summary  Preparing for surgery can make the procedure go more smoothly and lower your risk of complications.  Before surgery, make a list of questions and concerns to discuss with your surgeon. Ask about the risks and possible complications.  In the days or weeks before your surgery, follow all instructions from your health care provider. You may need to stop smoking, avoid alcohol, follow eating restrictions, and change or stop your regular medicines.  Contact your surgeon if you develop a fever or other signs of illness during the few days before your surgery.  This information is not intended to replace advice given to you by your health care provider. Make sure you discuss any questions you have with your health care provider.  Document Revised: 12/21/2018 Document Reviewed: 10/23/2018  Elsevier Patient Education © 2021 [employer]

## 2023-04-25 LAB
QT INTERVAL: 372 MS
QTC INTERVAL: 418 MS

## 2023-05-03 ENCOUNTER — TELEMEDICINE (OUTPATIENT)
Dept: FAMILY MEDICINE CLINIC | Age: 48
End: 2023-05-03
Payer: MEDICAID

## 2023-05-03 DIAGNOSIS — E78.2 MIXED HYPERLIPIDEMIA: ICD-10-CM

## 2023-05-03 DIAGNOSIS — I25.118 CORONARY ARTERY DISEASE OF NATIVE ARTERY OF NATIVE HEART WITH STABLE ANGINA PECTORIS (HCC): ICD-10-CM

## 2023-05-03 DIAGNOSIS — I10 ESSENTIAL HYPERTENSION: ICD-10-CM

## 2023-05-03 DIAGNOSIS — E11.9 TYPE 2 DIABETES MELLITUS WITHOUT COMPLICATION, WITHOUT LONG-TERM CURRENT USE OF INSULIN (HCC): Primary | ICD-10-CM

## 2023-05-03 PROCEDURE — 3046F HEMOGLOBIN A1C LEVEL >9.0%: CPT | Performed by: PEDIATRICS

## 2023-05-03 PROCEDURE — 99214 OFFICE O/P EST MOD 30 MIN: CPT | Performed by: PEDIATRICS

## 2023-05-03 RX ORDER — INSULIN ASPART 100 [IU]/ML
INJECTION, SOLUTION INTRAVENOUS; SUBCUTANEOUS
Qty: 9 ADJUSTABLE DOSE PRE-FILLED PEN SYRINGE | Refills: 3 | Status: SHIPPED | OUTPATIENT
Start: 2023-05-03

## 2023-05-03 ASSESSMENT — ENCOUNTER SYMPTOMS
BACK PAIN: 0
SHORTNESS OF BREATH: 1
COUGH: 0
ABDOMINAL PAIN: 0
WHEEZING: 0

## 2023-05-03 NOTE — PROGRESS NOTES
[facility], [address]  Phone [phone]   Fax [phone]      OFFICE VISIT: 5/3/2023    Anabella Orosco Meliton-: 1975      [facility]  Reason For Visit:  Anabella Orosco is a 51 y.o. Diabetes, Hypertension, Cholesterol Problem, and Coronary Artery Disease      Patient presents via [employer] video conferencing on follow-up for multiple issues. Present concerns:  A1c was elevated. His surgery was postponed  He is scheduled for coronary artery bypass grafting      Diabetes Mellitus Type 2  Diet compliance:  noncompliant: A good portion of the time  Nutrition Consultation Needed:  no  Medication:              Lantus insulin 30 units nightly                          He is reluctant to try basal bolus regimen. Metformin 1000 mg twice daily  Medication compliance:  compliant most of the time  Weight trend: stable  Current exercise: he is very active  Checking: occasionally but rarely. He feels like his sugar has been up lately. Home blood sugar records: fasting range: not checking much, but higher when checked. Low BG:  no  Eye exam current (within one year): yes  Checking Feet regularly:  yes - and no sores  ACE/ARB:  yes -lisinopril 5 mg  Aspirin: No: But recommended  Tobacco history: He  reports that he has never smoked.  He has never used smokeless tobacco.    Lab Results   Component Value Date    LABA1C 11.0 (H) 2023    LABA1C 11.4 (H) 2021    LABA1C 10.5 (H) 2019     Lab Results   Component Value Date    LABMICR 6.60 2021    CREATININE 0.9 2021       Hypertension:   BP today was   BP Readings from Last 1 Encounters:   22 (!) 140/90      Recent BP readings:    BP Readings from Last 3 Encounters:   22 (!) 140/90   22 (!) 140/90   22 134/78     Medication              Lisinopril 5 mg daily    Metoprolol succinate 50 mg daily  Medication compliance:  compliant most of the time  Home blood pressure monitoring: Yes -

## 2023-05-11 ENCOUNTER — TELEPHONE (OUTPATIENT)
Dept: FAMILY MEDICINE CLINIC | Age: 48
End: 2023-05-11

## 2023-05-11 NOTE — TELEPHONE ENCOUNTER
Pt called stating that he has an abscess tooth and is requesting an abx be sent to [facility]. Also his blood sugar is doing much better since he is plugged up to the monitor.

## 2023-05-12 RX ORDER — AMOXICILLIN 500 MG/1
500 CAPSULE ORAL 3 TIMES DAILY
Qty: 30 CAPSULE | Refills: 1 | Status: SHIPPED | OUTPATIENT
Start: 2023-05-12 | End: 2023-05-22

## 2023-05-12 NOTE — TELEPHONE ENCOUNTER
Call returned to pt with Dr Kailyn Sharp recommendations. Sent rx to pharmacy for pt.      Requested Prescriptions     Signed Prescriptions Disp Refills    amoxicillin (AMOXIL) 500 MG capsule 30 capsule 1     Sig: Take 1 capsule by mouth 3 times daily for 10 days     Authorizing Provider: Pawan Chaudhry     Ordering User: Breezy Mclaughlin

## 2023-05-25 ENCOUNTER — OFFICE VISIT (OUTPATIENT)
Dept: FAMILY MEDICINE CLINIC | Age: 48
End: 2023-05-25
Payer: MEDICAID

## 2023-05-25 VITALS
DIASTOLIC BLOOD PRESSURE: 89 MMHG | WEIGHT: 177 LBS | HEART RATE: 89 BPM | TEMPERATURE: 97.6 F | OXYGEN SATURATION: 99 % | BODY MASS INDEX: 24.01 KG/M2 | SYSTOLIC BLOOD PRESSURE: 138 MMHG

## 2023-05-25 DIAGNOSIS — K04.7 DENTAL ABSCESS: Primary | ICD-10-CM

## 2023-05-25 DIAGNOSIS — K02.9 DENTAL CARIES: ICD-10-CM

## 2023-05-25 DIAGNOSIS — E11.9 TYPE 2 DIABETES MELLITUS WITHOUT COMPLICATION, WITHOUT LONG-TERM CURRENT USE OF INSULIN (HCC): ICD-10-CM

## 2023-05-25 PROCEDURE — 3075F SYST BP GE 130 - 139MM HG: CPT | Performed by: NURSE PRACTITIONER

## 2023-05-25 PROCEDURE — 99213 OFFICE O/P EST LOW 20 MIN: CPT | Performed by: NURSE PRACTITIONER

## 2023-05-25 PROCEDURE — 3046F HEMOGLOBIN A1C LEVEL >9.0%: CPT | Performed by: NURSE PRACTITIONER

## 2023-05-25 PROCEDURE — 3079F DIAST BP 80-89 MM HG: CPT | Performed by: NURSE PRACTITIONER

## 2023-05-25 RX ORDER — KETOROLAC TROMETHAMINE 30 MG/ML
60 INJECTION, SOLUTION INTRAMUSCULAR; INTRAVENOUS ONCE
Status: SHIPPED | OUTPATIENT
Start: 2023-05-25 | End: 2023-05-30

## 2023-05-25 RX ORDER — CHLORHEXIDINE GLUCONATE 0.12 MG/ML
15 RINSE ORAL 2 TIMES DAILY
Qty: 420 ML | Refills: 0 | Status: SHIPPED | OUTPATIENT
Start: 2023-05-25 | End: 2023-06-08

## 2023-05-25 ASSESSMENT — PATIENT HEALTH QUESTIONNAIRE - PHQ9
SUM OF ALL RESPONSES TO PHQ QUESTIONS 1-9: 0
SUM OF ALL RESPONSES TO PHQ9 QUESTIONS 1 & 2: 0
2. FEELING DOWN, DEPRESSED OR HOPELESS: 0
SUM OF ALL RESPONSES TO PHQ QUESTIONS 1-9: 0
SUM OF ALL RESPONSES TO PHQ QUESTIONS 1-9: 0
1. LITTLE INTEREST OR PLEASURE IN DOING THINGS: 0
SUM OF ALL RESPONSES TO PHQ QUESTIONS 1-9: 0

## 2023-05-25 NOTE — PROGRESS NOTES
Frances Prado (:  1975) is a 51 y.o. male,Established patient, here for evaluation of the following chief complaint(s):  Dental Pain (Tooth abscess- got medications from previous abscess but making pt nauseated- unable to get into dentist ) and Hyperglycemia      ASSESSMENT/PLAN:    ICD-10-CM    1. Dental abscess  K04.7 chlorhexidine (PERIDEX) 0.12 % solution     ketorolac (TORADOL) injection 60 mg  Finish entire course of Amoxil (This was started earlier today)      2. Dental caries  K02.9       3. Type 2 diabetes mellitus without complication, without long-term current use of insulin (HCC)  E11.9 ADA diet  Exercise as tolerated  Closely monitor blood sugars  Continue DexCom    The current medical regimen is effective            Return if symptoms worsen or fail to improve. SUBJECTIVE/OBJECTIVE:  HPI    DENTAL ABSCESS:  Reports left sided dental pain. \"It is so painful. \"  This started yesterday. He was treated with Amoxil on 2023 for right sided dental abscess. \"It cleared up the right side. \"   \"I started another round of Amoxil this morning. I have had two doses. \"   Reports left lower dental pain. Blood sugars were running better before the dental infection. \"It was averaging about 170. \"    /89   Pulse 89   Temp 97.6 °F (36.4 °C)   Wt 177 lb (80.3 kg)   SpO2 99%   BMI 24.01 kg/m²     Review of Systems   HENT:  Positive for dental problem. Left lower dental abscess     Physical Exam  Vitals reviewed. Constitutional:       Appearance: He is well-developed. HENT:      Head: Normocephalic. Right Ear: External ear normal.      Left Ear: External ear normal.      Nose: Nose normal.      Mouth/Throat:      Dentition: Abnormal dentition. Dental caries present. Cardiovascular:      Rate and Rhythm: Normal rate and regular rhythm. Pulmonary:      Effort: Pulmonary effort is normal.      Breath sounds: Normal breath sounds. No wheezing, rhonchi or rales.

## 2023-05-26 ENCOUNTER — TELEPHONE (OUTPATIENT)
Dept: FAMILY MEDICINE CLINIC | Age: 48
End: 2023-05-26

## 2023-05-26 DIAGNOSIS — K04.7 DENTAL ABSCESS: Primary | ICD-10-CM

## 2023-05-26 RX ORDER — CLINDAMYCIN HYDROCHLORIDE 300 MG/1
300 CAPSULE ORAL 3 TIMES DAILY
Qty: 30 CAPSULE | Refills: 0 | Status: SHIPPED | OUTPATIENT
Start: 2023-05-26 | End: 2023-06-05

## 2023-05-26 NOTE — TELEPHONE ENCOUNTER
Patient called and said he is in a lot of pain and the side of his face is swollen. He would like to know if he needs to keep taking the antibiotics or switch to something else.

## 2023-05-26 NOTE — TELEPHONE ENCOUNTER
I would recommend switching to broader spectrum antibiotics. Stop amoxicillin    Recommend clindamycin 300 mg, 1 tablet 3 times daily for 10 days dispense #30. Refills #0.     If symptoms worsen patient will need to be seen in the emergency department

## 2023-06-15 ENCOUNTER — LAB (OUTPATIENT)
Dept: LAB | Facility: HOSPITAL | Age: 48
End: 2023-06-15
Payer: COMMERCIAL

## 2023-06-15 DIAGNOSIS — E11.65 TYPE 2 DIABETES MELLITUS WITH HYPERGLYCEMIA, WITH LONG-TERM CURRENT USE OF INSULIN: ICD-10-CM

## 2023-06-15 DIAGNOSIS — I25.10 CORONARY ARTERY DISEASE INVOLVING NATIVE CORONARY ARTERY OF NATIVE HEART WITHOUT ANGINA PECTORIS: ICD-10-CM

## 2023-06-15 DIAGNOSIS — Z79.4 TYPE 2 DIABETES MELLITUS WITH HYPERGLYCEMIA, WITH LONG-TERM CURRENT USE OF INSULIN: ICD-10-CM

## 2023-06-15 LAB
ANION GAP SERPL CALCULATED.3IONS-SCNC: 11 MMOL/L (ref 5–15)
BUN SERPL-MCNC: 30 MG/DL (ref 6–20)
BUN/CREAT SERPL: 19.5 (ref 7–25)
CALCIUM SPEC-SCNC: 9.3 MG/DL (ref 8.6–10.5)
CHLORIDE SERPL-SCNC: 101 MMOL/L (ref 98–107)
CO2 SERPL-SCNC: 26 MMOL/L (ref 22–29)
CREAT SERPL-MCNC: 1.54 MG/DL (ref 0.76–1.27)
EGFRCR SERPLBLD CKD-EPI 2021: 55 ML/MIN/1.73
GLUCOSE SERPL-MCNC: 130 MG/DL (ref 65–99)
HBA1C MFR BLD: 7.7 % (ref 4.8–5.6)
POTASSIUM SERPL-SCNC: 4.4 MMOL/L (ref 3.5–5.2)
SODIUM SERPL-SCNC: 138 MMOL/L (ref 136–145)

## 2023-06-15 PROCEDURE — 83036 HEMOGLOBIN GLYCOSYLATED A1C: CPT

## 2023-06-15 PROCEDURE — 80048 BASIC METABOLIC PNL TOTAL CA: CPT

## 2023-06-15 PROCEDURE — 36415 COLL VENOUS BLD VENIPUNCTURE: CPT

## 2023-06-19 ENCOUNTER — TELEPHONE (OUTPATIENT)
Dept: CARDIAC SURGERY | Facility: CLINIC | Age: 48
End: 2023-06-19
Payer: COMMERCIAL

## 2023-06-19 NOTE — TELEPHONE ENCOUNTER
Discussed most recent lab results with patient.  Hemoglobin A1c is much improved at 7.7.  Creatinine is up to 1.5 and have advised him to hydrate with sugar-free Gatorade.  He does wish to have blood set up with donors that have not received the COVID-vaccine as he did prior.  I have advised him to call and get this arranged and to call me back when he knows a timeframe on blood availability and we will schedule surgery from there.  He verbalizes understanding and states he will call them today.  
No

## 2023-06-28 ENCOUNTER — OFFICE VISIT (OUTPATIENT)
Dept: FAMILY MEDICINE CLINIC | Age: 48
End: 2023-06-28
Payer: MEDICAID

## 2023-06-28 VITALS
BODY MASS INDEX: 24.92 KG/M2 | WEIGHT: 184 LBS | OXYGEN SATURATION: 96 % | SYSTOLIC BLOOD PRESSURE: 100 MMHG | HEART RATE: 98 BPM | TEMPERATURE: 97.1 F | HEIGHT: 72 IN | DIASTOLIC BLOOD PRESSURE: 60 MMHG

## 2023-06-28 DIAGNOSIS — K52.9 COLITIS: Primary | ICD-10-CM

## 2023-06-28 DIAGNOSIS — K62.5 RECTAL BLEEDING: ICD-10-CM

## 2023-06-28 PROCEDURE — 3078F DIAST BP <80 MM HG: CPT | Performed by: NURSE PRACTITIONER

## 2023-06-28 PROCEDURE — 99213 OFFICE O/P EST LOW 20 MIN: CPT | Performed by: NURSE PRACTITIONER

## 2023-06-28 PROCEDURE — 3074F SYST BP LT 130 MM HG: CPT | Performed by: NURSE PRACTITIONER

## 2023-06-28 RX ORDER — DIAPER,BRIEF,INFANT-TODD,DISP
EACH MISCELLANEOUS
Qty: 30 G | Refills: 1 | Status: SHIPPED | OUTPATIENT
Start: 2023-06-28 | End: 2023-07-05

## 2023-06-28 RX ORDER — METRONIDAZOLE 500 MG/1
500 TABLET ORAL 2 TIMES DAILY
Qty: 14 TABLET | Refills: 0 | Status: SHIPPED | OUTPATIENT
Start: 2023-06-28 | End: 2023-07-05

## 2023-06-28 RX ORDER — CIPROFLOXACIN 500 MG/1
500 TABLET, FILM COATED ORAL 2 TIMES DAILY
Qty: 20 TABLET | Refills: 0 | Status: SHIPPED | OUTPATIENT
Start: 2023-06-28 | End: 2023-07-08

## 2023-06-28 ASSESSMENT — ENCOUNTER SYMPTOMS
ANAL BLEEDING: 1
ABDOMINAL PAIN: 1

## 2023-06-30 ENCOUNTER — TELEPHONE (OUTPATIENT)
Dept: FAMILY MEDICINE CLINIC | Age: 48
End: 2023-06-30

## 2023-07-24 DIAGNOSIS — E11.9 TYPE 2 DIABETES MELLITUS WITHOUT COMPLICATION, WITH LONG-TERM CURRENT USE OF INSULIN (HCC): ICD-10-CM

## 2023-07-24 DIAGNOSIS — Z79.4 TYPE 2 DIABETES MELLITUS WITHOUT COMPLICATION, WITH LONG-TERM CURRENT USE OF INSULIN (HCC): ICD-10-CM

## 2023-07-24 NOTE — TELEPHONE ENCOUNTER
Received call/My Chart Message from patient requesting refill on medication(s). Pt was last seen in office on 2023  and has a follow up scheduled for 2023. Will send request to provider for authorization.      Requested Prescriptions     Pending Prescriptions Disp Refills    Insulin Pen Needle 32G X 4 MM MISC 200 each 11     Si each by Does not apply route in the morning, at noon, in the evening, and at bedtime

## 2023-07-24 NOTE — TELEPHONE ENCOUNTER
Sent rx to pharmacy for pt    Requested Prescriptions     Signed Prescriptions Disp Refills    Insulin Pen Needle 32G X 4 MM MISC 200 each 11     Si each by Does not apply route in the morning, at noon, in the evening, and at bedtime     Authorizing Provider: Johnna Taylor     Ordering User: Kaci Humphries

## 2023-08-02 ENCOUNTER — OFFICE VISIT (OUTPATIENT)
Dept: FAMILY MEDICINE CLINIC | Age: 48
End: 2023-08-02
Payer: MEDICAID

## 2023-08-02 VITALS
HEART RATE: 77 BPM | HEIGHT: 72 IN | OXYGEN SATURATION: 99 % | TEMPERATURE: 97.2 F | WEIGHT: 190 LBS | DIASTOLIC BLOOD PRESSURE: 84 MMHG | SYSTOLIC BLOOD PRESSURE: 136 MMHG | BODY MASS INDEX: 25.73 KG/M2

## 2023-08-02 DIAGNOSIS — I25.118 CORONARY ARTERY DISEASE OF NATIVE ARTERY OF NATIVE HEART WITH STABLE ANGINA PECTORIS (HCC): ICD-10-CM

## 2023-08-02 DIAGNOSIS — I10 ESSENTIAL HYPERTENSION: ICD-10-CM

## 2023-08-02 DIAGNOSIS — E78.2 MIXED HYPERLIPIDEMIA: ICD-10-CM

## 2023-08-02 DIAGNOSIS — E11.9 TYPE 2 DIABETES MELLITUS WITHOUT COMPLICATION, WITHOUT LONG-TERM CURRENT USE OF INSULIN (HCC): Primary | ICD-10-CM

## 2023-08-02 PROCEDURE — 99214 OFFICE O/P EST MOD 30 MIN: CPT | Performed by: PEDIATRICS

## 2023-08-02 PROCEDURE — 3075F SYST BP GE 130 - 139MM HG: CPT | Performed by: PEDIATRICS

## 2023-08-02 PROCEDURE — 3079F DIAST BP 80-89 MM HG: CPT | Performed by: PEDIATRICS

## 2023-08-02 PROCEDURE — 3046F HEMOGLOBIN A1C LEVEL >9.0%: CPT | Performed by: PEDIATRICS

## 2023-08-02 SDOH — ECONOMIC STABILITY: INCOME INSECURITY: HOW HARD IS IT FOR YOU TO PAY FOR THE VERY BASICS LIKE FOOD, HOUSING, MEDICAL CARE, AND HEATING?: NOT HARD AT ALL

## 2023-08-02 SDOH — ECONOMIC STABILITY: FOOD INSECURITY: WITHIN THE PAST 12 MONTHS, THE FOOD YOU BOUGHT JUST DIDN'T LAST AND YOU DIDN'T HAVE MONEY TO GET MORE.: NEVER TRUE

## 2023-08-02 SDOH — ECONOMIC STABILITY: HOUSING INSECURITY
IN THE LAST 12 MONTHS, WAS THERE A TIME WHEN YOU DID NOT HAVE A STEADY PLACE TO SLEEP OR SLEPT IN A SHELTER (INCLUDING NOW)?: NO

## 2023-08-02 SDOH — ECONOMIC STABILITY: FOOD INSECURITY: WITHIN THE PAST 12 MONTHS, YOU WORRIED THAT YOUR FOOD WOULD RUN OUT BEFORE YOU GOT MONEY TO BUY MORE.: NEVER TRUE

## 2023-08-02 ASSESSMENT — ENCOUNTER SYMPTOMS
COUGH: 0
WHEEZING: 0
BACK PAIN: 0
ABDOMINAL PAIN: 0
SHORTNESS OF BREATH: 1

## 2023-08-02 NOTE — PROGRESS NOTES
Allergies: Patient has no known allergies. Past Medical History:   Diagnosis Date    Type 2 diabetes mellitus without complication ([address])        Family History   Problem Relation Age of Onset    Diabetes Father     Heart Disease Maternal Grandmother     Diabetes Maternal Grandfather     Diabetes Paternal Grandmother     Heart Disease Paternal Grandmother     Cancer Paternal Grandmother        Past Surgical History:   Procedure Laterality Date    WISDOM TOOTH EXTRACTION         Social History     Tobacco Use    Smoking status: Never    Smokeless tobacco: Never   Substance Use Topics    Alcohol use: Yes        Review of Systems   Constitutional:  Positive for activity change. Negative for appetite change and fever. HENT:  Negative for congestion and postnasal drip. Respiratory:  Positive for shortness of breath (improving). Negative for cough and wheezing. Cardiovascular:  Positive for chest pain (rarely). Negative for palpitations and leg swelling. Gastrointestinal:  Negative for abdominal pain. Endocrine:        Blood sugars are getting better   Genitourinary:  Negative for difficulty urinating. Musculoskeletal:  Negative for arthralgias and back pain. Skin:  Negative for rash. Psychiatric/Behavioral:  Negative for behavioral problems, self-injury and sleep disturbance. The patient is not nervous/anxious. Physical Exam  Vitals and nursing note reviewed. Constitutional:       General: He is not in acute distress. Appearance: He is well-developed. HENT:      Head: Normocephalic and atraumatic. Right Ear: External ear normal.      Left Ear: External ear normal.      Nose: Nose normal.      Mouth/Throat:      Pharynx: No oropharyngeal exudate. Eyes:      General:         Right eye: No discharge. Left eye: No discharge. Conjunctiva/sclera: Conjunctivae normal.   Neck:      Thyroid: No thyromegaly. Vascular: No JVD.    Cardiovascular:      Rate and Rhythm:

## 2023-08-05 DIAGNOSIS — E11.9 TYPE 2 DIABETES MELLITUS WITHOUT COMPLICATION, WITHOUT LONG-TERM CURRENT USE OF INSULIN (HCC): ICD-10-CM

## 2023-08-07 RX ORDER — INSULIN GLARGINE 100 [IU]/ML
30 INJECTION, SOLUTION SUBCUTANEOUS NIGHTLY
Qty: 30 ML | Refills: 3 | Status: SHIPPED | OUTPATIENT
Start: 2023-08-07

## 2023-08-07 NOTE — TELEPHONE ENCOUNTER
Received fax from pharmacy requesting refill on pts medication(s). Pt was last seen in office on 8/2/2023  and has a follow up scheduled for 11/2/2023. Will send request to  Dr. Meg Capellan  for patient.      Requested Prescriptions     Pending Prescriptions Disp Refills    LANTUS SOLOSTAR 100 UNIT/ML injection pen [Pharmacy Med Name: Lantus SoloStar 100 UNIT/ML Subcutaneous Solution Pen-injector] 30 mL 3     Sig: Inject 25 Units into the skin nightly

## 2023-08-07 NOTE — TELEPHONE ENCOUNTER
Sent rx to pharmacy for pt.      Requested Prescriptions     Signed Prescriptions Disp Refills    LANTUS SOLOSTAR 100 UNIT/ML injection pen 30 mL 3     Sig: Inject 30 Units into the skin nightly     Authorizing Provider: Stephanie Cifuentes     Ordering User: Oral Nolen

## 2023-08-11 NOTE — TELEPHONE ENCOUNTER
[facility]  Patient has lost is bottle of nitro and needing a refill. It is not on current med list and looks to be from historical provider. Wanted to be sure this is ok to order.

## 2023-08-14 RX ORDER — NITROGLYCERIN 0.4 MG/1
0.4 TABLET SUBLINGUAL EVERY 5 MIN PRN
Qty: 25 TABLET | Refills: 3 | Status: SHIPPED | OUTPATIENT
Start: 2023-08-14

## 2023-09-12 ENCOUNTER — TELEPHONE (OUTPATIENT)
Dept: CARDIAC SURGERY | Facility: CLINIC | Age: 48
End: 2023-09-12
Payer: COMMERCIAL

## 2023-09-12 DIAGNOSIS — E11.9 TYPE 2 DIABETES MELLITUS WITHOUT COMPLICATION, WITHOUT LONG-TERM CURRENT USE OF INSULIN (HCC): ICD-10-CM

## 2023-09-12 RX ORDER — INSULIN ASPART 100 [IU]/ML
INJECTION, SOLUTION INTRAVENOUS; SUBCUTANEOUS
Qty: 27 ML | Refills: 0 | Status: SHIPPED | OUTPATIENT
Start: 2023-09-12

## 2023-09-12 NOTE — TELEPHONE ENCOUNTER
Attempted to contact patient to get update on blood set up.  I spoke with patient in June and at that time he stated he would arrange blood set up as he does not wish to receive any blood transfusions from someone who has taken the COVID-vaccine.  He was to call me back with update when this was arranged so that we could schedule surgery within an appropriate timeframe.  Message left with patient to call back with update.

## 2023-09-12 NOTE — TELEPHONE ENCOUNTER
Received fax from pharmacy requesting refill on pts medication(s). Pt was last seen in office on 8/2/2023  and has a follow up scheduled for 11/2/2023. Will send request to  Dr. Jai Huerta  for authorization. Requested Prescriptions     Pending Prescriptions Disp Refills    insulin aspart (NOVOLOG FLEXPEN RELION) 100 UNIT/ML injection pen [Pharmacy Med Name: NovoLOG FlexPen ReliOn 100 UNIT/ML Subcutaneous Solution Pen-injector] 27 mL 0     Sig: USE AS DIRECTED PER CARB RATIO AND CORRECTION. AVERAGE DAILY USE-30 UNITS.

## 2023-10-18 ENCOUNTER — OFFICE VISIT (OUTPATIENT)
Dept: FAMILY MEDICINE CLINIC | Age: 48
End: 2023-10-18
Payer: MEDICAID

## 2023-10-18 VITALS
HEIGHT: 72 IN | HEART RATE: 72 BPM | WEIGHT: 196 LBS | SYSTOLIC BLOOD PRESSURE: 128 MMHG | TEMPERATURE: 97.1 F | BODY MASS INDEX: 26.55 KG/M2 | DIASTOLIC BLOOD PRESSURE: 82 MMHG | OXYGEN SATURATION: 99 %

## 2023-10-18 DIAGNOSIS — J02.0 ACUTE STREPTOCOCCAL PHARYNGITIS: Primary | ICD-10-CM

## 2023-10-18 DIAGNOSIS — Z20.818 STREPTOCOCCUS EXPOSURE: ICD-10-CM

## 2023-10-18 DIAGNOSIS — J02.9 SORE THROAT: ICD-10-CM

## 2023-10-18 LAB — S PYO AG THROAT QL: NORMAL

## 2023-10-18 PROCEDURE — 3079F DIAST BP 80-89 MM HG: CPT | Performed by: NURSE PRACTITIONER

## 2023-10-18 PROCEDURE — 3074F SYST BP LT 130 MM HG: CPT | Performed by: NURSE PRACTITIONER

## 2023-10-18 PROCEDURE — 99213 OFFICE O/P EST LOW 20 MIN: CPT | Performed by: NURSE PRACTITIONER

## 2023-10-18 RX ORDER — AMOXICILLIN 500 MG/1
500 CAPSULE ORAL 2 TIMES DAILY
Qty: 20 CAPSULE | Refills: 0 | Status: SHIPPED | OUTPATIENT
Start: 2023-10-18 | End: 2023-10-28

## 2023-10-18 ASSESSMENT — ENCOUNTER SYMPTOMS
GASTROINTESTINAL NEGATIVE: 1
RESPIRATORY NEGATIVE: 1
SORE THROAT: 1
EYES NEGATIVE: 1

## 2023-10-18 NOTE — PROGRESS NOTES
MG tablet Take 1 tablet by mouth at bedtime      nitroGLYCERIN (NITROSTAT) 0.4 MG SL tablet Place 1 tablet under the tongue as needed      aspirin 81 MG EC tablet Take 1 tablet by mouth daily      Continuous Blood Gluc Sensor (DEXCOM G6 SENSOR) MISC Use with dexcom system 3 each 11    Continuous Blood Gluc  (DEXCOM G6 ) TIMMY Use with dexcom system. 1 each 0    metoprolol succinate (TOPROL XL) 50 MG extended release tablet Take 1 tablet by mouth daily 30 tablet 5    albuterol sulfate HFA (PROVENTIL HFA) 108 (90 Base) MCG/ACT inhaler Inhale 2 puffs into the lungs every 6 hours as needed for Wheezing 1 each 1    lisinopril (PRINIVIL;ZESTRIL) 5 MG tablet Take 1 tablet by mouth daily 90 tablet 3    fenofibrate (TRICOR) 145 MG tablet Take 1 tablet by mouth daily 90 tablet 3     No current facility-administered medications for this visit. No Known Allergies    Family History   Problem Relation Age of Onset    Diabetes Father     Heart Disease Maternal Grandmother     Diabetes Maternal Grandfather     Diabetes Paternal Grandmother     Heart Disease Paternal Grandmother     Cancer Paternal Grandmother                Subjective:      Review of Systems   Constitutional: Negative. HENT:  Positive for sore throat. Eyes: Negative. Respiratory: Negative. Cardiovascular: Negative. Gastrointestinal: Negative. Endocrine: Negative. Genitourinary: Negative. Musculoskeletal: Negative. Skin: Negative. Neurological: Negative. Hematological: Negative. Psychiatric/Behavioral: Negative. Objective:     Physical Exam  Vitals and nursing note reviewed. Constitutional:       Appearance: Normal appearance. He is well-developed. HENT:      Head: Normocephalic and atraumatic.       Right Ear: Hearing, tympanic membrane, ear canal and external ear normal.      Left Ear: Hearing, tympanic membrane, ear canal and external ear normal.      Nose: Nose normal.      Mouth/Throat:

## 2023-10-24 ENCOUNTER — TELEPHONE (OUTPATIENT)
Dept: CARDIAC SURGERY | Facility: CLINIC | Age: 48
End: 2023-10-24
Payer: COMMERCIAL

## 2023-10-24 NOTE — TELEPHONE ENCOUNTER
Called patient to check on status update with blood products.  Patient states that he no longer wishes to use blood donated from someone who has not had the COVID-vaccine and he is now agreeable to use any blood in our blood bank if needed.  He does state he is not ready to schedule surgery at this time as he has been treated for strep throat.  He is to call back when he has recovered.  I advised him I will discuss further with Dr. Wilburn and he may need follow-up visit in the office given the significant amount of time that has passed since they last spoke.  Patient is agreeable to this.  I will await his call back

## 2023-10-30 DIAGNOSIS — I25.10 CORONARY ARTERY DISEASE INVOLVING NATIVE CORONARY ARTERY OF NATIVE HEART WITHOUT ANGINA PECTORIS: Primary | ICD-10-CM

## 2023-10-30 RX ORDER — SODIUM CHLORIDE 0.9 % (FLUSH) 0.9 %
10 SYRINGE (ML) INJECTION AS NEEDED
OUTPATIENT
Start: 2023-10-30

## 2023-10-30 RX ORDER — ONDANSETRON 2 MG/ML
4 INJECTION INTRAMUSCULAR; INTRAVENOUS EVERY 6 HOURS PRN
OUTPATIENT
Start: 2023-10-30

## 2023-10-30 RX ORDER — SODIUM CHLORIDE 0.9 % (FLUSH) 0.9 %
10 SYRINGE (ML) INJECTION EVERY 12 HOURS SCHEDULED
OUTPATIENT
Start: 2023-10-30

## 2023-11-02 ENCOUNTER — OFFICE VISIT (OUTPATIENT)
Dept: FAMILY MEDICINE CLINIC | Age: 48
End: 2023-11-02
Payer: MEDICAID

## 2023-11-02 VITALS
DIASTOLIC BLOOD PRESSURE: 72 MMHG | TEMPERATURE: 97 F | HEART RATE: 75 BPM | WEIGHT: 199.25 LBS | SYSTOLIC BLOOD PRESSURE: 134 MMHG | BODY MASS INDEX: 26.99 KG/M2 | HEIGHT: 72 IN | OXYGEN SATURATION: 98 %

## 2023-11-02 DIAGNOSIS — E11.9 TYPE 2 DIABETES MELLITUS WITHOUT COMPLICATION, WITHOUT LONG-TERM CURRENT USE OF INSULIN (HCC): Primary | ICD-10-CM

## 2023-11-02 DIAGNOSIS — I25.118 CORONARY ARTERY DISEASE OF NATIVE ARTERY OF NATIVE HEART WITH STABLE ANGINA PECTORIS (HCC): ICD-10-CM

## 2023-11-02 DIAGNOSIS — I10 PRIMARY HYPERTENSION: ICD-10-CM

## 2023-11-02 DIAGNOSIS — E78.2 MIXED HYPERLIPIDEMIA: ICD-10-CM

## 2023-11-02 PROCEDURE — 99214 OFFICE O/P EST MOD 30 MIN: CPT | Performed by: PEDIATRICS

## 2023-11-02 PROCEDURE — 3046F HEMOGLOBIN A1C LEVEL >9.0%: CPT | Performed by: PEDIATRICS

## 2023-11-02 PROCEDURE — 3078F DIAST BP <80 MM HG: CPT | Performed by: PEDIATRICS

## 2023-11-02 PROCEDURE — 3075F SYST BP GE 130 - 139MM HG: CPT | Performed by: PEDIATRICS

## 2023-11-02 ASSESSMENT — ENCOUNTER SYMPTOMS
SHORTNESS OF BREATH: 1
COUGH: 0
WHEEZING: 0
ABDOMINAL PAIN: 0
BACK PAIN: 0

## 2023-11-02 NOTE — PROGRESS NOTES
[address] [place] [address]  Phone [phone]   Fax [phone]      OFFICE VISIT: 2023    Ivet Easley [place]-: 1975      HPI  Reason For Visit:  Ivet Easley is a 51 y.o. Diabetes (He thinks he is good per patient /Has unfulfilled labs from august )    Patient presents on follow-up for multiple health issues. Presents concerns:  No significant concerns at present. He is applying for disability. He is having marked shortness of breath. Diabetes Mellitus Type 2  Diet compliance:  noncompliant: A good portion of the time  Nutrition Consultation Needed:  no  Medication:              Lantus insulin 30 units nightly   NovoLog insulin with carb ratio and correction              Metformin 1000 mg twice daily  Medication compliance:  compliant most of the time  Weight trend: stable, up 3 pounds 4 ounces from a month ago (this is gradually increased over the past year  Current exercise: he tries to remain active  Checking: Dexcom   He feels like his sugar has been up lately. Home blood sugar records: fasting range: not checking frequently  Low BG:  no  Eye exam current (within one year): yes  Checking Feet regularly:  yes - and no sores  ACE/ARB:  yes -lisinopril 5 mg  Aspirin: No: But recommended  Tobacco history: He  reports that he has never smoked.  He has never used smokeless tobacco.    Lab Results   Component Value Date    LABA1C 11.0 (H) 2023    LABA1C 11.4 (H) 2021    LABA1C 10.5 (H) 2019     Lab Results   Component Value Date    CREATININE 0.9 2021       Hypertension:   BP today was   BP Readings from Last 1 Encounters:   23 134/72      Recent BP readings:    BP Readings from Last 3 Encounters:   23 134/72   10/18/23 128/82   23 136/84     Medication              Toprol XL 50mg daily              Lisinopril 5 mg daily              Lasix 40 mg daily   Medication compliance:  compliant most of the time  Home blood

## 2023-11-13 DIAGNOSIS — E11.9 TYPE 2 DIABETES MELLITUS WITHOUT COMPLICATION, WITHOUT LONG-TERM CURRENT USE OF INSULIN (HCC): ICD-10-CM

## 2023-11-13 RX ORDER — INSULIN ASPART 100 [IU]/ML
INJECTION, SOLUTION INTRAVENOUS; SUBCUTANEOUS
Qty: 27 ML | Refills: 0 | Status: SHIPPED | OUTPATIENT
Start: 2023-11-13

## 2023-11-13 NOTE — TELEPHONE ENCOUNTER
Received fax from pharmacy requesting refill on pts medication(s). Pt was last seen in office on 11/2/2023  and has a follow up scheduled for 2/5/2024. Will send request to  Dr. Wilton Millan  for authorization. Requested Prescriptions     Pending Prescriptions Disp Refills    NOVOLOG FLEXPEN RELION 100 UNIT/ML injection pen [Pharmacy Med Name: NovoLOG FlexPen ReliOn 100 UNIT/ML Subcutaneous Solution Pen-injector] 27 mL 0     Sig: USE AS DIRECTED PER CARB RATIO AND CORRECTION. AVERAGE DAILY USE-30 UNITS.

## 2023-11-14 ENCOUNTER — TELEPHONE (OUTPATIENT)
Dept: FAMILY MEDICINE CLINIC | Age: 48
End: 2023-11-14

## 2023-11-14 NOTE — TELEPHONE ENCOUNTER
Patient called and stated [facility] would not fill his insulin pens until 12/6. Per pharmacy, he was given a 90 day supply and should have approx 3 pens left if he uses 30 u a day. Clarified with patient he takes 30 u a day. Will go home and check for missing box.

## 2023-11-22 ENCOUNTER — TELEPHONE (OUTPATIENT)
Dept: CARDIAC SURGERY | Facility: CLINIC | Age: 48
End: 2023-11-22
Payer: COMMERCIAL

## 2023-11-22 ENCOUNTER — HOSPITAL ENCOUNTER (OUTPATIENT)
Facility: HOSPITAL | Age: 48
Setting detail: HOSPITAL OUTPATIENT SURGERY
Discharge: HOME | End: 2023-11-22
Attending: INTERNAL MEDICINE | Admitting: INTERNAL MEDICINE
Payer: COMMERCIAL

## 2023-11-22 VITALS
OXYGEN SATURATION: 96 % | WEIGHT: 198.6 LBS | BODY MASS INDEX: 26.9 KG/M2 | DIASTOLIC BLOOD PRESSURE: 83 MMHG | HEIGHT: 72 IN | SYSTOLIC BLOOD PRESSURE: 169 MMHG | HEART RATE: 70 BPM | RESPIRATION RATE: 16 BRPM | TEMPERATURE: 97.1 F

## 2023-11-22 DIAGNOSIS — I25.10 CORONARY ARTERY DISEASE INVOLVING NATIVE CORONARY ARTERY OF NATIVE HEART WITHOUT ANGINA PECTORIS: ICD-10-CM

## 2023-11-22 LAB
ALBUMIN SERPL-MCNC: 4.7 G/DL (ref 3.5–5.2)
ALBUMIN/GLOB SERPL: 1.7 G/DL
ALP SERPL-CCNC: 63 U/L (ref 39–117)
ALT SERPL W P-5'-P-CCNC: 21 U/L (ref 1–41)
ANION GAP SERPL CALCULATED.3IONS-SCNC: 9 MMOL/L (ref 5–15)
AST SERPL-CCNC: 21 U/L (ref 1–40)
BILIRUB SERPL-MCNC: 0.6 MG/DL (ref 0–1.2)
BUN SERPL-MCNC: 25 MG/DL (ref 6–20)
BUN/CREAT SERPL: 20.2 (ref 7–25)
CALCIUM SPEC-SCNC: 9.4 MG/DL (ref 8.6–10.5)
CHLORIDE SERPL-SCNC: 101 MMOL/L (ref 98–107)
CHOLEST SERPL-MCNC: 146 MG/DL (ref 0–200)
CO2 SERPL-SCNC: 28 MMOL/L (ref 22–29)
CREAT SERPL-MCNC: 1.24 MG/DL (ref 0.76–1.27)
DEPRECATED RDW RBC AUTO: 40.2 FL (ref 37–54)
EGFRCR SERPLBLD CKD-EPI 2021: 71.3 ML/MIN/1.73
ERYTHROCYTE [DISTWIDTH] IN BLOOD BY AUTOMATED COUNT: 12.5 % (ref 12.3–15.4)
GLOBULIN UR ELPH-MCNC: 2.7 GM/DL
GLUCOSE SERPL-MCNC: 216 MG/DL (ref 65–99)
HBA1C MFR BLD: 7.1 % (ref 4.8–5.6)
HCT VFR BLD AUTO: 31.5 % (ref 37.5–51)
HDLC SERPL-MCNC: 35 MG/DL (ref 40–60)
HGB BLD-MCNC: 10.6 G/DL (ref 13–17.7)
LDLC SERPL CALC-MCNC: 78 MG/DL (ref 0–100)
LDLC/HDLC SERPL: 2.06 {RATIO}
MCH RBC QN AUTO: 29.7 PG (ref 26.6–33)
MCHC RBC AUTO-ENTMCNC: 33.7 G/DL (ref 31.5–35.7)
MCV RBC AUTO: 88.2 FL (ref 79–97)
PLATELET # BLD AUTO: 143 10*3/MM3 (ref 140–450)
PMV BLD AUTO: 10.5 FL (ref 6–12)
POTASSIUM SERPL-SCNC: 4.3 MMOL/L (ref 3.5–5.2)
PROT SERPL-MCNC: 7.4 G/DL (ref 6–8.5)
RBC # BLD AUTO: 3.57 10*6/MM3 (ref 4.14–5.8)
SODIUM SERPL-SCNC: 138 MMOL/L (ref 136–145)
TRIGL SERPL-MCNC: 194 MG/DL (ref 0–150)
VLDLC SERPL-MCNC: 33 MG/DL (ref 5–40)
WBC NRBC COR # BLD AUTO: 6.41 10*3/MM3 (ref 3.4–10.8)

## 2023-11-22 PROCEDURE — 25010000002 DIPHENHYDRAMINE PER 50 MG: Performed by: INTERNAL MEDICINE

## 2023-11-22 PROCEDURE — 93454 CORONARY ARTERY ANGIO S&I: CPT | Performed by: INTERNAL MEDICINE

## 2023-11-22 PROCEDURE — 80053 COMPREHEN METABOLIC PANEL: CPT | Performed by: NURSE PRACTITIONER

## 2023-11-22 PROCEDURE — 83036 HEMOGLOBIN GLYCOSYLATED A1C: CPT | Performed by: NURSE PRACTITIONER

## 2023-11-22 PROCEDURE — 85027 COMPLETE CBC AUTOMATED: CPT | Performed by: NURSE PRACTITIONER

## 2023-11-22 PROCEDURE — 25010000002 MIDAZOLAM PER 1 MG: Performed by: INTERNAL MEDICINE

## 2023-11-22 PROCEDURE — 25010000002 FENTANYL CITRATE (PF) 50 MCG/ML SOLUTION: Performed by: INTERNAL MEDICINE

## 2023-11-22 PROCEDURE — S0260 H&P FOR SURGERY: HCPCS | Performed by: INTERNAL MEDICINE

## 2023-11-22 PROCEDURE — 25010000002 HEPARIN (PORCINE) PER 1000 UNITS: Performed by: INTERNAL MEDICINE

## 2023-11-22 PROCEDURE — 80061 LIPID PANEL: CPT | Performed by: NURSE PRACTITIONER

## 2023-11-22 PROCEDURE — 25510000001 IOPAMIDOL PER 1 ML: Performed by: INTERNAL MEDICINE

## 2023-11-22 PROCEDURE — C1894 INTRO/SHEATH, NON-LASER: HCPCS | Performed by: INTERNAL MEDICINE

## 2023-11-22 RX ORDER — LIDOCAINE HYDROCHLORIDE 20 MG/ML
INJECTION, SOLUTION INFILTRATION; PERINEURAL
Status: DISCONTINUED | OUTPATIENT
Start: 2023-11-22 | End: 2023-11-22 | Stop reason: HOSPADM

## 2023-11-22 RX ORDER — DIPHENHYDRAMINE HYDROCHLORIDE 50 MG/ML
INJECTION INTRAMUSCULAR; INTRAVENOUS
Status: DISCONTINUED | OUTPATIENT
Start: 2023-11-22 | End: 2023-11-22 | Stop reason: HOSPADM

## 2023-11-22 RX ORDER — VERAPAMIL HYDROCHLORIDE 2.5 MG/ML
INJECTION, SOLUTION INTRAVENOUS
Status: DISCONTINUED | OUTPATIENT
Start: 2023-11-22 | End: 2023-11-22 | Stop reason: HOSPADM

## 2023-11-22 RX ORDER — FENTANYL CITRATE 50 UG/ML
INJECTION, SOLUTION INTRAMUSCULAR; INTRAVENOUS
Status: DISCONTINUED | OUTPATIENT
Start: 2023-11-22 | End: 2023-11-22 | Stop reason: HOSPADM

## 2023-11-22 RX ORDER — ASPIRIN 81 MG/1
324 TABLET, CHEWABLE ORAL ONCE
Status: DISCONTINUED | OUTPATIENT
Start: 2023-11-22 | End: 2023-11-22 | Stop reason: HOSPADM

## 2023-11-22 RX ORDER — ACETAMINOPHEN 325 MG/1
650 TABLET ORAL EVERY 4 HOURS PRN
Status: CANCELLED | OUTPATIENT
Start: 2023-11-22

## 2023-11-22 RX ORDER — ONDANSETRON 2 MG/ML
4 INJECTION INTRAMUSCULAR; INTRAVENOUS EVERY 6 HOURS PRN
Status: DISCONTINUED | OUTPATIENT
Start: 2023-11-22 | End: 2023-11-22 | Stop reason: HOSPADM

## 2023-11-22 RX ORDER — HEPARIN SODIUM 1000 [USP'U]/ML
INJECTION, SOLUTION INTRAVENOUS; SUBCUTANEOUS
Status: DISCONTINUED | OUTPATIENT
Start: 2023-11-22 | End: 2023-11-22 | Stop reason: HOSPADM

## 2023-11-22 RX ORDER — MIDAZOLAM HYDROCHLORIDE 1 MG/ML
INJECTION INTRAMUSCULAR; INTRAVENOUS
Status: DISCONTINUED | OUTPATIENT
Start: 2023-11-22 | End: 2023-11-22 | Stop reason: HOSPADM

## 2023-11-22 RX ORDER — ASPIRIN 81 MG/1
TABLET, CHEWABLE ORAL
Status: COMPLETED
Start: 2023-11-22 | End: 2023-11-22

## 2023-11-22 RX ORDER — SODIUM CHLORIDE 0.9 % (FLUSH) 0.9 %
10 SYRINGE (ML) INJECTION AS NEEDED
Status: DISCONTINUED | OUTPATIENT
Start: 2023-11-22 | End: 2023-11-22 | Stop reason: HOSPADM

## 2023-11-22 RX ORDER — SODIUM CHLORIDE 9 MG/ML
100 INJECTION, SOLUTION INTRAVENOUS CONTINUOUS
Status: CANCELLED | OUTPATIENT
Start: 2023-11-22

## 2023-11-22 RX ORDER — NITROGLYCERIN 0.4 MG/1
0.4 TABLET SUBLINGUAL
Status: CANCELLED | OUTPATIENT
Start: 2023-11-22

## 2023-11-22 RX ORDER — SODIUM CHLORIDE 0.9 % (FLUSH) 0.9 %
10 SYRINGE (ML) INJECTION EVERY 12 HOURS SCHEDULED
Status: DISCONTINUED | OUTPATIENT
Start: 2023-11-22 | End: 2023-11-22 | Stop reason: HOSPADM

## 2023-11-22 RX ADMIN — ASPIRIN 324 MG: 81 TABLET, CHEWABLE ORAL at 07:24

## 2023-11-22 NOTE — TELEPHONE ENCOUNTER
Heart cath has been completed.  Please call patient to schedule follow up with Dr. Wilburn to discuss surgery

## 2023-11-22 NOTE — Clinical Note
Allergies reviewed.  H&P note has been confirmed for the patient. Procedural consent has been signed.  Blood consent hs been signed. Staff has reviewed the patient's labs.

## 2023-11-22 NOTE — H&P
"Chief Complaint: Here for elective cardiac catheterization    HPI: This patient presents today for elective coronary angiography.  He underwent coronary angiography about 1 year ago and was found to have multivessel disease but so far has deferred coronary artery bypass grafting.  He is once again being considered for coronary artery bypass grafting therefore needs updated coronary angiography.  He has no complaints today.    I have reviewed all elements of the patient's past medical, past surgical, home medications, allergies, family and social history with the patient and updated these in the medical record as needed.    Review of systems negative except as otherwise noted in the HPI.    Physical Exam:  /83   Pulse 70   Temp 97.1 °F (36.2 °C)   Resp 16   Ht 182.9 cm (72\")   Wt 90.1 kg (198 lb 9.6 oz)   SpO2 96%   BMI 26.94 kg/m²   Temp:  [97.1 °F (36.2 °C)] 97.1 °F (36.2 °C)  Heart Rate:  [70-93] 70  Resp:  [13-16] 16  BP: (132-220)/() 169/83    Gen.: Awake alert and oriented ×3 and in no acute distress  HEENT: Normocephalic, atraumatic, pupils equally round and reactive to light, oropharynx clear, mucous membranes moist  Neck: Supple, no elevation of JVP, no thyromegaly, no carotid bruits  CV: Regular rate and rhythm, no audible murmurs, rubs, gallops  Pulmonary: Clear to auscultation bilaterally, no wheezes, rhonchi or rales  GI: Soft, nontender, nondistended, active bowel sounds  Extremities: Warm and well-perfused, no dermatitis or ulceration, no clubbing, cyanosis, edema  Neurologic: Cranial nerves are grossly intact, patient moves all 4 extremities equally during examination    Diagnostic Data:    Lab Results   Component Value Date    WBC 6.41 11/22/2023    HGB 10.6 (L) 11/22/2023    HCT 31.5 (L) 11/22/2023    MCV 88.2 11/22/2023     11/22/2023     Lab Results   Component Value Date    GLUCOSE 216 (H) 11/22/2023    CALCIUM 9.4 11/22/2023     11/22/2023    K 4.3 11/22/2023    " CO2 28.0 11/22/2023     11/22/2023    BUN 25 (H) 11/22/2023    CREATININE 1.24 11/22/2023    EGFR 71.3 11/22/2023    BCR 20.2 11/22/2023    ANIONGAP 9.0 11/22/2023     ASSESSMENT/PLAN:    1.  Coronary artery disease in native coronary artery    -This patient is to undergo elective coronary angiography today. I discussed cardiac catheterization, the procedure, risks (including bleeding, infection, vascular damage [including minor oozing, bruising, bleeding, and up to and including the need for vascular surgery], contrast reaction, renal failure, respiratory failure, heart attack, stroke, arrhythmia and even death), benefits, and alternatives and the patient has voiced understanding and is willing to proceed.

## 2023-12-07 ENCOUNTER — OFFICE VISIT (OUTPATIENT)
Dept: FAMILY MEDICINE CLINIC | Age: 48
End: 2023-12-07
Payer: MEDICAID

## 2023-12-07 ENCOUNTER — TELEPHONE (OUTPATIENT)
Dept: FAMILY MEDICINE CLINIC | Age: 48
End: 2023-12-07

## 2023-12-07 VITALS
BODY MASS INDEX: 26.87 KG/M2 | HEART RATE: 76 BPM | HEIGHT: 72 IN | DIASTOLIC BLOOD PRESSURE: 78 MMHG | SYSTOLIC BLOOD PRESSURE: 132 MMHG | WEIGHT: 198.38 LBS | TEMPERATURE: 97.2 F | OXYGEN SATURATION: 98 %

## 2023-12-07 DIAGNOSIS — J06.9 VIRAL URI: ICD-10-CM

## 2023-12-07 DIAGNOSIS — R07.0 THROAT PAIN: ICD-10-CM

## 2023-12-07 DIAGNOSIS — J06.9 VIRAL URI: Primary | ICD-10-CM

## 2023-12-07 DIAGNOSIS — R05.1 ACUTE COUGH: ICD-10-CM

## 2023-12-07 LAB
B PARAP IS1001 DNA NPH QL NAA+NON-PROBE: NOT DETECTED
B PERT.PT PRMT NPH QL NAA+NON-PROBE: NOT DETECTED
C PNEUM DNA NPH QL NAA+NON-PROBE: NOT DETECTED
FLUAV RNA NPH QL NAA+NON-PROBE: NOT DETECTED
FLUBV RNA NPH QL NAA+NON-PROBE: NOT DETECTED
HADV DNA NPH QL NAA+NON-PROBE: NOT DETECTED
HCOV 229E RNA NPH QL NAA+NON-PROBE: NOT DETECTED
HCOV HKU1 RNA NPH QL NAA+NON-PROBE: NOT DETECTED
HCOV NL63 RNA NPH QL NAA+NON-PROBE: NOT DETECTED
HCOV OC43 RNA NPH QL NAA+NON-PROBE: NOT DETECTED
HMPV RNA NPH QL NAA+NON-PROBE: NOT DETECTED
HPIV1 RNA NPH QL NAA+NON-PROBE: NOT DETECTED
HPIV2 RNA NPH QL NAA+NON-PROBE: NOT DETECTED
HPIV3 RNA NPH QL NAA+NON-PROBE: NOT DETECTED
HPIV4 RNA NPH QL NAA+NON-PROBE: NOT DETECTED
M PNEUMO DNA NPH QL NAA+NON-PROBE: NOT DETECTED
RSV RNA NPH QL NAA+NON-PROBE: NOT DETECTED
RV+EV RNA NPH QL NAA+NON-PROBE: NOT DETECTED
SARS-COV-2 RNA NPH QL NAA+NON-PROBE: NOT DETECTED

## 2023-12-07 PROCEDURE — 3078F DIAST BP <80 MM HG: CPT | Performed by: NURSE PRACTITIONER

## 2023-12-07 PROCEDURE — 3075F SYST BP GE 130 - 139MM HG: CPT | Performed by: NURSE PRACTITIONER

## 2023-12-07 PROCEDURE — 99213 OFFICE O/P EST LOW 20 MIN: CPT | Performed by: NURSE PRACTITIONER

## 2023-12-07 RX ORDER — DEXTROMETHORPHAN HYDROBROMIDE AND PROMETHAZINE HYDROCHLORIDE 15; 6.25 MG/5ML; MG/5ML
5 SYRUP ORAL 4 TIMES DAILY PRN
Qty: 118 EACH | Refills: 0 | Status: SHIPPED | OUTPATIENT
Start: 2023-12-07 | End: 2023-12-14

## 2023-12-07 ASSESSMENT — ENCOUNTER SYMPTOMS
NAUSEA: 0
RHINORRHEA: 1
SORE THROAT: 1
VOMITING: 0
DIARRHEA: 1
COUGH: 1

## 2023-12-07 NOTE — TELEPHONE ENCOUNTER
----- Message from Ethelda Goodpasture, APRN sent at 12/7/2023  3:15 PM CST -----  Please let patient know that respiratory panel was negative.

## 2023-12-07 NOTE — TELEPHONE ENCOUNTER
Called patient, spoke with: Patient regarding the results of the patients most recent labs. I advised Patient of Miguel Pierce recommendations.    Patient did voice understanding

## 2023-12-11 ENCOUNTER — OFFICE VISIT (OUTPATIENT)
Dept: CARDIAC SURGERY | Facility: CLINIC | Age: 48
End: 2023-12-11
Payer: COMMERCIAL

## 2023-12-11 VITALS
OXYGEN SATURATION: 96 % | DIASTOLIC BLOOD PRESSURE: 103 MMHG | HEIGHT: 72 IN | HEART RATE: 84 BPM | SYSTOLIC BLOOD PRESSURE: 154 MMHG | WEIGHT: 196 LBS | BODY MASS INDEX: 26.55 KG/M2

## 2023-12-11 DIAGNOSIS — I25.10 CORONARY ARTERY DISEASE INVOLVING NATIVE CORONARY ARTERY OF NATIVE HEART WITHOUT ANGINA PECTORIS: Primary | Chronic | ICD-10-CM

## 2023-12-11 DIAGNOSIS — I25.10 CORONARY ARTERY DISEASE INVOLVING NATIVE CORONARY ARTERY OF NATIVE HEART WITHOUT ANGINA PECTORIS: Primary | ICD-10-CM

## 2023-12-11 PROCEDURE — 3080F DIAST BP >= 90 MM HG: CPT | Performed by: SURGERY

## 2023-12-11 PROCEDURE — 99214 OFFICE O/P EST MOD 30 MIN: CPT | Performed by: SURGERY

## 2023-12-11 PROCEDURE — 3077F SYST BP >= 140 MM HG: CPT | Performed by: SURGERY

## 2023-12-11 RX ORDER — PROCHLORPERAZINE 25 MG/1
SUPPOSITORY RECTAL
COMMUNITY
Start: 2023-11-30

## 2023-12-11 RX ORDER — FUROSEMIDE 40 MG/1
40 TABLET ORAL DAILY
Qty: 90 TABLET | Refills: 0 | Status: SHIPPED | OUTPATIENT
Start: 2023-12-11

## 2023-12-11 RX ORDER — INSULIN ASPART 100 [IU]/ML
INJECTION, SOLUTION INTRAVENOUS; SUBCUTANEOUS
COMMUNITY
Start: 2023-11-13

## 2023-12-11 RX ORDER — DEXTROMETHORPHAN HYDROBROMIDE AND PROMETHAZINE HYDROCHLORIDE 15; 6.25 MG/5ML; MG/5ML
5 SYRUP ORAL
COMMUNITY
Start: 2023-12-07 | End: 2023-12-15

## 2023-12-11 NOTE — H&P (VIEW-ONLY)
"    [facility] Cardiothoracic Surgery  PROGRESS NOTE   CC: Unstable Angina    Subjective:   Speedy Nichols is a 48-year-old male who presents with a chief complaint of being unstable angina with multivessel coronary disease.    In summary, the patient has some good days and some bad days. He has a lot of pain when he walks any kind of distance and his back starts cramping up. He has had chest pain before, but this is something different. The patient's lower back cramps up when he starts walking and he denies any chest discomfort currently.     He confirmed he is going to get the bypasses scheduled. The patient was advised that his kidney levels were not right the last time he was called to schedule it. His son had a seizure and he had to take him to [place] to find out if he has epilepsy. It has been a situation where he can not just be off his feet for 2 to 3 months recovering.     The patient reported he had bad experiences twice with his donors about 3 months ago. He has been taking it easy and taking his medications. He added he normally has leg swelling and denied any trouble breathing when he lies flat. He talked to his primary care physician about how his prognosis is. He looked at the notes and said that his heart function has returned towards what it was before the event.    Both of his parents  of fibrosis in the lungs d/t COVID.    ROS:   No fevers, chills or recent illness.    Objective:      BP (!) 154/103 (BP Location: Right arm, Patient Position: Sitting, Cuff Size: Adult) Comment: took twice  Pulse 84   Ht 182.9 cm (72\")   Wt 88.9 kg (196 lb)   SpO2 96%   BMI 26.58 kg/m²     PE:  Vitals:    23 1357   BP: (!) 154/103   Pulse: 84   SpO2: 96%     GENERAL: NAD, resting comfortably, normal color  CARDIOVASCULAR: regular, regular rate, sinus  PULMONARY: Normal bilateral breath sounds, no labored breathing  ABDOMEN: soft, nontender/nondistended  EXTREMITIES: mild peripheral edema, " normal pulses, normal ROM        Lab Results (last 72 hours)       ** No results found for the last 72 hours. **              Assessment & Plan     Mr. Nichols is a 48-year-old male who I previously saw approximately 1 year ago with unstable angina and severe multivessel coronary disease. I recommended coronary artery bypass grafting. He wanted to delay surgery until he could have donor blood available from non COVID-19 vaccinated patients and after he had this done, he had A1c that was too high to proceed with surgery, therefore delaying his surgery quite a few times. He has also had some social determinants that have also made him delay surgery. He presents now with an A1c less than 8 and he is okay receiving blood if needed from vaccinated donors from the normal donor pole. He has undergone repeat coronary angiography and this shows severe multivessel disease as before. He has not had a repeat echo, but we will obtain this.     I discussed with him again the natural history of his coronary disease as well as treatment options. He understands that coronary bypass grafting is the best treatment option. We discussed the risk of surgery including but not limited to bleeding, infection, injury to major organs or vessels, chronic heart failure, arrhythmias, need for pacemaker, prolonged ventilation, renal failure, stroke, risk of anesthesia, risk of sternal wound or bone healing problems, reoperation, and/or death. We again discussed and he is okay receiving donor blood from the general donor pool. I have calculated this patient's specific STS risk score. He understands and agrees to proceed. I will obtain an echocardiogram and plan on his surgery at the beginning of January, 2024.    Thank you for trusting me with the care of Mr. Nichols.  Please do not hesitate to call with questions or concerns.    Papi Wilburn MD   Cardiothoracic Surgeon     Transcribed from ambient dictation for Papi Wilburn MD by David  Arsenio.  12/11/23   15:28 CST    Patient or patient representative verbalized consent to the visit recording.  I have personally performed the services described in this document as transcribed by the above individual, and it is both accurate and complete.

## 2023-12-11 NOTE — LETTER
December 18, 2023       No Recipients    Patient: Speedy Nichols   YOB: 1975   Date of Visit: 12/11/2023       Dear Selvin Schumacher DO,    Speedy Nichols was in my office today. Below are the relevant portions of my assessment and plan of care.    Mr. Nichols is a 48-year-old male who I previously saw approximately 1 year ago with unstable angina and severe multivessel coronary disease. I recommended coronary artery bypass grafting. He wanted to delay surgery until he could have donor blood available from non COVID-19 vaccinated patients and after he had this done, he had A1c that was too high to proceed with surgery, therefore delaying his surgery quite a few times. He has also had some social determinants that have also made him delay surgery. He presents now with an A1c less than 8 and he is okay receiving blood if needed from vaccinated donors from the normal donor pole. He has undergone repeat coronary angiography and this shows severe multivessel disease as before. He has not had a repeat echo, but we will obtain this.     I discussed with him again the natural history of his coronary disease as well as treatment options. He understands that coronary bypass grafting is the best treatment option. We discussed the risk of surgery including but not limited to bleeding, infection, injury to major organs or vessels, chronic heart failure, arrhythmias, need for pacemaker, prolonged ventilation, renal failure, stroke, risk of anesthesia, risk of sternal wound or bone healing problems, reoperation, and/or death. We again discussed and he is okay receiving donor blood from the general donor pool. I have calculated this patient's specific STS risk score. He understands and agrees to proceed. I will obtain an echocardiogram and plan on his surgery at the beginning of January, 2024.    Thank you for trusting me with the care of Mr. Nichols.  Please do not hesitate to call with questions or concerns.          Sincerely,        Papi Wilburn MD        CC:   No Recipients

## 2023-12-12 ENCOUNTER — TELEPHONE (OUTPATIENT)
Dept: CARDIAC SURGERY | Facility: CLINIC | Age: 48
End: 2023-12-12
Payer: COMMERCIAL

## 2023-12-12 ENCOUNTER — PREP FOR SURGERY (OUTPATIENT)
Dept: OTHER | Facility: HOSPITAL | Age: 48
End: 2023-12-12
Payer: COMMERCIAL

## 2023-12-12 DIAGNOSIS — I25.10 CORONARY ARTERY DISEASE INVOLVING NATIVE CORONARY ARTERY OF NATIVE HEART WITHOUT ANGINA PECTORIS: Primary | ICD-10-CM

## 2023-12-12 RX ORDER — SODIUM CHLORIDE 0.9 % (FLUSH) 0.9 %
30 SYRINGE (ML) INJECTION ONCE AS NEEDED
OUTPATIENT
Start: 2023-12-12

## 2023-12-12 RX ORDER — SODIUM CHLORIDE 9 MG/ML
40 INJECTION, SOLUTION INTRAVENOUS AS NEEDED
OUTPATIENT
Start: 2023-12-12

## 2023-12-12 RX ORDER — SODIUM CHLORIDE 0.9 % (FLUSH) 0.9 %
10 SYRINGE (ML) INJECTION EVERY 12 HOURS SCHEDULED
OUTPATIENT
Start: 2023-12-12

## 2023-12-12 RX ORDER — SODIUM CHLORIDE 0.9 % (FLUSH) 0.9 %
10 SYRINGE (ML) INJECTION AS NEEDED
OUTPATIENT
Start: 2023-12-12

## 2023-12-12 RX ORDER — ACETAMINOPHEN 500 MG
1000 TABLET ORAL ONCE
OUTPATIENT
Start: 2024-01-03

## 2023-12-12 RX ORDER — NICOTINE POLACRILEX 4 MG
15 LOZENGE BUCCAL
OUTPATIENT
Start: 2023-12-12

## 2023-12-12 RX ORDER — SODIUM CHLORIDE 9 MG/ML
30 INJECTION, SOLUTION INTRAVENOUS CONTINUOUS PRN
OUTPATIENT
Start: 2023-12-12

## 2023-12-12 RX ORDER — DEXTROSE MONOHYDRATE 25 G/50ML
10-50 INJECTION, SOLUTION INTRAVENOUS
OUTPATIENT
Start: 2023-12-12

## 2023-12-12 RX ORDER — IBUPROFEN 600 MG/1
1 TABLET ORAL
OUTPATIENT
Start: 2023-12-12

## 2023-12-12 NOTE — TELEPHONE ENCOUNTER
Surgery orders are in for 1/3/2024.  Please call patient with prewarning information.  Please confirm he does not take anticoagulation or Plavix.  Transthoracic echo has been ordered and should be scheduled prior to surgery.

## 2023-12-13 NOTE — TELEPHONE ENCOUNTER
Pt aware of prework date/time and surgery date 01/03/2024 arrival at 0500. NPO/no medications after midnight. Pt aware of Bactroban prescription and instructions for use. Pt confirms he is not taking anticoagulation medication. Also aware of echo appointment 12/27 date/time.

## 2023-12-18 RX ORDER — ATORVASTATIN CALCIUM 40 MG/1
40 TABLET, FILM COATED ORAL DAILY
Qty: 90 TABLET | Refills: 3 | Status: SHIPPED | OUTPATIENT
Start: 2023-12-18

## 2023-12-18 RX ORDER — LISINOPRIL 20 MG/1
20 TABLET ORAL DAILY
Qty: 90 TABLET | Refills: 3 | Status: SHIPPED | OUTPATIENT
Start: 2023-12-18

## 2023-12-18 RX ORDER — METOPROLOL SUCCINATE 50 MG/1
50 TABLET, EXTENDED RELEASE ORAL DAILY
Qty: 90 TABLET | Refills: 3 | Status: SHIPPED | OUTPATIENT
Start: 2023-12-18

## 2023-12-18 RX ORDER — NITROGLYCERIN 0.4 MG/1
0.4 TABLET SUBLINGUAL
Qty: 25 TABLET | Refills: 1 | Status: SHIPPED | OUTPATIENT
Start: 2023-12-18

## 2023-12-27 ENCOUNTER — HOSPITAL ENCOUNTER (OUTPATIENT)
Dept: CARDIOLOGY | Facility: HOSPITAL | Age: 48
Discharge: HOME | End: 2023-12-27
Admitting: NURSE PRACTITIONER
Payer: COMMERCIAL

## 2023-12-27 VITALS
BODY MASS INDEX: 26.55 KG/M2 | WEIGHT: 196 LBS | HEIGHT: 72 IN | SYSTOLIC BLOOD PRESSURE: 148 MMHG | DIASTOLIC BLOOD PRESSURE: 84 MMHG

## 2023-12-27 DIAGNOSIS — I25.10 CORONARY ARTERY DISEASE INVOLVING NATIVE CORONARY ARTERY OF NATIVE HEART WITHOUT ANGINA PECTORIS: ICD-10-CM

## 2023-12-27 PROCEDURE — 25510000001 PERFLUTREN 6.52 MG/ML SUSPENSION: Performed by: INTERNAL MEDICINE

## 2023-12-27 PROCEDURE — 93306 TTE W/DOPPLER COMPLETE: CPT

## 2023-12-27 RX ADMIN — PERFLUTREN 13.04 MG: 6.52 INJECTION, SUSPENSION INTRAVENOUS at 13:53

## 2023-12-28 LAB
BH CV ECHO MEAS - AO MAX PG: 6.2 MMHG
BH CV ECHO MEAS - AO MEAN PG: 4 MMHG
BH CV ECHO MEAS - AO V2 MAX: 124 CM/SEC
BH CV ECHO MEAS - AO V2 VTI: 28.5 CM
BH CV ECHO MEAS - AVA(I,D): 3.4 CM2
BH CV ECHO MEAS - EDV(CUBED): 117.6 ML
BH CV ECHO MEAS - EDV(MOD-SP2): 88.5 ML
BH CV ECHO MEAS - EDV(MOD-SP4): 119 ML
BH CV ECHO MEAS - EF(MOD-BP): 55.3 %
BH CV ECHO MEAS - EF(MOD-SP2): 51.9 %
BH CV ECHO MEAS - EF(MOD-SP4): 56.9 %
BH CV ECHO MEAS - ESV(CUBED): 42.9 ML
BH CV ECHO MEAS - ESV(MOD-SP2): 42.6 ML
BH CV ECHO MEAS - ESV(MOD-SP4): 51.3 ML
BH CV ECHO MEAS - FS: 28.6 %
BH CV ECHO MEAS - IVS/LVPW: 1 CM
BH CV ECHO MEAS - IVSD: 1.1 CM
BH CV ECHO MEAS - LA DIMENSION: 4.2 CM
BH CV ECHO MEAS - LAT PEAK E' VEL: 10.1 CM/SEC
BH CV ECHO MEAS - LV DIASTOLIC VOL/BSA (35-75): 56.3 CM2
BH CV ECHO MEAS - LV MASS(C)D: 200.5 GRAMS
BH CV ECHO MEAS - LV MAX PG: 3.3 MMHG
BH CV ECHO MEAS - LV MEAN PG: 2 MMHG
BH CV ECHO MEAS - LV SYSTOLIC VOL/BSA (12-30): 24.3 CM2
BH CV ECHO MEAS - LV V1 MAX: 91.2 CM/SEC
BH CV ECHO MEAS - LV V1 VTI: 21.3 CM
BH CV ECHO MEAS - LVIDD: 4.9 CM
BH CV ECHO MEAS - LVIDS: 3.5 CM
BH CV ECHO MEAS - LVOT AREA: 4.5 CM2
BH CV ECHO MEAS - LVOT DIAM: 2.4 CM
BH CV ECHO MEAS - LVPWD: 1.1 CM
BH CV ECHO MEAS - MED PEAK E' VEL: 6.9 CM/SEC
BH CV ECHO MEAS - MR MAX PG: 131.3 MMHG
BH CV ECHO MEAS - MR MAX VEL: 573 CM/SEC
BH CV ECHO MEAS - MV A MAX VEL: 77.5 CM/SEC
BH CV ECHO MEAS - MV DEC SLOPE: 375 CM/SEC2
BH CV ECHO MEAS - MV E MAX VEL: 92.1 CM/SEC
BH CV ECHO MEAS - MV E/A: 1.19
BH CV ECHO MEAS - MV P1/2T: 79.7 MSEC
BH CV ECHO MEAS - MVA(P1/2T): 2.8 CM2
BH CV ECHO MEAS - PA V2 MAX: 75.6 CM/SEC
BH CV ECHO MEAS - RAP SYSTOLE: 5 MMHG
BH CV ECHO MEAS - RV MAX PG: 1.82 MMHG
BH CV ECHO MEAS - RV V1 MAX: 67.4 CM/SEC
BH CV ECHO MEAS - RVDD: 3.2 CM
BH CV ECHO MEAS - RVSP: 20.8 MMHG
BH CV ECHO MEAS - SI(MOD-SP2): 21.7 ML/M2
BH CV ECHO MEAS - SI(MOD-SP4): 32.1 ML/M2
BH CV ECHO MEAS - SV(LVOT): 96.4 ML
BH CV ECHO MEAS - SV(MOD-SP2): 45.9 ML
BH CV ECHO MEAS - SV(MOD-SP4): 67.7 ML
BH CV ECHO MEAS - TAPSE (>1.6): 2.26 CM
BH CV ECHO MEAS - TR MAX PG: 15.8 MMHG
BH CV ECHO MEAS - TR MAX VEL: 199 CM/SEC
BH CV ECHO MEASUREMENTS AVERAGE E/E' RATIO: 10.84
BH CV XLRA - RV BASE: 3.8 CM
LEFT ATRIUM VOLUME INDEX: 24.9 ML/M2
LEFT ATRIUM VOLUME: 52.6 ML

## 2023-12-29 ENCOUNTER — HOSPITAL ENCOUNTER (OUTPATIENT)
Dept: PULMONOLOGY | Facility: HOSPITAL | Age: 48
Discharge: HOME | End: 2023-12-29
Payer: COMMERCIAL

## 2023-12-29 ENCOUNTER — PRE-ADMISSION TESTING (OUTPATIENT)
Dept: PREADMISSION TESTING | Facility: HOSPITAL | Age: 48
End: 2023-12-29
Payer: COMMERCIAL

## 2023-12-29 ENCOUNTER — ANESTHESIA EVENT (OUTPATIENT)
Dept: PERIOP | Facility: HOSPITAL | Age: 48
End: 2023-12-29
Payer: COMMERCIAL

## 2023-12-29 ENCOUNTER — HOSPITAL ENCOUNTER (OUTPATIENT)
Dept: GENERAL RADIOLOGY | Facility: HOSPITAL | Age: 48
Discharge: HOME | End: 2023-12-29
Payer: COMMERCIAL

## 2023-12-29 VITALS
BODY MASS INDEX: 27.11 KG/M2 | HEIGHT: 72 IN | OXYGEN SATURATION: 99 % | WEIGHT: 200.18 LBS | HEART RATE: 83 BPM | RESPIRATION RATE: 18 BRPM | SYSTOLIC BLOOD PRESSURE: 141 MMHG | DIASTOLIC BLOOD PRESSURE: 84 MMHG

## 2023-12-29 DIAGNOSIS — I25.10 CORONARY ARTERY DISEASE INVOLVING NATIVE CORONARY ARTERY OF NATIVE HEART WITHOUT ANGINA PECTORIS: ICD-10-CM

## 2023-12-29 LAB
ABO GROUP BLD: NORMAL
ALBUMIN SERPL-MCNC: 4.4 G/DL (ref 3.5–5.2)
ALBUMIN/GLOB SERPL: 1.6 G/DL
ALP SERPL-CCNC: 70 U/L (ref 39–117)
ALT SERPL W P-5'-P-CCNC: 25 U/L (ref 1–41)
ANION GAP SERPL CALCULATED.3IONS-SCNC: 12 MMOL/L (ref 5–15)
APTT PPP: 30.7 SECONDS (ref 24.5–36)
ARTERIAL PATENCY WRIST A: ABNORMAL
AST SERPL-CCNC: 25 U/L (ref 1–40)
ATMOSPHERIC PRESS: 747 MMHG
BASE EXCESS BLDA CALC-SCNC: -1.2 MMOL/L (ref 0–2)
BASOPHILS # BLD AUTO: 0.02 10*3/MM3 (ref 0–0.2)
BASOPHILS NFR BLD AUTO: 0.5 % (ref 0–1.5)
BDY SITE: ABNORMAL
BILIRUB SERPL-MCNC: 0.4 MG/DL (ref 0–1.2)
BLD GP AB SCN SERPL QL: NEGATIVE
BODY TEMPERATURE: 37
BUN SERPL-MCNC: 23 MG/DL (ref 6–20)
BUN/CREAT SERPL: 18.9 (ref 7–25)
CALCIUM SPEC-SCNC: 8.7 MG/DL (ref 8.6–10.5)
CHLORIDE SERPL-SCNC: 103 MMOL/L (ref 98–107)
CO2 SERPL-SCNC: 25 MMOL/L (ref 22–29)
CREAT SERPL-MCNC: 1.22 MG/DL (ref 0.76–1.27)
DEPRECATED RDW RBC AUTO: 40.9 FL (ref 37–54)
EGFRCR SERPLBLD CKD-EPI 2021: 72.7 ML/MIN/1.73
EOSINOPHIL # BLD AUTO: 0.14 10*3/MM3 (ref 0–0.4)
EOSINOPHIL NFR BLD AUTO: 3.3 % (ref 0.3–6.2)
ERYTHROCYTE [DISTWIDTH] IN BLOOD BY AUTOMATED COUNT: 12.6 % (ref 12.3–15.4)
GLOBULIN UR ELPH-MCNC: 2.8 GM/DL
GLUCOSE SERPL-MCNC: 131 MG/DL (ref 65–99)
HCO3 BLDA-SCNC: 22.5 MMOL/L (ref 20–26)
HCT VFR BLD AUTO: 31.7 % (ref 37.5–51)
HGB BLD-MCNC: 10.5 G/DL (ref 13–17.7)
IMM GRANULOCYTES # BLD AUTO: 0.02 10*3/MM3 (ref 0–0.05)
IMM GRANULOCYTES NFR BLD AUTO: 0.5 % (ref 0–0.5)
INR PPP: 0.98 (ref 0.91–1.09)
LYMPHOCYTES # BLD AUTO: 1.19 10*3/MM3 (ref 0.7–3.1)
LYMPHOCYTES NFR BLD AUTO: 28.2 % (ref 19.6–45.3)
Lab: ABNORMAL
MCH RBC QN AUTO: 29.2 PG (ref 26.6–33)
MCHC RBC AUTO-ENTMCNC: 33.1 G/DL (ref 31.5–35.7)
MCV RBC AUTO: 88.1 FL (ref 79–97)
MODALITY: ABNORMAL
MONOCYTES # BLD AUTO: 0.32 10*3/MM3 (ref 0.1–0.9)
MONOCYTES NFR BLD AUTO: 7.6 % (ref 5–12)
NEUTROPHILS NFR BLD AUTO: 2.53 10*3/MM3 (ref 1.7–7)
NEUTROPHILS NFR BLD AUTO: 59.9 % (ref 42.7–76)
NRBC BLD AUTO-RTO: 0 /100 WBC (ref 0–0.2)
PCO2 BLDA: 33.1 MM HG (ref 35–45)
PCO2 TEMP ADJ BLD: 33.1 MM HG (ref 35–45)
PH BLDA: 7.44 PH UNITS (ref 7.35–7.45)
PH, TEMP CORRECTED: 7.44 PH UNITS (ref 7.35–7.45)
PLATELET # BLD AUTO: 188 10*3/MM3 (ref 140–450)
PMV BLD AUTO: 10.4 FL (ref 6–12)
PO2 BLDA: 106 MM HG (ref 83–108)
PO2 TEMP ADJ BLD: 106 MM HG (ref 83–108)
POTASSIUM SERPL-SCNC: 4.5 MMOL/L (ref 3.5–5.2)
PROT SERPL-MCNC: 7.2 G/DL (ref 6–8.5)
PROTHROMBIN TIME: 13.1 SECONDS (ref 11.8–14.8)
RBC # BLD AUTO: 3.6 10*6/MM3 (ref 4.14–5.8)
RH BLD: POSITIVE
SAO2 % BLDCOA: 99.1 % (ref 94–99)
SODIUM SERPL-SCNC: 140 MMOL/L (ref 136–145)
T&S EXPIRATION DATE: NORMAL
VENTILATOR MODE: ABNORMAL
WBC NRBC COR # BLD AUTO: 4.22 10*3/MM3 (ref 3.4–10.8)

## 2023-12-29 PROCEDURE — 85610 PROTHROMBIN TIME: CPT

## 2023-12-29 PROCEDURE — 36600 WITHDRAWAL OF ARTERIAL BLOOD: CPT

## 2023-12-29 PROCEDURE — 80053 COMPREHEN METABOLIC PANEL: CPT

## 2023-12-29 PROCEDURE — 93005 ELECTROCARDIOGRAM TRACING: CPT

## 2023-12-29 PROCEDURE — 71046 X-RAY EXAM CHEST 2 VIEWS: CPT

## 2023-12-29 PROCEDURE — 86901 BLOOD TYPING SEROLOGIC RH(D): CPT

## 2023-12-29 PROCEDURE — 86900 BLOOD TYPING SEROLOGIC ABO: CPT

## 2023-12-29 PROCEDURE — 85025 COMPLETE CBC W/AUTO DIFF WBC: CPT

## 2023-12-29 PROCEDURE — 36415 COLL VENOUS BLD VENIPUNCTURE: CPT

## 2023-12-29 PROCEDURE — 86850 RBC ANTIBODY SCREEN: CPT

## 2023-12-29 PROCEDURE — 82803 BLOOD GASES ANY COMBINATION: CPT

## 2023-12-29 PROCEDURE — 85730 THROMBOPLASTIN TIME PARTIAL: CPT

## 2023-12-29 NOTE — ANESTHESIA PREPROCEDURE EVALUATION
Anesthesia Evaluation     Patient summary reviewed                Airway   Mallampati: II  TM distance: >3 FB  Neck ROM: full  Dental    (+) poor dentition        Pulmonary    (-) COPD, asthma, sleep apnea, not a smoker  Cardiovascular     (+) hypertension, CAD, CHF Systolic <55%, hyperlipidemia  (-) pacemaker, past MI, angina, cardiac stents, carotid artery disease      Neuro/Psych  (-) seizures, TIA, CVA  GI/Hepatic/Renal/Endo    (+) renal disease- CRI, diabetes mellitus using insulin  (-) GERD, liver disease    Musculoskeletal     Abdominal    Substance History      OB/GYN          Other                      Anesthesia Plan    ASA 4     general, Betina, CVL and PAC     (No CI to Nathan.  )  intravenous induction     Anesthetic plan, risks, benefits, and alternatives have been provided, discussed and informed consent has been obtained with: patient.    Use of blood products discussed with patient  Consented to blood products.      CODE STATUS:

## 2023-12-29 NOTE — DISCHARGE INSTRUCTIONS
Before you come to the hospital        Arrival time: AS DIRECTED BY OFFICE     YOU MAY TAKE THE FOLLOWING MEDICATION(S) THE MORNING OF SURGERY WITH A SIP OF WATER: none           ALL OTHER HOME MEDICATION CHECK WITH YOUR PHYSICIAN (especially if   you are taking diabetes medicines or blood thinners)    Do not take any Erectile Dysfunction medications (EX: CIALIS, VIAGRA) 24 hours prior to surgery.      If you were given and instructed to use a germ- killing soap, use as directed the night before surgery and again the morning of surgery or as directed by your surgeon. (Use one-half of the bottle with each shower.)   See attached information for How to Use Chlorhexidine for Bathing if applicable.            Eating and drinking restrictions prior to scheduled arrival time    2 Hours before arrival time STOP   Drinking Clear liquids (water, black coffee-NO CREAM,  apple juice-no pulp)    Clear Liquids    Water and flavored water                                                                      Clear Fruit juices, such as cranberry juice and apple juice.  Black coffee (NO cream of any kind, including powdered).  Plain tea  Clear bouillon or broth.  Flavored gelatin.  Soda.  Gatorade or Powerade.    8 Hours before arrival time STOP   All food, full liquids, and dairy products  Full liquid examples  Juices that have pulp.  Frozen ice pops that contain fruit pieces.  Coffee with creamer  Milk.  Yogurt.    (It is extremely important that you follow these guidelines to prevent delay or cancelation of your procedure)                       MANAGING PAIN AFTER SURGERY    We know you are probably wondering what your pain will be like after surgery.  Following surgery it is unrealistic to expect you will not have pain.   Pain is how our bodies let us know that something is wrong or cautions us to be careful.  That said, our goal is to make your pain tolerable.    Methods we may use to treat your pain include (oral or IV  medications, PCAs, epidurals, nerve blocks, etc.)   While some procedures require IV pain medications for a short time after surgery, transitioning to pain medications by mouth allows for better management of pain.   Your nurse will encourage you to take oral pain medications whenever possible.  IV medications work almost immediately, but only last a short while.  Taking medications by mouth allows for a more constant level of medication in your blood stream for a longer period of time.      Once your pain is out of control it is harder to get back under control.  It is important you are aware when your next dose of pain medication is due.  If you are admitted, your nurse may write the time of your next dose on the white board in your room to help you remember.      We are interested in your pain and encourage you to inform us about aggravating factors during your visit.   Many times a simple repositioning every few hours can make a big difference.    If your physician says it is okay, do not let your pain prevent you from getting out of bed. Be sure to call your nurse for assistance prior to getting up so you do not fall.      Before surgery, please decide your tolerable pain goal.  These faces help describe the pain ratings we use on a 0-10 scale.   Be prepared to tell us your goal and whether or not you take pain or anxiety medications at home.          Preparing for Surgery  Preparing for surgery is an important part of your care. It can make things go more smoothly and help you avoid complications. The steps leading up to surgery may vary among hospitals. Follow all instructions given to you by your health care providers. Ask questions if you do not understand something. Talk about any concerns that you have.  Here are some questions to consider asking before your surgery:  If my surgery is not an emergency (is elective), when would be the best time to have the surgery?  What arrangements do I need to make for  work, home, or school?  What will my recovery be like? How long will it be before I can return to normal activities?  Will I need to prepare my home? Will I need to arrange care for me or my children?  Should I expect to have pain after surgery? What are my pain management options? Are there nonmedical options that I can try for pain?  Tell a health care provider about:  Any allergies you have.  All medicines you are taking, including vitamins, herbs, eye drops, creams, and over-the-counter medicines.  Any problems you or family members have had with anesthetic medicines.  Any blood disorders you have.  Any surgeries you have had.  Any medical conditions you have.  Whether you are pregnant or may be pregnant.  What are the risks?  The risks and complications of surgery depend on the specific procedure that you have. Discuss all the risks with your health care providers before your surgery. Ask about common surgical complications, which may include:  Infection.  Bleeding or a need for blood replacement (transfusion).  Allergic reactions to medicines.  Damage to surrounding nerves, tissues, or structures.  A blood clot.  Scarring.  Failure of the surgery to correct the problem.  Follow these instructions before the procedure:  Several days or weeks before your procedure  You may have a physical exam by your primary health care provider to make sure it is safe for you to have surgery.  You may have testing. This may include a chest X-ray, blood and urine tests, electrocardiogram (ECG), or other testing.  Ask your health care provider about:  Changing or stopping your regular medicines. This is especially important if you are taking diabetes medicines or blood thinners.  Taking medicines such as aspirin and ibuprofen. These medicines can thin your blood. Do not take these medicines unless your health care provider tells you to take them.  Taking over-the-counter medicines, vitamins, herbs, and supplements.  Do not use  any products that contain nicotine or tobacco, such as cigarettes and e-cigarettes. If you need help quitting, ask your health care provider.  Avoid alcohol.  Ask your health care provider if there are exercises you can do to prepare for surgery.  Eat a healthy diet.   Plan to have someone 18 years of age or older to take you home from the hospital. We will need to verify your ride on the morning of surgery if you are being discharged home on the same day. Tell your ride to be expecting a call from the hospital prior to your procedure.   Plan to have a responsible adult care for you for at least 24 hours after you leave the hospital or clinic. This is important.  The day before your procedure  You may be given antibiotic medicine to take by mouth to help prevent infection. Take it as told by your health care provider.  You may be asked to shower with a germ-killing soap.  Follow instructions from your health care provider about eating and drinking restrictions. This includes gum, mints and hard candy.  Pack comfortable clothes according to your procedure.   The day of your procedure  You may need to take another shower with a germ-killing soap before you leave home in the morning.  With a small sip of water, take only the medicines that you are told to take.  Remove all jewelry including rings.   Leave anything you consider valuable at home except hearing aids if needed.  You do not need to bring your home medications into the hospital.   Do not wear any makeup, nail polish, powder, deodorant, lotion, hair accessories, or anything on your skin or body except your clothes.  If you will be staying in the hospital, bring a case to hold your glasses, contacts, or dentures. You may also want to bring your robe and non-skid footwear.       (Do not use denture adhesives since you will be asked to remove them during  surgery).   If you wear oxygen at home, bring it with you the day of surgery.  If instructed by your health  care provider, bring your sleep apnea device with you on the day of your surgery (if this applies to you).  You may want to leave your suitcase and sleep apnea device in the car until after surgery.   Arrive at the hospital as scheduled.  Bring a friend or family member with you who can help to answer questions and be present while you meet with your health care provider.  At the hospital  When you arrive at the hospital:  Go to registration located at the main entrance of the hospital. You will be registered and given a beeper and a sticker sheet. Take the stickers to the Outpatient nurses desk and place in the black tray. This is to notify staff that you have arrived. Then return to the lobby to wait.   When your beeper lights up and vibrates proceed through the double doors, under the stairs, and a member of the Outpatient Surgery staff will escort you to your preoperative room.  You may have to wear compression sleeves. These help to prevent blood clots and reduce swelling in your legs.  An IV may be inserted into one of your veins.              In the operating room, you may be given one or more of the following:        A medicine to help you relax (sedative).        A medicine to numb the area (local anesthetic).        A medicine to make you fall asleep (general anesthetic).        A medicine that is injected into an area of your body to numb everything below the                      injection site (regional anesthetic).  You may be given an antibiotic through your IV to help prevent infection.  Your surgical site will be marked or identified.    Contact a health care provider if you:  Develop a fever of more than 100.4°F (38°C) or other feelings of illness during the 48 hours before your surgery.  Have symptoms that get worse.  Have questions or concerns about your surgery.  Summary  Preparing for surgery can make the procedure go more smoothly and lower your risk of complications.  Before surgery, make a  list of questions and concerns to discuss with your surgeon. Ask about the risks and possible complications.  In the days or weeks before your surgery, follow all instructions from your health care provider. You may need to stop smoking, avoid alcohol, follow eating restrictions, and change or stop your regular medicines.  Contact your surgeon if you develop a fever or other signs of illness during the few days before your surgery.  This information is not intended to replace advice given to you by your health care provider. Make sure you discuss any questions you have with your health care provider.  Document Revised: 12/21/2018 Document Reviewed: 10/23/2018  [employer] Patient Education © 2021 [employer]         How to Use Chlorhexidine Before Surgery  Chlorhexidine gluconate (CHG) is a germ-killing (antiseptic) solution that is used to clean the skin. It can get rid of the bacteria that normally live on the skin and can keep them away for about 24 hours. To clean your skin with CHG, you may be given:  A CHG solution to use in the shower or as part of a sponge bath.  A prepackaged cloth that contains CHG.  Cleaning your skin with CHG may help lower the risk for infection:  While you are staying in the intensive care unit of the hospital.  If you have a vascular access, such as a central line, to provide short-term or long-term access to your veins.  If you have a catheter to drain urine from your bladder.  If you are on a ventilator. A ventilator is a machine that helps you breathe by moving air in and out of your lungs.  After surgery.  What are the risks?  Risks of using CHG include:  A skin reaction.  Hearing loss, if CHG gets in your ears and you have a perforated eardrum.  Eye injury, if CHG gets in your eyes and is not rinsed out.  The CHG product catching fire.  Make sure that you avoid smoking and flames after applying CHG to your skin.  Do not use CHG:  If you have a chlorhexidine allergy or have  previously reacted to chlorhexidine.  On babies younger than 2 months of age.  How to use CHG solution  Use CHG only as told by your health care provider, and follow the instructions on the label.  Use the full amount of CHG as directed. Usually, this is one bottle.  During a shower    Follow these steps when using CHG solution during a shower (unless your health care provider gives you different instructions):  Start the shower.  Use your normal soap and shampoo to wash your face and hair.  Turn off the shower or move out of the shower stream.  Pour the CHG onto a clean washcloth. Do not use any type of brush or rough-edged sponge.  Starting at your neck, lather your body down to your toes. Make sure you follow these instructions:  If you will be having surgery, pay special attention to the part of your body where you will be having surgery. Scrub this area for at least 1 minute.  Do not use CHG on your head or face. If the solution gets into your ears or eyes, rinse them well with water.  Avoid your genital area.  Avoid any areas of skin that have broken skin, cuts, or scrapes.  Scrub your back and under your arms. Make sure to wash skin folds.  Let the lather sit on your skin for 1-2 minutes or as long as told by your health care provider.  Thoroughly rinse your entire body in the shower. Make sure that all body creases and crevices are rinsed well.  Dry off with a clean towel. Do not put any substances on your body afterward--such as powder, lotion, or perfume--unless you are told to do so by your health care provider. Only use lotions that are recommended by the .  Put on clean clothes or pajamas.  If it is the night before your surgery, sleep in clean sheets.     During a sponge bath  Follow these steps when using CHG solution during a sponge bath (unless your health care provider gives you different instructions):  Use your normal soap and shampoo to wash your face and hair.  Pour the CHG onto a  clean washcloth.  Starting at your neck, lather your body down to your toes. Make sure you follow these instructions:  If you will be having surgery, pay special attention to the part of your body where you will be having surgery. Scrub this area for at least 1 minute.  Do not use CHG on your head or face. If the solution gets into your ears or eyes, rinse them well with water.  Avoid your genital area.  Avoid any areas of skin that have broken skin, cuts, or scrapes.  Scrub your back and under your arms. Make sure to wash skin folds.  Let the lather sit on your skin for 1-2 minutes or as long as told by your health care provider.  Using a different clean, wet washcloth, thoroughly rinse your entire body. Make sure that all body creases and crevices are rinsed well.  Dry off with a clean towel. Do not put any substances on your body afterward--such as powder, lotion, or perfume--unless you are told to do so by your health care provider. Only use lotions that are recommended by the .  Put on clean clothes or pajamas.  If it is the night before your surgery, sleep in clean sheets.  How to use CHG prepackaged cloths  Only use CHG cloths as told by your health care provider, and follow the instructions on the label.  Use the CHG cloth on clean, dry skin.  Do not use the CHG cloth on your head or face unless your health care provider tells you to.  When washing with the CHG cloth:  Avoid your genital area.  Avoid any areas of skin that have broken skin, cuts, or scrapes.  Before surgery    Follow these steps when using a CHG cloth to clean before surgery (unless your health care provider gives you different instructions):  Using the CHG cloth, vigorously scrub the part of your body where you will be having surgery. Scrub using a back-and-forth motion for 3 minutes. The area on your body should be completely wet with CHG when you are done scrubbing.  Do not rinse. Discard the cloth and let the area air-dry. Do  not put any substances on the area afterward, such as powder, lotion, or perfume.  Put on clean clothes or pajamas.  If it is the night before your surgery, sleep in clean sheets.     For general bathing  Follow these steps when using CHG cloths for general bathing (unless your health care provider gives you different instructions).  Use a separate CHG cloth for each area of your body. Make sure you wash between any folds of skin and between your fingers and toes. Wash your body in the following order, switching to a new cloth after each step:  The front of your neck, shoulders, and chest.  Both of your arms, under your arms, and your hands.  Your stomach and groin area, avoiding the genitals.  Your right leg and foot.  Your left leg and foot.  The back of your neck, your back, and your buttocks.  Do not rinse. Discard the cloth and let the area air-dry. Do not put any substances on your body afterward--such as powder, lotion, or perfume--unless you are told to do so by your health care provider. Only use lotions that are recommended by the .  Put on clean clothes or pajamas.  Contact a health care provider if:  Your skin gets irritated after scrubbing.  You have questions about using your solution or cloth.  You swallow any chlorhexidine. Call your local poison control center (1-888.123.1956 in the [place]  Get help right away if:  Your eyes itch badly, or they become very red or swollen.  Your skin itches badly and is red or swollen.  Your hearing changes.  You have trouble seeing.  You have swelling or tingling in your mouth or throat.  You have trouble breathing.  These symptoms may represent a serious problem that is an emergency. Do not wait to see if the symptoms will go away. Get medical help right away. Call your local emergency services (765 in the [place] Do not drive yourself to the hospital.  Summary  Chlorhexidine gluconate (CHG) is a germ-killing (antiseptic) solution that is used to clean  the skin. Cleaning your skin with CHG may help to lower your risk for infection.  You may be given CHG to use for bathing. It may be in a bottle or in a prepackaged cloth to use on your skin. Carefully follow your health care provider's instructions and the instructions on the product label.  Do not use CHG if you have a chlorhexidine allergy.  Contact your health care provider if your skin gets irritated after scrubbing.  This information is not intended to replace advice given to you by your health care provider. Make sure you discuss any questions you have with your health care provider.  Document Revised: 04/17/2023 Document Reviewed: 02/28/2022  [employer] Patient Education © 2023 [employer]

## 2024-01-01 LAB
QT INTERVAL: 392 MS
QTC INTERVAL: 446 MS

## 2024-01-03 ENCOUNTER — APPOINTMENT (OUTPATIENT)
Dept: CARDIOLOGY | Facility: HOSPITAL | Age: 49
End: 2024-01-03
Payer: COMMERCIAL

## 2024-01-03 ENCOUNTER — HOSPITAL ENCOUNTER (INPATIENT)
Facility: HOSPITAL | Age: 49
LOS: 5 days | Discharge: HOME | End: 2024-01-08
Attending: SURGERY | Admitting: SURGERY
Payer: COMMERCIAL

## 2024-01-03 ENCOUNTER — APPOINTMENT (OUTPATIENT)
Dept: GENERAL RADIOLOGY | Facility: HOSPITAL | Age: 49
End: 2024-01-03
Payer: COMMERCIAL

## 2024-01-03 ENCOUNTER — ANESTHESIA (OUTPATIENT)
Dept: PERIOP | Facility: HOSPITAL | Age: 49
End: 2024-01-03
Payer: COMMERCIAL

## 2024-01-03 DIAGNOSIS — I25.10 CORONARY ARTERY DISEASE INVOLVING NATIVE CORONARY ARTERY OF NATIVE HEART WITHOUT ANGINA PECTORIS: ICD-10-CM

## 2024-01-03 DIAGNOSIS — Z74.09 IMPAIRED MOBILITY: Primary | ICD-10-CM

## 2024-01-03 LAB
A-A DO2: 292.7 MMHG
ABO GROUP BLD: NORMAL
ALBUMIN SERPL-MCNC: 3.5 G/DL (ref 3.5–5.2)
ALBUMIN SERPL-MCNC: 3.6 G/DL (ref 3.5–5.2)
ANION GAP SERPL CALCULATED.3IONS-SCNC: 10 MMOL/L (ref 5–15)
ANION GAP SERPL CALCULATED.3IONS-SCNC: 11 MMOL/L (ref 5–15)
APTT PPP: 66.8 SECONDS (ref 24.5–36)
ARTERIAL PATENCY WRIST A: ABNORMAL
ATMOSPHERIC PRESS: 751 MMHG
ATMOSPHERIC PRESS: 751 MMHG
ATMOSPHERIC PRESS: 752 MMHG
ATMOSPHERIC PRESS: 753 MMHG
ATMOSPHERIC PRESS: 753 MMHG
ATMOSPHERIC PRESS: 754 MMHG
BASE EXCESS BLDA CALC-SCNC: -0.6 MMOL/L (ref 0–2)
BASE EXCESS BLDA CALC-SCNC: -0.9 MMOL/L (ref 0–2)
BASE EXCESS BLDA CALC-SCNC: -1.1 MMOL/L (ref 0–2)
BASE EXCESS BLDA CALC-SCNC: -3.3 MMOL/L (ref 0–2)
BASE EXCESS BLDA CALC-SCNC: 0.1 MMOL/L (ref 0–2)
BASE EXCESS BLDA CALC-SCNC: 0.5 MMOL/L (ref 0–2)
BASE EXCESS BLDA CALC-SCNC: 1.6 MMOL/L (ref 0–2)
BASE EXCESS BLDA CALC-SCNC: 1.7 MMOL/L (ref 0–2)
BASOPHILS # BLD AUTO: 0.05 10*3/MM3 (ref 0–0.2)
BASOPHILS NFR BLD AUTO: 0.4 % (ref 0–1.5)
BDY SITE: ABNORMAL
BLD GP AB SCN SERPL QL: NEGATIVE
BODY TEMPERATURE: 37
BUN SERPL-MCNC: 19 MG/DL (ref 6–20)
BUN SERPL-MCNC: 20 MG/DL (ref 6–20)
BUN/CREAT SERPL: 14.2 (ref 7–25)
BUN/CREAT SERPL: 15.5 (ref 7–25)
CA-I BLD-MCNC: 4.23 MG/DL (ref 4.6–5.4)
CA-I BLD-MCNC: 4.29 MG/DL (ref 4.6–5.4)
CA-I BLD-MCNC: 4.3 MG/DL (ref 4.6–5.4)
CA-I BLD-MCNC: 4.34 MG/DL (ref 4.6–5.4)
CA-I BLD-MCNC: 4.91 MG/DL (ref 4.6–5.4)
CA-I BLD-MCNC: 5.06 MG/DL (ref 4.6–5.4)
CALCIUM SPEC-SCNC: 8.4 MG/DL (ref 8.6–10.5)
CALCIUM SPEC-SCNC: 8.8 MG/DL (ref 8.6–10.5)
CHLORIDE SERPL-SCNC: 108 MMOL/L (ref 98–107)
CHLORIDE SERPL-SCNC: 108 MMOL/L (ref 98–107)
CO2 SERPL-SCNC: 23 MMOL/L (ref 22–29)
CO2 SERPL-SCNC: 23 MMOL/L (ref 22–29)
COHGB MFR BLD: 0.7 % (ref 0–5)
COHGB MFR BLD: 0.8 % (ref 0–5)
COHGB MFR BLD: 0.8 % (ref 0–5)
COHGB MFR BLD: 1.1 % (ref 0–5)
COHGB MFR BLD: 1.1 % (ref 0–5)
COHGB MFR BLD: 1.2 % (ref 0–5)
CREAT SERPL-MCNC: 1.29 MG/DL (ref 0.76–1.27)
CREAT SERPL-MCNC: 1.34 MG/DL (ref 0.76–1.27)
D-LACTATE SERPL-SCNC: 1.2 MMOL/L (ref 0.5–2)
D-LACTATE SERPL-SCNC: 2.2 MMOL/L (ref 0.5–2)
DEPRECATED RDW RBC AUTO: 42.1 FL (ref 37–54)
DEPRECATED RDW RBC AUTO: 42.6 FL (ref 37–54)
EGFRCR SERPLBLD CKD-EPI 2021: 64.9 ML/MIN/1.73
EGFRCR SERPLBLD CKD-EPI 2021: 68 ML/MIN/1.73
EOSINOPHIL # BLD AUTO: 0.24 10*3/MM3 (ref 0–0.4)
EOSINOPHIL NFR BLD AUTO: 1.9 % (ref 0.3–6.2)
ERYTHROCYTE [DISTWIDTH] IN BLOOD BY AUTOMATED COUNT: 13.1 % (ref 12.3–15.4)
ERYTHROCYTE [DISTWIDTH] IN BLOOD BY AUTOMATED COUNT: 13.2 % (ref 12.3–15.4)
FIBRINOGEN PPP-MCNC: 253 MG/DL (ref 240–460)
GAS FLOW AIRWAY: 2 LPM
GLUCOSE BLDC GLUCOMTR-MCNC: 112 MG/DL (ref 70–130)
GLUCOSE BLDC GLUCOMTR-MCNC: 123 MG/DL (ref 70–130)
GLUCOSE BLDC GLUCOMTR-MCNC: 157 MG/DL (ref 70–130)
GLUCOSE BLDC GLUCOMTR-MCNC: 159 MG/DL (ref 70–130)
GLUCOSE BLDC GLUCOMTR-MCNC: 167 MG/DL (ref 70–130)
GLUCOSE BLDC GLUCOMTR-MCNC: 172 MG/DL (ref 70–130)
GLUCOSE BLDC GLUCOMTR-MCNC: 173 MG/DL (ref 70–130)
GLUCOSE BLDC GLUCOMTR-MCNC: 177 MG/DL (ref 70–130)
GLUCOSE BLDC GLUCOMTR-MCNC: 180 MG/DL (ref 70–130)
GLUCOSE SERPL-MCNC: 118 MG/DL (ref 65–99)
GLUCOSE SERPL-MCNC: 171 MG/DL (ref 65–99)
HCO3 BLDA-SCNC: 22.6 MMOL/L (ref 20–26)
HCO3 BLDA-SCNC: 23.5 MMOL/L (ref 20–26)
HCO3 BLDA-SCNC: 23.7 MMOL/L (ref 20–26)
HCO3 BLDA-SCNC: 23.9 MMOL/L (ref 20–26)
HCO3 BLDA-SCNC: 24.1 MMOL/L (ref 20–26)
HCO3 BLDA-SCNC: 24.8 MMOL/L (ref 20–26)
HCO3 BLDA-SCNC: 25.9 MMOL/L (ref 20–26)
HCO3 BLDA-SCNC: 26.3 MMOL/L (ref 20–26)
HCT VFR BLD AUTO: 24.7 % (ref 37.5–51)
HCT VFR BLD AUTO: 27.2 % (ref 37.5–51)
HCT VFR BLD CALC: 21.9 % (ref 38–51)
HCT VFR BLD CALC: 22 % (ref 38–51)
HCT VFR BLD CALC: 23.4 % (ref 38–51)
HCT VFR BLD CALC: 27.8 % (ref 38–51)
HCT VFR BLD CALC: 28.4 % (ref 38–51)
HCT VFR BLD CALC: 29.1 % (ref 38–51)
HGB BLD-MCNC: 8 G/DL (ref 13–17.7)
HGB BLD-MCNC: 8.8 G/DL (ref 13–17.7)
HGB BLDA-MCNC: 7.2 G/DL (ref 14–18)
HGB BLDA-MCNC: 7.2 G/DL (ref 14–18)
HGB BLDA-MCNC: 7.6 G/DL (ref 14–18)
HGB BLDA-MCNC: 9.1 G/DL (ref 14–18)
HGB BLDA-MCNC: 9.3 G/DL (ref 14–18)
HGB BLDA-MCNC: 9.5 G/DL (ref 14–18)
IMM GRANULOCYTES # BLD AUTO: 0.19 10*3/MM3 (ref 0–0.05)
IMM GRANULOCYTES NFR BLD AUTO: 1.5 % (ref 0–0.5)
INHALED O2 CONCENTRATION: 100 %
INHALED O2 CONCENTRATION: 30 %
INHALED O2 CONCENTRATION: 30 %
INHALED O2 CONCENTRATION: 60 %
INHALED O2 CONCENTRATION: 80 %
INR PPP: 1.14 (ref 0.91–1.09)
LYMPHOCYTES # BLD AUTO: 3.48 10*3/MM3 (ref 0.7–3.1)
LYMPHOCYTES NFR BLD AUTO: 27.5 % (ref 19.6–45.3)
Lab: ABNORMAL
Lab: NORMAL
MCH RBC QN AUTO: 29.2 PG (ref 26.6–33)
MCH RBC QN AUTO: 29.2 PG (ref 26.6–33)
MCHC RBC AUTO-ENTMCNC: 32.4 G/DL (ref 31.5–35.7)
MCHC RBC AUTO-ENTMCNC: 32.4 G/DL (ref 31.5–35.7)
MCV RBC AUTO: 90.1 FL (ref 79–97)
MCV RBC AUTO: 90.4 FL (ref 79–97)
METHGB BLD QL: 0.9 % (ref 0–3)
METHGB BLD QL: 0.9 % (ref 0–3)
METHGB BLD QL: 1 % (ref 0–3)
METHGB BLD QL: 1 % (ref 0–3)
METHGB BLD QL: 1.1 % (ref 0–3)
METHGB BLD QL: 1.2 % (ref 0–3)
MODALITY: ABNORMAL
MONOCYTES # BLD AUTO: 1 10*3/MM3 (ref 0.1–0.9)
MONOCYTES NFR BLD AUTO: 7.9 % (ref 5–12)
NEUTROPHILS NFR BLD AUTO: 60.8 % (ref 42.7–76)
NEUTROPHILS NFR BLD AUTO: 7.69 10*3/MM3 (ref 1.7–7)
NRBC BLD AUTO-RTO: 0 /100 WBC (ref 0–0.2)
OXYHGB MFR BLDV: 95.3 % (ref 94–99)
OXYHGB MFR BLDV: 97.4 % (ref 94–99)
OXYHGB MFR BLDV: 97.8 % (ref 94–99)
OXYHGB MFR BLDV: 98.2 % (ref 94–99)
OXYHGB MFR BLDV: 98.7 % (ref 94–99)
OXYHGB MFR BLDV: 98.7 % (ref 94–99)
PCO2 BLDA: 31 MM HG (ref 35–45)
PCO2 BLDA: 36.3 MM HG (ref 35–45)
PCO2 BLDA: 38.6 MM HG (ref 35–45)
PCO2 BLDA: 39.3 MM HG (ref 35–45)
PCO2 BLDA: 39.6 MM HG (ref 35–45)
PCO2 BLDA: 40.3 MM HG (ref 35–45)
PCO2 BLDA: 41 MM HG (ref 35–45)
PCO2 BLDA: 43.7 MM HG (ref 35–45)
PCO2 TEMP ADJ BLD: 31 MM HG (ref 35–45)
PCO2 TEMP ADJ BLD: 36.3 MM HG (ref 35–45)
PCO2 TEMP ADJ BLD: 38.6 MM HG (ref 35–45)
PCO2 TEMP ADJ BLD: 39.3 MM HG (ref 35–45)
PCO2 TEMP ADJ BLD: 39.6 MM HG (ref 35–45)
PCO2 TEMP ADJ BLD: 40.3 MM HG (ref 35–45)
PCO2 TEMP ADJ BLD: 41 MM HG (ref 35–45)
PCO2 TEMP ADJ BLD: 43.7 MM HG (ref 35–45)
PEEP RESPIRATORY: 10 CM[H2O]
PEEP RESPIRATORY: 10 CM[H2O]
PEEP RESPIRATORY: 5 CM[H2O]
PH BLDA: 7.32 PH UNITS (ref 7.35–7.45)
PH BLDA: 7.38 PH UNITS (ref 7.35–7.45)
PH BLDA: 7.39 PH UNITS (ref 7.35–7.45)
PH BLDA: 7.41 PH UNITS (ref 7.35–7.45)
PH BLDA: 7.42 PH UNITS (ref 7.35–7.45)
PH BLDA: 7.42 PH UNITS (ref 7.35–7.45)
PH BLDA: 7.44 PH UNITS (ref 7.35–7.45)
PH BLDA: 7.49 PH UNITS (ref 7.35–7.45)
PH, TEMP CORRECTED: 7.32 PH UNITS (ref 7.35–7.45)
PH, TEMP CORRECTED: 7.38 PH UNITS (ref 7.35–7.45)
PH, TEMP CORRECTED: 7.39 PH UNITS (ref 7.35–7.45)
PH, TEMP CORRECTED: 7.41 PH UNITS (ref 7.35–7.45)
PH, TEMP CORRECTED: 7.42 PH UNITS (ref 7.35–7.45)
PH, TEMP CORRECTED: 7.42 PH UNITS (ref 7.35–7.45)
PH, TEMP CORRECTED: 7.44 PH UNITS (ref 7.35–7.45)
PH, TEMP CORRECTED: 7.49 PH UNITS (ref 7.35–7.45)
PHOSPHATE SERPL-MCNC: 4.5 MG/DL (ref 2.5–4.5)
PHOSPHATE SERPL-MCNC: 4.7 MG/DL (ref 2.5–4.5)
PLATELET # BLD AUTO: 164 10*3/MM3 (ref 140–450)
PLATELET # BLD AUTO: 234 10*3/MM3 (ref 140–450)
PMV BLD AUTO: 10.2 FL (ref 6–12)
PMV BLD AUTO: 9.6 FL (ref 6–12)
PO2 BLDA: 108 MM HG (ref 83–108)
PO2 BLDA: 120 MM HG (ref 83–108)
PO2 BLDA: 261 MM HG (ref 83–108)
PO2 BLDA: 327 MM HG (ref 83–108)
PO2 BLDA: 488 MM HG (ref 83–108)
PO2 BLDA: 492 MM HG (ref 83–108)
PO2 BLDA: 75.2 MM HG (ref 83–108)
PO2 BLDA: 87.6 MM HG (ref 83–108)
PO2 TEMP ADJ BLD: 108 MM HG (ref 83–108)
PO2 TEMP ADJ BLD: 120 MM HG (ref 83–108)
PO2 TEMP ADJ BLD: 261 MM HG (ref 83–108)
PO2 TEMP ADJ BLD: 327 MM HG (ref 83–108)
PO2 TEMP ADJ BLD: 488 MM HG (ref 83–108)
PO2 TEMP ADJ BLD: 492 MM HG (ref 83–108)
PO2 TEMP ADJ BLD: 75.2 MM HG (ref 83–108)
PO2 TEMP ADJ BLD: 87.6 MM HG (ref 83–108)
POTASSIUM BLDA-SCNC: 3.5 MMOL/L (ref 3.5–5.2)
POTASSIUM BLDA-SCNC: 3.7 MMOL/L (ref 3.5–5.2)
POTASSIUM BLDA-SCNC: 3.9 MMOL/L (ref 3.5–5.2)
POTASSIUM BLDA-SCNC: 4.1 MMOL/L (ref 3.5–5.2)
POTASSIUM BLDA-SCNC: 4.7 MMOL/L (ref 3.5–5.2)
POTASSIUM BLDA-SCNC: 4.9 MMOL/L (ref 3.5–5.2)
POTASSIUM SERPL-SCNC: 4.2 MMOL/L (ref 3.5–5.2)
POTASSIUM SERPL-SCNC: 5 MMOL/L (ref 3.5–5.2)
PROTHROMBIN TIME: 14.8 SECONDS (ref 11.8–14.8)
PSV: 10 CMH2O
PSV: 10 CMH2O
PSV: 5 CMH2O
RBC # BLD AUTO: 2.74 10*6/MM3 (ref 4.14–5.8)
RBC # BLD AUTO: 3.01 10*6/MM3 (ref 4.14–5.8)
RH BLD: POSITIVE
SAO2 % BLDCOA: 97 % (ref 94–99)
SAO2 % BLDCOA: 97.3 % (ref 94–99)
SAO2 % BLDCOA: 98.4 % (ref 94–99)
SAO2 % BLDCOA: 99.1 % (ref 94–99)
SAO2 % BLDCOA: >100.1 % (ref 94–99)
SET MECH RESP RATE: 12
SET MECH RESP RATE: 20
SET MECH RESP RATE: 20
SODIUM BLDA-SCNC: 138 MMOL/L (ref 136–145)
SODIUM BLDA-SCNC: 139 MMOL/L (ref 136–145)
SODIUM BLDA-SCNC: 139 MMOL/L (ref 136–145)
SODIUM BLDA-SCNC: 140 MMOL/L (ref 136–145)
SODIUM BLDA-SCNC: 141 MMOL/L (ref 136–145)
SODIUM BLDA-SCNC: 141 MMOL/L (ref 136–145)
SODIUM SERPL-SCNC: 141 MMOL/L (ref 136–145)
SODIUM SERPL-SCNC: 142 MMOL/L (ref 136–145)
T&S EXPIRATION DATE: NORMAL
VENTILATOR MODE: ABNORMAL
VT ON VENT VENT: 650 ML
WBC NRBC COR # BLD AUTO: 12.65 10*3/MM3 (ref 3.4–10.8)
WBC NRBC COR # BLD AUTO: 6.71 10*3/MM3 (ref 3.4–10.8)

## 2024-01-03 PROCEDURE — 94002 VENT MGMT INPAT INIT DAY: CPT

## 2024-01-03 PROCEDURE — 25010000002 NITROGLYCERIN 200 MCG/ML SOLUTION: Performed by: NURSE ANESTHETIST, CERTIFIED REGISTERED

## 2024-01-03 PROCEDURE — 94799 UNLISTED PULMONARY SVC/PX: CPT

## 2024-01-03 PROCEDURE — 83050 HGB METHEMOGLOBIN QUAN: CPT

## 2024-01-03 PROCEDURE — 93325 DOPPLER ECHO COLOR FLOW MAPG: CPT | Performed by: HOSPITALIST

## 2024-01-03 PROCEDURE — 94640 AIRWAY INHALATION TREATMENT: CPT

## 2024-01-03 PROCEDURE — 86900 BLOOD TYPING SEROLOGIC ABO: CPT | Performed by: SURGERY

## 2024-01-03 PROCEDURE — 82948 REAGENT STRIP/BLOOD GLUCOSE: CPT

## 2024-01-03 PROCEDURE — 86901 BLOOD TYPING SEROLOGIC RH(D): CPT | Performed by: SURGERY

## 2024-01-03 PROCEDURE — 93355 ECHO TRANSESOPHAGEAL (TEE): CPT

## 2024-01-03 PROCEDURE — 86850 RBC ANTIBODY SCREEN: CPT | Performed by: SURGERY

## 2024-01-03 PROCEDURE — 25010000002 VANCOMYCIN 1 G RECONSTITUTED SOLUTION 1 EACH VIAL: Performed by: SURGERY

## 2024-01-03 PROCEDURE — C1713 ANCHOR/SCREW BN/BN,TIS/BN: HCPCS | Performed by: SURGERY

## 2024-01-03 PROCEDURE — 25010000002 HEPARIN (PORCINE) PER 1000 UNITS: Performed by: NURSE ANESTHETIST, CERTIFIED REGISTERED

## 2024-01-03 PROCEDURE — 02100Z8 BYPASS CORONARY ARTERY, ONE ARTERY FROM RIGHT INTERNAL MAMMARY, OPEN APPROACH: ICD-10-PCS | Performed by: SURGERY

## 2024-01-03 PROCEDURE — 93005 ELECTROCARDIOGRAM TRACING: CPT | Performed by: SURGERY

## 2024-01-03 PROCEDURE — 25010000002 CEFAZOLIN PER 500 MG: Performed by: NURSE PRACTITIONER

## 2024-01-03 PROCEDURE — 5A1221Z PERFORMANCE OF CARDIAC OUTPUT, CONTINUOUS: ICD-10-PCS | Performed by: SURGERY

## 2024-01-03 PROCEDURE — A4648 IMPLANTABLE TISSUE MARKER: HCPCS | Performed by: SURGERY

## 2024-01-03 PROCEDURE — 25010000002 PROPOFOL 10 MG/ML EMULSION: Performed by: NURSE ANESTHETIST, CERTIFIED REGISTERED

## 2024-01-03 PROCEDURE — 82375 ASSAY CARBOXYHB QUANT: CPT

## 2024-01-03 PROCEDURE — C1751 CATH, INF, PER/CENT/MIDLINE: HCPCS | Performed by: ANESTHESIOLOGY

## 2024-01-03 PROCEDURE — 25810000003 SODIUM CHLORIDE 0.9 % SOLUTION: Performed by: NURSE ANESTHETIST, CERTIFIED REGISTERED

## 2024-01-03 PROCEDURE — 25010000002 NICARDIPINE 2.5 MG/ML SOLUTION: Performed by: NURSE ANESTHETIST, CERTIFIED REGISTERED

## 2024-01-03 PROCEDURE — 33534 CABG ARTERIAL TWO: CPT | Performed by: SURGERY

## 2024-01-03 PROCEDURE — 85730 THROMBOPLASTIN TIME PARTIAL: CPT | Performed by: SURGERY

## 2024-01-03 PROCEDURE — 25010000002 ACETAMINOPHEN 10 MG/ML SOLUTION: Performed by: SURGERY

## 2024-01-03 PROCEDURE — 85384 FIBRINOGEN ACTIVITY: CPT | Performed by: SURGERY

## 2024-01-03 PROCEDURE — C9290 INJ, BUPIVACAINE LIPOSOME: HCPCS | Performed by: SURGERY

## 2024-01-03 PROCEDURE — 25010000002 PROTAMINE SULFATE PER 10 MG: Performed by: NURSE ANESTHETIST, CERTIFIED REGISTERED

## 2024-01-03 PROCEDURE — 85027 COMPLETE CBC AUTOMATED: CPT | Performed by: SURGERY

## 2024-01-03 PROCEDURE — 85025 COMPLETE CBC W/AUTO DIFF WBC: CPT | Performed by: SURGERY

## 2024-01-03 PROCEDURE — 0 INSULIN REGULAR HUMAN PER 5 UNITS: Performed by: SURGERY

## 2024-01-03 PROCEDURE — 25010000002 MIDAZOLAM PER 1 MG: Performed by: NURSE ANESTHETIST, CERTIFIED REGISTERED

## 2024-01-03 PROCEDURE — 25010000002 HEPARIN (PORCINE) PER 1000 UNITS: Performed by: SURGERY

## 2024-01-03 PROCEDURE — 82803 BLOOD GASES ANY COMBINATION: CPT

## 2024-01-03 PROCEDURE — 06BP4ZZ EXCISION OF RIGHT SAPHENOUS VEIN, PERCUTANEOUS ENDOSCOPIC APPROACH: ICD-10-PCS | Performed by: SURGERY

## 2024-01-03 PROCEDURE — 82805 BLOOD GASES W/O2 SATURATION: CPT

## 2024-01-03 PROCEDURE — 80069 RENAL FUNCTION PANEL: CPT | Performed by: SURGERY

## 2024-01-03 PROCEDURE — 33519 CABG ARTERY-VEIN THREE: CPT | Performed by: SURGERY

## 2024-01-03 PROCEDURE — 25010000002 PAPAVERINE PER 60 MG: Performed by: SURGERY

## 2024-01-03 PROCEDURE — 25810000003 SODIUM CHLORIDE 0.9 % SOLUTION: Performed by: SURGERY

## 2024-01-03 PROCEDURE — 93312 ECHO TRANSESOPHAGEAL: CPT | Performed by: HOSPITALIST

## 2024-01-03 PROCEDURE — 71045 X-RAY EXAM CHEST 1 VIEW: CPT

## 2024-01-03 PROCEDURE — 82330 ASSAY OF CALCIUM: CPT

## 2024-01-03 PROCEDURE — 25810000003 SODIUM CHLORIDE 0.9 % SOLUTION 250 ML FLEX CONT: Performed by: NURSE ANESTHETIST, CERTIFIED REGISTERED

## 2024-01-03 PROCEDURE — 86923 COMPATIBILITY TEST ELECTRIC: CPT

## 2024-01-03 PROCEDURE — 93010 ELECTROCARDIOGRAM REPORT: CPT | Performed by: INTERNAL MEDICINE

## 2024-01-03 PROCEDURE — 0 BUPIVACAINE LIPOSOME 1.3 % SUSPENSION 20 ML VIAL: Performed by: SURGERY

## 2024-01-03 PROCEDURE — 25010000002 NICARDIPINE 2.5 MG/ML SOLUTION: Performed by: SURGERY

## 2024-01-03 PROCEDURE — 25010000002 FENTANYL CITRATE (PF) 100 MCG/2ML SOLUTION: Performed by: NURSE ANESTHETIST, CERTIFIED REGISTERED

## 2024-01-03 PROCEDURE — 02100Z9 BYPASS CORONARY ARTERY, ONE ARTERY FROM LEFT INTERNAL MAMMARY, OPEN APPROACH: ICD-10-PCS | Performed by: SURGERY

## 2024-01-03 PROCEDURE — 25010000002 ALBUMIN HUMAN 5% PER 50 ML: Performed by: SURGERY

## 2024-01-03 PROCEDURE — 25010000002 CEFAZOLIN PER 500 MG: Performed by: SURGERY

## 2024-01-03 PROCEDURE — 25810000003 LACTATED RINGERS PER 1000 ML: Performed by: SURGERY

## 2024-01-03 PROCEDURE — 25010000002 MORPHINE PER 10 MG: Performed by: SURGERY

## 2024-01-03 PROCEDURE — 83605 ASSAY OF LACTIC ACID: CPT | Performed by: SURGERY

## 2024-01-03 PROCEDURE — 021209W BYPASS CORONARY ARTERY, THREE ARTERIES FROM AORTA WITH AUTOLOGOUS VENOUS TISSUE, OPEN APPROACH: ICD-10-PCS | Performed by: SURGERY

## 2024-01-03 PROCEDURE — 85610 PROTHROMBIN TIME: CPT | Performed by: SURGERY

## 2024-01-03 PROCEDURE — 25810000003 LACTATED RINGERS PER 1000 ML: Performed by: ANESTHESIOLOGY

## 2024-01-03 PROCEDURE — 25010000002 MIDAZOLAM PER 1 MG: Performed by: ANESTHESIOLOGY

## 2024-01-03 PROCEDURE — 63710000001 INSULIN REGULAR HUMAN PER 5 UNITS: Performed by: NURSE ANESTHETIST, CERTIFIED REGISTERED

## 2024-01-03 PROCEDURE — 94761 N-INVAS EAR/PLS OXIMETRY MLT: CPT

## 2024-01-03 PROCEDURE — 25010000002 HYDROMORPHONE 1 MG/ML SOLUTION: Performed by: NURSE ANESTHETIST, CERTIFIED REGISTERED

## 2024-01-03 PROCEDURE — P9041 ALBUMIN (HUMAN),5%, 50ML: HCPCS | Performed by: SURGERY

## 2024-01-03 PROCEDURE — 25810000003 DEXTROSE 5 % WITH KCL 20 MEQ 20 MEQ/L SOLUTION: Performed by: SURGERY

## 2024-01-03 PROCEDURE — 93318 ECHO TRANSESOPHAGEAL INTRAOP: CPT | Performed by: ANESTHESIOLOGY

## 2024-01-03 DEVICE — WAX BONE HEMO NAT 2.5G: Type: IMPLANTABLE DEVICE | Site: CHEST | Status: FUNCTIONAL

## 2024-01-03 DEVICE — PLEDGET INCISIONLINE REINF TFE SFT PTFE 1.5X3X7MM WHT: Type: IMPLANTABLE DEVICE | Site: HEART | Status: FUNCTIONAL

## 2024-01-03 DEVICE — KT STERNALOCK XP X/PLATE: Type: IMPLANTABLE DEVICE | Site: CHEST | Status: FUNCTIONAL

## 2024-01-03 DEVICE — LIGACLIP EXTRA LIGATING CLIP CARTRIDGES: 6 TITANIUM CLIPS/ CARTRIDGE (SMALL)
Type: IMPLANTABLE DEVICE | Site: CHEST | Status: FUNCTIONAL
Brand: LIGACLIP

## 2024-01-03 DEVICE — DISK-SHAPED STYLE, SILICONE (1 PER STERILE PKG)
Type: IMPLANTABLE DEVICE | Site: HEART | Status: FUNCTIONAL
Brand: SCANLAN® RADIOMARK® GRAFT MARKERS

## 2024-01-03 DEVICE — KT HEMOST ABS SURGIFOAM PORCN 1GRAM: Type: IMPLANTABLE DEVICE | Site: CHEST | Status: FUNCTIONAL

## 2024-01-03 DEVICE — IMPLANTABLE DEVICE: Type: IMPLANTABLE DEVICE | Site: HEART | Status: FUNCTIONAL

## 2024-01-03 DEVICE — KT PLT STERNALOCK/XP AXILR SHRP: Type: IMPLANTABLE DEVICE | Site: CHEST | Status: FUNCTIONAL

## 2024-01-03 RX ORDER — ACETAMINOPHEN 325 MG/1
650 TABLET ORAL EVERY 6 HOURS SCHEDULED
Status: DISCONTINUED | OUTPATIENT
Start: 2024-01-04 | End: 2024-01-08 | Stop reason: HOSPADM

## 2024-01-03 RX ORDER — DEXTROSE MONOHYDRATE 25 G/50ML
12.5 INJECTION, SOLUTION INTRAVENOUS AS NEEDED
Status: DISCONTINUED | OUTPATIENT
Start: 2024-01-03 | End: 2024-01-03 | Stop reason: HOSPADM

## 2024-01-03 RX ORDER — ROCURONIUM BROMIDE 10 MG/ML
INJECTION, SOLUTION INTRAVENOUS AS NEEDED
Status: DISCONTINUED | OUTPATIENT
Start: 2024-01-03 | End: 2024-01-03 | Stop reason: SURG

## 2024-01-03 RX ORDER — MAGNESIUM HYDROXIDE 1200 MG/15ML
LIQUID ORAL AS NEEDED
Status: DISCONTINUED | OUTPATIENT
Start: 2024-01-03 | End: 2024-01-03 | Stop reason: HOSPADM

## 2024-01-03 RX ORDER — BISACODYL 5 MG/1
10 TABLET, DELAYED RELEASE ORAL 2 TIMES DAILY
Status: DISCONTINUED | OUTPATIENT
Start: 2024-01-04 | End: 2024-01-08 | Stop reason: HOSPADM

## 2024-01-03 RX ORDER — SODIUM CHLORIDE 9 MG/ML
40 INJECTION, SOLUTION INTRAVENOUS AS NEEDED
Status: DISCONTINUED | OUTPATIENT
Start: 2024-01-03 | End: 2024-01-03 | Stop reason: HOSPADM

## 2024-01-03 RX ORDER — ACETAMINOPHEN 325 MG/1
650 TABLET ORAL EVERY 4 HOURS PRN
Status: DISCONTINUED | OUTPATIENT
Start: 2024-01-04 | End: 2024-01-03

## 2024-01-03 RX ORDER — OXYCODONE HYDROCHLORIDE 5 MG/1
10 TABLET ORAL EVERY 4 HOURS PRN
Status: DISCONTINUED | OUTPATIENT
Start: 2024-01-03 | End: 2024-01-05

## 2024-01-03 RX ORDER — NOREPINEPHRINE BITARTRATE 0.03 MG/ML
.02-.3 INJECTION, SOLUTION INTRAVENOUS CONTINUOUS PRN
Status: DISCONTINUED | OUTPATIENT
Start: 2024-01-03 | End: 2024-01-08 | Stop reason: HOSPADM

## 2024-01-03 RX ORDER — NITROGLYCERIN 20 MG/100ML
5 INJECTION INTRAVENOUS CONTINUOUS
Status: DISCONTINUED | OUTPATIENT
Start: 2024-01-03 | End: 2024-01-08 | Stop reason: HOSPADM

## 2024-01-03 RX ORDER — SODIUM CHLORIDE, SODIUM LACTATE, POTASSIUM CHLORIDE, CALCIUM CHLORIDE 600; 310; 30; 20 MG/100ML; MG/100ML; MG/100ML; MG/100ML
1000 INJECTION, SOLUTION INTRAVENOUS CONTINUOUS
Status: DISCONTINUED | OUTPATIENT
Start: 2024-01-03 | End: 2024-01-03

## 2024-01-03 RX ORDER — POLYETHYLENE GLYCOL 3350 17 G/17G
17 POWDER, FOR SOLUTION ORAL DAILY
Status: DISCONTINUED | OUTPATIENT
Start: 2024-01-04 | End: 2024-01-08 | Stop reason: HOSPADM

## 2024-01-03 RX ORDER — CHLORHEXIDINE GLUCONATE ORAL RINSE 1.2 MG/ML
15 SOLUTION DENTAL EVERY 12 HOURS
Status: DISCONTINUED | OUTPATIENT
Start: 2024-01-03 | End: 2024-01-07

## 2024-01-03 RX ORDER — IPRATROPIUM BROMIDE AND ALBUTEROL SULFATE 2.5; .5 MG/3ML; MG/3ML
3 SOLUTION RESPIRATORY (INHALATION)
Status: DISCONTINUED | OUTPATIENT
Start: 2024-01-03 | End: 2024-01-08

## 2024-01-03 RX ORDER — MORPHINE SULFATE 2 MG/ML
2 INJECTION, SOLUTION INTRAMUSCULAR; INTRAVENOUS
Status: DISCONTINUED | OUTPATIENT
Start: 2024-01-03 | End: 2024-01-05

## 2024-01-03 RX ORDER — SODIUM CHLORIDE 0.9 % (FLUSH) 0.9 %
10 SYRINGE (ML) INJECTION EVERY 12 HOURS SCHEDULED
Status: DISCONTINUED | OUTPATIENT
Start: 2024-01-03 | End: 2024-01-03 | Stop reason: HOSPADM

## 2024-01-03 RX ORDER — MEPERIDINE HYDROCHLORIDE 50 MG/ML
25 INJECTION INTRAMUSCULAR; INTRAVENOUS; SUBCUTANEOUS
Status: DISCONTINUED | OUTPATIENT
Start: 2024-01-03 | End: 2024-01-04

## 2024-01-03 RX ORDER — ASPIRIN 81 MG/1
162 TABLET, CHEWABLE ORAL ONCE
Status: COMPLETED | OUTPATIENT
Start: 2024-01-04 | End: 2024-01-04

## 2024-01-03 RX ORDER — ASPIRIN 81 MG/1
81 TABLET ORAL DAILY
Status: DISCONTINUED | OUTPATIENT
Start: 2024-01-05 | End: 2024-01-08 | Stop reason: HOSPADM

## 2024-01-03 RX ORDER — OXYCODONE HYDROCHLORIDE 5 MG/1
5 TABLET ORAL EVERY 4 HOURS PRN
Status: DISCONTINUED | OUTPATIENT
Start: 2024-01-03 | End: 2024-01-08 | Stop reason: HOSPADM

## 2024-01-03 RX ORDER — FENTANYL CITRATE 50 UG/ML
INJECTION, SOLUTION INTRAMUSCULAR; INTRAVENOUS AS NEEDED
Status: DISCONTINUED | OUTPATIENT
Start: 2024-01-03 | End: 2024-01-03 | Stop reason: SURG

## 2024-01-03 RX ORDER — ATORVASTATIN CALCIUM 10 MG/1
20 TABLET, FILM COATED ORAL NIGHTLY
Status: DISCONTINUED | OUTPATIENT
Start: 2024-01-04 | End: 2024-01-08 | Stop reason: HOSPADM

## 2024-01-03 RX ORDER — PROTAMINE SULFATE 10 MG/ML
INJECTION, SOLUTION INTRAVENOUS AS NEEDED
Status: DISCONTINUED | OUTPATIENT
Start: 2024-01-03 | End: 2024-01-03 | Stop reason: SURG

## 2024-01-03 RX ORDER — DEXTROSE MONOHYDRATE 25 G/50ML
10-50 INJECTION, SOLUTION INTRAVENOUS
Status: DISCONTINUED | OUTPATIENT
Start: 2024-01-03 | End: 2024-01-03 | Stop reason: HOSPADM

## 2024-01-03 RX ORDER — DEXMEDETOMIDINE HYDROCHLORIDE 4 UG/ML
INJECTION, SOLUTION INTRAVENOUS CONTINUOUS PRN
Status: DISCONTINUED | OUTPATIENT
Start: 2024-01-03 | End: 2024-01-03 | Stop reason: SURG

## 2024-01-03 RX ORDER — NICARDIPINE HYDROCHLORIDE 2.5 MG/ML
INJECTION INTRAVENOUS AS NEEDED
Status: DISCONTINUED | OUTPATIENT
Start: 2024-01-03 | End: 2024-01-03 | Stop reason: SURG

## 2024-01-03 RX ORDER — MIDAZOLAM HYDROCHLORIDE 1 MG/ML
INJECTION INTRAMUSCULAR; INTRAVENOUS AS NEEDED
Status: DISCONTINUED | OUTPATIENT
Start: 2024-01-03 | End: 2024-01-03 | Stop reason: SURG

## 2024-01-03 RX ORDER — NEOSTIGMINE METHYLSULFATE 5 MG/5 ML
SYRINGE (ML) INTRAVENOUS AS NEEDED
Status: DISCONTINUED | OUTPATIENT
Start: 2024-01-03 | End: 2024-01-03 | Stop reason: SURG

## 2024-01-03 RX ORDER — MIDAZOLAM HYDROCHLORIDE 1 MG/ML
1 INJECTION INTRAMUSCULAR; INTRAVENOUS
Status: DISCONTINUED | OUTPATIENT
Start: 2024-01-03 | End: 2024-01-03 | Stop reason: HOSPADM

## 2024-01-03 RX ORDER — SODIUM CHLORIDE 0.9 % (FLUSH) 0.9 %
10 SYRINGE (ML) INJECTION AS NEEDED
Status: DISCONTINUED | OUTPATIENT
Start: 2024-01-03 | End: 2024-01-03 | Stop reason: HOSPADM

## 2024-01-03 RX ORDER — ACETAMINOPHEN 650 MG/1
650 SUPPOSITORY RECTAL EVERY 4 HOURS PRN
Status: DISCONTINUED | OUTPATIENT
Start: 2024-01-04 | End: 2024-01-03

## 2024-01-03 RX ORDER — HEPARIN SODIUM 1000 [USP'U]/ML
INJECTION, SOLUTION INTRAVENOUS; SUBCUTANEOUS AS NEEDED
Status: DISCONTINUED | OUTPATIENT
Start: 2024-01-03 | End: 2024-01-03 | Stop reason: SURG

## 2024-01-03 RX ORDER — MIDAZOLAM HYDROCHLORIDE 1 MG/ML
2 INJECTION INTRAMUSCULAR; INTRAVENOUS ONCE
Status: COMPLETED | OUTPATIENT
Start: 2024-01-03 | End: 2024-01-03

## 2024-01-03 RX ORDER — LIDOCAINE HYDROCHLORIDE 20 MG/ML
INJECTION, SOLUTION EPIDURAL; INFILTRATION; INTRACAUDAL; PERINEURAL AS NEEDED
Status: DISCONTINUED | OUTPATIENT
Start: 2024-01-03 | End: 2024-01-03 | Stop reason: SURG

## 2024-01-03 RX ORDER — DEXTROSE MONOHYDRATE 25 G/50ML
10-50 INJECTION, SOLUTION INTRAVENOUS
Status: DISCONTINUED | OUTPATIENT
Start: 2024-01-03 | End: 2024-01-04

## 2024-01-03 RX ORDER — POTASSIUM CHLORIDE, DEXTROSE MONOHYDRATE 150; 5 MG/100ML; G/100ML
30 INJECTION, SOLUTION INTRAVENOUS CONTINUOUS
Status: DISCONTINUED | OUTPATIENT
Start: 2024-01-03 | End: 2024-01-05

## 2024-01-03 RX ORDER — SODIUM CHLORIDE, SODIUM LACTATE, POTASSIUM CHLORIDE, CALCIUM CHLORIDE 600; 310; 30; 20 MG/100ML; MG/100ML; MG/100ML; MG/100ML
9 INJECTION, SOLUTION INTRAVENOUS CONTINUOUS
Status: DISCONTINUED | OUTPATIENT
Start: 2024-01-03 | End: 2024-01-03

## 2024-01-03 RX ORDER — BUPIVACAINE HCL/0.9 % NACL/PF 0.125 %
PLASTIC BAG, INJECTION (ML) EPIDURAL AS NEEDED
Status: DISCONTINUED | OUTPATIENT
Start: 2024-01-03 | End: 2024-01-03 | Stop reason: SURG

## 2024-01-03 RX ORDER — NICOTINE POLACRILEX 4 MG
15 LOZENGE BUCCAL
Status: DISCONTINUED | OUTPATIENT
Start: 2024-01-03 | End: 2024-01-04

## 2024-01-03 RX ORDER — SODIUM CHLORIDE 9 MG/ML
30 INJECTION, SOLUTION INTRAVENOUS CONTINUOUS PRN
Status: DISCONTINUED | OUTPATIENT
Start: 2024-01-03 | End: 2024-01-03 | Stop reason: HOSPADM

## 2024-01-03 RX ORDER — ACETAMINOPHEN 10 MG/ML
1000 INJECTION, SOLUTION INTRAVENOUS EVERY 8 HOURS
Status: COMPLETED | OUTPATIENT
Start: 2024-01-03 | End: 2024-01-03

## 2024-01-03 RX ORDER — PROPOFOL 10 MG/ML
VIAL (ML) INTRAVENOUS AS NEEDED
Status: DISCONTINUED | OUTPATIENT
Start: 2024-01-03 | End: 2024-01-03 | Stop reason: SURG

## 2024-01-03 RX ORDER — SODIUM CHLORIDE 0.9 % (FLUSH) 0.9 %
30 SYRINGE (ML) INJECTION ONCE AS NEEDED
Status: DISCONTINUED | OUTPATIENT
Start: 2024-01-03 | End: 2024-01-03 | Stop reason: HOSPADM

## 2024-01-03 RX ORDER — NITROGLYCERIN 20 MG/100ML
INJECTION INTRAVENOUS CONTINUOUS PRN
Status: DISCONTINUED | OUTPATIENT
Start: 2024-01-03 | End: 2024-01-03 | Stop reason: SURG

## 2024-01-03 RX ORDER — PANTOPRAZOLE SODIUM 40 MG/1
40 TABLET, DELAYED RELEASE ORAL
Status: DISCONTINUED | OUTPATIENT
Start: 2024-01-04 | End: 2024-01-08 | Stop reason: HOSPADM

## 2024-01-03 RX ORDER — ACETAMINOPHEN 160 MG/5ML
650 SOLUTION ORAL EVERY 4 HOURS PRN
Status: DISCONTINUED | OUTPATIENT
Start: 2024-01-04 | End: 2024-01-03

## 2024-01-03 RX ORDER — FENTANYL CITRATE 50 UG/ML
25 INJECTION, SOLUTION INTRAMUSCULAR; INTRAVENOUS
Status: DISCONTINUED | OUTPATIENT
Start: 2024-01-03 | End: 2024-01-03 | Stop reason: HOSPADM

## 2024-01-03 RX ORDER — ONDANSETRON 2 MG/ML
4 INJECTION INTRAMUSCULAR; INTRAVENOUS EVERY 6 HOURS PRN
Status: DISCONTINUED | OUTPATIENT
Start: 2024-01-03 | End: 2024-01-08 | Stop reason: HOSPADM

## 2024-01-03 RX ORDER — CALCIUM CHLORIDE 100 MG/ML
1 INJECTION INTRAVENOUS; INTRAVENTRICULAR ONCE
Status: COMPLETED | OUTPATIENT
Start: 2024-01-03 | End: 2024-01-03

## 2024-01-03 RX ORDER — CLOPIDOGREL BISULFATE 75 MG/1
75 TABLET ORAL DAILY
Status: DISCONTINUED | OUTPATIENT
Start: 2024-01-04 | End: 2024-01-08 | Stop reason: HOSPADM

## 2024-01-03 RX ORDER — NITROGLYCERIN 0.4 MG/1
0.4 TABLET SUBLINGUAL
Status: DISCONTINUED | OUTPATIENT
Start: 2024-01-03 | End: 2024-01-08 | Stop reason: HOSPADM

## 2024-01-03 RX ORDER — ALBUMIN, HUMAN INJ 5% 5 %
500 SOLUTION INTRAVENOUS ONCE
Status: COMPLETED | OUTPATIENT
Start: 2024-01-03 | End: 2024-01-03

## 2024-01-03 RX ORDER — CHLORHEXIDINE GLUCONATE ORAL RINSE 1.2 MG/ML
15 SOLUTION DENTAL EVERY 12 HOURS SCHEDULED
Status: DISCONTINUED | OUTPATIENT
Start: 2024-01-03 | End: 2024-01-03 | Stop reason: SDUPTHER

## 2024-01-03 RX ORDER — SUFENTANIL CITRATE 50 UG/ML
INJECTION EPIDURAL; INTRAVENOUS AS NEEDED
Status: DISCONTINUED | OUTPATIENT
Start: 2024-01-03 | End: 2024-01-03 | Stop reason: SURG

## 2024-01-03 RX ORDER — ALBUTEROL SULFATE 2.5 MG/3ML
2.5 SOLUTION RESPIRATORY (INHALATION) EVERY 4 HOURS PRN
Status: DISCONTINUED | OUTPATIENT
Start: 2024-01-03 | End: 2024-01-04

## 2024-01-03 RX ORDER — NICOTINE POLACRILEX 4 MG
15 LOZENGE BUCCAL
Status: DISCONTINUED | OUTPATIENT
Start: 2024-01-03 | End: 2024-01-03 | Stop reason: HOSPADM

## 2024-01-03 RX ORDER — PREGABALIN 25 MG/1
25 CAPSULE ORAL EVERY 12 HOURS SCHEDULED
Status: DISCONTINUED | OUTPATIENT
Start: 2024-01-04 | End: 2024-01-08 | Stop reason: HOSPADM

## 2024-01-03 RX ORDER — IBUPROFEN 600 MG/1
1 TABLET ORAL
Status: DISCONTINUED | OUTPATIENT
Start: 2024-01-03 | End: 2024-01-03 | Stop reason: HOSPADM

## 2024-01-03 RX ORDER — ENOXAPARIN SODIUM 100 MG/ML
40 INJECTION SUBCUTANEOUS DAILY
Status: DISCONTINUED | OUTPATIENT
Start: 2024-01-04 | End: 2024-01-08 | Stop reason: HOSPADM

## 2024-01-03 RX ORDER — VECURONIUM BROMIDE 1 MG/ML
INJECTION, POWDER, LYOPHILIZED, FOR SOLUTION INTRAVENOUS AS NEEDED
Status: DISCONTINUED | OUTPATIENT
Start: 2024-01-03 | End: 2024-01-03 | Stop reason: SURG

## 2024-01-03 RX ORDER — FENTANYL CITRATE 50 UG/ML
50 INJECTION, SOLUTION INTRAMUSCULAR; INTRAVENOUS ONCE
Status: DISCONTINUED | OUTPATIENT
Start: 2024-01-03 | End: 2024-01-03 | Stop reason: HOSPADM

## 2024-01-03 RX ORDER — ACETAMINOPHEN 500 MG
1000 TABLET ORAL ONCE
Status: COMPLETED | OUTPATIENT
Start: 2024-01-03 | End: 2024-01-03

## 2024-01-03 RX ORDER — DEXMEDETOMIDINE HYDROCHLORIDE 4 UG/ML
.2-1.5 INJECTION, SOLUTION INTRAVENOUS
Status: DISCONTINUED | OUTPATIENT
Start: 2024-01-03 | End: 2024-01-05

## 2024-01-03 RX ORDER — METOPROLOL TARTRATE 1 MG/ML
INJECTION, SOLUTION INTRAVENOUS AS NEEDED
Status: DISCONTINUED | OUTPATIENT
Start: 2024-01-03 | End: 2024-01-03 | Stop reason: SURG

## 2024-01-03 RX ADMIN — FENTANYL CITRATE 100 MCG: 50 INJECTION, SOLUTION INTRAMUSCULAR; INTRAVENOUS at 12:37

## 2024-01-03 RX ADMIN — Medication 50 MCG: at 09:13

## 2024-01-03 RX ADMIN — CEFAZOLIN 1 G: 2 INJECTION, POWDER, FOR SOLUTION INTRAMUSCULAR; INTRAVENOUS at 11:30

## 2024-01-03 RX ADMIN — SODIUM CHLORIDE 2.5 MG/HR: 9 INJECTION, SOLUTION INTRAVENOUS at 14:28

## 2024-01-03 RX ADMIN — NICARDIPINE HYDROCHLORIDE 1000 MCG: 2.5 INJECTION INTRAVENOUS at 12:41

## 2024-01-03 RX ADMIN — HEPARIN SODIUM 35000 UNITS: 1000 INJECTION, SOLUTION INTRAVENOUS; SUBCUTANEOUS at 09:49

## 2024-01-03 RX ADMIN — Medication 100 MCG: at 07:28

## 2024-01-03 RX ADMIN — NICARDIPINE HYDROCHLORIDE 500 MCG: 2.5 INJECTION INTRAVENOUS at 12:36

## 2024-01-03 RX ADMIN — Medication 3 MG: at 13:17

## 2024-01-03 RX ADMIN — Medication 1 APPLICATION: at 06:14

## 2024-01-03 RX ADMIN — CALCIUM CHLORIDE 1 G: 100 INJECTION INTRAVENOUS; INTRAVENTRICULAR at 17:40

## 2024-01-03 RX ADMIN — SODIUM CHLORIDE 1 UNITS/HR: 900 INJECTION INTRAVENOUS at 08:00

## 2024-01-03 RX ADMIN — PROPOFOL 70 MG: 10 INJECTION, EMULSION INTRAVENOUS at 07:21

## 2024-01-03 RX ADMIN — SODIUM CHLORIDE 1.3 UNITS/HR: 9 INJECTION, SOLUTION INTRAVENOUS at 13:40

## 2024-01-03 RX ADMIN — Medication 100 MCG: at 10:02

## 2024-01-03 RX ADMIN — VECURONIUM BROMIDE 10 MG: 1 INJECTION, POWDER, LYOPHILIZED, FOR SOLUTION INTRAVENOUS at 07:21

## 2024-01-03 RX ADMIN — METOPROLOL TARTRATE 4 MG: 1 INJECTION, SOLUTION INTRAVENOUS at 09:41

## 2024-01-03 RX ADMIN — SODIUM CHLORIDE, POTASSIUM CHLORIDE, SODIUM LACTATE AND CALCIUM CHLORIDE 9 ML/HR: 600; 310; 30; 20 INJECTION, SOLUTION INTRAVENOUS at 07:10

## 2024-01-03 RX ADMIN — CEFAZOLIN 2000 MG: 2 INJECTION, POWDER, FOR SOLUTION INTRAMUSCULAR; INTRAVENOUS at 07:30

## 2024-01-03 RX ADMIN — CEFAZOLIN 2000 MG: 2 INJECTION, POWDER, FOR SOLUTION INTRAMUSCULAR; INTRAVENOUS at 18:30

## 2024-01-03 RX ADMIN — NICARDIPINE HYDROCHLORIDE 1000 MCG: 2.5 INJECTION INTRAVENOUS at 12:57

## 2024-01-03 RX ADMIN — LIDOCAINE HYDROCHLORIDE 100 MG: 20 INJECTION, SOLUTION EPIDURAL; INFILTRATION; INTRACAUDAL; PERINEURAL at 07:21

## 2024-01-03 RX ADMIN — NOREPINEPHRINE BITARTRATE 0.02 MCG/KG/MIN: 0.03 INJECTION, SOLUTION INTRAVENOUS at 16:44

## 2024-01-03 RX ADMIN — POTASSIUM CHLORIDE AND DEXTROSE MONOHYDRATE 30 ML/HR: 150; 5 INJECTION, SOLUTION INTRAVENOUS at 13:35

## 2024-01-03 RX ADMIN — CHLORHEXIDINE GLUCONATE 15 ML: 1.2 RINSE ORAL at 17:07

## 2024-01-03 RX ADMIN — MIDAZOLAM 2 MG: 1 INJECTION INTRAMUSCULAR; INTRAVENOUS at 11:41

## 2024-01-03 RX ADMIN — VECURONIUM BROMIDE 5 MG: 1 INJECTION, POWDER, LYOPHILIZED, FOR SOLUTION INTRAVENOUS at 09:20

## 2024-01-03 RX ADMIN — AMINOCAPROIC ACID 1 G/HR: 250 INJECTION, SOLUTION INTRAVENOUS at 07:25

## 2024-01-03 RX ADMIN — NICARDIPINE HYDROCHLORIDE 500 MCG: 2.5 INJECTION INTRAVENOUS at 12:32

## 2024-01-03 RX ADMIN — SUFENTANIL CITRATE 50 MCG: 50 INJECTION, SOLUTION EPIDURAL; INTRAVENOUS at 09:38

## 2024-01-03 RX ADMIN — MORPHINE SULFATE 2 MG: 2 INJECTION, SOLUTION INTRAMUSCULAR; INTRAVENOUS at 16:18

## 2024-01-03 RX ADMIN — SUFENTANIL CITRATE 0.3 MCG: 50 INJECTION, SOLUTION EPIDURAL; INTRAVENOUS at 10:26

## 2024-01-03 RX ADMIN — SUFENTANIL CITRATE 100 MCG: 50 INJECTION, SOLUTION EPIDURAL; INTRAVENOUS at 07:21

## 2024-01-03 RX ADMIN — Medication 1 APPLICATION: at 17:07

## 2024-01-03 RX ADMIN — DEXMEDETOMIDINE HYDROCHLORIDE 1 MCG/KG/HR: 4 INJECTION, SOLUTION INTRAVENOUS at 12:15

## 2024-01-03 RX ADMIN — Medication 100 MCG: at 07:52

## 2024-01-03 RX ADMIN — ACETAMINOPHEN 1000 MG: 10 INJECTION, SOLUTION INTRAVENOUS at 21:04

## 2024-01-03 RX ADMIN — NICARDIPINE HYDROCHLORIDE 1000 MCG: 2.5 INJECTION INTRAVENOUS at 12:46

## 2024-01-03 RX ADMIN — ACETAMINOPHEN 1000 MG: 10 INJECTION, SOLUTION INTRAVENOUS at 14:17

## 2024-01-03 RX ADMIN — GLYCOPYRROLATE 0.4 MG: 0.2 INJECTION INTRAMUSCULAR; INTRAVENOUS at 13:16

## 2024-01-03 RX ADMIN — DEXMEDETOMIDINE HYDROCHLORIDE 1 MCG/KG/HR: 4 INJECTION, SOLUTION INTRAVENOUS at 15:28

## 2024-01-03 RX ADMIN — MORPHINE SULFATE 2 MG: 2 INJECTION, SOLUTION INTRAMUSCULAR; INTRAVENOUS at 18:39

## 2024-01-03 RX ADMIN — ACETAMINOPHEN 1000 MG: 500 TABLET, FILM COATED ORAL at 06:13

## 2024-01-03 RX ADMIN — IPRATROPIUM BROMIDE AND ALBUTEROL SULFATE 3 ML: 2.5; .5 SOLUTION RESPIRATORY (INHALATION) at 19:05

## 2024-01-03 RX ADMIN — NICARDIPINE HYDROCHLORIDE 500 MCG: 2.5 INJECTION INTRAVENOUS at 12:33

## 2024-01-03 RX ADMIN — NICARDIPINE HYDROCHLORIDE 500 MCG: 2.5 INJECTION INTRAVENOUS at 12:38

## 2024-01-03 RX ADMIN — HEPARIN SODIUM 5000 UNITS: 1000 INJECTION, SOLUTION INTRAVENOUS; SUBCUTANEOUS at 09:24

## 2024-01-03 RX ADMIN — MIDAZOLAM 2.5 MG: 1 INJECTION INTRAMUSCULAR; INTRAVENOUS at 07:20

## 2024-01-03 RX ADMIN — MIDAZOLAM 2.5 MG: 1 INJECTION INTRAMUSCULAR; INTRAVENOUS at 07:14

## 2024-01-03 RX ADMIN — PROTAMINE SULFATE 150 MG: 10 INJECTION, SOLUTION INTRAVENOUS at 11:53

## 2024-01-03 RX ADMIN — SODIUM CHLORIDE, POTASSIUM CHLORIDE, SODIUM LACTATE AND CALCIUM CHLORIDE 1000 ML: 600; 310; 30; 20 INJECTION, SOLUTION INTRAVENOUS at 07:11

## 2024-01-03 RX ADMIN — METOPROLOL TARTRATE 1 MG: 1 INJECTION, SOLUTION INTRAVENOUS at 07:15

## 2024-01-03 RX ADMIN — METOPROLOL TARTRATE 5 MG: 1 INJECTION, SOLUTION INTRAVENOUS at 12:45

## 2024-01-03 RX ADMIN — Medication 100 MCG: at 07:42

## 2024-01-03 RX ADMIN — ALBUMIN (HUMAN) 500 ML: 12.5 INJECTION, SOLUTION INTRAVENOUS at 16:35

## 2024-01-03 RX ADMIN — MIDAZOLAM HYDROCHLORIDE 2 MG: 1 INJECTION, SOLUTION INTRAMUSCULAR; INTRAVENOUS at 06:46

## 2024-01-03 RX ADMIN — ROCURONIUM BROMIDE 50 MG: 10 INJECTION, SOLUTION INTRAVENOUS at 11:41

## 2024-01-03 RX ADMIN — VECURONIUM BROMIDE 5 MG: 1 INJECTION, POWDER, LYOPHILIZED, FOR SOLUTION INTRAVENOUS at 09:47

## 2024-01-03 RX ADMIN — SUFENTANIL CITRATE 1 MCG/KG/HR: 50 INJECTION EPIDURAL; INTRAVENOUS at 07:40

## 2024-01-03 RX ADMIN — IPRATROPIUM BROMIDE AND ALBUTEROL SULFATE 3 ML: 2.5; .5 SOLUTION RESPIRATORY (INHALATION) at 14:57

## 2024-01-03 RX ADMIN — Medication 100 MCG: at 09:53

## 2024-01-03 RX ADMIN — MORPHINE SULFATE 2 MG: 2 INJECTION, SOLUTION INTRAMUSCULAR; INTRAVENOUS at 18:03

## 2024-01-03 RX ADMIN — HYDROMORPHONE HYDROCHLORIDE 1 MG: 1 INJECTION, SOLUTION INTRAMUSCULAR; INTRAVENOUS; SUBCUTANEOUS at 12:35

## 2024-01-03 RX ADMIN — NITROGLYCERIN 25 MCG/MIN: 20 INJECTION INTRAVENOUS at 08:05

## 2024-01-03 RX ADMIN — PROPOFOL 50 MG: 10 INJECTION, EMULSION INTRAVENOUS at 09:39

## 2024-01-03 NOTE — ANESTHESIA PROCEDURE NOTES
Airway  Urgency: elective    Date/Time: 1/3/2024 7:23 AM  End Time:1/3/2024 7:23 AM  Airway not difficult    General Information and Staff    Patient location during procedure: OR  CRNA/CAA: Yevgeniy Garland CRNA  SRNA: Glenn Estrada SRNA  Indications and Patient Condition  Indications for airway management: airway protection    Preoxygenated: yes  Mask difficulty assessment: 1 - vent by mask    Final Airway Details  Final airway type: endotracheal airway      Successful airway: ETT  Cuffed: yes   Successful intubation technique: video laryngoscopy  Facilitating devices/methods: intubating stylet  Endotracheal tube insertion site: oral  Blade: Purcell  Blade size: 4  ETT size (mm): 7.5  Cormack-Lehane Classification: grade I - full view of glottis  Placement verified by: chest auscultation and capnometry   Cuff volume (mL): 6  Measured from: lips  ETT/EBT  to lips (cm): 22  Number of attempts at approach: 1  Assessment: lips, teeth, and gum same as pre-op and atraumatic intubation    Additional Comments  ATRAUMATIC INTUBATION

## 2024-01-03 NOTE — OP NOTE
Cardiac Surgery Operative Note     Date of Procedure: 1/3/2024     Preoperative Diagnosis:   Coronary artery disease involving native coronary artery of native heart with unstable angina  Hyperlipidemia  Hypertension  Diabetes Mellitus, Type 2, On Insulin Poorly Controlled     Postoperative Diagnosis:  Same     Procedures Performed:  1. Coronary Artery Bypass, using arterial graft; two arterial grafts, CPT 41233  2. Coronary Artery bypass, using venous grafts and arterial grafts, 3 venous graft, CPT +81435     Procedure Summary: CABG x5 (LIMA to LAD, YOLANDA to OM2 through transverse sinus, SVG to OM1, SVG to Diagonal Artery, SVG to R-PDA)      Surgeon:  Papi Wilburn MD  Assistants:  Kelsey Sinclair CFA  Anesthesia Staff:  Tal Braxton MD  Anesthesia Type:  General     Estimated Blood Loss:  Minimal (Cell Saver)     Drains:  1. 24 [place] Randy drain-left pleural space  2. 24 [place] Randy drains x2-anterior and posterior mediastinum  3. 24 [place] Randy drain - right pleural space     Specimens:  None     Operative Indications: Mr. Nichols is a 48-year-old male who presented to me 1 year ago with unstable angina and severe multivessel coronary disease.  We tried to set him up for coronary bypass grafting but he did not want to proceed without non-COVID-19 vaccinated patient blood available for possible donor.  He set this up but then his A1c was uncontrolled and above 10 we therefore delayed his surgery.  He eventually had better A1c control and agreed to have blood transfusion for many donor if needed.  His coronary angiography showed severe multivessel disease he had low normal to mildly reduced LVEF and no significant valvular disease.  Discussed with him the risk and benefits of surgery understood and agreed to proceed.      Operative Findings: Excellent bilateral arterial conduit.  Marginal to poor venous conduit, limited conduit available due to small vein.  The LAD at site of grafting measured 1.25 mm in  size and had  severe disease burden, grafted using LIMA.  OM2 measured 2 mm in size at site of grafting and had moderate atherosclerotic disease burden, grafted using YOLANDA insitu through transverse sinus.  OM 1 measured 1.5 mm in size at site of grafting and had moderate atherosclerotic disease burden, grafted using SVG.  The diagonal artery measured 1.8 mm in size at site of grafting, grafted using SVG.  The R-PDA was grafted using SVG, measured 2 mm in size at site of grafting with moderate atherosclerotic disease.  Transesophageal echocardiography showed normal LV dysfunction at the end of the case, normal LV function at the end of the case     Total aortic cross-clamp time: 90 minutes  Total cardiac bypass time:  105 minutes     Operative description in detail:  The patient was taken to the operative suite where the patient was placed in a supine position.  Induction of general anesthesia and placement of a single-lumen endotracheal tube was performed without remark.  Appropriate arterial and venous access was established without remark.  A right IJ central venous catheter was placed followed by a [place] pulmonary artery catheter by anesthesia staff.  The patient was then prepped and draped in the usual and sterile surgical fashion.  A timeout was performed.  Perioperative antibiotics were administered.  Beta blocker was given.     A simultaneous team approach was used to harvest both internal mammary arteries as well as the right saphenous vein.  Right SV was harvested using standard endoscopic technique, lower vein was taken in open technique.  The left vein was examined that was too small for use.  Side branches were controlled with a combination of suture and clips.  Vein was marginal quality.  The leg wounds were hemostatic and were closed in anatomic layers using absorbable suture.     Median sternotomy was performed in standard fashion. Hemostasis was ensured along sternal edges.  A Rultract device was used  to expose the posterior sternal table.  The left and right internal mammary arteries were taken down using a standard skeletonized technique with a combination of electrocautery and clips to control all side branches.  At that time, the patient was systemically anticoagulated with IV heparin.  A 24 Japanese Randy drain was placed in the left and right pleural space.  After suitable time of circulating heparin, clips were placed doubly onto the mammary arteries distally and they were divided proximal to the previously placed clips.  Both had excellent arterial inflow.  The mammary artery harvest beds were hemostatic.  The Rultract device was removed from the sterile field and a sternal retractor was used for exposure.         Midline mediastinal fat and thymus were divided and pericardium opened.  A pericardial well was created.  The distal ascending aorta and right atrial appendage were cannulated for cardiopulmonary bypass in standard fashion.  Aortic line was tested and was satisfactory.  A combined cardioplegia/aortic root vent set was secured with a horizontal mattress 4-0 Prolene suture.  A retrograde cardioplegia catheter was inserted the coronary sinus to the free wall the right atrium.  With an appropriate ACT cardiopulmonary bypass was commenced.  I dissected out the LAD for suitable sites for bypass grafting.  The OM was examined and dissected out for suitable bypass site.  The R-PDA was dissected out for appropriate grafting site.  I made a hole in the right pericardial pleural reflection and tunneled the YOLANDA in situ through the transverse sinus to the OM 2, easily reached.       With that, I proceeded to apply the aortic cross-clamp and administered cardioplegia utilizing standard cardioplegia.  This was given in an antegrade and retrograde fashion.  I ensured during the entire cardioplegia administration the LV did not distend.  Upon achieving electrical-mechanical arrest, I applied topical hypothermia  to the ventricle and total standard dose was administered.  Cardioplegia was given after every anastomosis with combination of graft, retrograde and antegrade cardioplegia.     I then directed my attention to the R-PDA.  Coronary arteriotomy was made and augmented to size with Slaughter scissors.  The saphenous vein graft was prepared, it was anastomosed in end-to-side fashion using a running 7-0 Prolene suture.  Anastomosis was assessed for hemostasis which was excellent.  It was not tense or torsed.  I then measured the vein graft with heart full for proximal anastomosis.  Aortotomy was made and augmented to size using a 4 mm punch.  Proximal anastomosis was made in an end-to-side fashion using a running 6-0 Prolene suture.  Hemostasis was excellent and the aortic root was de-aired while tying the suture.    I then directed my attention to the OM2.  Coronary arteriotomy was made and augmented to size with Slaughter scissors.  The YOLANDA in situ was prepared, it was anastomosed in end-to-side fashion using a running 7-0 Prolene suture.  Anastomosis was assessed for hemostasis which was excellent.  It was not tense or torsed.       I then directed my attention to the OM1.  Coronary arteriotomy was made and augmented to size with Slaughter scissors.  The saphenous vein graft was prepared, it was anastomosed in end-to-side fashion using a running 7-0 Prolene suture.  Anastomosis was assessed for hemostasis which was excellent.  It was not tense or torsed.  I then measured the vein graft with heart full for proximal anastomosis.  Aortotomy was made and augmented to size using a 4 mm punch.  Proximal anastomosis was made in an end-to-side fashion using a running 6-0 Prolene suture.  Hemostasis was excellent and the aortic root was de-aired while tying the suture.      I then directed my attention to the diagonal artery.  Coronary arteriotomy was made and augmented to size with Slaughter scissors.  The saphenous vein graft was prepared,  it was anastomosed in end-to-side fashion using a running 7-0 Prolene suture.  Anastomosis was assessed for hemostasis which was excellent.  It was not tense or torsed.  I then measured the vein graft with heart full for proximal anastomosis.  Aortotomy was made and augmented to size using a 4 mm punch.  Proximal anastomosis was made in an end-to-side fashion using a running 6-0 Prolene suture.  Hemostasis was excellent and the aortic root was de-aired while tying the suture.    I directed my attention towards the LAD.  A coronary arteriotomy was made and augmented to size with Slaughter scissors.  It is as per operative findings.  The LIMA was prepared.  The anastomosis was constructed in an end-to-side orientation with running 7-0 Prolene suture.  The anastomosis was assessed for hemostasis which was excellent. It is without tension or torsion.      With that being accomplished, a terminal hotshot was administered.  The patient was placed in Trendelenburg position.  Upon completion of terminal hotshot and placement of temporary epicardial pacing wires, with the aortic vent on high and pump flows diminished, the aortic cross-clamp was released.  With that, full support was implemented.  A nonworking beating phase was implemented.  Ventilation restored.  I surveyed each graft and each anastomosis was hemostatic and had excellent lay.  With all in readiness, the heart was allowed to fill and then weaned off cardiopulmonary bypass.  We removed the aortic root vent/cardioplegia cannula set.  Its associated pursestring suture was tied securely and this was reinforced as per matter of routine.       I decannulated the venous line and snared down its associated pursestring suture.  Systemic intravenous protamine was administered.  All associated blood volume was returned to the patient. With continued good hemodynamics, I decannulated the arterial line and tied down its associated pursestring sutures.  Aortic cannulation site  was reinforced as per routine.  At this time I tied down the previously snared pursestring suture.  The mediastinum was drained with 24 [place] Randy drains placed anteriorly and posteriorly.  I surveyed the chest and hemostasis was pristine.  The sternum was reapproximated with the sterna-loc XP plating system.  In layers anatomically the soft tissue planes were reapproximated. Instruments, sharps, and sponge counts were reported as correct.       Complications: None     Disposition: Transferred to ICU in stable and guarded condition.     Papi Wilburn MD  Cardiothoracic Surgeon

## 2024-01-03 NOTE — ANESTHESIA PROCEDURE NOTES
Arterial Line    Pre-sedation assessment completed: 1/3/2024 6:45 AM    Patient reassessed immediately prior to procedure    Patient location during procedure: holding area  Start time: 1/3/2024 6:48 AM  Stop Time:1/3/2024 6:49 AM       Line placed for ABGs/Labs/ISTAT and hemodynamic monitoring.  Performed By   XIOMARA/CAA: Yrn Salinas CRNA   Preanesthetic Checklist  Completed: patient identified, IV checked, site marked, risks and benefits discussed, surgical consent, monitors and equipment checked, pre-op evaluation and timeout performed  Arterial Line Prep    Prep: alcohol swabs  Patient monitoring: continuous pulse oximetry and blood pressure monitoring  Arterial Line Procedure   Laterality:left  Location:  radial artery  Catheter size: 20 G   Guidance: palpation technique  Number of attempts: 1  Successful placement: yes Images: still images not obtained  Post Assessment   Dressing Type: occlusive dressing applied, secured with tape and wrist guard applied.   Complications no   Patient Tolerance: patient tolerated the procedure well with no apparent complications

## 2024-01-03 NOTE — ANESTHESIA PROCEDURE NOTES
Central Line      Patient location during procedure: OR  Start time: 1/3/2024 7:33 AM  Stop Time:1/3/2024 7:41 AM  Indications: central pressure monitoring, vascular access and MD/Surgeon request  Staff  Anesthesiologist: Tal Braxton MD  Preanesthetic Checklist  Completed: patient identified, IV checked, site marked, risks and benefits discussed, surgical consent, monitors and equipment checked, pre-op evaluation and timeout performed  Central Line Prep  Sterile Tech:cap, gloves, gown, mask and sterile barriers  Prep: chloraprep  Patient monitoring: blood pressure monitoring, continuous pulse oximetry and EKG  Central Line Procedure  Laterality:right  Location:internal jugular  Catheter Type:triple lumen, MAC and [place]  Catheter Size:9 Fr  Guidance:ultrasound guided  PROCEDURE NOTE/ULTRASOUND INTERPRETATION.  Using ultrasound guidance the potential vascular sites for insertion of the catheter were visualized to determine the patency of the vessel to be used for vascular access.  After selecting the appropriate site for insertion, the needle was visualized under ultrasound being inserted into the internal jugular vein, followed by ultrasound confirmation of wire and catheter placement. There were no abnormalities seen on ultrasound; an image was taken; and the patient tolerated the procedure with no complications. Images: still images not obtained  Assessment  Post procedure:biopatch applied, line sutured and occlusive dressing applied  Assessement:blood return through all ports, free fluid flow, chest x-ray ordered and Flash Test  Complications:no  Patient Tolerance:patient tolerated the procedure well with no apparent complications

## 2024-01-03 NOTE — ANESTHESIA PROCEDURE NOTES
Intra-Op Anesthesia IVY    Procedure Performed: Intra-Op Anesthesia IVY       Start Time:  1/3/2024 7:43 AM       End Time:   1/3/2024 7:48 AM    Preanesthesia Checklist:  Patient identified, IV assessed, risks and benefits discussed, monitors and equipment assessed, procedure being performed at surgeon's request and anesthesia consent obtained.    General Procedure Information  Diagnostic Indications for Echo:  assessment of surgical repair and hemodynamic monitoring  Physician Requesting Echo: Papi Wilburn MD  Location performed:  OR  Intubated  Bite block not placed  Heart visualized  Probe Insertion:  Easy  Probe Type:  Multiplane  Modalities:  2D only, pulse wave Doppler, color flow mapping and continuous wave Doppler        Anesthesia Information  Performed Personally  Anesthesiologist:  Tal Braxton MD      Echocardiogram Comments:       Routine periop IVY

## 2024-01-03 NOTE — ANESTHESIA POSTPROCEDURE EVALUATION
Patient: Speedy Nichols    Procedure Summary       Date: 01/03/24 Room / Location:  [facility] OR  /  [facility] OR    Anesthesia Start: 0714 Anesthesia Stop: 1317    Procedure: CORONARY ARTERY BYPASS GRAFTING X5, BILATERAL INTERNAL MAMMARY ARTERY GRAFT, RIGHT UPPER LEG ENDOSCOPIC VEIN HARVEST, RIGHT LOWER LEG OPEN VEIN HARVEST, LEFT LEG ABORTED VEIN HARVEST, TRANSESOPHAGEAL ECHOCARDIOGRAM, XP STERNAL PLATING (Chest) Diagnosis:       Coronary artery disease involving native coronary artery of native heart without angina pectoris      (Coronary artery disease involving native coronary artery of native heart without angina pectoris [I25.10])    Surgeons: Papi Wilburn MD Provider: Tal Braxton MD    Anesthesia Type: general, [place], CVL, PAC ASA Status: 4            Anesthesia Type: general, [place], CVL, PAC    Vitals  Vitals Value Taken Time   /69 01/03/24 1316   Temp     Pulse 108 01/03/24 1329   Resp     SpO2 99 % 01/03/24 1329   Vitals shown include unfiled device data.        Post Anesthesia Care and Evaluation    Patient location during evaluation: ICU  Patient participation: complete - patient cannot participate  Post-procedure mental status: sedated.  Pain management: adequate    Airway patency: patent  Anesthetic complications: No anesthetic complications  PONV Status: none  Cardiovascular status: acceptable  Respiratory status: acceptable, ventilator and ETT  Hydration status: acceptable

## 2024-01-03 NOTE — INTERVAL H&P NOTE
No significant change.  Discussed operative plan and risks and benefits, he understands and agrees to proceed.    Papi Wilburn M.D.  Cardiothoracic Surgeon

## 2024-01-04 ENCOUNTER — APPOINTMENT (OUTPATIENT)
Dept: GENERAL RADIOLOGY | Facility: HOSPITAL | Age: 49
End: 2024-01-04
Payer: COMMERCIAL

## 2024-01-04 LAB
ALBUMIN SERPL-MCNC: 3.9 G/DL (ref 3.5–5.2)
ALBUMIN SERPL-MCNC: 4.2 G/DL (ref 3.5–5.2)
ANION GAP SERPL CALCULATED.3IONS-SCNC: 12 MMOL/L (ref 5–15)
ANION GAP SERPL CALCULATED.3IONS-SCNC: 14 MMOL/L (ref 5–15)
ANION GAP SERPL CALCULATED.3IONS-SCNC: 8 MMOL/L (ref 5–15)
ARTERIAL PATENCY WRIST A: ABNORMAL
ARTERIAL PATENCY WRIST A: ABNORMAL
ATMOSPHERIC PRESS: 755 MMHG
ATMOSPHERIC PRESS: 756 MMHG
BASE EXCESS BLDA CALC-SCNC: -3.2 MMOL/L (ref 0–2)
BASE EXCESS BLDA CALC-SCNC: -4.2 MMOL/L (ref 0–2)
BASOPHILS # BLD AUTO: 0.01 10*3/MM3 (ref 0–0.2)
BASOPHILS NFR BLD AUTO: 0.1 % (ref 0–1.5)
BDY SITE: ABNORMAL
BDY SITE: ABNORMAL
BODY TEMPERATURE: 37
BODY TEMPERATURE: 37
BUN SERPL-MCNC: 23 MG/DL (ref 6–20)
BUN SERPL-MCNC: 27 MG/DL (ref 6–20)
BUN SERPL-MCNC: 35 MG/DL (ref 6–20)
BUN/CREAT SERPL: 12.5 (ref 7–25)
BUN/CREAT SERPL: 12.8 (ref 7–25)
BUN/CREAT SERPL: 15 (ref 7–25)
CALCIUM SPEC-SCNC: 8.3 MG/DL (ref 8.6–10.5)
CALCIUM SPEC-SCNC: 9 MG/DL (ref 8.6–10.5)
CALCIUM SPEC-SCNC: 9.1 MG/DL (ref 8.6–10.5)
CHLORIDE SERPL-SCNC: 101 MMOL/L (ref 98–107)
CHLORIDE SERPL-SCNC: 104 MMOL/L (ref 98–107)
CHLORIDE SERPL-SCNC: 107 MMOL/L (ref 98–107)
CO2 SERPL-SCNC: 19 MMOL/L (ref 22–29)
CO2 SERPL-SCNC: 21 MMOL/L (ref 22–29)
CO2 SERPL-SCNC: 23 MMOL/L (ref 22–29)
CREAT SERPL-MCNC: 1.53 MG/DL (ref 0.76–1.27)
CREAT SERPL-MCNC: 2.11 MG/DL (ref 0.76–1.27)
CREAT SERPL-MCNC: 2.8 MG/DL (ref 0.76–1.27)
DEPRECATED RDW RBC AUTO: 45 FL (ref 37–54)
DEPRECATED RDW RBC AUTO: 48.2 FL (ref 37–54)
EGFRCR SERPLBLD CKD-EPI 2021: 26.8 ML/MIN/1.73
EGFRCR SERPLBLD CKD-EPI 2021: 37.7 ML/MIN/1.73
EGFRCR SERPLBLD CKD-EPI 2021: 55.4 ML/MIN/1.73
EOSINOPHIL # BLD AUTO: 0.01 10*3/MM3 (ref 0–0.4)
EOSINOPHIL NFR BLD AUTO: 0.1 % (ref 0.3–6.2)
ERYTHROCYTE [DISTWIDTH] IN BLOOD BY AUTOMATED COUNT: 13.4 % (ref 12.3–15.4)
ERYTHROCYTE [DISTWIDTH] IN BLOOD BY AUTOMATED COUNT: 13.7 % (ref 12.3–15.4)
GLUCOSE BLDC GLUCOMTR-MCNC: 114 MG/DL (ref 70–130)
GLUCOSE BLDC GLUCOMTR-MCNC: 123 MG/DL (ref 70–130)
GLUCOSE BLDC GLUCOMTR-MCNC: 140 MG/DL (ref 70–130)
GLUCOSE BLDC GLUCOMTR-MCNC: 140 MG/DL (ref 70–130)
GLUCOSE BLDC GLUCOMTR-MCNC: 146 MG/DL (ref 70–130)
GLUCOSE BLDC GLUCOMTR-MCNC: 174 MG/DL (ref 70–130)
GLUCOSE BLDC GLUCOMTR-MCNC: 185 MG/DL (ref 70–130)
GLUCOSE BLDC GLUCOMTR-MCNC: 187 MG/DL (ref 70–130)
GLUCOSE BLDC GLUCOMTR-MCNC: 187 MG/DL (ref 70–130)
GLUCOSE BLDC GLUCOMTR-MCNC: 194 MG/DL (ref 70–130)
GLUCOSE BLDC GLUCOMTR-MCNC: 197 MG/DL (ref 70–130)
GLUCOSE BLDC GLUCOMTR-MCNC: 198 MG/DL (ref 70–130)
GLUCOSE BLDC GLUCOMTR-MCNC: 237 MG/DL (ref 70–130)
GLUCOSE SERPL-MCNC: 141 MG/DL (ref 65–99)
GLUCOSE SERPL-MCNC: 196 MG/DL (ref 65–99)
GLUCOSE SERPL-MCNC: 233 MG/DL (ref 65–99)
HCO3 BLDA-SCNC: 22.1 MMOL/L (ref 20–26)
HCO3 BLDA-SCNC: 22.2 MMOL/L (ref 20–26)
HCT VFR BLD AUTO: 24 % (ref 37.5–51)
HCT VFR BLD AUTO: 25.4 % (ref 37.5–51)
HCT VFR BLD AUTO: 27.2 % (ref 37.5–51)
HGB BLD-MCNC: 7.4 G/DL (ref 13–17.7)
HGB BLD-MCNC: 8 G/DL (ref 13–17.7)
HGB BLD-MCNC: 8.5 G/DL (ref 13–17.7)
IMM GRANULOCYTES # BLD AUTO: 0.03 10*3/MM3 (ref 0–0.05)
IMM GRANULOCYTES NFR BLD AUTO: 0.3 % (ref 0–0.5)
INHALED O2 CONCENTRATION: 30 %
INHALED O2 CONCENTRATION: 30 %
INR PPP: 1.17 (ref 0.91–1.09)
LYMPHOCYTES # BLD AUTO: 0.5 10*3/MM3 (ref 0.7–3.1)
LYMPHOCYTES NFR BLD AUTO: 5.7 % (ref 19.6–45.3)
Lab: ABNORMAL
Lab: ABNORMAL
MAGNESIUM SERPL-MCNC: 2.2 MG/DL (ref 1.6–2.6)
MCH RBC QN AUTO: 29.4 PG (ref 26.6–33)
MCH RBC QN AUTO: 29.6 PG (ref 26.6–33)
MCHC RBC AUTO-ENTMCNC: 30.8 G/DL (ref 31.5–35.7)
MCHC RBC AUTO-ENTMCNC: 31.5 G/DL (ref 31.5–35.7)
MCV RBC AUTO: 93.4 FL (ref 79–97)
MCV RBC AUTO: 96 FL (ref 79–97)
MODALITY: ABNORMAL
MODALITY: ABNORMAL
MONOCYTES # BLD AUTO: 0.7 10*3/MM3 (ref 0.1–0.9)
MONOCYTES NFR BLD AUTO: 8 % (ref 5–12)
NEUTROPHILS NFR BLD AUTO: 7.48 10*3/MM3 (ref 1.7–7)
NEUTROPHILS NFR BLD AUTO: 85.8 % (ref 42.7–76)
NRBC BLD AUTO-RTO: 0 /100 WBC (ref 0–0.2)
PCO2 BLDA: 40.7 MM HG (ref 35–45)
PCO2 BLDA: 45.9 MM HG (ref 35–45)
PCO2 TEMP ADJ BLD: 40.7 MM HG (ref 35–45)
PCO2 TEMP ADJ BLD: 45.9 MM HG (ref 35–45)
PEEP RESPIRATORY: 5 CM[H2O]
PEEP RESPIRATORY: 5 CM[H2O]
PH BLDA: 7.29 PH UNITS (ref 7.35–7.45)
PH BLDA: 7.34 PH UNITS (ref 7.35–7.45)
PH, TEMP CORRECTED: 7.29 PH UNITS (ref 7.35–7.45)
PH, TEMP CORRECTED: 7.34 PH UNITS (ref 7.35–7.45)
PHOSPHATE SERPL-MCNC: 4 MG/DL (ref 2.5–4.5)
PHOSPHATE SERPL-MCNC: 5.4 MG/DL (ref 2.5–4.5)
PLATELET # BLD AUTO: 165 10*3/MM3 (ref 140–450)
PLATELET # BLD AUTO: 165 10*3/MM3 (ref 140–450)
PMV BLD AUTO: 10.8 FL (ref 6–12)
PMV BLD AUTO: 10.9 FL (ref 6–12)
PO2 BLDA: 90 MM HG (ref 83–108)
PO2 BLDA: 90.8 MM HG (ref 83–108)
PO2 TEMP ADJ BLD: 90 MM HG (ref 83–108)
PO2 TEMP ADJ BLD: 90.8 MM HG (ref 83–108)
POTASSIUM SERPL-SCNC: 5.2 MMOL/L (ref 3.5–5.2)
POTASSIUM SERPL-SCNC: 5.6 MMOL/L (ref 3.5–5.2)
POTASSIUM SERPL-SCNC: 5.9 MMOL/L (ref 3.5–5.2)
PROTHROMBIN TIME: 15 SECONDS (ref 11.8–14.8)
PSV: 5 CMH2O
PSV: 5 CMH2O
RBC # BLD AUTO: 2.5 10*6/MM3 (ref 4.14–5.8)
RBC # BLD AUTO: 2.72 10*6/MM3 (ref 4.14–5.8)
SAO2 % BLDCOA: 97.1 % (ref 94–99)
SAO2 % BLDCOA: 97.7 % (ref 94–99)
SODIUM SERPL-SCNC: 134 MMOL/L (ref 136–145)
SODIUM SERPL-SCNC: 137 MMOL/L (ref 136–145)
SODIUM SERPL-SCNC: 138 MMOL/L (ref 136–145)
VENTILATOR MODE: ABNORMAL
VENTILATOR MODE: ABNORMAL
WBC NRBC COR # BLD AUTO: 10.6 10*3/MM3 (ref 3.4–10.8)
WBC NRBC COR # BLD AUTO: 8.73 10*3/MM3 (ref 3.4–10.8)

## 2024-01-04 PROCEDURE — 94799 UNLISTED PULMONARY SVC/PX: CPT

## 2024-01-04 PROCEDURE — P9016 RBC LEUKOCYTES REDUCED: HCPCS

## 2024-01-04 PROCEDURE — 94761 N-INVAS EAR/PLS OXIMETRY MLT: CPT

## 2024-01-04 PROCEDURE — 80069 RENAL FUNCTION PANEL: CPT | Performed by: SURGERY

## 2024-01-04 PROCEDURE — 25010000002 ALBUMIN HUMAN 5% PER 50 ML: Performed by: SURGERY

## 2024-01-04 PROCEDURE — 25010000002 ONDANSETRON PER 1 MG: Performed by: SURGERY

## 2024-01-04 PROCEDURE — 63710000001 INSULIN REGULAR HUMAN PER 5 UNITS: Performed by: SURGERY

## 2024-01-04 PROCEDURE — 82948 REAGENT STRIP/BLOOD GLUCOSE: CPT

## 2024-01-04 PROCEDURE — 86900 BLOOD TYPING SEROLOGIC ABO: CPT

## 2024-01-04 PROCEDURE — 93005 ELECTROCARDIOGRAM TRACING: CPT | Performed by: SURGERY

## 2024-01-04 PROCEDURE — 83735 ASSAY OF MAGNESIUM: CPT | Performed by: SURGERY

## 2024-01-04 PROCEDURE — 25010000002 MORPHINE PER 10 MG: Performed by: SURGERY

## 2024-01-04 PROCEDURE — 85025 COMPLETE CBC W/AUTO DIFF WBC: CPT | Performed by: SURGERY

## 2024-01-04 PROCEDURE — 63710000001 INSULIN DETEMIR PER 5 UNITS: Performed by: NURSE PRACTITIONER

## 2024-01-04 PROCEDURE — 97162 PT EVAL MOD COMPLEX 30 MIN: CPT

## 2024-01-04 PROCEDURE — 36430 TRANSFUSION BLD/BLD COMPNT: CPT

## 2024-01-04 PROCEDURE — 25010000002 ENOXAPARIN PER 10 MG: Performed by: SURGERY

## 2024-01-04 PROCEDURE — 94664 DEMO&/EVAL PT USE INHALER: CPT

## 2024-01-04 PROCEDURE — 71045 X-RAY EXAM CHEST 1 VIEW: CPT

## 2024-01-04 PROCEDURE — 63710000001 INSULIN LISPRO (HUMAN) PER 5 UNITS: Performed by: NURSE PRACTITIONER

## 2024-01-04 PROCEDURE — 25810000003 SODIUM CHLORIDE 0.9 % SOLUTION: Performed by: SURGERY

## 2024-01-04 PROCEDURE — 97116 GAIT TRAINING THERAPY: CPT

## 2024-01-04 PROCEDURE — P9041 ALBUMIN (HUMAN),5%, 50ML: HCPCS | Performed by: NURSE PRACTITIONER

## 2024-01-04 PROCEDURE — 25010000002 ALBUMIN HUMAN 5% PER 50 ML: Performed by: NURSE PRACTITIONER

## 2024-01-04 PROCEDURE — P9041 ALBUMIN (HUMAN),5%, 50ML: HCPCS | Performed by: SURGERY

## 2024-01-04 PROCEDURE — 25010000002 CEFAZOLIN PER 500 MG: Performed by: SURGERY

## 2024-01-04 PROCEDURE — 0 INSULIN REGULAR HUMAN PER 5 UNITS: Performed by: SURGERY

## 2024-01-04 PROCEDURE — 85610 PROTHROMBIN TIME: CPT | Performed by: SURGERY

## 2024-01-04 PROCEDURE — 25010000002 METOCLOPRAMIDE PER 10 MG: Performed by: SURGERY

## 2024-01-04 PROCEDURE — 85014 HEMATOCRIT: CPT | Performed by: NURSE PRACTITIONER

## 2024-01-04 PROCEDURE — 93010 ELECTROCARDIOGRAM REPORT: CPT | Performed by: INTERNAL MEDICINE

## 2024-01-04 PROCEDURE — 25010000002 FUROSEMIDE PER 20 MG: Performed by: SURGERY

## 2024-01-04 PROCEDURE — 85027 COMPLETE CBC AUTOMATED: CPT | Performed by: NURSE PRACTITIONER

## 2024-01-04 PROCEDURE — 85018 HEMOGLOBIN: CPT | Performed by: NURSE PRACTITIONER

## 2024-01-04 PROCEDURE — 80048 BASIC METABOLIC PNL TOTAL CA: CPT | Performed by: NURSE PRACTITIONER

## 2024-01-04 PROCEDURE — 82803 BLOOD GASES ANY COMBINATION: CPT

## 2024-01-04 RX ORDER — DEXTROSE MONOHYDRATE 25 G/50ML
25 INJECTION, SOLUTION INTRAVENOUS
Status: DISCONTINUED | OUTPATIENT
Start: 2024-01-04 | End: 2024-01-08 | Stop reason: HOSPADM

## 2024-01-04 RX ORDER — ALBUMIN, HUMAN INJ 5% 5 %
500 SOLUTION INTRAVENOUS ONCE
Status: COMPLETED | OUTPATIENT
Start: 2024-01-04 | End: 2024-01-04

## 2024-01-04 RX ORDER — METOCLOPRAMIDE HYDROCHLORIDE 5 MG/ML
10 INJECTION INTRAMUSCULAR; INTRAVENOUS EVERY 6 HOURS
Status: DISCONTINUED | OUTPATIENT
Start: 2024-01-04 | End: 2024-01-07 | Stop reason: CLARIF

## 2024-01-04 RX ORDER — NICOTINE POLACRILEX 4 MG
15 LOZENGE BUCCAL
Status: DISCONTINUED | OUTPATIENT
Start: 2024-01-04 | End: 2024-01-08 | Stop reason: HOSPADM

## 2024-01-04 RX ORDER — LIDOCAINE 50 MG/G
2 PATCH TOPICAL
Status: DISCONTINUED | OUTPATIENT
Start: 2024-01-04 | End: 2024-01-08 | Stop reason: HOSPADM

## 2024-01-04 RX ORDER — INSULIN LISPRO 100 [IU]/ML
3-14 INJECTION, SOLUTION INTRAVENOUS; SUBCUTANEOUS
Status: DISCONTINUED | OUTPATIENT
Start: 2024-01-04 | End: 2024-01-08 | Stop reason: HOSPADM

## 2024-01-04 RX ORDER — INSULIN GLARGINE 100 [IU]/ML
30 INJECTION, SOLUTION SUBCUTANEOUS NIGHTLY
COMMUNITY

## 2024-01-04 RX ORDER — SODIUM CHLORIDE 9 MG/ML
40 INJECTION, SOLUTION INTRAVENOUS CONTINUOUS
Status: DISCONTINUED | OUTPATIENT
Start: 2024-01-04 | End: 2024-01-06

## 2024-01-04 RX ORDER — PREGABALIN 25 MG/1
25 CAPSULE ORAL EVERY 12 HOURS SCHEDULED
Status: DISCONTINUED | OUTPATIENT
Start: 2024-01-04 | End: 2024-01-04 | Stop reason: SDUPTHER

## 2024-01-04 RX ORDER — METHOCARBAMOL 500 MG/1
500 TABLET, FILM COATED ORAL EVERY 8 HOURS SCHEDULED
Status: DISCONTINUED | OUTPATIENT
Start: 2024-01-04 | End: 2024-01-08 | Stop reason: HOSPADM

## 2024-01-04 RX ORDER — CHLORHEXIDINE GLUCONATE 500 MG/1
1 CLOTH TOPICAL EVERY 24 HOURS
Status: DISCONTINUED | OUTPATIENT
Start: 2024-01-05 | End: 2024-01-06

## 2024-01-04 RX ORDER — DEXTROSE MONOHYDRATE 25 G/50ML
25 INJECTION, SOLUTION INTRAVENOUS ONCE
Status: COMPLETED | OUTPATIENT
Start: 2024-01-04 | End: 2024-01-04

## 2024-01-04 RX ORDER — FUROSEMIDE 10 MG/ML
20 INJECTION INTRAMUSCULAR; INTRAVENOUS ONCE
Status: COMPLETED | OUTPATIENT
Start: 2024-01-04 | End: 2024-01-04

## 2024-01-04 RX ADMIN — ONDANSETRON 4 MG: 2 INJECTION INTRAMUSCULAR; INTRAVENOUS at 06:13

## 2024-01-04 RX ADMIN — CEFAZOLIN 2000 MG: 2 INJECTION, POWDER, FOR SOLUTION INTRAMUSCULAR; INTRAVENOUS at 03:11

## 2024-01-04 RX ADMIN — METHOCARBAMOL 500 MG: 500 TABLET, FILM COATED ORAL at 04:16

## 2024-01-04 RX ADMIN — PREGABALIN 25 MG: 25 CAPSULE ORAL at 08:23

## 2024-01-04 RX ADMIN — CEFAZOLIN 2000 MG: 2 INJECTION, POWDER, FOR SOLUTION INTRAMUSCULAR; INTRAVENOUS at 21:33

## 2024-01-04 RX ADMIN — METHOCARBAMOL 500 MG: 500 TABLET, FILM COATED ORAL at 14:10

## 2024-01-04 RX ADMIN — SODIUM CHLORIDE 6.6 UNITS/HR: 9 INJECTION, SOLUTION INTRAVENOUS at 10:15

## 2024-01-04 RX ADMIN — METOPROLOL TARTRATE 12.5 MG: 25 TABLET, FILM COATED ORAL at 21:33

## 2024-01-04 RX ADMIN — ALBUMIN (HUMAN) 500 ML: 12.5 INJECTION, SOLUTION INTRAVENOUS at 22:28

## 2024-01-04 RX ADMIN — IPRATROPIUM BROMIDE AND ALBUTEROL SULFATE 3 ML: 2.5; .5 SOLUTION RESPIRATORY (INHALATION) at 06:43

## 2024-01-04 RX ADMIN — INSULIN LISPRO 5 UNITS: 100 INJECTION, SOLUTION INTRAVENOUS; SUBCUTANEOUS at 21:34

## 2024-01-04 RX ADMIN — METOCLOPRAMIDE HYDROCHLORIDE 10 MG: 5 INJECTION INTRAMUSCULAR; INTRAVENOUS at 18:28

## 2024-01-04 RX ADMIN — ENOXAPARIN SODIUM 40 MG: 100 INJECTION SUBCUTANEOUS at 08:24

## 2024-01-04 RX ADMIN — SODIUM CHLORIDE 50 ML/HR: 9 INJECTION, SOLUTION INTRAVENOUS at 18:41

## 2024-01-04 RX ADMIN — BISACODYL 10 MG: 5 TABLET ORAL at 21:34

## 2024-01-04 RX ADMIN — ACETAMINOPHEN 650 MG: 325 TABLET, FILM COATED ORAL at 23:31

## 2024-01-04 RX ADMIN — IPRATROPIUM BROMIDE AND ALBUTEROL SULFATE 3 ML: 2.5; .5 SOLUTION RESPIRATORY (INHALATION) at 10:29

## 2024-01-04 RX ADMIN — OXYCODONE HYDROCHLORIDE 5 MG: 5 TABLET ORAL at 03:11

## 2024-01-04 RX ADMIN — ALBUMIN (HUMAN) 500 ML: 12.5 INJECTION, SOLUTION INTRAVENOUS at 08:26

## 2024-01-04 RX ADMIN — ALBUMIN (HUMAN) 500 ML: 12.5 INJECTION, SOLUTION INTRAVENOUS at 03:58

## 2024-01-04 RX ADMIN — ONDANSETRON 4 MG: 2 INJECTION INTRAMUSCULAR; INTRAVENOUS at 00:25

## 2024-01-04 RX ADMIN — ACETAMINOPHEN 650 MG: 325 TABLET, FILM COATED ORAL at 11:39

## 2024-01-04 RX ADMIN — CLOPIDOGREL BISULFATE 75 MG: 75 TABLET, FILM COATED ORAL at 18:07

## 2024-01-04 RX ADMIN — Medication 1 APPLICATION: at 18:07

## 2024-01-04 RX ADMIN — INSULIN DETEMIR 10 UNITS: 100 INJECTION, SOLUTION SUBCUTANEOUS at 21:34

## 2024-01-04 RX ADMIN — CALCIUM CHLORIDE 1000 MG: 100 INJECTION INTRAVENOUS; INTRAVENTRICULAR at 23:21

## 2024-01-04 RX ADMIN — ASPIRIN 162 MG: 81 TABLET, CHEWABLE ORAL at 08:23

## 2024-01-04 RX ADMIN — MORPHINE SULFATE 2 MG: 2 INJECTION, SOLUTION INTRAMUSCULAR; INTRAVENOUS at 00:41

## 2024-01-04 RX ADMIN — ONDANSETRON 4 MG: 2 INJECTION INTRAMUSCULAR; INTRAVENOUS at 23:31

## 2024-01-04 RX ADMIN — PANTOPRAZOLE SODIUM 40 MG: 40 TABLET, DELAYED RELEASE ORAL at 05:29

## 2024-01-04 RX ADMIN — INSULIN DETEMIR 10 UNITS: 100 INJECTION, SOLUTION SUBCUTANEOUS at 09:00

## 2024-01-04 RX ADMIN — FUROSEMIDE 20 MG: 10 INJECTION, SOLUTION INTRAMUSCULAR; INTRAVENOUS at 22:27

## 2024-01-04 RX ADMIN — ONDANSETRON 4 MG: 2 INJECTION INTRAMUSCULAR; INTRAVENOUS at 14:01

## 2024-01-04 RX ADMIN — Medication 1 APPLICATION: at 05:29

## 2024-01-04 RX ADMIN — BISACODYL 10 MG: 5 TABLET ORAL at 08:25

## 2024-01-04 RX ADMIN — OXYCODONE HYDROCHLORIDE 10 MG: 5 TABLET ORAL at 07:10

## 2024-01-04 RX ADMIN — POLYETHYLENE GLYCOL 3350 17 G: 17 POWDER, FOR SOLUTION ORAL at 08:25

## 2024-01-04 RX ADMIN — LIDOCAINE 5% 2 PATCH: 700 PATCH TOPICAL at 08:25

## 2024-01-04 RX ADMIN — DEXTROSE MONOHYDRATE 25 ML: 25 INJECTION, SOLUTION INTRAVENOUS at 22:28

## 2024-01-04 RX ADMIN — ACETAMINOPHEN 650 MG: 325 TABLET, FILM COATED ORAL at 18:07

## 2024-01-04 RX ADMIN — CEFAZOLIN 2000 MG: 2 INJECTION, POWDER, FOR SOLUTION INTRAMUSCULAR; INTRAVENOUS at 11:40

## 2024-01-04 RX ADMIN — ACETAMINOPHEN 650 MG: 325 TABLET, FILM COATED ORAL at 05:29

## 2024-01-04 RX ADMIN — INSULIN HUMAN 10 UNITS: 100 INJECTION, SOLUTION PARENTERAL at 22:28

## 2024-01-04 RX ADMIN — ATORVASTATIN CALCIUM 20 MG: 10 TABLET, FILM COATED ORAL at 21:33

## 2024-01-04 RX ADMIN — METHOCARBAMOL 500 MG: 500 TABLET, FILM COATED ORAL at 21:33

## 2024-01-04 RX ADMIN — CHLORHEXIDINE GLUCONATE 15 ML: 1.2 RINSE ORAL at 05:29

## 2024-01-04 RX ADMIN — IPRATROPIUM BROMIDE AND ALBUTEROL SULFATE 3 ML: 2.5; .5 SOLUTION RESPIRATORY (INHALATION) at 19:10

## 2024-01-04 RX ADMIN — IPRATROPIUM BROMIDE AND ALBUTEROL SULFATE 3 ML: 2.5; .5 SOLUTION RESPIRATORY (INHALATION) at 14:17

## 2024-01-04 RX ADMIN — OXYCODONE HYDROCHLORIDE 10 MG: 5 TABLET ORAL at 16:13

## 2024-01-04 RX ADMIN — OXYCODONE HYDROCHLORIDE 10 MG: 5 TABLET ORAL at 21:33

## 2024-01-04 RX ADMIN — OXYCODONE HYDROCHLORIDE 10 MG: 5 TABLET ORAL at 11:44

## 2024-01-04 RX ADMIN — PREGABALIN 25 MG: 25 CAPSULE ORAL at 21:35

## 2024-01-04 NOTE — PROGRESS NOTES
"Patient name: Speedy Nichols  Patient : 1975  VISIT # [id number]  MR #[id number]    Procedure:Procedure(s):  CORONARY ARTERY BYPASS GRAFTING X5, BILATERAL INTERNAL MAMMARY ARTERY GRAFT, RIGHT UPPER LEG ENDOSCOPIC VEIN HARVEST, RIGHT LOWER LEG OPEN VEIN HARVEST, LEFT LEG ABORTED VEIN HARVEST, TRANSESOPHAGEAL ECHOCARDIOGRAM, XP STERNAL PLATING  Procedure Date:1/3/2024  POD:1 Day Post-Op    Subjective     Extubated overnight and tolerating 2 L nasal cannula.  Weight is down 1 pound today.  Creatinine today slightly increased at 1.53.  On low-dose Levophed.  Due to walk with physical therapy.  Complains of pain this morning.      Telemetry: Sinus rhythm 80s  IV drips: Insulin, Levophed, nitro       Objective     Visit Vitals  /76 (BP Location: Right arm, Patient Position: Sitting)   Pulse 89   Temp 99.1 °F (37.3 °C) (Oral)   Resp 19   Ht 183 cm (72.05\")   Wt 90.3 kg (199 lb)   SpO2 100%   BMI 26.95 kg/m²   Baseline weight 200 pounds    Intake/Output Summary (Last 24 hours) at 2024 0811  Last data filed at 2024 0400  Gross per 24 hour   Intake 4307.14 ml   Output 2315 ml   Net 1992.14 ml     MCT: 220 mL in 24 hours, serosanguineous, no airleak  Right and left pleural drain: 280 mL in 24 hours, serosanguineous, no airleak    Lab:     CBC:  Results from last 7 days   Lab Units 24  0244 24  1653 24  1313   WBC 10*3/mm3 8.73 6.71 12.65*   HEMATOCRIT % 25.4* 24.7* 27.2*   PLATELETS 10*3/mm3 165 164 234          BMP:  Results from last 7 days   Lab Units 24  0244 24  1653 24  1324 24  1313   SODIUM mmol/L 138 141  --  142   SODIUM, ARTERIAL mmol/L  --   --  141  --    POTASSIUM mmol/L 5.6* 5.0  --  4.2   CHLORIDE mmol/L 107 108*  --  108*   CO2 mmol/L 23.0 23.0  --  23.0   GLUCOSE mg/dL 196* 171*  --  118*   BUN mg/dL 23* 20  --  19   CREATININE mg/dL 1.53* 1.29*  --  1.34*          COAG:  Results from last 7 days   Lab Units 24  0244 24  1313   INR  " 1.17* 1.14*   APTT seconds  --  66.8*       IMAGES:       Imaging Results (Last 24 Hours)       Procedure Component Value Units Date/Time    XR Chest 1 View [[id number]] Collected: 01/04/24 0632     Updated: 01/04/24 0637    Narrative:      EXAMINATION: XR CHEST 1 -     1/4/2024 2:12 AM     HISTORY: Post-Op Heart Surgery     A frontal projection of the chest is compared with the previous study  dated 1/3/2024.     The lungs are poorly expanded, worse than the previous study.     There are atelectatic changes in the lower lungs bilaterally similar to  the previous study.     There is no pleural effusion, pulmonary congestion or pneumothorax.     The heart size is not evaluated. There is evidence of prior cardiac  surgery.     The endotracheal tube have been removed since the previous study. A  [place] catheter is in place with distal end in the mid to distal  right pulmonary artery. The position has moderately changed since the  previous study. Bilateral chest tubes are in place. Mediastinal drain  are in place.     No acute bony abnormality.       Impression:      1. Poor lung expansion. Bilateral significant atelectatic changes.  2. The endotracheal tube have been removed since the previous study. The  remaining tubes and lines are in place.              This report was signed and finalized on 1/4/2024 6:34 AM by Dr. Roxanne Wilder MD.       XR Chest 1 View [[id number]] Collected: 01/03/24 1353     Updated: 01/03/24 1359    Narrative:      EXAM: XR CHEST 1 - 1/3/2024 12:38 PM     HISTORY: Post-Op Check Line & Tube Placement; I25.10-Atherosclerotic  heart disease of native coronary artery without angina pectoris       COMPARISON: 12/29/2023 .     TECHNIQUE: Single frontal radiograph of the chest was obtained.     FINDINGS:     Support Devices: Endotracheal tube tip projects 7 cm above the itz.  Right IJ [place]-Ay[place] catheter tip projects over the proximal right main  pulmonary artery. Bilateral chest tubes  and mediastinal drains are  noted.     Cardiac and Mediastinal Silhouettes: Normal.     Lungs/Pleura: Findings suggestive of mild central vascular congestion.  No sizable pleural effusion. No visible pneumothorax.     Osseous structures: No acute osseous finding.     Other: None.       Impression:         Endotracheal tube tip projects 7 cm above the itz. This could be  advanced 2 cm for optimal positioning.     [place]-Ayaka catheter tip projects over the proximal right main pulmonary  artery.     Good positioning of chest tubes.     Findings suggestive of mild central vascular congestion.           This report was signed and finalized on 1/3/2024 1:56 PM by Papi Garrison.             CXR: Chest tubes in stable position with no pneumothorax.  Hypoventilation and atelectasis bilaterally.    Physical Exam:  General: Alert, oriented. No apparent distress.  Overweight  Cardiovascular: Regular rate and rhythm without murmur, rubs, or gallops.    Pulmonary: Clear to auscultation bilaterally without wheezing, rubs, or rales.  On 2 L nasal cannula  Chest: Sternotomy incision clean, dry, and intact. Sternum stable. No clicks. Chest tubes to 20 cm suction. No air leak. Fluid is serosanguineous.  Abdomen: Soft, nondistended, and nontender.  Extremities: Warm, moves all extremities. No edema. Saphenectomy site clean, dry, and intact with Ace wrap  Neurologic:  Grossly intact with no focal deficits.            Impression:  Coronary artery disease  Hyperlipidemia, on statin  Hypertension, well-controlled  Type 2 diabetes mellitus with long-term use of insulin        Plan:  Begin bowel regimen  Albumin 500 mL x 1 dose now  Repeat labs today at 1200  Hold beta-blocker this morning  Discontinue nitro  Give Levemir 10 units this morning and tonight start Levemir 10 units nightly  Multimodality pain control regimen, add Lyrica  Encourage pulmonary toilet and ambulation  Routine postcardiac surgery care  Keep chest tubes  today  Remain in ICU for now  Discussed with patient and nursing      ANUPAM Nguyen  01/04/24  08:11 CST

## 2024-01-04 NOTE — CASE MANAGEMENT/SOCIAL WORK
Discharge Planning Assessment   Melissa     Patient Name: Speedy Nichols  MRN: [id number]  Today's Date: 1/4/2024    Admit Date: 1/3/2024        Discharge Needs Assessment       Row Name 01/04/24 0958       Living Environment    People in Home spouse;child(jake), dependent    Name(s) of People in Home Marilia and 3 depedent children    Current Living Arrangements home    Potentially Unsafe Housing Conditions none    In the past 12 months has the electric, gas, oil, or water company threatened to shut off services in your home? No    Primary Care Provided by self    Provides Primary Care For child(jake)    Family Caregiver if Needed spouse    Family Caregiver Names Marilia    Able to Return to Prior Arrangements yes       Resource/Environmental Concerns    Resource/Environmental Concerns none       Transportation Needs    In the past 12 months, has lack of transportation kept you from medical appointments or from getting medications? no    In the past 12 months, has lack of transportation kept you from meetings, work, or from getting things needed for daily living? No       Food Insecurity    Within the past 12 months, you worried that your food would run out before you got the money to buy more. Never true    Within the past 12 months, the food you bought just didn't last and you didn't have money to get more. Never true       Transition Planning    Patient/Family Anticipates Transition to home with family    Transportation Anticipated family or friend will provide       Discharge Needs Assessment    Readmission Within the Last 30 Days no previous admission in last 30 days    Equipment Currently Used at Home none    Concerns to be Addressed no discharge needs identified    Equipment Needed After Discharge none    Discharge Coordination/Progress spoke to patient who lives with spouse and 3 dependent children and is working independent at home prior to surgery; has RX coverage and PCP; will follow for DC needs                    Discharge Plan    No documentation.                 Continued Care and Services - Admitted Since 1/3/2024    Coordination has not been started for this encounter.          Demographic Summary    No documentation.                  Functional Status    No documentation.                  Psychosocial    No documentation.                  Abuse/Neglect    No documentation.                  Legal    No documentation.                  Substance Abuse    No documentation.                  Patient Forms    No documentation.                     Mavis Funes RN

## 2024-01-04 NOTE — PAYOR COMM NOTE
"ADMIT INPT 1-3-24  [id number]    [phone]    Speedy Nichols \"Jostin\" (48 y.o. Male)       Date of Birth   1975    Social Security Number       Address   [place] [address]    Home Phone   [phone]    MRN   [id number]       Christianity   [employer]    Marital Status                               Admission Date   1/3/24    Admission Type   Elective    Admitting Provider   Papi Wilburn MD    Attending Provider   Papi Wilburn MD    Department, Room/Bed   [facility] INTENSIVE CARE, I007/1       Discharge Date       Discharge Disposition       Discharge Destination                                 Attending Provider: Papi Wilburn MD    Allergies: Poison Ivy Extract    Isolation: None   Infection: None   Code Status: CPR    Ht: 183 cm (72.05\")   Wt: 90.3 kg (199 lb)    Admission Cmt: None   Principal Problem: Coronary artery disease involving native coronary artery of native heart without angina pectoris [I25.10]                   Active Insurance as of 1/3/2024       Primary Coverage       Payor Plan Insurance Group Employer/Plan Group    [facility] [place] [facility] [place]        Payor Plan Address Payor Plan Phone Number Payor Plan Fax Number Effective Dates    [address]   7/1/2017 - None Entered    [facility] [id number]         Subscriber Name Subscriber Birth Date Member ID       SPEEDY NICHOLS 1975 [id number]                     Emergency Contacts        (Rel.) Home Phone Work Phone Mobile Phone    Marilia Nichols (Spouse) [phone] -- --                 History & Physical        Papi Wilburn MD at 01/03/24 0628          No significant change.  Discussed operative plan and risks and benefits, he understands and agrees to proceed.    Papi Wilburn M.D.  Cardiothoracic Surgeon        Electronically signed by Papi Wilburn MD at 01/03/24 0606   Source Note              [facility] " "Cardiothoracic Surgery  PROGRESS NOTE   CC: Unstable Angina    Subjective:   Speedy Nichols is a 48-year-old male who presents with a chief complaint of being unstable angina with multivessel coronary disease.    In summary, the patient has some good days and some bad days. He has a lot of pain when he walks any kind of distance and his back starts cramping up. He has had chest pain before, but this is something different. The patient's lower back cramps up when he starts walking and he denies any chest discomfort currently.     He confirmed he is going to get the bypasses scheduled. The patient was advised that his kidney levels were not right the last time he was called to schedule it. His son had a seizure and he [place] out if he has epilepsy. It has been a situation where he can not just be off his feet for 2 to 3 months recovering.     The patient reported he had bad experiences twice with his donors about 3 months ago. He has been taking it easy and taking his medications. He added he normally has leg swelling and denied any trouble breathing when he lies flat. He talked to his primary care physician about how his prognosis is. He looked at the notes and said that his heart function has returned towards what it was before the event.    Both of his parents  of fibrosis in the lungs d/t COVID.    ROS:   No fevers, chills or recent illness.    Objective:      BP (!) 154/103 (BP Location: Right arm, Patient Position: Sitting, Cuff Size: Adult) Comment: took twice  Pulse 84   Ht 182.9 cm (72\")   Wt 88.9 kg (196 lb)   SpO2 96%   BMI 26.58 kg/m²     PE:  Vitals:    23 1357   BP: (!) 154/103   Pulse: 84   SpO2: 96%   GENERAL: NAD, resting comfortably, normal color  CARDIOVASCULAR: regular, regular rate, sinus  PULMONARY: Normal bilateral breath sounds, no labored breathing  ABDOMEN: soft, nontender/nondistended  EXTREMITIES: mild peripheral edema, normal pulses, normal " ROM        Lab Results (last 72 hours)       ** No results found for the last 72 hours. **            Assessment & Plan    Mr. Nichols is a 48-year-old male who I previously saw approximately 1 year ago with unstable angina and severe multivessel coronary disease. I recommended coronary artery bypass grafting. He wanted to delay surgery until he could have donor blood available from non COVID-19 vaccinated patients and after he had this done, he had A1c that was too high to proceed with surgery, therefore delaying his surgery quite a few times. He has also had some social determinants that have also made him delay surgery. He presents now with an A1c less than 8 and he is okay receiving blood if needed from vaccinated donors from the normal donor pole. He has undergone repeat coronary angiography and this shows severe multivessel disease as before. He has not had a repeat echo, but we will obtain this.     I discussed with him again the natural history of his coronary disease as well as treatment options. He understands that coronary bypass grafting is the best treatment option. We discussed the risk of surgery including but not limited to bleeding, infection, injury to major organs or vessels, chronic heart failure, arrhythmias, need for pacemaker, prolonged ventilation, renal failure, stroke, risk of anesthesia, risk of sternal wound or bone healing problems, reoperation, and/or death. We again discussed and he is okay receiving donor blood from the general donor pool. I have calculated this patient's specific STS risk score. He understands and agrees to proceed. I will obtain an echocardiogram and plan on his surgery at the beginning of January, 2024.    Thank you for trusting me with the care of Mr. Nichols.  Please do not hesitate to call with questions or concerns.    Papi Wilburn MD   Cardiothoracic Surgeon     Transcribed from ambient dictation for Papi Wilburn MD by David Cesar.  12/11/23   15:28  "CST    Patient or patient representative verbalized consent to the visit recording.  I have personally performed the services described in this document as transcribed by the above individual, and it is both accurate and complete.      Electronically signed by Papi Wilburn MD at 23 1107                 Papi Wilburn MD at 23 [phone]              [facility] Cardiothoracic Surgery  PROGRESS NOTE   CC: Unstable Angina    Subjective:   Speedy Nichols is a 48-year-old male who presents with a chief complaint of being unstable angina with multivessel coronary disease.    In summary, the patient has some good days and some bad days. He has a lot of pain when he walks any kind of distance and his back starts cramping up. He has had chest pain before, but this is something different. The patient's lower back cramps up when he starts walking and he denies any chest discomfort currently.     He confirmed he is going to get the bypasses scheduled. The patient was advised that his kidney levels were not right the last time he was called to schedule it. His son had a seizure and he had [place] out if he has epilepsy. It has been a situation where he can not just be off his feet for 2 to 3 months recovering.     The patient reported he had bad experiences twice with his donors about 3 months ago. He has been taking it easy and taking his medications. He added he normally has leg swelling and denied any trouble breathing when he lies flat. He talked to his primary care physician about how his prognosis is. He looked at the notes and said that his heart function has returned towards what it was before the event.    Both of his parents  of fibrosis in the lungs d/t COVID.    ROS:   No fevers, chills or recent illness.    Objective:      BP (!) 154/103 (BP Location: Right arm, Patient Position: Sitting, Cuff Size: Adult) Comment: took twice  Pulse 84   Ht 182.9 cm (72\")   Wt 88.9 " kg (196 lb)   SpO2 96%   BMI 26.58 kg/m²     PE:  Vitals:    12/11/23 1357   BP: (!) 154/103   Pulse: 84   SpO2: 96%     GENERAL: NAD, resting comfortably, normal color  CARDIOVASCULAR: regular, regular rate, sinus  PULMONARY: Normal bilateral breath sounds, no labored breathing  ABDOMEN: soft, nontender/nondistended  EXTREMITIES: mild peripheral edema, normal pulses, normal ROM        Lab Results (last 72 hours)       ** No results found for the last 72 hours. **              Assessment & Plan    Mr. Nichols is a 48-year-old male who I previously saw approximately 1 year ago with unstable angina and severe multivessel coronary disease. I recommended coronary artery bypass grafting. He wanted to delay surgery until he could have donor blood available from non COVID-19 vaccinated patients and after he had this done, he had A1c that was too high to proceed with surgery, therefore delaying his surgery quite a few times. He has also had some social determinants that have also made him delay surgery. He presents now with an A1c less than 8 and he is okay receiving blood if needed from vaccinated donors from the normal donor pole. He has undergone repeat coronary angiography and this shows severe multivessel disease as before. He has not had a repeat echo, but we will obtain this.     I discussed with him again the natural history of his coronary disease as well as treatment options. He understands that coronary bypass grafting is the best treatment option. We discussed the risk of surgery including but not limited to bleeding, infection, injury to major organs or vessels, chronic heart failure, arrhythmias, need for pacemaker, prolonged ventilation, renal failure, stroke, risk of anesthesia, risk of sternal wound or bone healing problems, reoperation, and/or death. We again discussed and he is okay receiving donor blood from the general donor pool. I have calculated this patient's specific STS risk score. He understands  and agrees to proceed. I will obtain an echocardiogram and plan on his surgery at the beginning of January, 2024.    Thank you for trusting me with the care of Mr. Nichols.  Please do not hesitate to call with questions or concerns.    Papi Wilburn MD   Cardiothoracic Surgeon     Transcribed from ambient dictation for Papi Wilburn MD by David Cesar.  12/11/23   15:28 CST    Patient or patient representative verbalized consent to the visit recording.  I have personally performed the services described in this document as transcribed by the above individual, and it is both accurate and complete.      Electronically signed by Papi Wilburn MD at 12/18/23 1101       Facility-Administered Medications as of 1/4/2024   Medication Dose Route Frequency Provider Last Rate Last Admin    [COMPLETED] acetaminophen (OFIRMEV) injection 1,000 mg  1,000 mg Intravenous Q8H Papi Wilburn MD   1,000 mg at 01/03/24 2104    [COMPLETED] acetaminophen (TYLENOL) tablet 1,000 mg  1,000 mg Oral Once Mavis Tinajero APRN   1,000 mg at 01/03/24 0613    acetaminophen (TYLENOL) tablet 650 mg  650 mg Oral Q6H Papi Wilburn MD   650 mg at 01/04/24 0529    [COMPLETED] albumin human 5 % solution 500 mL  500 mL Intravenous Once Papi Wilburn MD   500 mL at 01/03/24 1635    [COMPLETED] albumin human 5 % solution 500 mL  500 mL Intravenous Once Papi Wilburn MD   500 mL at 01/04/24 0358    albuterol (PROVENTIL) nebulizer solution 0.083% 2.5 mg/3mL  2.5 mg Nebulization Q4H PRN Papi Wilburn MD        aspirin chewable tablet 162 mg  162 mg Oral Once Papi Wilburn MD        [START ON 1/5/2024] aspirin EC tablet 81 mg  81 mg Oral Daily Papi Wilburn MD        atorvastatin (LIPITOR) tablet 20 mg  20 mg Oral Nightly Papi Wilburn MD        bisacodyl (DULCOLAX) EC tablet 10 mg  10 mg Oral BID Papi Wilburn MD        [COMPLETED] calcium chloride injection 1 g  1 g Intravenous Once Papi Wilburn MD   1 g at 01/03/24 1740    Calcium  Replacement - Follow Nurse / BPA Driven Protocol   Does not apply PRN Wilburn, Papi HSU MD        [COMPLETED] ceFAZolin 2000 mg IVPB in 100 mL NS (MBP)  2,000 mg Intravenous Once Mavis Tinajero APRN   1 g at 01/03/24 1130    ceFAZolin 2000 mg IVPB in 100 mL NS (MBP)  2,000 mg Intravenous Q8H Wilburn, Papi HSU MD   2,000 mg at 01/04/24 0311    chlorhexidine (PERIDEX) 0.12 % solution 15 mL  15 mL Mouth/Throat Q12H Wilburn, Papi HSU MD   15 mL at 01/04/24 0529    clevidipine (CLEVIPREX) infusion 0.5 mg/mL  2-32 mg/hr Intravenous Continuous PRN Cosmo, Papi HSU MD        clopidogrel (PLAVIX) tablet 75 mg  75 mg Oral Daily Wilburn, Papi HSU MD        dexmedetomidine (PRECEDEX) 400 mcg in 100 mL NS infusion  0.2-1.5 mcg/kg/hr Intravenous Titrated Wilburn, Papi HSU MD   Stopped at 01/03/24 1951    dextrose (D50W) (25 g/50 mL) IV injection 10-50 mL  10-50 mL Intravenous Q15 Min PRN Wilburn, Papi HSU MD        dextrose (GLUTOSE) oral gel 15 g  15 g Oral Q15 Min PRN Wilburn, Papi HSU MD        dextrose 5 % with KCl 20 mEq/L infusion  30 mL/hr Intravenous Continuous Wilburn, Papi HSU MD 30 mL/hr at 01/03/24 1335 30 mL/hr at 01/03/24 1335    And    dextrose 5 % with KCl 20 mEq/L infusion  30 mL/hr Intravenous Continuous Wilburn, Papi HSU MD 30 mL/hr at 01/03/24 1335 30 mL/hr at 01/03/24 1335    Enoxaparin Sodium (LOVENOX) syringe 40 mg  40 mg Subcutaneous Daily Wilburn, Papi HSU MD        glucagon (GLUCAGEN) injection 1 mg  1 mg Intramuscular Q15 Min PRN Wilburn, Papi HSU MD        insulin regular (HumuLIN R,NovoLIN R) 100 Units in sodium chloride 0.9 % 100 mL (1 Units/mL) infusion  0-100 Units/hr Intravenous Titrated Cosmo, Papi HSU MD 8.4 mL/hr at 01/04/24 0601 8.4 Units/hr at 01/04/24 0601    ipratropium-albuterol (DUO-NEB) nebulizer solution 3 mL  3 mL Nebulization 4x Daily - RT Wilburn, Papi HSU MD   3 mL at 01/03/24 1905    lidocaine (LIDODERM) 5 % 2 patch  2 patch Transdermal Q24H Wilburn, Papi HSU MD        Magnesium Cardiology Dose  Replacement - Follow Nurse / BPA Driven Protocol   Does not apply PRN Wilburn, Papi HSU MD        [] meperidine (DEMEROL) injection 25 mg  25 mg Intravenous Q1H PRN Wilburn, Papi HSU MD        methocarbamol (ROBAXIN) tablet 500 mg  500 mg Oral Q8H Wilburn, Papi HSU MD   500 mg at 24 0416    metoprolol tartrate (LOPRESSOR) tablet 12.5 mg  12.5 mg Oral Q12H Wilburn, Papi HSU MD        [COMPLETED] midazolam (VERSED) injection 2 mg  2 mg Intravenous Once Tal Braxton MD   2 mg at 24 0646    Morphine sulfate (PF) injection 2 mg  2 mg Intravenous Q30 Min PRN Wilburn, Papi HSU MD   2 mg at 24 0041    [COMPLETED] mupirocin (BACTROBAN) 2 % nasal ointment 1 application   1 application  Each Nare Once Mavis Tinajero APRN   1 application  at 24 0614    mupirocin (BACTROBAN) 2 % nasal ointment   Each Nare Q12H Wilburn, Papi HSU MD   1 application  at 24 0529    niCARdipine (CARDENE) 25 mg in 250 mL NS infusion kit  5-15 mg/hr Intravenous Continuous PRN Wilburn, Papi HSU MD   Stopped at 24 1917    nitroglycerin (NITROSTAT) SL tablet 0.4 mg  0.4 mg Sublingual Q5 Min PRN Wilburn, Papi HSU MD        nitroglycerin (TRIDIL) 200 mcg/ml infusion  5 mcg/min Intravenous Continuous Wilburn, Papi HSU MD 1.5 mL/hr at 24 1346 5 mcg/min at 24 1346    norepinephrine (LEVOPHED) 8 mg in 250 mL NS infusion (premix)  0.02-0.3 mcg/kg/min Intravenous Continuous PRN Wilburn, Papi HSU MD 3.41 mL/hr at 24 2123 0.02 mcg/kg/min at 24 2123    ondansetron (ZOFRAN) injection 4 mg  4 mg Intravenous Q6H PRN Wilburn, Papi HSU MD   4 mg at 24 0025    oxyCODONE (ROXICODONE) immediate release tablet 10 mg  10 mg Oral Q4H PRN Wilburn, Papi HSU, MD        oxyCODONE (ROXICODONE) immediate release tablet 5 mg  5 mg Oral Q4H PRN Papi Wilburn MD   5 mg at 24 0311    pantoprazole (PROTONIX) EC tablet 40 mg  40 mg Oral Q AM Mavis Tinajero APRN   40 mg at 24 0529    Phosphorus Replacement -  Follow Nurse / BPA Driven Protocol   Does not apply PRN Papi Wilburn MD        polyethylene glycol (MIRALAX) packet 17 g  17 g Oral Daily Wilburn, Papi HSU MD        Potassium Replacement - Follow Nurse / BPA Driven Protocol   Does not apply PRN Papi Wilburn MD        pregabalin (LYRICA) capsule 25 mg  25 mg Oral Q12H Cosmo, Papi HSU MD        propofol (DIPRIVAN) infusion 10 mg/mL 100 mL  5-50 mcg/kg/min Intravenous Continuous PRN Wilburn, Papi HSU MD         Orders (all)        Start     Ordered    01/06/24 0600  XR Chest PA & Lateral  1 Time Imaging         01/03/24 1311    01/05/24 1200  Remove All Dressings 48 Hours Post-Op  Once         01/03/24 1311    01/05/24 1200  Leave Incisions Open to Air Unless Draining or Edges Are Not Approximated  Continuous         01/03/24 1311 01/05/24 0900  aspirin EC tablet 81 mg  Daily         01/03/24 1311    01/05/24 0600  Wound Care  Daily       01/03/24 1311 01/05/24 0600  CBC (No Diff)  Daily       01/03/24 1311    01/05/24 0600  Basic Metabolic Panel  Daily       01/03/24 1311    01/04/24 2100  atorvastatin (LIPITOR) tablet 20 mg  Nightly         01/03/24 1311    01/04/24 1800  clopidogrel (PLAVIX) tablet 75 mg  Daily         01/03/24 1311    01/04/24 1400  Intake & Output  Every Shift       01/03/24 1311    01/04/24 1238  acetaminophen (TYLENOL) tablet 650 mg  Every 4 Hours PRN,   Status:  Discontinued        See Hyperspace for full Linked Orders Report.    01/03/24 1311    01/04/24 1238  acetaminophen (TYLENOL) 160 MG/5ML oral solution 650 mg  Every 4 Hours PRN,   Status:  Discontinued        See Hyperspace for full Linked Orders Report.    01/03/24 1311    01/04/24 1238  acetaminophen (TYLENOL) suppository 650 mg  Every 4 Hours PRN,   Status:  Discontinued        See Hyperspace for full Linked Orders Report.    01/03/24 1311    01/04/24 1200  Vital Signs  Every 4 Hours      Comments: Perform Vitals Less Frequently Only if Patient Stable      01/03/24 1311     01/04/24 0900  aspirin chewable tablet 162 mg  Once         01/03/24 1311 01/04/24 0900  metoprolol tartrate (LOPRESSOR) tablet 12.5 mg  Every 12 Hours Scheduled         01/03/24 1311 01/04/24 0900  bisacodyl (DULCOLAX) EC tablet 10 mg  2 Times Daily         01/03/24 1311 01/04/24 0900  polyethylene glycol (MIRALAX) packet 17 g  Daily         01/03/24 1311 01/04/24 0900  Enoxaparin Sodium (LOVENOX) syringe 40 mg  Daily         01/03/24 1311 01/04/24 0900  pregabalin (LYRICA) capsule 25 mg  Every 12 Hours Scheduled         01/03/24 1311 01/04/24 0900  lidocaine (LIDODERM) 5 % 2 patch  Every 24 Hours Scheduled         01/04/24 0431 01/04/24 0800  Wean & Extubate Per Rapid Wean and Extubation Protocol  Every Morning,   Status:  Canceled       01/03/24 1311 01/04/24 0800  Wean FiO2 per Respiratory Therapist for SpO2  for SpO2 >92%, until at 30 % FiO2  Every Morning,   Status:  Canceled      Comments: Wean FiO2 per Respiratory Therapist for SpO2  for SpO2 >92%, until at 30 % FiO2    01/03/24 1311 01/04/24 0800  Wean Respiratory Rate by a minimum of 2 every hour if indicated until the rate of 4 is achieved.  Every Morning,   Status:  Canceled      Comments: Wean Respiratory Rate by a minimum of 2 every hour if indicated until the rate of 4 is achieved.    01/03/24 1311 01/04/24 0600  XR Chest 1 View  Daily       01/03/24 1311 01/04/24 0600  ECG 12 Lead Other; CAD, CABG  Daily       01/03/24 1311 01/04/24 0600  pantoprazole (PROTONIX) EC tablet 40 mg  Every Early Morning         01/03/24 1318    01/04/24 0600  acetaminophen (TYLENOL) tablet 650 mg  Every 6 Hours Scheduled         01/03/24 1323    01/04/24 0458  POC Glucose Once  PROCEDURE ONCE        Comments: Complete no more than 45 minutes prior to patient eating      01/04/24 0446    01/04/24 0445  methocarbamol (ROBAXIN) tablet 500 mg  Every 8 Hours Scheduled         01/04/24 0350    01/04/24 0445  albumin human 5 % solution 500 mL   Once         01/04/24 0350    01/04/24 0355  POC Glucose Once  PROCEDURE ONCE        Comments: Complete no more than 45 minutes prior to patient eating      01/04/24 0343    01/04/24 0350  Diet: Cardiac Diets; Healthy Heart (2-3 Na+); Texture: Regular Texture (IDDSI 7); Fluid Consistency: Thin (IDDSI 0)  Diet Effective Now         01/04/24 0350    01/04/24 0300  CBC & Differential  Once         01/03/24 1311    01/04/24 0300  Renal Function Panel  Once         01/03/24 1311    01/04/24 0300  Magnesium  Once         01/03/24 1311    01/04/24 0300  Protime-INR  Once         01/03/24 1311    01/04/24 0300  CBC Auto Differential  PROCEDURE ONCE         01/03/24 1901    01/04/24 0257  POC Glucose Once  PROCEDURE ONCE        Comments: Complete no more than 45 minutes prior to patient eating      01/04/24 0239    01/04/24 0257  Blood Gas, Arterial -  PROCEDURE ONCE         01/04/24 0257    01/04/24 0224  POC Glucose Once  PROCEDURE ONCE        Comments: Complete no more than 45 minutes prior to patient eating      01/04/24 0141    01/04/24 0015  Blood Gas, Arterial -  PROCEDURE ONCE         01/04/24 0014    01/03/24 2357  POC Glucose Once  PROCEDURE ONCE        Comments: Complete no more than 45 minutes prior to patient eating      01/03/24 2340    01/03/24 2252  Blood Gas, Arterial -  PROCEDURE ONCE         01/03/24 2251    01/03/24 2149  POC Glucose Once  PROCEDURE ONCE        Comments: Complete no more than 45 minutes prior to patient eating      01/03/24 2137    01/03/24 1952  POC Glucose Once  PROCEDURE ONCE        Comments: Complete no more than 45 minutes prior to patient eating      01/03/24 1940    01/03/24 1930  ceFAZolin 2000 mg IVPB in 100 mL NS (MBP)  Every 8 Hours         01/03/24 1311    01/03/24 1815  calcium chloride injection 1 g  Once         01/03/24 1727    01/03/24 1800  Ambulate Patient  3 Times Daily         01/03/24 1311    01/03/24 1800  mupirocin (BACTROBAN) 2 % nasal ointment  Every 12 Hours          01/03/24 1311    01/03/24 1800  chlorhexidine (PERIDEX) 0.12 % solution 15 mL  Every 12 Hours         01/03/24 1311    01/03/24 1751  POC Glucose Once  PROCEDURE ONCE        Comments: Complete no more than 45 minutes prior to patient eating      01/03/24 1739    01/03/24 1700  albumin human 5 % solution 500 mL  Once         01/03/24 1614    01/03/24 1655  POC Glucose Once  PROCEDURE ONCE        Comments: Complete no more than 45 minutes prior to patient eating      01/03/24 1642    01/03/24 1637  Blood Gas, Arterial -  Once        Comments: if no ABG has been obtained in the previous 4 hours during Vent Liberation      01/03/24 1311    01/03/24 1613  STAT Lactic Acid, Reflex  PROCEDURE ONCE         01/03/24 1348    01/03/24 1600  Check Peripheral Pulses Every 4 Hours  Every 4 Hours       01/03/24 1311    01/03/24 1547  POC Glucose Once  PROCEDURE ONCE        Comments: Complete no more than 45 minutes prior to patient eating      01/03/24 1535    01/03/24 1539  Blood Gas, Arterial -  PROCEDURE ONCE         01/03/24 1539    01/03/24 1530  ipratropium-albuterol (DUO-NEB) nebulizer solution 3 mL  4 Times Daily - RT         01/03/24 1311    01/03/24 1448  POC Glucose Once  PROCEDURE ONCE        Comments: Complete no more than 45 minutes prior to patient eating      01/03/24 1437    01/03/24 1400  Vital Signs Every Hour Until 24 Hours Post Op  Every Hour      Comments: Perform Vitals Less Frequently Only if Patient Stable      01/03/24 1311    01/03/24 1400  Check Peripheral Pulses Every 1 Hour x4  Every Hour       01/03/24 1311    01/03/24 1400  Intake & Output Every 15 Minutes x2, Every 30 Minutes x4  Every Hour       01/03/24 1311    01/03/24 1400  Intake & Output Every Hour Until 24 Hours Post Op  Every Hour       01/03/24 1311 01/03/24 1400  Cardiac Output Parameters On Admission, Then Every 1 Hour x4  Every Hour       01/03/24 1311 01/03/24 1400  Cardiac Output Parameters Every 2 Hours Until 24 Hours Post  Op  Every 2 Hours       01/03/24 1311    01/03/24 1400  Nurse and RT to Assess Patient for Readiness to Wean Criteria as Follows:  Every Hour,   Status:  Canceled      Comments: Patient is awake and following commands, Patient is spontaneously breathing, respiratory rate <25/min, Patient's hemodynamics are stable, Patient has no evidence of clinically significant bleeding, SpO2 >92% on <30% FiO2, PEEP < or = to 8.    01/03/24 1311    01/03/24 1400  chlorhexidine (PERIDEX) 0.12 % solution 15 mL  Every 12 Hours Scheduled,   Status:  Discontinued         01/03/24 1311    01/03/24 1400  nitroglycerin (TRIDIL) 200 mcg/ml infusion  Continuous         01/03/24 1311    01/03/24 1400  insulin regular (HumuLIN R,NovoLIN R) 100 Units in sodium chloride 0.9 % 100 mL (1 Units/mL) infusion  Titrated        Note to Pharmacy: Upon initiation of Glucommander insulin drip, make sure all other insulin orders and anti-diabetic medications have been discontinued.    01/03/24 1311    01/03/24 1400  dexmedetomidine (PRECEDEX) 400 mcg in 100 mL NS infusion  Titrated         01/03/24 1311    01/03/24 1400  acetaminophen (OFIRMEV) injection 1,000 mg  Every 8 Hours         01/03/24 1311    01/03/24 1352  POC Glucose Once  PROCEDURE ONCE        Comments: Complete no more than 45 minutes prior to patient eating      01/03/24 1339    01/03/24 1327  Blood Gas, Arterial With Co-Ox  Once         01/03/24 1327    01/03/24 1326  Calcium, Ionized  PROCEDURE ONCE         01/03/24 1326    01/03/24 1325  Blood Gas, Arterial With Co-Ox  PROCEDURE ONCE         01/03/24 1324    01/03/24 1312  Assess Need for Indwelling Urinary Catheter - Follow Removal Protocol  Continuous        Comments: Follow Protocol As Outlined in Process Instructions (Open Order Report to View Full Instructions)    01/03/24 1311    01/03/24 1312  Urinary Catheter Care  Every Shift       01/03/24 1311    01/03/24 1312  Code Status and Medical Interventions:  Continuous          01/03/24 1311    01/03/24 1312  Vital Signs & Post-Op Checks Every 15 Minutes x2, Every 30 Minutes x4  Per Hospital Policy        Comments: Perform Vitals Less Frequently Only if Patient Stable    01/03/24 1311    01/03/24 1312  Daily Weights  Daily       01/03/24 1311    01/03/24 1312  Continuous Cardiac Monitoring  Continuous        Comments: Follow Standing Orders As Outlined in Process Instructions (Open Order Report to View Full Instructions)    01/03/24 1311    01/03/24 1312  Telemetry - Maintain IV Access  Continuous         01/03/24 1311    01/03/24 1312  Telemetry - Place Orders & Notify Provider of Results When Patient Experiences Acute Chest Pain, Dysrhythmia or Respiratory Distress  Until Discontinued         01/03/24 1311    01/03/24 1312  May Be Off Telemetry for Tests  Continuous         01/03/24 1311    01/03/24 1312  Continuous Pulse Oximetry  Continuous         01/03/24 1311    01/03/24 1312  Discontinue NG After Extubation  Once         01/03/24 1311    01/03/24 1312  Chest & Mediastinal Tubes to 20cm Continuous Suction  Once         01/03/24 1311    01/03/24 1312  Begin Rewarming with Thermal Unit As Needed For Temp Less Than 96F  Once         01/03/24 1311    01/03/24 1312  ACE Wraps to Vein Pearl Donor Site for 24 Hours, Then Apply CHER Hose  Continuous         01/03/24 1311    01/03/24 1312  Wound Dressing  Continuous         01/03/24 1311    01/03/24 1312  Notify Surgeon if Drainage From Surgical Incision Site  Until Discontinued         01/03/24 1311    01/03/24 1312  ISOLATE PACER WIRES  Continuous         01/03/24 1311    01/03/24 1312  Turn Patient Every 2 Hours or Begin Bed Rotation Once Hemodynamically Stable  Now Then Every 2 Hours         01/03/24 1311    01/03/24 1312  Advance PROM to AROM  Now Then Every 4 Hours         01/03/24 1311    01/03/24 1312  Up in Chair for Meals  Until Discontinued         01/03/24 1311    01/03/24 1312  Notify Provider - Urine Output  Until Discontinued          01/03/24 1311 01/03/24 1312  Notify Provider - Chest Tube Output  Until Discontinued         01/03/24 1311 01/03/24 1312  Notify Provider (Specify)  Until Discontinued         01/03/24 1311 01/03/24 1312  Cardiac Sternal Precautions - Maintain at All Times & Teach Patient  Continuous        Comments: Maintain Sternal Precautions at All Times & Teach Patient    01/03/24 1311    01/03/24 1312  Fall Precautions  Continuous         01/03/24 1311 01/03/24 1312  Initial Ventilator Settings Per Pulmonologist / Anesthesiologist  Once         01/03/24 1311 01/03/24 1312  Incentive Spirometry  Every Hour While Awake       01/03/24 1311 01/03/24 1312  Ventilator - Vent Mode: Alternate (Custom) Mode: See Comments  Continuous,   Status:  Canceled        Comments: Initial Mechanical Ventilation Settings upon Arrival to the ICU: SIMV, Tidal Volume 5-7 mL/kg ideal body weight based on height formula, Respiratory Rate 14-16/min, PEEP 5 cmH2O if <90kg, PEEP 8 cmH2O if >90kg, FiO2 60%.    01/03/24 1311 01/03/24 1312  Ventilator - Vent Mode: Alternate (Custom) Mode: See Comments  Continuous,   Status:  Canceled        Comments: RT adjusts Mechanical Ventilation as needed to achieve: pH 7.35-7.45, pCO2 34-45 mmHg, PaO2 > 90 mmHg, SpO2 > 92%.    01/03/24 1311 01/03/24 1312  Extubate Patient If  All of the Following Criteria are Met:  Once        Comments: Patient tolerated Spontaneous Breathing Trial for 20 minutes, respiratory rate < 25/min, SpO2 >94%, patient remains hemodynamically stable, patient is awake and following commands, RSBI f/vt < 90, Elevate HOB > 35 degrees, Vital Capacity: 10-15 mL/kg, NIF >-25 cm H2O, minute ventilation <14 l/min, Obtain ABG: pH of 7.33 to 7.55, confirm with nurse if ok to extubate. Prior to extubation, Propofol must be off, ok to proceed with Precedex with provider order. If criteria NOT met: Wait 1 hour, reassess. If still does not meet criteria: call Physician.     01/03/24 1311    01/03/24 1312  Notify Physician if Vapotherm Mask Needed to Keep SpO2 Greater Than 90% or Continuing Respiratory Distress  Until Discontinued        Comments: Notify Physician if Vapotherm mask needed to keep SpO2 >90% or continuing respiratory distress.r    01/03/24 1311    01/03/24 1312  Call Physician if Racemic needed.  Until Discontinued        Comments: Call Physician if Racemic needed.    01/03/24 1311    01/03/24 1312  RT To Evaluate & Demonstrate Use of IS  Once        Comments: RT To Evaluate & Demonstrate Use of IS    01/03/24 1311    01/03/24 1312  XR Chest 1 View  1 Time Imaging         01/03/24 1311    01/03/24 1312  ECG 12 Lead Other; CAD, s/p CABG  STAT         01/03/24 1311    01/03/24 1312  Protime-INR  STAT         01/03/24 1311    01/03/24 1312  aPTT  STAT         01/03/24 1311    01/03/24 1312  Calcium, Ionized  STAT         01/03/24 1311    01/03/24 1312  Blood Gas, Arterial -  STAT         01/03/24 1311    01/03/24 1312  CBC & Differential  STAT         01/03/24 1311    01/03/24 1312  Fibrinogen  STAT         01/03/24 1311    01/03/24 1312  CBC (No Diff)  Now Then Every 4 Hours       01/03/24 1311    01/03/24 1312  Renal Function Panel  Now Then Every 4 Hours       01/03/24 1311    01/03/24 1312  Draw Labs and Notify Provider if Chest Tube Output Greater Than 100mL/hr  Until Discontinued         01/03/24 1311    01/03/24 1312  Advance Diet As Tolerated -  Until Discontinued         01/03/24 1311    01/03/24 1312  Cardiac Rehab Evaluation  Once        Provider:  (Not yet assigned)    01/03/24 1311    01/03/24 1312  Consult to Case Management /   Once        Provider:  (Not yet assigned)    01/03/24 1311    01/03/24 1312  PT Consult: Eval & Treat  Once         01/03/24 1311    01/03/24 1312  Place Sequential Compression Device  Once         01/03/24 1311    01/03/24 1312  Maintain Sequential Compression Device  Continuous         01/03/24 1311    01/03/24 1312  RN  to Release PRN POC Glucose orders as per Glucommander  Continuous        Comments: Glucommander recommended POC testing will vary between 15 minutes and 2 hours. Release PRN POC Glucose orders as needed.    01/03/24 1311 01/03/24 1312  RN to Order STAT Glucose for BG less than 10 mg/dl or greater than 600 mg/dl  Continuous        Comments: If blood glucose reading is less than 10 mg/dl or greater than 600 mg/dl, obtain STAT glucose by Lab. Do not delay treatment of unstable patient in order to obtain glucose sample from Lab. Inform physician of results.    01/03/24 1311 01/03/24 1312  Prior to initiating the Glucommander™, ensure all prior insulin orders are discontinued.  Once        Comments: Prior to initiating the Glucommander™, ensure all prior insulin orders are discontinued.    01/03/24 1311 01/03/24 1312  PRIOR to START of Intravenous Insulin Infusion, Notify Provider if K+ is Less Than 3.3, for Extra Potassium Orders, if Indicated. Do Not Start Insulin Drip if K+ Less Than 3.3.  Per Order Details         01/03/24 1311 01/03/24 1312  Use a Dedicated Line for Insulin Infusion (If Possible).  May Use a Carrier Fluid of NS at KVO Rate if Insulin Rate is Insufficient to Maintain IV Patency.  Prime IV Line With Insulin Infusion  Continuous         01/03/24 1311 01/03/24 1312  If insulin infusion is discontinued, Glucommander must also be discontinued. If the insulin infusion is re-ordered, you must discontinue previous settings in Glucommander and start new.  Per Order Details        Comments: If insulin infusion is discontinued, Glucommander must also be discontinued. If the insulin infusion is re-ordered, you must discontinue previous settings in Glucommander and start new.    01/03/24 1311 01/03/24 1312  Notify Provider  Until Discontinued         01/03/24 1311 01/03/24 1312  If patient is being fed a diet or bolus tube feeds, utilize the start meal feature / meal bolus feature in  Glucommander.  Continuous        Comments: If patient is being fed a diet or bolus tube feeds, utilize the start meal feature / meal bolus feature in Glucommander.    01/03/24 1311    01/03/24 1312  NPO Diet NPO Type: Sips with Meds  Diet Effective Now,   Status:  Canceled         01/03/24 1311    01/03/24 1312  CBC Auto Differential  PROCEDURE ONCE         01/03/24 1311    01/03/24 1311  dextrose 5 % with KCl 20 mEq/L infusion  Continuous        See Hyperspace for full Linked Orders Report.    01/03/24 1311    01/03/24 1311  dextrose 5 % with KCl 20 mEq/L infusion  Continuous        See Hyperspace for full Linked Orders Report.    01/03/24 1311    01/03/24 1311  clevidipine (CLEVIPREX) infusion 0.5 mg/mL  Continuous PRN         01/03/24 1311    01/03/24 1311  dextrose (GLUTOSE) oral gel 15 g  Every 15 Minutes PRN         01/03/24 1311    01/03/24 1311  dextrose (D50W) (25 g/50 mL) IV injection 10-50 mL  Every 15 Minutes PRN         01/03/24 1311    01/03/24 1311  glucagon (GLUCAGEN) injection 1 mg  Every 15 Minutes PRN         01/03/24 1311    01/03/24 1311  propofol (DIPRIVAN) infusion 10 mg/mL 100 mL  Continuous PRN         01/03/24 1311    01/03/24 1311  meperidine (DEMEROL) injection 25 mg  Every 1 Hour PRN         01/03/24 1311    01/03/24 1311  Potassium Replacement - Follow Nurse / BPA Driven Protocol  As Needed         01/03/24 1311    01/03/24 1311  Magnesium Cardiology Dose Replacement - Follow Nurse / BPA Driven Protocol  As Needed         01/03/24 1311    01/03/24 1311  Phosphorus Replacement - Follow Nurse / BPA Driven Protocol  As Needed         01/03/24 1311    01/03/24 1311  Calcium Replacement - Follow Nurse / BPA Driven Protocol  As Needed         01/03/24 1311    01/03/24 1311  ondansetron (ZOFRAN) injection 4 mg  Every 6 Hours PRN         01/03/24 1311    01/03/24 1311  oxyCODONE (ROXICODONE) immediate release tablet 5 mg  Every 4 Hours PRN         01/03/24 1311    01/03/24 1311  oxyCODONE  (ROXICODONE) immediate release tablet 10 mg  Every 4 Hours PRN         01/03/24 1311    01/03/24 1311  Morphine sulfate (PF) injection 2 mg  Every 30 Minutes PRN         01/03/24 1311    01/03/24 1311  niCARdipine (CARDENE) 25 mg in 250 mL NS infusion kit  Continuous PRN         01/03/24 1311    01/03/24 1311  norepinephrine (LEVOPHED) 8 mg in 250 mL NS infusion (premix)  Continuous PRN         01/03/24 1311    01/03/24 1311  nitroglycerin (NITROSTAT) SL tablet 0.4 mg  Every 5 Minutes PRN         01/03/24 1311    01/03/24 1311  Begin Weaning When Criteria Met:  As Needed,   Status:  Canceled      Comments: If all of the weaning criteria are met, Respiratory Therapist is to initiate spontaneous breathing trial with PSV pressure support mode with PEEP 5 cmH2O if <90kg, PEEP 8 cmH2O if > or = 90kg. If criteria are not met, RT or nurse is to re-evaluate in 15-30 minutes.    01/03/24 1311    01/03/24 1311  albuterol (PROVENTIL) nebulizer solution 0.083% 2.5 mg/3mL  Every 4 Hours PRN         01/03/24 1311    01/03/24 1240  Lactic Acid, Plasma  STAT         01/03/24 1239    01/03/24 1240  Target Arousal Level RASS -1 to -2  Continuous         01/03/24 1239    01/03/24 1237  Inpatient Admission  Once         01/03/24 1239    01/03/24 1237  Continue Indwelling Urinary Catheter  Once        See Hyperspace for full Linked Orders Report.    01/03/24 1239    01/03/24 1237  Assess Need for Indwelling Urinary Catheter - Follow Removal Protocol  Continuous,   Status:  Canceled        Comments: Indwelling Urinary Catheter Removal Criteria  Discontinue Indwelling Urinary Catheter Unless One of the Following is Present  Urinary Retention or Obstruction  Chronic Wheeler Catheter Use  End of Life  Critical Illness with Strict I/O   Tract or Abdominal Surgery  Stage 3/4 Sacral / Perineal Wound  Required Activity Restriction: Trauma  Required Activity Restriction: Spine Surgery  If Patient is Being Followed by Urology Contact Them PRIOR  to Removal  Do Not Remove Indwelling Urinary Catheter Order is Present with a CLINICAL REASON to Maintain the Catheter. Provider is Required to Include a Clinical Reason to Maintain a Urinary Catheter    Chronic Wheeler Catheter Use (Present on Admission)  Assess for Continued Need & Document Medical Necessity  If Infection is Suspected, Contact the Provider       See Hyperspace for full Linked Orders Report.    01/03/24 1239    01/03/24 1237  Urinary Catheter Care  Every Shift,   Status:  Canceled      See Hyperspace for full Linked Orders Report.    01/03/24 1239    01/03/24 1204  Blood Gas, Arterial With Co-Ox  PROCEDURE ONCE         01/03/24 1204    01/03/24 1112  Blood Gas, Arterial With Co-Ox  PROCEDURE ONCE         01/03/24 1112    01/03/24 1026  Blood Gas, Arterial With Co-Ox  PROCEDURE ONCE         01/03/24 1024    01/03/24 0957  Blood Gas, Arterial With Co-Ox  PROCEDURE ONCE         01/03/24 0957    01/03/24 0900  sodium chloride 0.9 % flush 10 mL  Every 12 Hours Scheduled,   Status:  Discontinued         01/03/24 0544    01/03/24 0900  sodium chloride 0.9 % flush 10 mL  Every 12 Hours Scheduled,   Status:  Discontinued         01/03/24 0639    01/03/24 0838  electrolyte-A 1,000 mL with heparin (porcine) 1,000 Units mixture  As Needed,   Status:  Discontinued         01/03/24 0838    01/03/24 0838  sterile water irrigation solution  As Needed,   Status:  Discontinued         01/03/24 0838    01/03/24 0838  thrombin topical  As Needed,   Status:  Discontinued         01/03/24 0839    01/03/24 0837  sodium chloride 1,000 mL with vancomycin 1,000 mg irrigation  As Needed,   Status:  Discontinued         01/03/24 0837    01/03/24 0837  bupivacaine-EPINEPHrine PF 20 mL, bupivacaine liposome 20 mL mixture  As Needed,   Status:  Discontinued         01/03/24 0837    01/03/24 0837  sodium chloride (NS) irrigation solution  As Needed,   Status:  Discontinued         01/03/24 0837    01/03/24 0837  sodium chloride 500  mL with papaverine 2 mL mixture  As Needed,   Status:  Discontinued         01/03/24 0838    01/03/24 0803  Blood Gas, Arterial With Co-Ox  PROCEDURE ONCE         01/03/24 0802    01/03/24 0730  Insert Indwelling Urinary Catheter  Once,   Status:  Canceled        Comments: Follow Protocol As Outlined in Process Instructions (Open Order Report to View Full Instructions)    01/03/24 0739    01/03/24 0722  OR Blood Pickup  Once,   Status:  Canceled         01/03/24 0722    01/03/24 0712  lactated ringers infusion 1,000 mL  Continuous,   Status:  Discontinued         01/03/24 0710    01/03/24 0711  Follow Anesthesia Guidelines / Protocol  Once,   Status:  Canceled         01/03/24 0710    01/03/24 0643  POC Glucose Once  PROCEDURE ONCE        Comments: Complete no more than 45 minutes prior to patient eating      01/03/24 0630    01/03/24 0641  lactated ringers infusion  Continuous,   Status:  Discontinued         01/03/24 0639    01/03/24 0641  midazolam (VERSED) injection 2 mg  Once         01/03/24 0639    01/03/24 0641  fentaNYL citrate (PF) (SUBLIMAZE) injection 50 mcg  Once,   Status:  Discontinued         01/03/24 0639    01/03/24 0640  Oxygen Therapy- Nasal Cannula; Titrate 1-6 LPM Per SpO2; 90 - 95%  Continuous,   Status:  Canceled         01/03/24 0639    01/03/24 0640  Pulse Oximetry, Continuous  Continuous,   Status:  Canceled         01/03/24 0639    01/03/24 0640  Insert Peripheral IV  Once,   Status:  Canceled         01/03/24 0639    01/03/24 0640  Saline Lock & Maintain IV Access  Continuous,   Status:  Canceled         01/03/24 0639    01/03/24 0639  Vital Signs - Per Anesthesia Protocol  As Needed,   Status:  Canceled       01/03/24 0639    01/03/24 0639  sodium chloride 0.9 % flush 10 mL  As Needed,   Status:  Discontinued         01/03/24 0639    01/03/24 0639  fentaNYL citrate (PF) (SUBLIMAZE) injection 25 mcg  Every 5 Minutes PRN,   Status:  Discontinued         01/03/24 0639    01/03/24 0639   midazolam (VERSED) injection 1 mg  Every 10 Minutes PRN,   Status:  Discontinued         01/03/24 0639    01/03/24 0639  dextrose (D50W) (25 g/50 mL) IV injection 12.5 g  As Needed,   Status:  Discontinued         01/03/24 0639    01/03/24 0633  Intra-Op TAVR Transesophageal Echo  Once         01/03/24 0633    01/03/24 0629  Type & Screen  Once         01/03/24 0628    01/03/24 0546  insulin regular (HumuLIN R,NovoLIN R) 100 Units in sodium chloride 0.9 % 100 mL (1 Units/mL) infusion  Titrated,   Status:  Discontinued        Note to Pharmacy: Upon initiation of Glucommander insulin drip, make sure all other insulin orders and anti-diabetic medications have been discontinued.    01/03/24 0544    01/03/24 0546  acetaminophen (TYLENOL) tablet 1,000 mg  Once         01/03/24 0544    01/03/24 0546  mupirocin (BACTROBAN) 2 % nasal ointment 1 application   Once         01/03/24 0544    01/03/24 0546  ceFAZolin 2000 mg IVPB in 100 mL NS (MBP)  Once         01/03/24 0544    01/03/24 0545  Follow Anesthesia Guidelines / Protocol  Once,   Status:  Canceled         01/03/24 0544    01/03/24 0545  STS Risk Score has been calculated and discussed with the patient and family  Continuous,   Status:  Canceled         01/03/24 0544    01/03/24 0545  Obtain Informed Consent  Once,   Status:  Canceled         01/03/24 0544    01/03/24 0545  Verify NPO Status  Continuous,   Status:  Canceled         01/03/24 0544    01/03/24 0545  Place Sequential Compression Device  Once,   Status:  Canceled         01/03/24 0544    01/03/24 0545  Chlorhexidine Shower / Bath After Skin Prep  Once,   Status:  Canceled        Comments: Chlorhexidine Skin Prep and Instructions For All Patients Having A Procedure Requiring an Outward Incision if Not Allergic.  If Allergic, Give Antibacterial Skin Wipes and Instructions.  Do Not Use For Facial Cases or on Any Mucus Membranes.    01/03/24 0544    01/03/24 0545  Clip Hair Chin to Ankles  Once,   Status:   Canceled         01/03/24 0544    01/03/24 0545  Additional Pre-Op Care Order / Instruction  Continuous,   Status:  Canceled         01/03/24 0544    01/03/24 0545  Notify Physician - Standard  Until Discontinued,   Status:  Canceled         01/03/24 0544    01/03/24 0545  Prepare RBC, 2 Units  Blood - Once         01/03/24 0544    01/03/24 0545  Prepare Platelet Pheresis, 1 Units  Blood - Once,   Status:  Canceled         01/03/24 0544    01/03/24 0545  Prepare Fresh Frozen Plasma, 2 Units  Blood - Once,   Status:  Canceled         01/03/24 0544    01/03/24 0545  Insert Peripheral IV x2  Once,   Status:  Canceled         01/03/24 0544    01/03/24 0545  Saline Lock & Maintain IV Access  Continuous,   Status:  Canceled         01/03/24 0544    01/03/24 0544  sodium chloride 0.9 % flush 10 mL  As Needed,   Status:  Discontinued         01/03/24 0544    01/03/24 0544  sodium chloride 0.9 % infusion 40 mL  As Needed,   Status:  Discontinued         01/03/24 0544    01/03/24 0544  sodium chloride 0.9 % flush 30 mL  Once As Needed,   Status:  Discontinued         01/03/24 0544    01/03/24 0544  sodium chloride 0.9 % infusion  Continuous PRN,   Status:  Discontinued         01/03/24 0544    01/03/24 0544  dextrose (GLUTOSE) oral gel 15 g  Every 15 Minutes PRN,   Status:  Discontinued         01/03/24 0544    01/03/24 0544  dextrose (D50W) (25 g/50 mL) IV injection 10-50 mL  Every 15 Minutes PRN,   Status:  Discontinued         01/03/24 0544    01/03/24 0544  glucagon (GLUCAGEN) injection 1 mg  Every 15 Minutes PRN,   Status:  Discontinued         01/03/24 0544    Unscheduled  Check Peripheral Pulses As Needed  As Needed       01/03/24 1311    Unscheduled  Cardiac Output Parameters PRN Until 24 Hours Post-Op  As Needed       01/03/24 1311    Unscheduled  Insert Nasogastric Tube If Indicated & Not Already in Place  As Needed      Comments: Indications: Nausea, Vomiting, Prolonged Intubation or to Administer  Medications  Attach to Low Wall Suction if Any Residual    01/03/24 1311    Unscheduled  Wound Care  As Needed       01/03/24 1311    Unscheduled  Dangle at Bedside After Extubation  As Needed       01/03/24 1311    Unscheduled  Up in Chair As Tolerated After Extubation  As Needed       01/03/24 1311    Unscheduled  Oxygen Therapy- Nasal Cannula; 2 LPM  Continuous PRN       01/03/24 1311    Unscheduled  Blood Gas, Arterial -  As Needed      Comments: Obtain ABG as needed for ventilator changes      01/03/24 1311    Unscheduled  Blood Gas, Arterial -  As Needed      Comments: Prior to Extubation      01/03/24 1311    Unscheduled  Oxygen Therapy- Nasal Cannula; Titrate 1-6 LPM Per SpO2; 90 - 95%  Continuous PRN       01/03/24 1311    Unscheduled  CBC & Differential  As Needed        Comments: Chest Tube Drainage Greater Than 100mL/hr      01/03/24 1311    Unscheduled  aPTT  As Needed        Comments: Chest Tube Drainage Greater Than 100mL/hr      01/03/24 1311    Unscheduled  Protime-INR  As Needed        Comments: Chest Tube Drainage Greater Than 100mL/hr      01/03/24 1311    Unscheduled  Fibrin Split Products  As Needed        Comments: Chest Tube Drainage Greater Than 100mL/hr      01/03/24 1311    Unscheduled  Fibrinogen  As Needed        Comments: Chest Tube Drainage Greater Than 100mL/hr      01/03/24 1311    Unscheduled  Treat Hypoglycemia As Recommended By Glucommander™ & Notify Provider of Treatment  As Needed      Comments: Follow Hypoglycemia Orders As Outlined in Process Instructions (Open Order Report to View Full Instructions)  Notify Provider Any Time Hypoglycemia Treatment is Administered    01/03/24 1311    Unscheduled  If Insulin Infusion is Paused - Follow Glucommander Instructions  As Needed       01/03/24 1311    Unscheduled  POC Glucose PRN  As Needed      Comments: Glucommander recommended POC testing will vary between 15 minutes and 2 hours. Release PRN POC Glucose orders as needed.       01/03/24 1311                  Ventilator/Non-Invasive Ventilation Settings (From admission, onward)       Start     Ordered    01/03/24 1312  Ventilator - Vent Mode: Alternate (Custom) Mode: See Comments  (Initial Vent Settings upon arrival to ICU - PAD)  Continuous,   Status:  Canceled        Comments: Initial Mechanical Ventilation Settings upon Arrival to the ICU: SIMV, Tidal Volume 5-7 mL/kg ideal body weight based on height formula, Respiratory Rate 14-16/min, PEEP 5 cmH2O if <90kg, PEEP 8 cmH2O if >90kg, FiO2 60%.   Question:  Vent Mode  Answer:  Alternate (Custom) Mode: See Comments    01/03/24 1311    01/03/24 1312  Ventilator - Vent Mode: Alternate (Custom) Mode: See Comments  (Within 30 Minutes Arrival to ICU - PAD)  Continuous,   Status:  Canceled        Comments: RT adjusts Mechanical Ventilation as needed to achieve: pH 7.35-7.45, pCO2 34-45 mmHg, PaO2 > 90 mmHg, SpO2 > 92%.   Question:  Vent Mode  Answer:  Alternate (Custom) Mode: See Comments    01/03/24 1311                     Operative/Procedure Notes (all)        Papi Wilburn MD at 01/03/24 0808  Version 3 of 3         Cardiac Surgery Operative Note     Date of Procedure: 1/3/2024     Preoperative Diagnosis:   Coronary artery disease involving native coronary artery of native heart with unstable angina  Hyperlipidemia  Hypertension  Diabetes Mellitus, Type 2, On Insulin Poorly Controlled     Postoperative Diagnosis:  Same     Procedures Performed:  1. Coronary Artery Bypass, using arterial graft; two arterial grafts, CPT 96284  2. Coronary Artery bypass, using venous grafts and arterial grafts, 3 venous graft, CPT +94915     Procedure Summary: CABG x5 (LIMA to LAD, YOLANDA to OM2 through transverse sinus, SVG to OM1, SVG to Diagonal Artery, SVG to R-PDA)      Surgeon:  Papi Wilburn MD  Assistants:  Kelsey Sinclair CFA  Anesthesia Staff:  Tal Braxton MD  Anesthesia Type:  General     Estimated Blood Loss:  Minimal (Cell Saver)      Drains:  1. 24 Lao Randy drain-left pleural space  2. 24 Lao Randy drains x2-anterior and posterior mediastinum  3. 24 Lao Randy drain - right pleural space     Specimens:  None     Operative Indications: Mr. Nichols is a 48-year-old male who presented to me 1 year ago with unstable angina and severe multivessel coronary disease.  We tried to set him up for coronary bypass grafting but he did not want to proceed without non-COVID-19 vaccinated patient blood available for possible donor.  He set this up but then his A1c was uncontrolled and above 10 we therefore delayed his surgery.  He eventually had better A1c control and agreed to have blood transfusion for many donor if needed.  His coronary angiography showed severe multivessel disease he had low normal to mildly reduced LVEF and no significant valvular disease.  Discussed with him the risk and benefits of surgery understood and agreed to proceed.      Operative Findings: Excellent bilateral arterial conduit.  Marginal to poor venous conduit, limited conduit available due to small vein.  The LAD at site of grafting measured 1.25 mm in size and had  severe disease burden, grafted using LIMA.  OM2 measured 2 mm in size at site of grafting and had moderate atherosclerotic disease burden, grafted using YOLANDA insitu through transverse sinus.  OM 1 measured 1.5 mm in size at site of grafting and had moderate atherosclerotic disease burden, grafted using SVG.  The diagonal artery measured 1.8 mm in size at site of grafting, grafted using SVG.  The R-PDA was grafted using SVG, measured 2 mm in size at site of grafting with moderate atherosclerotic disease.  Transesophageal echocardiography showed normal LV dysfunction at the end of the case, normal LV function at the end of the case     Total aortic cross-clamp time: 90 minutes  Total cardiac bypass time:  105 minutes     Operative description in detail:  The patient was taken to the operative suite where the  patient was placed in a supine position.  Induction of general anesthesia and placement of a single-lumen endotracheal tube was performed without remark.  Appropriate arterial and venous access was established without remark.  A right IJ central venous catheter was placed followed by a [place] pulmonary artery catheter by anesthesia staff.  The patient was then prepped and draped in the usual and sterile surgical fashion.  A timeout was performed.  Perioperative antibiotics were administered.  Beta blocker was given.     A simultaneous team approach was used to harvest both internal mammary arteries as well as the right saphenous vein.  Right SV was harvested using standard endoscopic technique, lower vein was taken in open technique.  The left vein was examined that was too small for use.  Side branches were controlled with a combination of suture and clips.  Vein was marginal quality.  The leg wounds were hemostatic and were closed in anatomic layers using absorbable suture.     Median sternotomy was performed in standard fashion. Hemostasis was ensured along sternal edges.  A Rultract device was used to expose the posterior sternal table.  The left and right internal mammary arteries were taken down using a standard skeletonized technique with a combination of electrocautery and clips to control all side branches.  At that time, the patient was systemically anticoagulated with IV heparin.  A 24 Greenlandic Randy drain was placed in the left and right pleural space.  After suitable time of circulating heparin, clips were placed doubly onto the mammary arteries distally and they were divided proximal to the previously placed clips.  Both had excellent arterial inflow.  The mammary artery harvest beds were hemostatic.  The Rultract device was removed from the sterile field and a sternal retractor was used for exposure.         Midline mediastinal fat and thymus were divided and pericardium opened.  A pericardial well was  created.  The distal ascending aorta and right atrial appendage were cannulated for cardiopulmonary bypass in standard fashion.  Aortic line was tested and was satisfactory.  A combined cardioplegia/aortic root vent set was secured with a horizontal mattress 4-0 Prolene suture.  A retrograde cardioplegia catheter was inserted the coronary sinus to the free wall the right atrium.  With an appropriate ACT cardiopulmonary bypass was commenced.  I dissected out the LAD for suitable sites for bypass grafting.  The OM was examined and dissected out for suitable bypass site.  The R-PDA was dissected out for appropriate grafting site.  I made a hole in the right pericardial pleural reflection and tunneled the YOLANDA in situ through the transverse sinus to the OM 2, easily reached.       With that, I proceeded to apply the aortic cross-clamp and administered cardioplegia utilizing standard cardioplegia.  This was given in an antegrade and retrograde fashion.  I ensured during the entire cardioplegia administration the LV did not distend.  Upon achieving electrical-mechanical arrest, I applied topical hypothermia to the ventricle and total standard dose was administered.  Cardioplegia was given after every anastomosis with combination of graft, retrograde and antegrade cardioplegia.     I then directed my attention to the R-PDA.  Coronary arteriotomy was made and augmented to size with Slaughter scissors.  The saphenous vein graft was prepared, it was anastomosed in end-to-side fashion using a running 7-0 Prolene suture.  Anastomosis was assessed for hemostasis which was excellent.  It was not tense or torsed.  I then measured the vein graft with heart full for proximal anastomosis.  Aortotomy was made and augmented to size using a 4 mm punch.  Proximal anastomosis was made in an end-to-side fashion using a running 6-0 Prolene suture.  Hemostasis was excellent and the aortic root was de-aired while tying the suture.    I then  directed my attention to the OM2.  Coronary arteriotomy was made and augmented to size with Slaughter scissors.  The YOLANDA in situ was prepared, it was anastomosed in end-to-side fashion using a running 7-0 Prolene suture.  Anastomosis was assessed for hemostasis which was excellent.  It was not tense or torsed.       I then directed my attention to the OM1.  Coronary arteriotomy was made and augmented to size with Slaughter scissors.  The saphenous vein graft was prepared, it was anastomosed in end-to-side fashion using a running 7-0 Prolene suture.  Anastomosis was assessed for hemostasis which was excellent.  It was not tense or torsed.  I then measured the vein graft with heart full for proximal anastomosis.  Aortotomy was made and augmented to size using a 4 mm punch.  Proximal anastomosis was made in an end-to-side fashion using a running 6-0 Prolene suture.  Hemostasis was excellent and the aortic root was de-aired while tying the suture.      I then directed my attention to the diagonal artery.  Coronary arteriotomy was made and augmented to size with Slaughter scissors.  The saphenous vein graft was prepared, it was anastomosed in end-to-side fashion using a running 7-0 Prolene suture.  Anastomosis was assessed for hemostasis which was excellent.  It was not tense or torsed.  I then measured the vein graft with heart full for proximal anastomosis.  Aortotomy was made and augmented to size using a 4 mm punch.  Proximal anastomosis was made in an end-to-side fashion using a running 6-0 Prolene suture.  Hemostasis was excellent and the aortic root was de-aired while tying the suture.    I directed my attention towards the LAD.  A coronary arteriotomy was made and augmented to size with Slaughter scissors.  It is as per operative findings.  The LIMA was prepared.  The anastomosis was constructed in an end-to-side orientation with running 7-0 Prolene suture.  The anastomosis was assessed for hemostasis which was excellent. It is  without tension or torsion.      With that being accomplished, a terminal hotshot was administered.  The patient was placed in Trendelenburg position.  Upon completion of terminal hotshot and placement of temporary epicardial pacing wires, with the aortic vent on high and pump flows diminished, the aortic cross-clamp was released.  With that, full support was implemented.  A nonworking beating phase was implemented.  Ventilation restored.  I surveyed each graft and each anastomosis was hemostatic and had excellent lay.  With all in readiness, the heart was allowed to fill and then weaned off cardiopulmonary bypass.  We removed the aortic root vent/cardioplegia cannula set.  Its associated pursestring suture was tied securely and this was reinforced as per matter of routine.       I decannulated the venous line and snared down its associated pursestring suture.  Systemic intravenous protamine was administered.  All associated blood volume was returned to the patient. With continued good hemodynamics, I decannulated the arterial line and tied down its associated pursestring sutures.  Aortic cannulation site was reinforced as per routine.  At this time I tied down the previously snared pursestring suture.  The mediastinum was drained with 24 Indonesian Randy drains placed anteriorly and posteriorly.  I surveyed the chest and hemostasis was pristine.  The sternum was reapproximated with the sterna-loc XP plating system.  In layers anatomically the soft tissue planes were reapproximated. Instruments, sharps, and sponge counts were reported as correct.       Complications: None     Disposition: Transferred to ICU in stable and guarded condition.     Papi Wilburn MD  Cardiothoracic Surgeon    Electronically signed by Papi Wilburn MD at 01/03/24 6554       Papi Wilburn MD at 01/03/24 0808  Version 2 of 3         Cardiac Surgery Operative Note     Date of Procedure: 1/3/2024     Preoperative Diagnosis:   Coronary artery  disease involving native coronary artery of native heart with unstable angina  Hyperlipidemia  Hypertension  Diabetes Mellitus, Type 2, On Insulin Poorly Controlled     Postoperative Diagnosis:  Same     Procedures Performed:  1. Coronary Artery Bypass, using arterial graft; two arterial grafts, CPT 24172  2. Coronary Artery bypass, using venous grafts and arterial grafts, 3 venous graft, CPT +03300     Procedure Summary: CABG x5 (LIMA to LAD, YOLANDA to OM2 through transverse sinus, SVG to OM1, SVG to Diagonal Artery, SVG to R-PDA)      Surgeon:  Papi Wilburn MD  Assistants:  Kelsey Sinclair CFA  Anesthesia Staff:  Tal Braxton MD  Anesthesia Type:  General     Estimated Blood Loss:  Minimal (Cell Saver)     Drains:  1. 24 Trinidadian Randy drain-left pleural space  2. 24 Trinidadian Randy drains x2-anterior and posterior mediastinum  3. 24 Trinidadian Randy drain - right pleural space     Specimens:  None     Operative Indications: Mr. Nichols is a 48-year-old male who presented to me 1 year ago with unstable angina and severe multivessel coronary disease.  We tried to set him up for coronary bypass grafting but he did not want to proceed without non-COVID-19 vaccinated patient blood available for possible donor.  He set this up but then his A1c was uncontrolled and above 10 we therefore delayed his surgery.  He eventually had better A1c control and agreed to have blood transfusion for many donor if needed.  His coronary angiography showed severe multivessel disease he had low normal to mildly reduced LVEF and no significant valvular disease.  Discussed with him the risk and benefits of surgery understood and agreed to proceed.      Operative Findings: Excellent bilateral arterial conduit.  Good venous conduit, limited conduit available as left lower leg only usable vein.  The LAD at site of grafting measured 2 mm in size and had mild disease burden, grafted using LIMA.  OM measured 2 mm in size at site of grafting and had  mild atherosclerotic disease burden, grafted using SVG.  The R-PDA was grafted using YOLANDA in situ, measured 2 mm in size at site of grafting with moderate atherosclerotic disease.  Transesophageal echocardiography showed normal LV dysfunction at the end of the case, normal LV function at the end of the case     Total aortic cross-clamp time:  52 minutes  Total cardiac bypass time:  70 minutes     Operative description in detail:  The patient was taken to the operative suite where the patient was placed in a supine position.  Induction of general anesthesia and placement of a single-lumen endotracheal tube was performed without remark.  Appropriate arterial and venous access was established without remark.  A right IJ central venous catheter was placed followed by a [place] pulmonary artery catheter by anesthesia staff.  The patient was then prepped and draped in the usual and sterile surgical fashion.  A timeout was performed.  Perioperative antibiotics were administered.  Beta blocker was given.     A simultaneous team approach was used to harvest both internal mammary arteries as well as the left saphenous vein.  The right vein was examined that was too small for use.  The vein was harvested using open skip technique on the left leg.  Side branches were controlled with a combination of suture and clips.  Vein was good quality.  The leg wounds were hemostatic and were closed in anatomic layers using absorbable suture.     Median sternotomy was performed in standard fashion. Hemostasis was ensured along sternal edges.  A Rultract device was used to expose the posterior sternal table.  The left and right internal mammary arteries were taken down using a standard skeletonized technique with a combination of electrocautery and clips to control all side branches.  At that time, the patient was systemically anticoagulated with IV heparin.  A 24 Portuguese Randy drain was placed in the left and right pleural space.  After  suitable time of circulating heparin, clips were placed doubly onto the mammary arteries distally and they were divided proximal to the previously placed clips.  Both had excellent arterial inflow.  The mammary artery harvest beds were hemostatic.  The Rultract device was removed from the sterile field and a sternal retractor was used for exposure.         Midline mediastinal fat and thymus were divided and pericardium opened.  A pericardial well was created.  The distal ascending aorta and right atrial appendage were cannulated for cardiopulmonary bypass in standard fashion.  Aortic line was tested and was satisfactory.  A combined cardioplegia/aortic root vent set was secured with a horizontal mattress 4-0 Prolene suture.  A retrograde cardioplegia catheter was inserted the coronary sinus to the free wall the right atrium.  With an appropriate ACT cardiopulmonary bypass was commenced.  I dissected out the LAD for suitable sites for bypass grafting.  The OM was examined and dissected out for suitable bypass site.  The R-PDA was dissected out for appropriate grafting site.  I made a hole in the right pericardial pleural reflection and tunneled the YOLANDA in situ to R-PDA, easily reached.       With that, I proceeded to apply the aortic cross-clamp and administered cardioplegia utilizing standard cardioplegia.  This was given in an antegrade and retrograde fashion.  I ensured during the entire cardioplegia administration the LV did not distend.  Upon achieving electrical-mechanical arrest, I applied topical hypothermia to the ventricle and total standard dose was administered.  Cardioplegia was given after every anastomosis with combination of graft, retrograde and antegrade cardioplegia.     I then directed my attention to the R-PDA.  Coronary arteriotomy was made and augmented to size with Slaughter scissors.  The YOLANDA in situ was prepared, it was anastomosed in end-to-side fashion using a running 7-0 Prolene suture.   Anastomosis was assessed for hemostasis which was excellent.  It was not tense or torsed.       I then directed my attention to the OM.  Coronary arteriotomy was made and augmented to size with Slaughter scissors.  The saphenous vein graft was prepared, it was anastomosed in end-to-side fashion using a running 7-0 Prolene suture.  Anastomosis was assessed for hemostasis which was excellent.  It was not tense or torsed.  I then measured the vein graft with heart full for proximal anastomosis.  Aortotomy was made and augmented to size using a 4 mm punch.  Proximal anastomosis was made in an end-to-side fashion using a running 6-0 Prolene suture.  Hemostasis was excellent and the aortic root was de-aired while tying the suture.      I directed my attention towards the LAD.  A coronary arteriotomy was made and augmented to size with Slaughter scissors.  It is as per operative findings.  The LIMA was prepared.  The anastomosis was constructed in an end-to-side orientation with running 7-0 Prolene suture.  The anastomosis was assessed for hemostasis which was excellent. It is without tension or torsion.      With that being accomplished, a terminal hotshot was administered.  The patient was placed in Trendelenburg position.  Upon completion of terminal hotshot and placement of temporary epicardial pacing wires, with the aortic vent on high and pump flows diminished, the aortic cross-clamp was released.  With that, full support was implemented.  A nonworking beating phase was implemented.  Ventilation restored.  I surveyed each graft and each anastomosis was hemostatic and had excellent lay.  With all in readiness, the heart was allowed to fill and then weaned off cardiopulmonary bypass.  We removed the aortic root vent/cardioplegia cannula set.  Its associated pursestring suture was tied securely and this was reinforced as per matter of routine.       The table was now placed in neutral position.  I decannulated the venous line  and snared down its associated pursestring suture.  Systemic intravenous protamine was administered.  All associated blood volume was returned to the patient. With continued good hemodynamics, I decannulated the arterial line and tied down its associated pursestring sutures.  Aortic cannulation site was reinforced as per routine.  At this time I tied down the previously snared pursestring suture.  The mediastinum was drained with 24 Italian Randy drains placed anteriorly and posteriorly.  I surveyed the chest and hemostasis was pristine.  The sternum was reapproximated with the sterna-loc XP plating system.  In layers anatomically the soft tissue planes were reapproximated. Instruments, sharps, and sponge counts were reported as correct.       Complications: None     Disposition: Transferred to ICU in stable and guarded condition.     Papi Wilburn MD  Cardiothoracic Surgeon    Electronically signed by Papi Wilburn MD at 01/03/24 1504       Papi Wilburn MD at 01/03/24 0837  Version 1 of 3         Cardiac Surgery Operative Note     Date of Procedure: 1/3/2024     Preoperative Diagnosis:   Coronary artery disease involving native coronary artery of native heart with unstable angina  Hyperlipidemia  Hypertension  Diabetes Mellitus, Type 2, On Insulin Poorly Controlled     Postoperative Diagnosis:  Same     Procedures Performed:  1. Coronary Artery Bypass, using arterial graft; two arterial grafts, CPT 50658  2. Coronary Artery bypass, using venous grafts and arterial grafts, 3 venous graft, CPT +74492     Procedure Summary: CABG x4 (LIMA to LAD, YOLANDA to OM2 through transverse sinus, SVG to OM1, SVG to Diagonal Artery, SVG to R-PDA)      Surgeon:  Papi Wilburn MD  Assistants:  Kelsey Sinclair CFA  Anesthesia Staff:  Tal Braxton MD  Anesthesia Type:  General     Estimated Blood Loss:  Minimal (Cell Saver)     Drains:  1. 24 Italian Randy drain-left pleural space  2. 24 Italian Randy drains x2-anterior and  posterior mediastinum  3. 24 Tajik Randy drain - right pleural space     Specimens:  None     Operative Indications: Mr. Nichols is a 48-year-old male who presented to me 1 year ago with unstable angina and severe multivessel coronary disease.  We tried to set him up for coronary bypass grafting but he did not want to proceed without non-COVID-19 vaccinated patient blood available for possible donor.  He set this up but then his A1c was uncontrolled and above 10 we therefore delayed his surgery.  He eventually had better A1c control and agreed to have blood transfusion for many donor if needed.  His coronary angiography showed severe multivessel disease he had low normal to mildly reduced LVEF and no significant valvular disease.  Discussed with him the risk and benefits of surgery understood and agreed to proceed.      Operative Findings: Excellent bilateral arterial conduit.  Good venous conduit, limited conduit available as left lower leg only usable vein.  The LAD at site of grafting measured 2 mm in size and had mild disease burden, grafted using LIMA.  OM measured 2 mm in size at site of grafting and had mild atherosclerotic disease burden, grafted using SVG.  The R-PDA was grafted using YOLANDA in situ, measured 2 mm in size at site of grafting with moderate atherosclerotic disease.  Transesophageal echocardiography showed normal LV dysfunction at the end of the case, normal LV function at the end of the case     Total aortic cross-clamp time:  52 minutes  Total cardiac bypass time:  70 minutes     Operative description in detail:  The patient was taken to the operative suite where the patient was placed in a supine position.  Induction of general anesthesia and placement of a single-lumen endotracheal tube was performed without remark.  Appropriate arterial and venous access was established without remark.  A right IJ central venous catheter was placed followed by a [place] pulmonary artery catheter by anesthesia  staff.  The patient was then prepped and draped in the usual and sterile surgical fashion.  A timeout was performed.  Perioperative antibiotics were administered.  Beta blocker was given.     A simultaneous team approach was used to harvest both internal mammary arteries as well as the left saphenous vein.  The right vein was examined that was too small for use.  The vein was harvested using open skip technique on the left leg.  Side branches were controlled with a combination of suture and clips.  Vein was good quality.  The leg wounds were hemostatic and were closed in anatomic layers using absorbable suture.     Median sternotomy was performed in standard fashion. Hemostasis was ensured along sternal edges.  A Rultract device was used to expose the posterior sternal table.  The left and right internal mammary arteries were taken down using a standard skeletonized technique with a combination of electrocautery and clips to control all side branches.  At that time, the patient was systemically anticoagulated with IV heparin.  A 24 Hungarian Randy drain was placed in the left and right pleural space.  After suitable time of circulating heparin, clips were placed doubly onto the mammary arteries distally and they were divided proximal to the previously placed clips.  Both had excellent arterial inflow.  The mammary artery harvest beds were hemostatic.  The Rultract device was removed from the sterile field and a sternal retractor was used for exposure.         Midline mediastinal fat and thymus were divided and pericardium opened.  A pericardial well was created.  The distal ascending aorta and right atrial appendage were cannulated for cardiopulmonary bypass in standard fashion.  Aortic line was tested and was satisfactory.  A combined cardioplegia/aortic root vent set was secured with a horizontal mattress 4-0 Prolene suture.  A retrograde cardioplegia catheter was inserted the coronary sinus to the free wall the  right atrium.  With an appropriate ACT cardiopulmonary bypass was commenced.  I dissected out the LAD for suitable sites for bypass grafting.  The OM was examined and dissected out for suitable bypass site.  The R-PDA was dissected out for appropriate grafting site.  I made a hole in the right pericardial pleural reflection and tunneled the YOLANDA in situ to R-PDA, easily reached.       With that, I proceeded to apply the aortic cross-clamp and administered cardioplegia utilizing standard cardioplegia.  This was given in an antegrade and retrograde fashion.  I ensured during the entire cardioplegia administration the LV did not distend.  Upon achieving electrical-mechanical arrest, I applied topical hypothermia to the ventricle and total standard dose was administered.  Cardioplegia was given after every anastomosis with combination of graft, retrograde and antegrade cardioplegia.     I then directed my attention to the R-PDA.  Coronary arteriotomy was made and augmented to size with Slaughter scissors.  The YOLANDA in situ was prepared, it was anastomosed in end-to-side fashion using a running 7-0 Prolene suture.  Anastomosis was assessed for hemostasis which was excellent.  It was not tense or torsed.       I then directed my attention to the OM.  Coronary arteriotomy was made and augmented to size with Slaughter scissors.  The saphenous vein graft was prepared, it was anastomosed in end-to-side fashion using a running 7-0 Prolene suture.  Anastomosis was assessed for hemostasis which was excellent.  It was not tense or torsed.  I then measured the vein graft with heart full for proximal anastomosis.  Aortotomy was made and augmented to size using a 4 mm punch.  Proximal anastomosis was made in an end-to-side fashion using a running 6-0 Prolene suture.  Hemostasis was excellent and the aortic root was de-aired while tying the suture.      I directed my attention towards the LAD.  A coronary arteriotomy was made and augmented  to size with Slaughter scissors.  It is as per operative findings.  The LIMA was prepared.  The anastomosis was constructed in an end-to-side orientation with running 7-0 Prolene suture.  The anastomosis was assessed for hemostasis which was excellent. It is without tension or torsion.      With that being accomplished, a terminal hotshot was administered.  The patient was placed in Trendelenburg position.  Upon completion of terminal hotshot and placement of temporary epicardial pacing wires, with the aortic vent on high and pump flows diminished, the aortic cross-clamp was released.  With that, full support was implemented.  A nonworking beating phase was implemented.  Ventilation restored.  I surveyed each graft and each anastomosis was hemostatic and had excellent lay.  With all in readiness, the heart was allowed to fill and then weaned off cardiopulmonary bypass.  We removed the aortic root vent/cardioplegia cannula set.  Its associated pursestring suture was tied securely and this was reinforced as per matter of routine.       The table was now placed in neutral position.  I decannulated the venous line and snared down its associated pursestring suture.  Systemic intravenous protamine was administered.  All associated blood volume was returned to the patient. With continued good hemodynamics, I decannulated the arterial line and tied down its associated pursestring sutures.  Aortic cannulation site was reinforced as per routine.  At this time I tied down the previously snared pursestring suture.  The mediastinum was drained with 24 Khmer Randy drains placed anteriorly and posteriorly.  I surveyed the chest and hemostasis was pristine.  The sternum was reapproximated with the sterna-loc XP plating system.  In layers anatomically the soft tissue planes were reapproximated. Instruments, sharps, and sponge counts were reported as correct.       Complications: None     Disposition: Transferred to ICU in stable and  guarded condition.     Papi Wilburn MD  Cardiothoracic Surgeon    Electronically signed by Papi Wilburn MD at 01/03/24 7944       Physician Progress Notes (last 48 hours)  Notes from 01/02/24 0609 through 01/04/24 0609   No notes of this type exist for this encounter.       Consult Notes (last 48 hours)  Notes from 01/02/24 0609 through 01/04/24 0609   No notes of this type exist for this encounter.

## 2024-01-04 NOTE — PLAN OF CARE
"Goal Outcome Evaluation:      Patient stood with Min assist. Ambulated 25' with min to mod assist, very wide stance and stop and go gait. Patient stopped and would not move or talk. Attempted to take a step again, not able, very dizzy. Chair brought and patient returned to room. BP began at 112/66 - 88/54 - 83/58 - 112/64.  HR 92 - 76 -93.Those are sitting, standing, after first sat down and then BP after a couple minutes. Patient then stood and Marched in place for 12 steps. Patient voiced his disappointment and \"really want to do better\". Patient will benefit from safety education and increased ambulation.                  "

## 2024-01-04 NOTE — THERAPY EVALUATION
Patient Name: Speedy Nichols  : 1975    MRN: [id number]                              Today's Date: 2024       Admit Date: 1/3/2024    Visit Dx:     ICD-10-CM ICD-9-CM   1. Impaired mobility [Z74.09]  Z74.09 799.89   2. Coronary artery disease involving native coronary artery of native heart without angina pectoris  I25.10 414.01     Patient Active Problem List   Diagnosis    Type 2 diabetes mellitus with hyperglycemia, with long-term current use of insulin    Essential hypertension    Chronic systolic heart failure    Coronary artery disease involving native coronary artery of native heart without angina pectoris    CAD (coronary artery disease), native coronary artery     Past Medical History:   Diagnosis Date    Anxiety     CHF (congestive heart failure)     Coronary artery disease     Diabetes mellitus     Hypertension     Type 2 diabetes mellitus      Past Surgical History:   Procedure Laterality Date    CARDIAC CATHETERIZATION N/A 2022    Procedure: Left Heart Cath;  Surgeon: Gopal King MD;  Location:  [facility];  Service: Cardiology;  Laterality: N/A;    CARDIAC CATHETERIZATION N/A 2023    Procedure: Left Heart Cath;  Surgeon: Gopal King MD;  Location:  [facility];  Service: Cardiology;  Laterality: N/A;    CORONARY ARTERY BYPASS GRAFT N/A 1/3/2024    Procedure: CORONARY ARTERY BYPASS GRAFTING X5, BILATERAL INTERNAL MAMMARY ARTERY GRAFT, RIGHT UPPER LEG ENDOSCOPIC VEIN HARVEST, RIGHT LOWER LEG OPEN VEIN HARVEST, LEFT LEG ABORTED VEIN HARVEST, TRANSESOPHAGEAL ECHOCARDIOGRAM, XP STERNAL PLATING;  Surgeon: Papi Wilburn MD;  Location:  [facility] OR;  Service: Cardiothoracic;  Laterality: N/A;    TOOTH EXTRACTION Right 2022    WISDOM TOOTH EXTRACTION        General Information       Row Name 24 0758          Physical Therapy Time and Intention    Document Type evaluation  s/p 1/3 CABG x 5, R EVH/OVH, sternal plating.   -KAILEE     Mode of Treatment physical therapy  -KAILEE       Row Name 01/04/24 0758          General Information    Patient Profile Reviewed yes  -KAILEE     Prior Level of Function independent:;all household mobility;ADL's  -KAILEE     Existing Precautions/Restrictions fall;oxygen therapy device and L/min;sternal  chest tube x 2  -KAILEE     Barriers to Rehab medically complex  -KAILEE       Row Name 01/04/24 0758          Living Environment    People in Home spouse  -KAILEE       Row Name 01/04/24 0758          Home Main Entrance    Number of Stairs, Main Entrance none  -KAILEE       Row Name 01/04/24 0758          Stairs Within Home, Primary    Number of Stairs, Within Home, Primary none  -KAILEE       Row Name 01/04/24 0758          Cognition    Orientation Status (Cognition) oriented to;person;place;situation  -KAILEE       Row Name 01/04/24 0758          Safety Issues, Functional Mobility    Impairments Affecting Function (Mobility) balance;endurance/activity tolerance;strength;shortness of breath;pain  -KAILEE               User Key  (r) = Recorded By, (t) = Taken By, (c) = Cosigned By      Initials Name Provider Type    KAILEE Americo Zepeda, PT DPT Physical Therapist                   Mobility       Row Name 01/04/24 0758          Bed Mobility    Comment, (Bed Mobility) in chair  -KAILEE       Row Name 01/04/24 0758          Sit-Stand Transfer    Sit-Stand [place] (Transfers) moderate assist (50% patient effort)  -KAILEE       Row Name 01/04/24 0758          Gait/Stairs (Locomotion)    [place] Level (Gait) moderate assist (50% patient effort)  -KAILEE     Distance in Feet (Gait) 60 ft  -KAILEE     Deviations/Abnormal Patterns (Gait) festinating/shuffling;sebastián decreased;gait speed decreased  -KAILEE     Bilateral Gait Deviations forward flexed posture  -KAILEE     Comment, (Gait/Stairs) forward flexed/anterior lean. Very small stop  and go steps, high fall risk   -KAILEE               User Key  (r) = Recorded By, (t) = Taken By, (c) = Cosigned By      Initials Name  Provider Type    Americo Petersen, PT DPT Physical Therapist                   Obj/Interventions       Row Name 01/04/24 0758          Range of Motion Comprehensive    Comment, General Range of Motion B LE AROM WFL  -KAILEE       Row Name 01/04/24 0758          Strength Comprehensive (MMT)    Comment, General Manual Muscle Testing (MMT) Assessment B LE grossly 3+/5  -KAILEE       Row Name 01/04/24 0758          Balance    Balance Assessment sitting dynamic balance;standing dynamic balance  -KAILEE     Dynamic Sitting Balance standby assist  -KAILEE     Position, Sitting Balance unsupported;sitting in chair  -KAILEE     Dynamic Standing Balance moderate assist  -KAILEE     Position/Device Used, Standing Balance supported  -KAILEE               User Key  (r) = Recorded By, (t) = Taken By, (c) = Cosigned By      Initials Name Provider Type    Americo Petersen, PT DPT Physical Therapist                   Goals/Plan       Row Name 01/04/24 0758          Bed Mobility Goal 1 (PT)    Activity/Assistive Device (Bed Mobility Goal 1, PT) sit to supine/supine to sit  -KAILEE     [place] Level/Cues Needed (Bed Mobility Goal 1, PT) supervision required  -KAILEE     Time Frame (Bed Mobility Goal 1, PT) long term goal (LTG);10 days  -KAILEE     Progress/Outcomes (Bed Mobility Goal 1, PT) new goal  -KAILEE       Row Name 01/04/24 0758          Transfer Goal 1 (PT)    Activity/Assistive Device (Transfer Goal 1, PT) sit-to-stand/stand-to-sit;bed-to-chair/chair-to-bed  -KAILEE     [place] Level/Cues Needed (Transfer Goal 1, PT) supervision required  -KAILEE     Time Frame (Transfer Goal 1, PT) long term goal (LTG);10 days  -KAILEE     Progress/Outcome (Transfer Goal 1, PT) new goal  -KAILEE       Row Name 01/04/24 0758          Gait Training Goal 1 (PT)    Activity/Assistive Device (Gait Training Goal 1, PT) gait (walking locomotion);decrease fall risk;improve balance and speed;increase endurance/gait distance;increase energy conservation  -KAILEE     [place] Level (Gait  Training Goal 1, PT) supervision required  -KAILEE     Distance (Gait Training Goal 1, PT) 200 ft  -KAILEE     Time Frame (Gait Training Goal 1, PT) long term goal (LTG);10 days  -KAILEE     Progress/Outcome (Gait Training Goal 1, PT) new goal  -KAILEE       Row Name 01/04/24 0758          Therapy Assessment/Plan (PT)    Planned Therapy Interventions (PT) bed mobility training;transfer training;gait training;balance training;home exercise program;patient/family education;postural re-education;stair training;strengthening  -KAILEE               User Key  (r) = Recorded By, (t) = Taken By, (c) = Cosigned By      Initials Name Provider Type    Americo Petersen, PT DPT Physical Therapist                   Clinical Impression       Row Name 01/04/24 0758          Pain    Pain Intervention(s) Repositioned;Ambulation/increased activity  -KAILEE     Additional Documentation Pain Scale: FACES Pre/Post-Treatment (Group)  -KAILEE       Row Name 01/04/24 0758          Pain Scale: FACES Pre/Post-Treatment    Pain: FACES Scale, Pretreatment 6-->hurts even more  -KAILEE     Pain Location incisional  -KAILEE     Pain Location - chest  -KAILEE       Row Name 01/04/24 0758          Plan of Care Review    Plan of Care Reviewed With patient  -KAILEE     Outcome Evaluation PT eval complete. He is lethargic but will awaken. He did not say many words and interact much during session. He was educated on sternal precautions. He needs mod assist to stand and mod assist to ambulate. Was able to ambulate 60 total ft. Demos a forward flexed/anterior lean. Very small stop and go steps. Needs cues to keep his eyes open. He was a high fall risk during this session. Once back sitting in the chair he demos poor postural control and would lean strong to the left. He was unable to correct this posture on his own. Took around 3 minutes for him to demo improved postural control. RN was notified on this session outcomes. PT will cont with mobility, balance and strengthening. I am hopeful he  will recover and d.c home with assist.  -KAILEE       Row Name 01/04/24 0758          Therapy Assessment/Plan (PT)    Patient/Family Therapy Goals Statement (PT) decrease pain  -KAILEE     Rehab Potential (PT) good, to achieve stated therapy goals  -KAILEE     Criteria for Skilled Interventions Met (PT) yes;meets criteria;skilled treatment is necessary  -KAILEE     Therapy Frequency (PT) 2 times/day  -KAILEE     Predicted Duration of Therapy Intervention (PT) unti ld.c  -KAILEE       Row Name 01/04/24 0758          Vital Signs    Pre Systolic BP Rehab 105  -KAILEE     Pre Treatment Diastolic BP 64  -KAILEE     Post Systolic BP Rehab 89  -KAILEE     Post Treatment Diastolic BP 63  -KIALEE     Pretreatment Heart Rate (beats/min) 84  -KAILEE     Posttreatment Heart Rate (beats/min) 85  -KAILEE     Pre SpO2 (%) 100  -KAILEE     O2 Delivery Pre Treatment nasal cannula  -KAILEE     O2 Delivery Intra Treatment nasal cannula  -KAILEE     Post SpO2 (%) 94  -KAILEE     O2 Delivery Post Treatment nasal cannula  -KAILEE     Pre Patient Position Sitting  -KAILEE     Intra Patient Position Standing  -KAILEE     Post Patient Position Sitting  -KAILEE       Row Name 01/04/24 0758          Positioning and Restraints    Pre-Treatment Position sitting in chair/recliner  -KAILEE     Post Treatment Position chair  -KAILEE     In Chair notified nsg;sitting;call light within reach;encouraged to call for assist;RUE elevated;LUE elevated;patient within staff view  -KAILEE               User Key  (r) = Recorded By, (t) = Taken By, (c) = Cosigned By      Initials Name Provider Type    Americo Petersen, PT DPT Physical Therapist                   Outcome Measures       Row Name 01/04/24 0758 01/04/24 0500       How much help from another person do you currently need...    Turning from your back to your side while in flat bed without using bedrails? 2  -KAILEE 2  -SE    Moving from lying on back to sitting on the side of a flat bed without bedrails? 2  -KAILEE 2  -SE    Moving to and from a bed to a chair (including a wheelchair)? 2  -KAILEE  3  -SE    Standing up from a chair using your arms (e.g., wheelchair, bedside chair)? 2  -KAILEE 3  -SE    Climbing 3-5 steps with a railing? 1  -KAILEE 2  -SE    To walk in hospital room? 2  -KAILEE 2  -SE    AM-PAC 6 Clicks Score (PT) 11  -KAILEE 14  -SE    Highest Level of Mobility Goal 4 --> Transfer to chair/commode  -KAILEE 4 --> Transfer to chair/commode  -SE      Row Name 01/04/24 0758          Functional Assessment    Outcome Measure Options AM-PAC 6 Clicks Basic Mobility (PT)  -KAILEE               User Key  (r) = Recorded By, (t) = Taken By, (c) = Cosigned By      Initials Name Provider Type    Americo Petersen, PT DPT Physical Therapist    Lawrence Navarrete RN Registered Nurse                                 Physical Therapy Education       Title: PT OT SLP Therapies (In Progress)       Topic: Physical Therapy (In Progress)       Point: Mobility training (In Progress)       Learning Progress Summary             Patient Acceptance, E, NR by KAILEE at 1/4/2024 0758    Comment: sternal prec, benefits of activity, progression of PT                         Point: Home exercise program (Not Started)       Learner Progress:  Not documented in this visit.              Point: Body mechanics (Not Started)       Learner Progress:  Not documented in this visit.              Point: Precautions (In Progress)       Learning Progress Summary             Patient Acceptance, E, NR by KAILEE at 1/4/2024 0758    Comment: sternal prec, benefits of activity, progression of PT                                         User Key       Initials Effective Dates Name Provider Type [facility] 02/03/23 -  Americo Zepeda, PT DPT Physical Therapist PT                  PT Recommendation and Plan  Planned Therapy Interventions (PT): bed mobility training, transfer training, gait training, balance training, home exercise program, patient/family education, postural re-education, stair training, strengthening  Plan of Care Reviewed With: patient  Outcome  Evaluation: PT eval complete. He is lethargic but will awaken. He did not say many words and interact much during session. He was educated on sternal precautions. He needs mod assist to stand and mod assist to ambulate. Was able to ambulate 60 total ft. Demos a forward flexed/anterior lean. Very small stop and go steps. Needs cues to keep his eyes open. He was a high fall risk during this session. Once back sitting in the chair he demos poor postural control and would lean strong to the left. He was unable to correct this posture on his own. Took around 3 minutes for him to demo improved postural control. RN was notified on this session outcomes. PT will cont with mobility, balance and strengthening. I am hopeful he will recover and d.c home with assist.     Time Calculation:         PT Charges       Row Name 01/04/24 0914             Time Calculation    Start Time 0758  -KAILEE      Stop Time 0856  -KAILEE      Time Calculation (min) 58 min  -KAILEE      PT Received On 01/04/24  -KAILEE      PT Goal Re-Cert Due Date 01/14/24  -KAILEE                User Key  (r) = Recorded By, (t) = Taken By, (c) = Cosigned By      Initials Name Provider Type    Americo Petersen, PT DPT Physical Therapist                  Therapy Charges for Today       Code Description Service Date Service Provider Modifiers Qty    [id number]  PT EVAL MOD COMPLEXITY 4 1/4/2024 Americo Zepeda PT DPT GP 1            PT G-Codes  Outcome Measure Options: AM-PAC 6 Clicks Basic Mobility (PT)  AM-PAC 6 Clicks Score (PT): 11  PT Discharge Summary  Anticipated Discharge Disposition (PT): home with assist, home with 24/7 care, sub acute care setting    Americo Zepeda, PT DPT  1/4/2024

## 2024-01-04 NOTE — PLAN OF CARE
Goal Outcome Evaluation:  Plan of Care Reviewed With: patient        Progress: improving  Outcome Evaluation: Extubated to 2LNC at 0301. Last CO/CI 4.8/2.3. CANTOR, AOx4. C/o pain treated with prn orders, pain improved but still present per patient. Tolerated transfer to standing scale and chair well. MST chest tube output 90mL this shift, Pleural chest tube 140mL this shift. Uop adequate. Bp running soft and requiring low dose of levophed, see MAR. Hgb remains unchanged at 8. All dressings CDI.

## 2024-01-04 NOTE — ANESTHESIA POSTPROCEDURE EVALUATION
"Patient: Speedy Nichols    Procedure Summary       Date: 01/03/24 Room / Location:  [facility] OR  /  [facility]    Anesthesia Start: 0714 Anesthesia Stop: 1317    Procedure: CORONARY ARTERY BYPASS GRAFTING X5, BILATERAL INTERNAL MAMMARY ARTERY GRAFT, RIGHT UPPER LEG ENDOSCOPIC VEIN HARVEST, RIGHT LOWER LEG OPEN VEIN HARVEST, LEFT LEG ABORTED VEIN HARVEST, TRANSESOPHAGEAL ECHOCARDIOGRAM, XP STERNAL PLATING (Chest) Diagnosis:       Coronary artery disease involving native coronary artery of native heart without angina pectoris      (Coronary artery disease involving native coronary artery of native heart without angina pectoris [I25.10])    Surgeons: Papi Wilburn MD Provider: Tal Braxton MD    Anesthesia Type: general, Betina, CVL, PAC ASA Status: 4            Anesthesia Type: general, Betina, CVL, PAC    Vitals  Vitals Value Taken Time   /64 01/04/24 0816   Temp 99.1 °F (37.3 °C) 01/04/24 0800   Pulse 87 01/04/24 0836   Resp 19 01/04/24 0800   SpO2 99 % 01/04/24 0830   Vitals shown include unfiled device data.        Post Anesthesia Care and Evaluation    Patient location during evaluation: PACU  Patient participation: complete - patient participated  Level of consciousness: awake and alert  Pain management: adequate    Airway patency: patent  Anesthetic complications: No anesthetic complications    Cardiovascular status: acceptable  Respiratory status: acceptable  Hydration status: acceptable    Comments: Blood pressure 111/76, pulse 89, temperature 99.1 °F (37.3 °C), temperature source Oral, resp. rate 19, height 183 cm (72.05\"), weight 90.3 kg (199 lb), SpO2 100%.    Pt discharged from PACU based on omkar score >8    "

## 2024-01-04 NOTE — PLAN OF CARE
Goal Outcome Evaluation:  Plan of Care Reviewed With: patient           Outcome Evaluation: PT eval complete. He is lethargic but will awaken. He did not say many words and interact much during session. He was educated on sternal precautions. He needs mod assist to stand and mod assist to ambulate. Was able to ambulate 60 total ft. Demos a forward flexed/anterior lean. Very small stop and go steps. Needs cues to keep his eyes open. He was a high fall risk during this session. Once back sitting in the chair he demos poor postural control and would lean strong to the left. He was unable to correct this posture on his own. Took around 3 minutes for him to demo improved postural control. RN was notified on this session outcomes. PT will cont with mobility, balance and strengthening. I am hopeful he will recover and d.c home with assist.      Anticipated Discharge Disposition (PT): home with assist, home with 24/7 care, sub acute care setting

## 2024-01-05 ENCOUNTER — APPOINTMENT (OUTPATIENT)
Dept: GENERAL RADIOLOGY | Facility: HOSPITAL | Age: 49
End: 2024-01-05
Payer: COMMERCIAL

## 2024-01-05 LAB
A-A DO2: 240.8 MMHG
A-A DO2: 29.2 MMHG
ANION GAP SERPL CALCULATED.3IONS-SCNC: 11 MMOL/L (ref 5–15)
ANION GAP SERPL CALCULATED.3IONS-SCNC: 11 MMOL/L (ref 5–15)
ARTERIAL PATENCY WRIST A: ABNORMAL
ATMOSPHERIC PRESS: 754 MMHG
ATMOSPHERIC PRESS: 756 MMHG
ATMOSPHERIC PRESS: 756 MMHG
BASE EXCESS BLDA CALC-SCNC: -4.8 MMOL/L (ref 0–2)
BASE EXCESS BLDA CALC-SCNC: -6.6 MMOL/L (ref 0–2)
BASE EXCESS BLDA CALC-SCNC: -8.5 MMOL/L (ref 0–2)
BDY SITE: ABNORMAL
BODY TEMPERATURE: 37
BUN SERPL-MCNC: 38 MG/DL (ref 6–20)
BUN SERPL-MCNC: 38 MG/DL (ref 6–20)
BUN/CREAT SERPL: 14.1 (ref 7–25)
BUN/CREAT SERPL: 15.8 (ref 7–25)
CA-I BLD-MCNC: 4.91 MG/DL (ref 4.6–5.4)
CALCIUM SPEC-SCNC: 9.2 MG/DL (ref 8.6–10.5)
CALCIUM SPEC-SCNC: 9.3 MG/DL (ref 8.6–10.5)
CHLORIDE SERPL-SCNC: 102 MMOL/L (ref 98–107)
CHLORIDE SERPL-SCNC: 106 MMOL/L (ref 98–107)
CO2 SERPL-SCNC: 21 MMOL/L (ref 22–29)
CO2 SERPL-SCNC: 21 MMOL/L (ref 22–29)
COHGB MFR BLD: 1.2 % (ref 0–5)
COHGB MFR BLD: 1.3 % (ref 0–5)
CREAT SERPL-MCNC: 2.41 MG/DL (ref 0.76–1.27)
CREAT SERPL-MCNC: 2.7 MG/DL (ref 0.76–1.27)
D-LACTATE SERPL-SCNC: 1 MMOL/L (ref 0.5–2)
DEPRECATED RDW RBC AUTO: 48.7 FL (ref 37–54)
DEPRECATED RDW RBC AUTO: 49 FL (ref 37–54)
EGFRCR SERPLBLD CKD-EPI 2021: 28 ML/MIN/1.73
EGFRCR SERPLBLD CKD-EPI 2021: 32.1 ML/MIN/1.73
EPAP: 8
ERYTHROCYTE [DISTWIDTH] IN BLOOD BY AUTOMATED COUNT: 14 % (ref 12.3–15.4)
ERYTHROCYTE [DISTWIDTH] IN BLOOD BY AUTOMATED COUNT: 14.5 % (ref 12.3–15.4)
GAS FLOW AIRWAY: 1 LPM
GAS FLOW AIRWAY: 6 LPM
GLUCOSE BLDC GLUCOMTR-MCNC: 127 MG/DL (ref 70–130)
GLUCOSE BLDC GLUCOMTR-MCNC: 132 MG/DL (ref 70–130)
GLUCOSE BLDC GLUCOMTR-MCNC: 134 MG/DL (ref 70–130)
GLUCOSE BLDC GLUCOMTR-MCNC: 137 MG/DL (ref 70–130)
GLUCOSE BLDC GLUCOMTR-MCNC: 146 MG/DL (ref 70–130)
GLUCOSE BLDC GLUCOMTR-MCNC: 147 MG/DL (ref 70–130)
GLUCOSE BLDC GLUCOMTR-MCNC: 148 MG/DL (ref 70–130)
GLUCOSE BLDC GLUCOMTR-MCNC: 152 MG/DL (ref 70–130)
GLUCOSE BLDC GLUCOMTR-MCNC: 153 MG/DL (ref 70–130)
GLUCOSE BLDC GLUCOMTR-MCNC: 173 MG/DL (ref 70–130)
GLUCOSE BLDC GLUCOMTR-MCNC: 226 MG/DL (ref 70–130)
GLUCOSE BLDC GLUCOMTR-MCNC: 233 MG/DL (ref 70–130)
GLUCOSE BLDC GLUCOMTR-MCNC: 257 MG/DL (ref 70–130)
GLUCOSE BLDC GLUCOMTR-MCNC: 273 MG/DL (ref 70–130)
GLUCOSE BLDC GLUCOMTR-MCNC: 331 MG/DL (ref 70–130)
GLUCOSE SERPL-MCNC: 163 MG/DL (ref 65–99)
GLUCOSE SERPL-MCNC: 254 MG/DL (ref 65–99)
HCO3 BLDA-SCNC: 19 MMOL/L (ref 20–26)
HCO3 BLDA-SCNC: 19.7 MMOL/L (ref 20–26)
HCO3 BLDA-SCNC: 20.8 MMOL/L (ref 20–26)
HCT VFR BLD AUTO: 25 % (ref 37.5–51)
HCT VFR BLD AUTO: 25.4 % (ref 37.5–51)
HCT VFR BLD AUTO: 27.5 % (ref 37.5–51)
HCT VFR BLD AUTO: 27.8 % (ref 37.5–51)
HCT VFR BLD CALC: 24.2 % (ref 38–51)
HCT VFR BLD CALC: 28 % (ref 38–51)
HGB BLD-MCNC: 7.6 G/DL (ref 13–17.7)
HGB BLD-MCNC: 7.9 G/DL (ref 13–17.7)
HGB BLD-MCNC: 8.6 G/DL (ref 13–17.7)
HGB BLD-MCNC: 8.7 G/DL (ref 13–17.7)
HGB BLDA-MCNC: 7.9 G/DL (ref 14–18)
HGB BLDA-MCNC: 9.2 G/DL (ref 14–18)
INHALED O2 CONCENTRATION: 50 %
IPAP: 16
Lab: ABNORMAL
Lab: NORMAL
MCH RBC QN AUTO: 28.8 PG (ref 26.6–33)
MCH RBC QN AUTO: 29 PG (ref 26.6–33)
MCHC RBC AUTO-ENTMCNC: 30.4 G/DL (ref 31.5–35.7)
MCHC RBC AUTO-ENTMCNC: 31.3 G/DL (ref 31.5–35.7)
MCV RBC AUTO: 92.1 FL (ref 79–97)
MCV RBC AUTO: 95.4 FL (ref 79–97)
METHGB BLD QL: 1.2 % (ref 0–3)
METHGB BLD QL: <1 % (ref 0–3)
MODALITY: ABNORMAL
NOTIFIED BY: ABNORMAL
NOTIFIED WHO: ABNORMAL
OXYHGB MFR BLDV: 88.2 % (ref 94–99)
OXYHGB MFR BLDV: 92.6 % (ref 94–99)
PCO2 BLDA: 39.6 MM HG (ref 35–45)
PCO2 BLDA: 42.4 MM HG (ref 35–45)
PCO2 BLDA: 48.3 MM HG (ref 35–45)
PCO2 TEMP ADJ BLD: 39.6 MM HG (ref 35–45)
PCO2 TEMP ADJ BLD: 42.4 MM HG (ref 35–45)
PCO2 TEMP ADJ BLD: 48.3 MM HG (ref 35–45)
PH BLDA: 7.2 PH UNITS (ref 7.35–7.45)
PH BLDA: 7.28 PH UNITS (ref 7.35–7.45)
PH BLDA: 7.33 PH UNITS (ref 7.35–7.45)
PH, TEMP CORRECTED: 7.2 PH UNITS (ref 7.35–7.45)
PH, TEMP CORRECTED: 7.28 PH UNITS (ref 7.35–7.45)
PH, TEMP CORRECTED: 7.33 PH UNITS (ref 7.35–7.45)
PLATELET # BLD AUTO: 132 10*3/MM3 (ref 140–450)
PLATELET # BLD AUTO: 136 10*3/MM3 (ref 140–450)
PMV BLD AUTO: 10.6 FL (ref 6–12)
PMV BLD AUTO: 10.7 FL (ref 6–12)
PO2 BLDA: 120 MM HG (ref 83–108)
PO2 BLDA: 65.2 MM HG (ref 83–108)
PO2 BLDA: 69.8 MM HG (ref 83–108)
PO2 TEMP ADJ BLD: 120 MM HG (ref 83–108)
PO2 TEMP ADJ BLD: 65.2 MM HG (ref 83–108)
PO2 TEMP ADJ BLD: 69.8 MM HG (ref 83–108)
POTASSIUM BLDA-SCNC: 4.7 MMOL/L (ref 3.5–5.2)
POTASSIUM BLDA-SCNC: 5.7 MMOL/L (ref 3.5–5.2)
POTASSIUM SERPL-SCNC: 4.5 MMOL/L (ref 3.5–5.2)
POTASSIUM SERPL-SCNC: 6.4 MMOL/L (ref 3.5–5.2)
QT INTERVAL: 338 MS
QT INTERVAL: 340 MS
QT INTERVAL: 362 MS
QT INTERVAL: 394 MS
QTC INTERVAL: 413 MS
QTC INTERVAL: 415 MS
QTC INTERVAL: 433 MS
QTC INTERVAL: 525 MS
RBC # BLD AUTO: 2.62 10*6/MM3 (ref 4.14–5.8)
RBC # BLD AUTO: 3.02 10*6/MM3 (ref 4.14–5.8)
SAO2 % BLDCOA: 90.5 % (ref 94–99)
SAO2 % BLDCOA: 94.4 % (ref 94–99)
SAO2 % BLDCOA: 98.9 % (ref 94–99)
SET MECH RESP RATE: 18
SODIUM BLDA-SCNC: 135 MMOL/L (ref 136–145)
SODIUM BLDA-SCNC: 138 MMOL/L (ref 136–145)
SODIUM SERPL-SCNC: 134 MMOL/L (ref 136–145)
SODIUM SERPL-SCNC: 138 MMOL/L (ref 136–145)
VENTILATOR MODE: ABNORMAL
WBC NRBC COR # BLD AUTO: 10.62 10*3/MM3 (ref 3.4–10.8)
WBC NRBC COR # BLD AUTO: 8.51 10*3/MM3 (ref 3.4–10.8)

## 2024-01-05 PROCEDURE — 85027 COMPLETE CBC AUTOMATED: CPT | Performed by: SURGERY

## 2024-01-05 PROCEDURE — 82330 ASSAY OF CALCIUM: CPT

## 2024-01-05 PROCEDURE — 86900 BLOOD TYPING SEROLOGIC ABO: CPT

## 2024-01-05 PROCEDURE — 85018 HEMOGLOBIN: CPT | Performed by: SURGERY

## 2024-01-05 PROCEDURE — 25010000002 CEFAZOLIN PER 500 MG: Performed by: SURGERY

## 2024-01-05 PROCEDURE — 25010000002 NICARDIPINE 2.5 MG/ML SOLUTION: Performed by: SURGERY

## 2024-01-05 PROCEDURE — 94799 UNLISTED PULMONARY SVC/PX: CPT

## 2024-01-05 PROCEDURE — 80048 BASIC METABOLIC PNL TOTAL CA: CPT | Performed by: NURSE PRACTITIONER

## 2024-01-05 PROCEDURE — 85014 HEMATOCRIT: CPT | Performed by: SURGERY

## 2024-01-05 PROCEDURE — 83605 ASSAY OF LACTIC ACID: CPT | Performed by: SURGERY

## 2024-01-05 PROCEDURE — 36430 TRANSFUSION BLD/BLD COMPNT: CPT

## 2024-01-05 PROCEDURE — P9016 RBC LEUKOCYTES REDUCED: HCPCS

## 2024-01-05 PROCEDURE — 80048 BASIC METABOLIC PNL TOTAL CA: CPT | Performed by: SURGERY

## 2024-01-05 PROCEDURE — 94761 N-INVAS EAR/PLS OXIMETRY MLT: CPT

## 2024-01-05 PROCEDURE — 94660 CPAP INITIATION&MGMT: CPT

## 2024-01-05 PROCEDURE — 71045 X-RAY EXAM CHEST 1 VIEW: CPT

## 2024-01-05 PROCEDURE — 86923 COMPATIBILITY TEST ELECTRIC: CPT

## 2024-01-05 PROCEDURE — 25010000002 NALOXONE PER 1 MG: Performed by: SURGERY

## 2024-01-05 PROCEDURE — 25010000002 METOCLOPRAMIDE PER 10 MG: Performed by: SURGERY

## 2024-01-05 PROCEDURE — 82948 REAGENT STRIP/BLOOD GLUCOSE: CPT

## 2024-01-05 PROCEDURE — 83050 HGB METHEMOGLOBIN QUAN: CPT

## 2024-01-05 PROCEDURE — 93005 ELECTROCARDIOGRAM TRACING: CPT | Performed by: SURGERY

## 2024-01-05 PROCEDURE — 93010 ELECTROCARDIOGRAM REPORT: CPT | Performed by: INTERNAL MEDICINE

## 2024-01-05 PROCEDURE — 0 INSULIN REGULAR HUMAN PER 5 UNITS: Performed by: SURGERY

## 2024-01-05 PROCEDURE — 82375 ASSAY CARBOXYHB QUANT: CPT

## 2024-01-05 PROCEDURE — 97116 GAIT TRAINING THERAPY: CPT

## 2024-01-05 PROCEDURE — 25010000002 FUROSEMIDE PER 20 MG: Performed by: NURSE PRACTITIONER

## 2024-01-05 PROCEDURE — 82805 BLOOD GASES W/O2 SATURATION: CPT

## 2024-01-05 PROCEDURE — 85027 COMPLETE CBC AUTOMATED: CPT | Performed by: NURSE PRACTITIONER

## 2024-01-05 PROCEDURE — 82803 BLOOD GASES ANY COMBINATION: CPT

## 2024-01-05 PROCEDURE — 25010000002 ENOXAPARIN PER 10 MG: Performed by: SURGERY

## 2024-01-05 PROCEDURE — 86901 BLOOD TYPING SEROLOGIC RH(D): CPT

## 2024-01-05 PROCEDURE — 63710000001 INSULIN REGULAR HUMAN PER 5 UNITS: Performed by: SURGERY

## 2024-01-05 PROCEDURE — 25810000003 SODIUM CHLORIDE 0.9 % SOLUTION: Performed by: SURGERY

## 2024-01-05 RX ORDER — SODIUM POLYSTYRENE SULFONATE 15 G/60ML
15 SUSPENSION ORAL; RECTAL ONCE
Status: COMPLETED | OUTPATIENT
Start: 2024-01-05 | End: 2024-01-05

## 2024-01-05 RX ORDER — BISACODYL 10 MG
10 SUPPOSITORY, RECTAL RECTAL ONCE
Status: COMPLETED | OUTPATIENT
Start: 2024-01-05 | End: 2024-01-05

## 2024-01-05 RX ORDER — SODIUM BICARBONATE 42 MG/ML
5 INJECTION, SOLUTION INTRAVENOUS ONCE
Status: DISCONTINUED | OUTPATIENT
Start: 2024-01-05 | End: 2024-01-05

## 2024-01-05 RX ORDER — DEXTROSE MONOHYDRATE 25 G/50ML
50 INJECTION, SOLUTION INTRAVENOUS
Status: DISCONTINUED | OUTPATIENT
Start: 2024-01-05 | End: 2024-01-05

## 2024-01-05 RX ORDER — FUROSEMIDE 10 MG/ML
40 INJECTION INTRAMUSCULAR; INTRAVENOUS ONCE
Status: COMPLETED | OUTPATIENT
Start: 2024-01-05 | End: 2024-01-05

## 2024-01-05 RX ORDER — FUROSEMIDE 10 MG/ML
20 INJECTION INTRAMUSCULAR; INTRAVENOUS ONCE
Status: COMPLETED | OUTPATIENT
Start: 2024-01-05 | End: 2024-01-05

## 2024-01-05 RX ORDER — DEXTROSE MONOHYDRATE 25 G/50ML
50 INJECTION, SOLUTION INTRAVENOUS ONCE
Status: COMPLETED | OUTPATIENT
Start: 2024-01-05 | End: 2024-01-05

## 2024-01-05 RX ORDER — NALOXONE HCL 0.4 MG/ML
0.1 VIAL (ML) INJECTION ONCE
Status: COMPLETED | OUTPATIENT
Start: 2024-01-05 | End: 2024-01-05

## 2024-01-05 RX ADMIN — ACETAMINOPHEN 650 MG: 325 TABLET, FILM COATED ORAL at 05:45

## 2024-01-05 RX ADMIN — BISACODYL 10 MG: 5 TABLET ORAL at 20:42

## 2024-01-05 RX ADMIN — CEFAZOLIN 2000 MG: 2 INJECTION, POWDER, FOR SOLUTION INTRAMUSCULAR; INTRAVENOUS at 05:04

## 2024-01-05 RX ADMIN — SODIUM CHLORIDE 100 ML/HR: 9 INJECTION, SOLUTION INTRAVENOUS at 01:47

## 2024-01-05 RX ADMIN — CLOPIDOGREL BISULFATE 75 MG: 75 TABLET, FILM COATED ORAL at 17:19

## 2024-01-05 RX ADMIN — CALCIUM CHLORIDE 1000 MG: 100 INJECTION INTRAVENOUS; INTRAVENTRICULAR at 03:31

## 2024-01-05 RX ADMIN — IPRATROPIUM BROMIDE AND ALBUTEROL SULFATE 3 ML: 2.5; .5 SOLUTION RESPIRATORY (INHALATION) at 14:18

## 2024-01-05 RX ADMIN — POLYETHYLENE GLYCOL 3350 17 G: 17 POWDER, FOR SOLUTION ORAL at 09:01

## 2024-01-05 RX ADMIN — ACETAMINOPHEN 650 MG: 325 TABLET, FILM COATED ORAL at 17:19

## 2024-01-05 RX ADMIN — BISACODYL 10 MG: 5 TABLET ORAL at 09:01

## 2024-01-05 RX ADMIN — SODIUM BICARBONATE 50 MEQ: 84 INJECTION INTRAVENOUS at 03:36

## 2024-01-05 RX ADMIN — IPRATROPIUM BROMIDE AND ALBUTEROL SULFATE 3 ML: 2.5; .5 SOLUTION RESPIRATORY (INHALATION) at 18:31

## 2024-01-05 RX ADMIN — NOREPINEPHRINE BITARTRATE 0.02 MCG/KG/MIN: 0.03 INJECTION, SOLUTION INTRAVENOUS at 05:24

## 2024-01-05 RX ADMIN — METOCLOPRAMIDE HYDROCHLORIDE 10 MG: 5 INJECTION INTRAMUSCULAR; INTRAVENOUS at 20:16

## 2024-01-05 RX ADMIN — PANTOPRAZOLE SODIUM 40 MG: 40 TABLET, DELAYED RELEASE ORAL at 05:45

## 2024-01-05 RX ADMIN — OXYCODONE HYDROCHLORIDE 10 MG: 5 TABLET ORAL at 01:46

## 2024-01-05 RX ADMIN — Medication 1 APPLICATION: at 05:45

## 2024-01-05 RX ADMIN — FUROSEMIDE 20 MG: 10 INJECTION, SOLUTION INTRAMUSCULAR; INTRAVENOUS at 17:19

## 2024-01-05 RX ADMIN — SODIUM CHLORIDE 40 ML/HR: 9 INJECTION, SOLUTION INTRAVENOUS at 20:16

## 2024-01-05 RX ADMIN — ATORVASTATIN CALCIUM 20 MG: 10 TABLET, FILM COATED ORAL at 20:42

## 2024-01-05 RX ADMIN — METHOCARBAMOL 500 MG: 500 TABLET, FILM COATED ORAL at 05:45

## 2024-01-05 RX ADMIN — METHOCARBAMOL 500 MG: 500 TABLET, FILM COATED ORAL at 13:04

## 2024-01-05 RX ADMIN — INSULIN HUMAN 10 UNITS: 100 INJECTION, SOLUTION PARENTERAL at 05:03

## 2024-01-05 RX ADMIN — METOPROLOL TARTRATE 12.5 MG: 25 TABLET, FILM COATED ORAL at 09:01

## 2024-01-05 RX ADMIN — METOCLOPRAMIDE HYDROCHLORIDE 10 MG: 5 INJECTION INTRAMUSCULAR; INTRAVENOUS at 13:04

## 2024-01-05 RX ADMIN — SODIUM CHLORIDE 5 MG/HR: 9 INJECTION, SOLUTION INTRAVENOUS at 06:31

## 2024-01-05 RX ADMIN — CHLORHEXIDINE GLUCONATE 15 ML: 1.2 RINSE ORAL at 05:45

## 2024-01-05 RX ADMIN — NALOXONE HYDROCHLORIDE 0.1 MG: 0.4 INJECTION, SOLUTION INTRAMUSCULAR; INTRAVENOUS; SUBCUTANEOUS at 04:47

## 2024-01-05 RX ADMIN — IPRATROPIUM BROMIDE AND ALBUTEROL SULFATE 3 ML: 2.5; .5 SOLUTION RESPIRATORY (INHALATION) at 10:19

## 2024-01-05 RX ADMIN — DEXTROSE MONOHYDRATE 25 G: 25 INJECTION, SOLUTION INTRAVENOUS at 05:00

## 2024-01-05 RX ADMIN — SODIUM ZIRCONIUM CYCLOSILICATE 10 G: 10 POWDER, FOR SUSPENSION ORAL at 05:01

## 2024-01-05 RX ADMIN — METOCLOPRAMIDE HYDROCHLORIDE 10 MG: 5 INJECTION INTRAMUSCULAR; INTRAVENOUS at 08:08

## 2024-01-05 RX ADMIN — PREGABALIN 25 MG: 25 CAPSULE ORAL at 20:42

## 2024-01-05 RX ADMIN — FUROSEMIDE 40 MG: 10 INJECTION, SOLUTION INTRAVENOUS at 09:15

## 2024-01-05 RX ADMIN — DEXTROSE MONOHYDRATE 50 ML: 25 INJECTION, SOLUTION INTRAVENOUS at 04:56

## 2024-01-05 RX ADMIN — Medication 50 MEQ: at 03:59

## 2024-01-05 RX ADMIN — ENOXAPARIN SODIUM 40 MG: 100 INJECTION SUBCUTANEOUS at 09:01

## 2024-01-05 RX ADMIN — SODIUM CHLORIDE 5.4 UNITS/HR: 9 INJECTION, SOLUTION INTRAVENOUS at 05:33

## 2024-01-05 RX ADMIN — METHOCARBAMOL 500 MG: 500 TABLET, FILM COATED ORAL at 22:42

## 2024-01-05 RX ADMIN — CHLORHEXIDINE GLUCONATE 1 APPLICATION: 500 CLOTH TOPICAL at 03:36

## 2024-01-05 RX ADMIN — ASPIRIN 81 MG: 81 TABLET, COATED ORAL at 09:01

## 2024-01-05 RX ADMIN — SODIUM BICARBONATE 50 MEQ: 84 INJECTION INTRAVENOUS at 03:59

## 2024-01-05 RX ADMIN — METOCLOPRAMIDE HYDROCHLORIDE 10 MG: 5 INJECTION INTRAMUSCULAR; INTRAVENOUS at 01:46

## 2024-01-05 RX ADMIN — ACETAMINOPHEN 650 MG: 325 TABLET, FILM COATED ORAL at 11:48

## 2024-01-05 RX ADMIN — SODIUM POLYSTYRENE SULFONATE 15 G: 15 SUSPENSION ORAL; RECTAL at 03:04

## 2024-01-05 RX ADMIN — IPRATROPIUM BROMIDE AND ALBUTEROL SULFATE 3 ML: 2.5; .5 SOLUTION RESPIRATORY (INHALATION) at 05:44

## 2024-01-05 RX ADMIN — LIDOCAINE 5% 2 PATCH: 700 PATCH TOPICAL at 09:01

## 2024-01-05 RX ADMIN — PREGABALIN 25 MG: 25 CAPSULE ORAL at 09:01

## 2024-01-05 RX ADMIN — BISACODYL 10 MG: 10 SUPPOSITORY RECTAL at 09:19

## 2024-01-05 RX ADMIN — METOPROLOL TARTRATE 37.5 MG: 25 TABLET, FILM COATED ORAL at 20:42

## 2024-01-05 RX ADMIN — METOPROLOL TARTRATE 12.5 MG: 25 TABLET, FILM COATED ORAL at 13:04

## 2024-01-05 NOTE — PLAN OF CARE
Goal Outcome Evaluation:      Patient stood and ambulated 200' with min assist and 2 standing rest. Patient has a wide base of support and short steps. Afternoon walk smoother gait than a.m. Patient is motivated.

## 2024-01-05 NOTE — THERAPY TREATMENT NOTE
Acute Care - Physical Therapy Treatment Note  [facility]     Patient Name: Speedy Nichols  : 1975  MRN: [id number]  Today's Date: 2024      Visit Dx:     ICD-10-CM ICD-9-CM   1. Impaired mobility [Z74.09]  Z74.09 799.89   2. Coronary artery disease involving native coronary artery of native heart without angina pectoris  I25.10 414.01     Patient Active Problem List   Diagnosis    Type 2 diabetes mellitus with hyperglycemia, with long-term current use of insulin    Essential hypertension    Chronic systolic heart failure    Coronary artery disease involving native coronary artery of native heart without angina pectoris    CAD (coronary artery disease), native coronary artery     Past Medical History:   Diagnosis Date    Anxiety     CHF (congestive heart failure)     Coronary artery disease     Diabetes mellitus     Hypertension     Type 2 diabetes mellitus      Past Surgical History:   Procedure Laterality Date    CARDIAC CATHETERIZATION N/A 2022    Procedure: Left Heart Cath;  Surgeon: Gopal King MD;  Location:  [facility];  Service: Cardiology;  Laterality: N/A;    CARDIAC CATHETERIZATION N/A 2023    Procedure: Left Heart Cath;  Surgeon: Gopal King MD;  Location:  [facility];  Service: Cardiology;  Laterality: N/A;    CORONARY ARTERY BYPASS GRAFT N/A 1/3/2024    Procedure: CORONARY ARTERY BYPASS GRAFTING X5, BILATERAL INTERNAL MAMMARY ARTERY GRAFT, RIGHT UPPER LEG ENDOSCOPIC VEIN HARVEST, RIGHT LOWER LEG OPEN VEIN HARVEST, LEFT LEG ABORTED VEIN HARVEST, TRANSESOPHAGEAL ECHOCARDIOGRAM, XP STERNAL PLATING;  Surgeon: Papi Wilburn MD;  Location: [facility] OR;  Service: Cardiothoracic;  Laterality: N/A;    TOOTH EXTRACTION Right 2022    WISDOM TOOTH EXTRACTION       PT Assessment (last 12 hours)       PT Evaluation and Treatment       Row Name 24 0920          Physical Therapy Time and Intention    Subjective Information  complains of;fatigue;pain;weakness  -     Document Type therapy note (daily note)  -     Mode of Treatment physical therapy  -     Comment reviewed restrictions  -       Row Name 01/05/24 0920          General Information    Existing Precautions/Restrictions fall;oxygen therapy device and L/min;sternal  -[place]       Row Name 01/05/24 0920          Pain    Pretreatment Pain Rating 5/10  -[place]     Posttreatment Pain Rating 6/10  -[place]     Pain Location - chest  -       Row Name 01/05/24 0920          Sit-Stand Transfer    Sit-Stand [place] (Transfers) verbal cues;minimum assist (75% patient effort)  -       Row Name 01/05/24 0920          Stand-Sit Transfer    Stand-Sit [place] (Transfers) verbal cues;contact guard  -       Row Name 01/05/24 0920          Gait/Stairs (Locomotion)    [place] Level (Gait) minimum assist (75% patient effort)  -     Distance in Feet (Gait) 200  1 standing rest  -     Deviations/Abnormal Patterns (Gait) base of support, wide;steppage  -       Row Name 01/05/24 0920          Motor Skills    Comments, Therapeutic Exercise protocol x15 prior  -       Row Name             Wound 01/03/24 0815 midline sternal Incision    Wound - Properties Group Placement Date: 01/03/24  -TJ Placement Time: 0815  -TJ Orientation: midline  -TJ Location: sternal  -TJ Primary Wound Type: Incision  -TJ    Retired Wound - Properties Group Placement Date: 01/03/24  -TJ Placement Time: 0815  -TJ Orientation: midline  -TJ Location: sternal  -TJ Primary Wound Type: Incision  -TJ    Retired Wound - Properties Group Date first assessed: 01/03/24  -TJ Time first assessed: 0815  -TJ Location: sternal  -TJ Primary Wound Type: Incision  -TJ      Row Name             Wound 01/03/24 0808 Right leg Incision    Wound - Properties Group Placement Date: 01/03/24  -TJ Placement Time: 0808  -TJ Side: Right  -TJ Location: leg  -TJ Primary Wound Type: Incision  -TJ    Retired Wound - Properties Group  Placement Date: 01/03/24  -TJ Placement Time: 0808  -TJ Side: Right  -TJ Location: leg  -TJ Primary Wound Type: Incision  -TJ    Retired Wound - Properties Group Date first assessed: 01/03/24  -TJ Time first assessed: 0808  -TJ Side: Right  -TJ Location: leg  -TJ Primary Wound Type: Incision  -TJ      Row Name             Wound 01/03/24 0900 Left leg Incision    Wound - Properties Group Placement Date: 01/03/24  -TJ Placement Time: 0900  -TJ Side: Left  -TJ Location: leg  -TJ Primary Wound Type: Incision  -TJ    Retired Wound - Properties Group Placement Date: 01/03/24  -TJ Placement Time: 0900  -TJ Side: Left  -TJ Location: leg  -TJ Primary Wound Type: Incision  -TJ    Retired Wound - Properties Group Date first assessed: 01/03/24  -TJ Time first assessed: 0900  -TJ Side: Left  -TJ Location: leg  -TJ Primary Wound Type: Incision  -TJ      Row Name 01/05/24 0920          Vital Signs    Pre Systolic BP Rehab 140  -[place]     Pre Treatment Diastolic BP 80  -[place]     Post Systolic BP Rehab 143  -[place]     Post Treatment Diastolic BP 83  -[place]     Pretreatment Heart Rate (beats/min) 99  -[place]     Posttreatment Heart Rate (beats/min) 99  -[place]     Pre SpO2 (%) 94  -[place]     O2 Delivery Pre Treatment --  vapotherm  -[place]     Intra SpO2 (%) 97  -[place]     O2 Delivery Intra Treatment non-rebreather  -[place]     Post SpO2 (%) 98  -[place]     O2 Delivery Post Treatment --  vapo  -[place]       Row Name 01/05/24 0920          Positioning and Restraints    Pre-Treatment Position sitting in chair/recliner  -[place]     Post Treatment Position chair  -[place]     In Chair sitting;call light within reach;with nsg;RUE elevated;LUE elevated  -[place]               User Key  (r) = Recorded By, (t) = Taken By, (c) = Cosigned By      Initials Name Provider Type    Genevieve Gamez PTA Physical Therapist Assistant    Yue Mcnair, RN Registered Nurse                    Physical Therapy Education       Title: PT OT SLP Therapies (In Progress)       Topic: Physical Therapy (In  Progress)       Point: Mobility training (In Progress)       Learning Progress Summary             Patient Acceptance, E, NR by KAILEE at 1/4/2024 0758    Comment: sternal prec, benefits of activity, progression of PT                         Point: Home exercise program (Not Started)       Learner Progress:  Not documented in this visit.              Point: Body mechanics (Not Started)       Learner Progress:  Not documented in this visit.              Point: Precautions (Done)       Learning Progress Summary             Patient Acceptance, E, VU,NR by [place] at 1/5/2024 1012    Comment: sternal restrictions    Acceptance, E, NR by KAILEE at 1/4/2024 0758    Comment: sternal prec, benefits of activity, progression of PT                                         User Key       Initials Effective Dates Name Provider Type Discipline    KAILEE 02/03/23 -  Americo Zepeda, PT DPT Physical Therapist PT    [place] 02/03/23 -  Genevieve Castellon PTA Physical Therapist Assistant PT                  PT Recommendation and Plan         Outcome Measures       Row Name 01/05/24 1000 01/04/24 1500          How much help from another person do you currently need...    Turning from your back to your side while in flat bed without using bedrails? 2  -[place] 2  -[place]     Moving from lying on back to sitting on the side of a flat bed without bedrails? 2  -[place] 2  -[place]     Moving to and from a bed to a chair (including a wheelchair)? 3  -[place] 2  -[place]     Standing up from a chair using your arms (e.g., wheelchair, bedside chair)? 3  -[place] 3  -[place]     Climbing 3-5 steps with a railing? 2  -[place] 2  -[place]     To walk in hospital room? 3  -[place] 2  -[place]     AM-PAC 6 Clicks Score (PT) 15  -[place] 13  -[place]     Highest Level of Mobility Goal 4 --> Transfer to chair/commode  -[place] 4 --> Transfer to chair/commode  -[place]               User Key  (r) = Recorded By, (t) = Taken By, (c) = Cosigned By      Initials Name Provider Type    Genevieve Gamez PTA Physical Therapist Assistant                      Time Calculation:    PT Charges       Row Name 01/05/24 1014             Time Calculation    Start Time 0920  -[place]      Stop Time 1003  -[place]      Time Calculation (min) 43 min  -[place]         Timed Charges    18055 - Gait Training Minutes  43  -[place]         Total Minutes    Timed Charges Total Minutes 43  -[place]       Total Minutes 43  -[place]                User Key  (r) = Recorded By, (t) = Taken By, (c) = Cosigned By      Initials Name Provider Type    Genevieve Gamez, PTA Physical Therapist Assistant                  Therapy Charges for Today       Code Description Service Date Service Provider Modifiers Qty    53828201889 HC GAIT TRAINING EA 15 MIN 1/4/2024 Genevieve Castellon, LOWELL GP 3    01618467807 HC GAIT TRAINING EA 15 MIN 1/5/2024 Genevieve Castellon, PTA GP 3            PT G-Codes  Outcome Measure Options: AM-PAC 6 Clicks Basic Mobility (PT)  AM-PAC 6 Clicks Score (PT): 15    Genevieve Castellon PTA  1/5/2024

## 2024-01-05 NOTE — PROGRESS NOTES
"Patient name: Speedy Nichols  Patient : 1975  VISIT # [id number]  MR #[id number]    Procedure:Procedure(s):  CORONARY ARTERY BYPASS GRAFTING X5, BILATERAL INTERNAL MAMMARY ARTERY GRAFT, RIGHT UPPER LEG ENDOSCOPIC VEIN HARVEST, RIGHT LOWER LEG OPEN VEIN HARVEST, LEFT LEG ABORTED VEIN HARVEST, TRANSESOPHAGEAL ECHOCARDIOGRAM, XP STERNAL PLATING  Procedure Date:1/3/2024  POD:2 Days Post-Op    Subjective     Overnight events noted.  Tolerating BiPAP with O2 sat 95%.  Patient became lethargic and was found to be acidotic.  Creatinine increased to 2.7.  More alert after 1 dose of Narcan.  Hemoglobin this morning is 7.6 and he is being transfused 1 additional unit of PRBC.  Potassium is 6.4 and he has been given Lokelma and Kayexalate.  Back on insulin drip.  No bowel movement.  UOP 40 mL/h.    Telemetry: Sinus tachycardia 101  IV drips: Insulin, IV fluids       Objective     Visit Vitals  /82   Pulse 111   Temp 98.6 °F (37 °C) (Axillary)   Resp (!) 29   Ht 183 cm (72.05\")   Wt 90.3 kg (199 lb)   SpO2 95%   BMI 26.95 kg/m²   Baseline weight 200 pounds    Intake/Output Summary (Last 24 hours) at 2024 0744  Last data filed at 2024 0740  Gross per 24 hour   Intake 2627.01 ml   Output 1030 ml   Net 1597.01 ml     MCT: 200 mL in 24 hours, serosanguineous, no airleak  Right and left pleural drain: 180 mL in 24 hours, serosanguineous, no airleak    Lab:     CBC:  Results from last 7 days   Lab Units 24  0358 24  0253 24  1801 24  1139 24  0244   WBC 10*3/mm3 10.62  --   --  10.60 8.73   HEMATOCRIT % 25.0* 25.4* 27.2* 24.0* 25.4*   PLATELETS 10*3/mm3 136*  --   --  165 165          BMP:  Results from last 7 days   Lab Units 24  0358 24  0238 24  2120 24  1139   SODIUM mmol/L 134*  --  134* 137   SODIUM, ARTERIAL mmol/L  --  135*  --   --    POTASSIUM mmol/L 6.4*  --  5.9* 5.2   CHLORIDE mmol/L 102  --  101 104   CO2 mmol/L 21.0*  --  19.0* 21.0*   GLUCOSE " mg/dL 254*  --  233* 141*   BUN mg/dL 38*  --  35* 27*   CREATININE mg/dL 2.70*  --  2.80* 2.11*          COAG:  Results from last 7 days   Lab Units 01/04/24  0244 01/03/24  1313   INR  1.17* 1.14*   APTT seconds  --  66.8*       IMAGES:       Imaging Results (Last 24 Hours)       Procedure Component Value Units Date/Time    XR Chest 1 View [[id number]] Collected: 01/05/24 0647     Updated: 01/05/24 0652    Narrative:      EXAMINATION: XR CHEST 1 VW-     1/5/2024 2:05 AM     HISTORY: Post-Op Heart Surgery     A frontal projection of the chest is compared with the previous study  dated 1/4/2024.     The lungs are poorly expanded, worse than the previous study.     There are atelectatic changes in the lower lungs.     There is a probable trace bibasilar pleural effusion.     There is no pulmonary congestion or pneumothorax.     There is moderate cardiomegaly which may be accentuated due to AP  projection. There is evidence of prior cardiac surgery.     The Runnemede-Ayaka catheter has been removed since the previous study. Right  internal jugular vascular sheath is in place. Bilateral chest tubes and  mediastinal drain are in place.       Impression:      1. Postsurgical changes. Poor lung expansion due to significant  atelectatic changes bilaterally. No pulmonary congestion or  pneumothorax.  2. The removal of the Runnemede-Ayaka catheter since the previous study. Chest  tubes and mediastinal drain are in place.              This report was signed and finalized on 1/5/2024 6:49 AM by Dr. Roxanne Wilder MD.             CXR: Chest tubes in stable position with no pneumothorax.  Borderline expansion with significant bibasilar atelectasis.    Physical Exam:  General: Alert, oriented. No apparent distress.  Overweight  Cardiovascular: Regular rate and rhythm without murmur, rubs, or gallops.    Pulmonary: Diminished bilaterally without wheezing, rubs, or rales.  On BiPAP  Chest: Sternotomy incision clean, dry, and intact. Sternum  stable. No clicks. Chest tubes to 20 cm suction. No air leak. Fluid is serosanguineous.  Abdomen: Soft, nondistended, and nontender.  Extremities: Warm, moves all extremities. No edema. Saphenectomy site clean, dry, and intact.  Neurologic:  Grossly intact with no focal deficits.            Impression:  Coronary artery disease  Hyperlipidemia, on statin  Hypertension, well-controlled  Type 2 diabetes mellitus with long-term use of insulin        Plan:  Continue bowel regimen, suppository today  Discontinue mediastinal chest tubes and bilateral pleural drains  Give beta-blocker this morning  Lasix 40 mg IV x 1 dose this morning  Repeat ABG with Co. ox now, if improved, transition to Vapotherm and walk.  Repeat labs today at 12:00  Encourage pulmonary toilet and ambulation  Routine postcardiac surgery care  Remain in ICU   Discussed with patient and nursing      ANUPAM Nguyen  01/05/24  07:44 CST

## 2024-01-05 NOTE — PLAN OF CARE
Goal Outcome Evaluation:      Patient stood and ambulated 200' with Min assist and 1 standing rest. Pain 5/10 -7/10. /80 -143/83. Patient will benefit from safety education and increased ambulation.

## 2024-01-05 NOTE — CASE MANAGEMENT/SOCIAL WORK
Continued Stay Note  ASHLEY Torres     Patient Name: Speedy Nichols  MRN: [id number]  Today's Date: 1/5/2024    Admit Date: 1/3/2024        Discharge Plan       Row Name 01/05/24 0925       Plan    Plan Comments Patient resides at home with his spouse and family.  SW following to determine plan/needs.                   Discharge Codes    No documentation.                       LISA Shah

## 2024-01-06 ENCOUNTER — APPOINTMENT (OUTPATIENT)
Dept: GENERAL RADIOLOGY | Facility: HOSPITAL | Age: 49
End: 2024-01-06
Payer: COMMERCIAL

## 2024-01-06 LAB
ANION GAP SERPL CALCULATED.3IONS-SCNC: 10 MMOL/L (ref 5–15)
BH BB BLOOD EXPIRATION DATE: NORMAL
BH BB BLOOD EXPIRATION DATE: NORMAL
BH BB BLOOD TYPE BARCODE: 7300
BH BB BLOOD TYPE BARCODE: 7300
BH BB DISPENSE STATUS: NORMAL
BH BB DISPENSE STATUS: NORMAL
BH BB PRODUCT CODE: NORMAL
BH BB PRODUCT CODE: NORMAL
BH BB UNIT NUMBER: NORMAL
BH BB UNIT NUMBER: NORMAL
BUN SERPL-MCNC: 37 MG/DL (ref 6–20)
BUN/CREAT SERPL: 23 (ref 7–25)
CALCIUM SPEC-SCNC: 7.4 MG/DL (ref 8.6–10.5)
CHLORIDE SERPL-SCNC: 111 MMOL/L (ref 98–107)
CO2 SERPL-SCNC: 19 MMOL/L (ref 22–29)
CREAT SERPL-MCNC: 1.61 MG/DL (ref 0.76–1.27)
CROSSMATCH INTERPRETATION: NORMAL
CROSSMATCH INTERPRETATION: NORMAL
DEPRECATED RDW RBC AUTO: 48.8 FL (ref 37–54)
EGFRCR SERPLBLD CKD-EPI 2021: 52.1 ML/MIN/1.73
ERYTHROCYTE [DISTWIDTH] IN BLOOD BY AUTOMATED COUNT: 14.4 % (ref 12.3–15.4)
GLUCOSE BLDC GLUCOMTR-MCNC: 143 MG/DL (ref 70–130)
GLUCOSE BLDC GLUCOMTR-MCNC: 149 MG/DL (ref 70–130)
GLUCOSE BLDC GLUCOMTR-MCNC: 155 MG/DL (ref 70–130)
GLUCOSE BLDC GLUCOMTR-MCNC: 166 MG/DL (ref 70–130)
GLUCOSE BLDC GLUCOMTR-MCNC: 215 MG/DL (ref 70–130)
GLUCOSE BLDC GLUCOMTR-MCNC: 219 MG/DL (ref 70–130)
GLUCOSE BLDC GLUCOMTR-MCNC: 269 MG/DL (ref 70–130)
GLUCOSE SERPL-MCNC: 141 MG/DL (ref 65–99)
HCT VFR BLD AUTO: 26.2 % (ref 37.5–51)
HGB BLD-MCNC: 8.2 G/DL (ref 13–17.7)
MCH RBC QN AUTO: 29.1 PG (ref 26.6–33)
MCHC RBC AUTO-ENTMCNC: 31.3 G/DL (ref 31.5–35.7)
MCV RBC AUTO: 92.9 FL (ref 79–97)
PLATELET # BLD AUTO: 119 10*3/MM3 (ref 140–450)
PMV BLD AUTO: 10.5 FL (ref 6–12)
POTASSIUM SERPL-SCNC: 4 MMOL/L (ref 3.5–5.2)
RBC # BLD AUTO: 2.82 10*6/MM3 (ref 4.14–5.8)
SODIUM SERPL-SCNC: 140 MMOL/L (ref 136–145)
UNIT  ABO: NORMAL
UNIT  ABO: NORMAL
UNIT  RH: NORMAL
UNIT  RH: NORMAL
WBC NRBC COR # BLD AUTO: 6.88 10*3/MM3 (ref 3.4–10.8)

## 2024-01-06 PROCEDURE — 94761 N-INVAS EAR/PLS OXIMETRY MLT: CPT

## 2024-01-06 PROCEDURE — 94799 UNLISTED PULMONARY SVC/PX: CPT

## 2024-01-06 PROCEDURE — 25010000002 CALCIUM GLUCONATE-NACL 1-0.675 GM/50ML-% SOLUTION: Performed by: SURGERY

## 2024-01-06 PROCEDURE — 94660 CPAP INITIATION&MGMT: CPT

## 2024-01-06 PROCEDURE — 85027 COMPLETE CBC AUTOMATED: CPT | Performed by: SURGERY

## 2024-01-06 PROCEDURE — 25010000002 FUROSEMIDE PER 20 MG: Performed by: NURSE PRACTITIONER

## 2024-01-06 PROCEDURE — 25010000002 CALCIUM GLUCONATE PER 10 ML: Performed by: SURGERY

## 2024-01-06 PROCEDURE — 82948 REAGENT STRIP/BLOOD GLUCOSE: CPT

## 2024-01-06 PROCEDURE — 25010000002 ENOXAPARIN PER 10 MG: Performed by: SURGERY

## 2024-01-06 PROCEDURE — 63710000001 INSULIN LISPRO (HUMAN) PER 5 UNITS: Performed by: NURSE PRACTITIONER

## 2024-01-06 PROCEDURE — 25010000002 METOCLOPRAMIDE PER 10 MG: Performed by: SURGERY

## 2024-01-06 PROCEDURE — 71046 X-RAY EXAM CHEST 2 VIEWS: CPT

## 2024-01-06 PROCEDURE — 80048 BASIC METABOLIC PNL TOTAL CA: CPT | Performed by: SURGERY

## 2024-01-06 PROCEDURE — 97116 GAIT TRAINING THERAPY: CPT

## 2024-01-06 RX ORDER — FUROSEMIDE 10 MG/ML
20 INJECTION INTRAMUSCULAR; INTRAVENOUS EVERY 12 HOURS
Status: DISCONTINUED | OUTPATIENT
Start: 2024-01-06 | End: 2024-01-08

## 2024-01-06 RX ORDER — CALCIUM GLUCONATE 20 MG/ML
1000 INJECTION, SOLUTION INTRAVENOUS
Status: COMPLETED | OUTPATIENT
Start: 2024-01-06 | End: 2024-01-06

## 2024-01-06 RX ADMIN — ACETAMINOPHEN 650 MG: 325 TABLET, FILM COATED ORAL at 23:13

## 2024-01-06 RX ADMIN — METHOCARBAMOL 500 MG: 500 TABLET, FILM COATED ORAL at 21:29

## 2024-01-06 RX ADMIN — CALCIUM GLUCONATE 2000 MG: 98 INJECTION, SOLUTION INTRAVENOUS at 09:12

## 2024-01-06 RX ADMIN — POLYETHYLENE GLYCOL 3350 17 G: 17 POWDER, FOR SOLUTION ORAL at 08:15

## 2024-01-06 RX ADMIN — LIDOCAINE 5% 2 PATCH: 700 PATCH TOPICAL at 08:15

## 2024-01-06 RX ADMIN — ACETAMINOPHEN 650 MG: 325 TABLET, FILM COATED ORAL at 17:07

## 2024-01-06 RX ADMIN — BISACODYL 10 MG: 5 TABLET ORAL at 20:16

## 2024-01-06 RX ADMIN — ACETAMINOPHEN 650 MG: 325 TABLET, FILM COATED ORAL at 00:39

## 2024-01-06 RX ADMIN — IPRATROPIUM BROMIDE AND ALBUTEROL SULFATE 3 ML: 2.5; .5 SOLUTION RESPIRATORY (INHALATION) at 15:39

## 2024-01-06 RX ADMIN — ASPIRIN 81 MG: 81 TABLET, COATED ORAL at 08:15

## 2024-01-06 RX ADMIN — IPRATROPIUM BROMIDE AND ALBUTEROL SULFATE 3 ML: 2.5; .5 SOLUTION RESPIRATORY (INHALATION) at 10:16

## 2024-01-06 RX ADMIN — CLOPIDOGREL BISULFATE 75 MG: 75 TABLET, FILM COATED ORAL at 17:07

## 2024-01-06 RX ADMIN — FUROSEMIDE 20 MG: 10 INJECTION, SOLUTION INTRAMUSCULAR; INTRAVENOUS at 23:13

## 2024-01-06 RX ADMIN — METOCLOPRAMIDE HYDROCHLORIDE 10 MG: 5 INJECTION INTRAMUSCULAR; INTRAVENOUS at 15:06

## 2024-01-06 RX ADMIN — METHOCARBAMOL 500 MG: 500 TABLET, FILM COATED ORAL at 15:06

## 2024-01-06 RX ADMIN — CHLORHEXIDINE GLUCONATE 1 APPLICATION: 500 CLOTH TOPICAL at 03:19

## 2024-01-06 RX ADMIN — INSULIN LISPRO 5 UNITS: 100 INJECTION, SOLUTION INTRAVENOUS; SUBCUTANEOUS at 20:17

## 2024-01-06 RX ADMIN — CALCIUM CHLORIDE 1000 MG: 100 INJECTION INTRAVENOUS; INTRAVENTRICULAR at 11:32

## 2024-01-06 RX ADMIN — FUROSEMIDE 20 MG: 10 INJECTION, SOLUTION INTRAMUSCULAR; INTRAVENOUS at 11:25

## 2024-01-06 RX ADMIN — METHOCARBAMOL 500 MG: 500 TABLET, FILM COATED ORAL at 05:32

## 2024-01-06 RX ADMIN — ACETAMINOPHEN 650 MG: 325 TABLET, FILM COATED ORAL at 11:25

## 2024-01-06 RX ADMIN — BISACODYL 10 MG: 5 TABLET ORAL at 08:15

## 2024-01-06 RX ADMIN — IPRATROPIUM BROMIDE AND ALBUTEROL SULFATE 3 ML: 2.5; .5 SOLUTION RESPIRATORY (INHALATION) at 21:57

## 2024-01-06 RX ADMIN — PREGABALIN 25 MG: 25 CAPSULE ORAL at 20:17

## 2024-01-06 RX ADMIN — CHLORHEXIDINE GLUCONATE 15 ML: 1.2 RINSE ORAL at 05:32

## 2024-01-06 RX ADMIN — METOCLOPRAMIDE HYDROCHLORIDE 10 MG: 5 INJECTION INTRAMUSCULAR; INTRAVENOUS at 20:18

## 2024-01-06 RX ADMIN — ACETAMINOPHEN 650 MG: 325 TABLET, FILM COATED ORAL at 05:32

## 2024-01-06 RX ADMIN — ATORVASTATIN CALCIUM 20 MG: 10 TABLET, FILM COATED ORAL at 20:16

## 2024-01-06 RX ADMIN — METOCLOPRAMIDE HYDROCHLORIDE 10 MG: 5 INJECTION INTRAMUSCULAR; INTRAVENOUS at 00:37

## 2024-01-06 RX ADMIN — ENOXAPARIN SODIUM 40 MG: 100 INJECTION SUBCUTANEOUS at 08:15

## 2024-01-06 RX ADMIN — CALCIUM GLUCONATE 1000 MG: 20 INJECTION, SOLUTION INTRAVENOUS at 04:46

## 2024-01-06 RX ADMIN — CALCIUM GLUCONATE 2000 MG: 98 INJECTION, SOLUTION INTRAVENOUS at 05:55

## 2024-01-06 RX ADMIN — PANTOPRAZOLE SODIUM 40 MG: 40 TABLET, DELAYED RELEASE ORAL at 05:32

## 2024-01-06 RX ADMIN — INSULIN LISPRO 8 UNITS: 100 INJECTION, SOLUTION INTRAVENOUS; SUBCUTANEOUS at 11:25

## 2024-01-06 RX ADMIN — PREGABALIN 25 MG: 25 CAPSULE ORAL at 08:14

## 2024-01-06 RX ADMIN — CHLORHEXIDINE GLUCONATE 15 ML: 1.2 RINSE ORAL at 17:07

## 2024-01-06 RX ADMIN — METOCLOPRAMIDE HYDROCHLORIDE 10 MG: 5 INJECTION INTRAMUSCULAR; INTRAVENOUS at 08:15

## 2024-01-06 RX ADMIN — INSULIN LISPRO 5 UNITS: 100 INJECTION, SOLUTION INTRAVENOUS; SUBCUTANEOUS at 17:07

## 2024-01-06 RX ADMIN — METOPROLOL TARTRATE 37.5 MG: 25 TABLET, FILM COATED ORAL at 08:15

## 2024-01-06 RX ADMIN — METOPROLOL TARTRATE 37.5 MG: 25 TABLET, FILM COATED ORAL at 20:17

## 2024-01-06 RX ADMIN — IPRATROPIUM BROMIDE AND ALBUTEROL SULFATE 3 ML: 2.5; .5 SOLUTION RESPIRATORY (INHALATION) at 05:56

## 2024-01-06 NOTE — THERAPY TREATMENT NOTE
Acute Care - Physical Therapy Treatment Note  [facility]     Patient Name: Speedy Nichols  : 1975  MRN: [id number]  Today's Date: 2024      Visit Dx:     ICD-10-CM ICD-9-CM   1. Impaired mobility [Z74.09]  Z74.09 799.89   2. Coronary artery disease involving native coronary artery of native heart without angina pectoris  I25.10 414.01     Patient Active Problem List   Diagnosis    Type 2 diabetes mellitus with hyperglycemia, with long-term current use of insulin    Essential hypertension    Chronic systolic heart failure    Coronary artery disease involving native coronary artery of native heart without angina pectoris    CAD (coronary artery disease), native coronary artery     Past Medical History:   Diagnosis Date    Anxiety     CHF (congestive heart failure)     Coronary artery disease     Diabetes mellitus     Hypertension     Type 2 diabetes mellitus      Past Surgical History:   Procedure Laterality Date    CARDIAC CATHETERIZATION N/A 2022    Procedure: Left Heart Cath;  Surgeon: Gopal King MD;  Location:  [facility];  Service: Cardiology;  Laterality: N/A;    CARDIAC CATHETERIZATION N/A 2023    Procedure: Left Heart Cath;  Surgeon: Gopal King MD;  Location:  [facility];  Service: Cardiology;  Laterality: N/A;    CORONARY ARTERY BYPASS GRAFT N/A 1/3/2024    Procedure: CORONARY ARTERY BYPASS GRAFTING X5, BILATERAL INTERNAL MAMMARY ARTERY GRAFT, RIGHT UPPER LEG ENDOSCOPIC VEIN HARVEST, RIGHT LOWER LEG OPEN VEIN HARVEST, LEFT LEG ABORTED VEIN HARVEST, TRANSESOPHAGEAL ECHOCARDIOGRAM, XP STERNAL PLATING;  Surgeon: Papi Wilburn MD;  Location: [facility] OR;  Service: Cardiothoracic;  Laterality: N/A;    TOOTH EXTRACTION Right 2022    WISDOM TOOTH EXTRACTION       PT Assessment (last 12 hours)       PT Evaluation and Treatment       Row Name 24 0927          Physical Therapy Time and Intention    Subjective Information  complains of;fatigue  -TB     Document Type therapy note (daily note)  -TB     Mode of Treatment physical therapy  -TB       Row Name 01/06/24 0927          General Information    Existing Precautions/Restrictions fall;oxygen therapy device and L/min;sternal  -TB       Row Name 01/06/24 0927          Pain    Pretreatment Pain Rating 2/10  -TB     Posttreatment Pain Rating 2/10  -TB     Pain Location - chest  -TB       Row Name 01/06/24 0927          Bed Mobility    Comment, (Bed Mobility) Chair  -TB       Row Name 01/06/24 0927          Transfers    Transfers sit-stand transfer;stand-sit transfer  -TB       Row Name 01/06/24 0927          Sit-Stand Transfer    Sit-Stand Platter (Transfers) contact guard;verbal cues  -TB       Row Name 01/06/24 0927          Stand-Sit Transfer    Stand-Sit [place]atter (Transfers) verbal cues;contact guard  -TB       Row Name 01/06/24 0927          Gait/Stairs (Locomotion)    Platter Level (Gait) contact guard;verbal cues  -TB     Distance in Feet (Gait) 200  1 standing rest break  -TB     Deviations/Abnormal Patterns (Gait) base of support, wide;stride length decreased  -TB       Row Name 01/06/24 0927          Motor Skills    Comments, Therapeutic Exercise Protocol x15  -TB       Row Name             Wound 01/03/24 0815 midline sternal Incision    Wound - Properties Group Placement Date: 01/03/24  -TJ Placement Time: 0815 -TJ Orientation: midline  -TJ Location: sternal  -TJ Primary Wound Type: Incision  -TJ    Retired Wound - Properties Group Placement Date: 01/03/24  -TJ Placement Time: 0815  -TJ Orientation: midline  -TJ Location: sternal  -TJ Primary Wound Type: Incision  -TJ    Retired Wound - Properties Group Date first assessed: 01/03/24  -TJ Time first assessed: 0815  -TJ Location: sternal  -TJ Primary Wound Type: Incision  -TJ      Row Name             Wound 01/03/24 0808 Right leg Incision    Wound - Properties Group Placement Date: 01/03/24  -TJ Placement Time:  0808  -TJ Side: Right  -TJ Location: leg  -TJ Primary Wound Type: Incision  -TJ    Retired Wound - Properties Group Placement Date: 01/03/24  -TJ Placement Time: 0808  -TJ Side: Right  -TJ Location: leg  -TJ Primary Wound Type: Incision  -TJ    Retired Wound - Properties Group Date first assessed: 01/03/24  -TJ Time first assessed: 0808  -TJ Side: Right  -TJ Location: leg  -TJ Primary Wound Type: Incision  -TJ      Row Name             Wound 01/03/24 0900 Left leg Incision    Wound - Properties Group Placement Date: 01/03/24  -TJ Placement Time: 0900  -TJ Side: Left  -TJ Location: leg  -TJ Primary Wound Type: Incision  -TJ    Retired Wound - Properties Group Placement Date: 01/03/24  -TJ Placement Time: 0900  -TJ Side: Left  -TJ Location: leg  -TJ Primary Wound Type: Incision  -TJ    Retired Wound - Properties Group Date first assessed: 01/03/24  -TJ Time first assessed: 0900  -TJ Side: Left  -TJ Location: leg  -TJ Primary Wound Type: Incision  -TJ      Row Name 01/06/24 0927          Plan of Care Review    Plan of Care Reviewed With patient  -TB     Progress improving  -TB     Outcome Evaluation PT tx complete. Performed seated protocol exs x15. Stood CGA. Amb 200' w/ CGA and 1 standing rest break. Pt c/o some pain in chest but stated it was tolerable. Amb on 2L O2. Will cont POC  -TB       Row Name 01/06/24 0927          Positioning and Restraints    Pre-Treatment Position sitting in chair/recliner  -TB     Post Treatment Position chair  -TB     In Chair notified nsg;reclined;sitting;call light within reach;encouraged to call for assist;with family/caregiver;legs elevated  -TB               User Key  (r) = Recorded By, (t) = Taken By, (c) = Cosigned By      Initials Name Provider Type    TB Rissa De Los Santos, PTA Physical Therapist Assistant    Yue Mcnair, RN Registered Nurse                    Physical Therapy Education       Title: PT OT SLP Therapies (In Progress)       Topic: Physical Therapy (In  Progress)       Point: Mobility training (In Progress)       Learning Progress Summary             Patient Acceptance, E, NR by KAILEE at 1/4/2024 0758    Comment: sternal prec, benefits of activity, progression of PT                         Point: Home exercise program (Not Started)       Learner Progress:  Not documented in this visit.              Point: Body mechanics (Not Started)       Learner Progress:  Not documented in this visit.              Point: Precautions (Done)       Learning Progress Summary             Patient Acceptance, E, VU,NR by [place] at 1/5/2024 1012    Comment: sternal restrictions    Acceptance, E, NR by KAILEE at 1/4/2024 0758    Comment: sternal prec, benefits of activity, progression of PT                                         User Key       Initials Effective Dates Name Provider Type Discipline    KAILEE 02/03/23 -  Amreico Zepeda, PT DPT Physical Therapist PT    [place] 02/03/23 -  Genevieve Castellon, PTA Physical Therapist Assistant PT                  PT Recommendation and Plan     Plan of Care Reviewed With: patient  Progress: improving  Outcome Evaluation: PT tx complete. Performed seated protocol exs x15. Stood CGA. Amb 200' w/ CGA and 1 standing rest break. Pt c/o some pain in chest but stated it was tolerable. Amb on 2L O2. Will cont POC   Outcome Measures       Row Name 01/06/24 0927 01/05/24 1000 01/04/24 1500       How much help from another person do you currently need...    Turning from your back to your side while in flat bed without using bedrails? 3  -TB 2  -[place] 2  -[place]    Moving from lying on back to sitting on the side of a flat bed without bedrails? 3  -TB 2  -[place] 2  -[place]    Moving to and from a bed to a chair (including a wheelchair)? 4  -TB 3  -[place] 2  -[place]    Standing up from a chair using your arms (e.g., wheelchair, bedside chair)? 4  -TB 3  -[place] 3  -[place]    Climbing 3-5 steps with a railing? 3  -TB 2  -[place] 2  -[place]    To walk in hospital room? 3  -TB 3  -[place] 2  -[place]    AM-PAC 6 Clicks Score  (PT) 20  -TB 15  -[place] 13  -[place]    Highest Level of Mobility Goal 6 --> Walk 10 steps or more  -TB 4 --> Transfer to chair/commode  -[place] 4 --> Transfer to chair/commode  -[place]       Functional Assessment    Outcome Measure Options AM-PAC 6 Clicks Basic Mobility (PT)  -TB -- --              User Key  (r) = Recorded By, (t) = Taken By, (c) = Cosigned By      Initials Name Provider Type    [place] Genevieve Castellon, LOWELL Physical Therapist Assistant    Rissa Saez PTA Physical Therapist Assistant                     Time Calculation:    PT Charges       Row Name 01/06/24 1159             Time Calculation    Start Time 0927  -TB      Stop Time 0950  -TB      Time Calculation (min) 23 min  -TB      PT Received On 01/06/24  -TB         Time Calculation- PT    Total Timed Code Minutes- PT 23 minute(s)  -TB                User Key  (r) = Recorded By, (t) = Taken By, (c) = Cosigned By      Initials Name Provider Type    Rissa Saez PTA Physical Therapist Assistant                  Therapy Charges for Today       Code Description Service Date Service Provider Modifiers Qty    62905438377 HC GAIT TRAINING EA 15 MIN 1/6/2024 Rissa De Los Santos PTA GP 2            PT G-Codes  Outcome Measure Options: AM-PAC 6 Clicks Basic Mobility (PT)  AM-PAC 6 Clicks Score (PT): 20    Rissa De Los Santos PTA  1/6/2024

## 2024-01-06 NOTE — PLAN OF CARE
Goal Outcome Evaluation:  Plan of Care Reviewed With: patient        Progress: improving  Outcome Evaluation: PT tx complete. Performed seated protocol exs x15. Stood CGA. Amb 200' w/ CGA and 1 standing rest break. Pt c/o some pain in chest but stated it was tolerable. Amb on 2L O2. Will cont POC

## 2024-01-06 NOTE — THERAPY TREATMENT NOTE
Acute Care - Physical Therapy Treatment Note  [facility]     Patient Name: Speedy Nichols  : 1975  MRN: [id number]  Today's Date: 2024      Visit Dx:     ICD-10-CM ICD-9-CM   1. Impaired mobility [Z74.09]  Z74.09 799.89   2. Coronary artery disease involving native coronary artery of native heart without angina pectoris  I25.10 414.01     Patient Active Problem List   Diagnosis    Type 2 diabetes mellitus with hyperglycemia, with long-term current use of insulin    Essential hypertension    Chronic systolic heart failure    Coronary artery disease involving native coronary artery of native heart without angina pectoris    CAD (coronary artery disease), native coronary artery     Past Medical History:   Diagnosis Date    Anxiety     CHF (congestive heart failure)     Coronary artery disease     Diabetes mellitus     Hypertension     Type 2 diabetes mellitus      Past Surgical History:   Procedure Laterality Date    CARDIAC CATHETERIZATION N/A 2022    Procedure: Left Heart Cath;  Surgeon: Gopal King MD;  Location:  [facility];  Service: Cardiology;  Laterality: N/A;    CARDIAC CATHETERIZATION N/A 2023    Procedure: Left Heart Cath;  Surgeon: Gopal King MD;  Location:  [facility];  Service: Cardiology;  Laterality: N/A;    CORONARY ARTERY BYPASS GRAFT N/A 1/3/2024    Procedure: CORONARY ARTERY BYPASS GRAFTING X5, BILATERAL INTERNAL MAMMARY ARTERY GRAFT, RIGHT UPPER LEG ENDOSCOPIC VEIN HARVEST, RIGHT LOWER LEG OPEN VEIN HARVEST, LEFT LEG ABORTED VEIN HARVEST, TRANSESOPHAGEAL ECHOCARDIOGRAM, XP STERNAL PLATING;  Surgeon: Papi Wilburn MD;  Location: [facility] OR;  Service: Cardiothoracic;  Laterality: N/A;    TOOTH EXTRACTION Right 2022    WISDOM TOOTH EXTRACTION       PT Assessment (last 12 hours)       PT Evaluation and Treatment       Row Name 24 1450 24 0927       Physical Therapy Time and Intention    Subjective  Information no complaints  -TB complains of;fatigue  -TB    Document Type therapy note (daily note)  -TB therapy note (daily note)  -TB    Mode of Treatment physical therapy  -TB physical therapy  -TB      Row Name 01/06/24 1450 01/06/24 0927       General Information    Existing Precautions/Restrictions fall;oxygen therapy device and L/min;sternal  -TB fall;oxygen therapy device and L/min;sternal  -TB      Row Name 01/06/24 1450 01/06/24 0927       Pain    Pretreatment Pain Rating 0/10 - no pain  -TB 2/10  -TB    Posttreatment Pain Rating 0/10 - no pain  -TB 2/10  -TB    Pain Location - -- chest  -TB      Row Name 01/06/24 1450 01/06/24 0927       Bed Mobility    Comment, (Bed Mobility) Chair  -TB Chair  -TB      Row Name 01/06/24 1450 01/06/24 0927       Transfers    Transfers sit-stand transfer;stand-sit transfer  -TB sit-stand transfer;stand-sit transfer  -TB      Row Name 01/06/24 1450 01/06/24 0927       Sit-Stand Transfer    Sit-Stand Canadian (Transfers) contact guard;verbal cues  -TB contact guard;verbal cues  -TB      Row Name 01/06/24 1450 01/06/24 0927       Stand-Sit Transfer    Stand-Sit Canadian (Transfers) verbal cues;contact guard  -TB verbal cues;contact guard  -TB      Row Name 01/06/24 1450 01/06/24 0927       Gait/Stairs (Locomotion)    Canadian Level (Gait) contact guard;verbal cues  -TB contact guard;verbal cues  -TB    Distance in Feet (Gait) 300'  -  1 standing rest break  -TB    Deviations/Abnormal Patterns (Gait) base of support, wide;stride length decreased  -TB base of support, wide;stride length decreased  -TB    Comment, (Gait/Stairs) Amb on 2L  -TB --      Row Name 01/06/24 1450 01/06/24 0927       Motor Skills    Comments, Therapeutic Exercise Protocol x15  -TB Protocol x15  -TB      Row Name             Wound 01/03/24 0815 midline sternal Incision    Wound - Properties Group Placement Date: 01/03/24  -TJ Placement Time: 0815 -TJ Orientation: midline  -TJ Location:  sternal  -TJ Primary Wound Type: Incision  -TJ    Retired Wound - Properties Group Placement Date: 01/03/24  -TJ Placement Time: 0815  -TJ Orientation: midline  -TJ Location: sternal  -TJ Primary Wound Type: Incision  -TJ    Retired Wound - Properties Group Date first assessed: 01/03/24  -TJ Time first assessed: 0815  -TJ Location: sternal  -TJ Primary Wound Type: Incision  -TJ      Row Name             Wound 01/03/24 0808 Right leg Incision    Wound - Properties Group Placement Date: 01/03/24  -TJ Placement Time: 0808  -TJ Side: Right  -TJ Location: leg  -TJ Primary Wound Type: Incision  -TJ    Retired Wound - Properties Group Placement Date: 01/03/24  -TJ Placement Time: 0808  -TJ Side: Right  -TJ Location: leg  -TJ Primary Wound Type: Incision  -TJ    Retired Wound - Properties Group Date first assessed: 01/03/24  -TJ Time first assessed: 0808  -TJ Side: Right  -TJ Location: leg  -TJ Primary Wound Type: Incision  -TJ      Row Name             Wound 01/03/24 0900 Left leg Incision    Wound - Properties Group Placement Date: 01/03/24  -TJ Placement Time: 0900  -TJ Side: Left  -TJ Location: leg  -TJ Primary Wound Type: Incision  -TJ    Retired Wound - Properties Group Placement Date: 01/03/24  -TJ Placement Time: 0900  -TJ Side: Left  -TJ Location: leg  -TJ Primary Wound Type: Incision  -TJ    Retired Wound - Properties Group Date first assessed: 01/03/24  -TJ Time first assessed: 0900  -TJ Side: Left  -TJ Location: leg  -TJ Primary Wound Type: Incision  -TJ      Row Name 01/06/24 0927          Plan of Care Review    Plan of Care Reviewed With patient  -TB     Progress improving  -TB     Outcome Evaluation PT tx complete. Performed seated protocol exs x15. Stood CGA. Amb 200' w/ CGA and 1 standing rest break. Pt c/o some pain in chest but stated it was tolerable. Amb on 2L O2. Will cont POC  -TB       Row Name 01/06/24 1450 01/06/24 0927       Positioning and Restraints    Pre-Treatment Position sitting in  chair/recliner  -TB sitting in chair/recliner  -TB    Post Treatment Position chair  -TB chair  -TB    In Chair reclined;sitting;call light within reach;encouraged to call for assist;with family/caregiver;with nsg;legs elevated  -TB notified nsg;reclined;sitting;call light within reach;encouraged to call for assist;with family/caregiver;legs elevated  -TB              User Key  (r) = Recorded By, (t) = Taken By, (c) = Cosigned By      Initials Name Provider Type    TB Rissa De Los Santos, PTA Physical Therapist Assistant    Yue Mcnair RN Registered Nurse                    Physical Therapy Education       Title: PT OT SLP Therapies (In Progress)       Topic: Physical Therapy (In Progress)       Point: Mobility training (In Progress)       Learning Progress Summary             Patient Acceptance, E, NR by KAILEE at 1/4/2024 0758    Comment: sternal prec, benefits of activity, progression of PT                         Point: Home exercise program (Not Started)       Learner Progress:  Not documented in this visit.              Point: Body mechanics (Not Started)       Learner Progress:  Not documented in this visit.              Point: Precautions (Done)       Learning Progress Summary             Patient Acceptance, E, VU,NR by [place] at 1/5/2024 1012    Comment: sternal restrictions    Acceptance, E, NR by KAILEE at 1/4/2024 0758    Comment: sternal prec, benefits of activity, progression of PT                                         User Key       Initials Effective Dates Name Provider Type Discipline    KAILEE 02/03/23 -  Americo Zepeda PT DPT Physical Therapist PT    [place] 02/03/23 -  Genevieve Castellon PTA Physical Therapist Assistant PT                  PT Recommendation and Plan     Plan of Care Reviewed With: patient  Progress: improving  Outcome Evaluation: PT tx complete. Performed seated protocol exs x15. Stood CGA. Amb 200' w/ CGA and 1 standing rest break. Pt c/o some pain in chest but stated it was  tolerable. Amb on 2L O2. Will cont POC   Outcome Measures       Row Name 01/06/24 0927 01/05/24 1000 01/04/24 1500       How much help from another person do you currently need...    Turning from your back to your side while in flat bed without using bedrails? 3  -TB 2  -[place] 2  -[place]    Moving from lying on back to sitting on the side of a flat bed without bedrails? 3  -TB 2  -[place] 2  -[place]    Moving to and from a bed to a chair (including a wheelchair)? 4  -TB 3  -[place] 2  -[place]    Standing up from a chair using your arms (e.g., wheelchair, bedside chair)? 4  -TB 3  -[place] 3  -[place]    Climbing 3-5 steps with a railing? 3  -TB 2  -[place] 2  -[place]IA    To walk in hospital room? 3  -TB 3  -[place]IA 2  -GE[place]IA    AM-PAC 6 Clicks Score (PT) 20  -TB 15  -GE[place]GIA 13  -GE[place]IA    Highest Level of Mobility Goal 6 --> Walk 10 steps or more  -TB 4 --> Transfer to chair/commode  -[place] 4 --> Transfer to chair/commode  -GEORGIA       Functional Assessment    Outcome Measure Options AM-PAC 6 Clicks Basic Mobility (PT)  -TB -- --              User Key  (r) = Recorded By, (t) = Taken By, (c) = Cosigned By      Initials Name Provider Type    [place] Genevieve Castellon, PTA Physical Therapist Assistant    TB Rissa De Los Santos, PTA Physical Therapist Assistant                     Time Calculation:    PT Charges       Row Name 01/06/24 1604 01/06/24 1159          Time Calculation    Start Time 1450  -TB 0927  -TB     Stop Time 1513  -TB 0950  -TB     Time Calculation (min) 23 min  -TB 23 min  -TB     PT Received On 01/06/24  -TB 01/06/24  -TB        Time Calculation- PT    Total Timed Code Minutes- PT 23 minute(s)  -TB 23 minute(s)  -TB               User Key  (r) = Recorded By, (t) = Taken By, (c) = Cosigned By      Initials Name Provider Type    Rissa Saez, PTA Physical Therapist Assistant                  Therapy Charges for Today       Code Description Service Date Service Provider Modifiers Qty    51787287227 HC GAIT TRAINING EA 15 MIN 1/6/2024 Rissa De Los Santos,  PTA GP 2    90951966794 HC GAIT TRAINING EA 15 MIN 1/6/2024 Rissa De Los Santos, PTA GP 2            PT G-Codes  Outcome Measure Options: AM-PAC 6 Clicks Basic Mobility (PT)  AM-PAC 6 Clicks Score (PT): 20    Rissa De Los Santos PTA  1/6/2024

## 2024-01-06 NOTE — PROGRESS NOTES
"Patient name: Speedy Nichols  Patient : 1975  VISIT # [id number]  MR #[id number]    Procedure:Procedure(s):  CORONARY ARTERY BYPASS GRAFTING X5, BILATERAL INTERNAL MAMMARY ARTERY GRAFT, RIGHT UPPER LEG ENDOSCOPIC VEIN HARVEST, RIGHT LOWER LEG OPEN VEIN HARVEST, LEFT LEG ABORTED VEIN HARVEST, TRANSESOPHAGEAL ECHOCARDIOGRAM, XP STERNAL PLATING  Procedure Date:1/3/2024  POD:3 Days Post-Op    Subjective   Chest pain      Afebrile.  Up in chair.  93 on 3 L.  Mobilizing with therapy better this morning.  Hemoglobin 8.2.  Creatinine improved to 1.6 with a potassium of 4.0.  Blood pressure remains elevated but better.  He has had a bowel movement.  Weight unchanged.     Telemetry: Sinus tach, 80s  IV gtts: Insulin, Cardene, insulin          Objective     Visit Vitals  /77   Pulse 110   Temp 98.5 °F (36.9 °C) (Oral)   Resp 16   Ht 183 cm (72.05\")   Wt 94.1 kg (207 lb 6.4 oz)   SpO2 93%   BMI 28.09 kg/m²       Intake/Output Summary (Last 24 hours) at 2024 0814  Last data filed at 2024 0400  Gross per 24 hour   Intake 847.37 ml   Output 1805 ml   Net -957.63 ml     LABS:    CBC  WBC   Date/Time Value Ref Range Status   2024 6.88 3.40 - 10.80 10*3/mm3 Final     RBC   Date/Time Value Ref Range Status   2024 2.82 (L) 4.14 - 5.80 10*6/mm3 Final     Hemoglobin   Date/Time Value Ref Range Status   2024 8.2 (L) 13.0 - 17.7 g/dL Final     Hematocrit   Date/Time Value Ref Range Status   2024 26.2 (L) 37.5 - 51.0 % Final     MCH   Date/Time Value Ref Range Status   2024 29.1 26.6 - 33.0 pg Final     MCHC   Date/Time Value Ref Range Status   2024 31.3 (L) 31.5 - 35.7 g/dL Final     RDW   Date/Time Value Ref Range Status   2024 0318 14.4 12.3 - 15.4 % Final     RDW-SD   Date/Time Value Ref Range Status   20248 48.8 37.0 - 54.0 fl Final     MPV   Date/Time Value Ref Range Status   2024 10.5 6.0 - 12.0 fL Final     Platelets "   Date/Time Value Ref Range Status   01/06/2024 0318 119 (L) 140 - 450 10*3/mm3 Final       BMP  Glucose   Date/Time Value Ref Range Status   01/06/2024 0318 141 (H) 65 - 99 mg/dL Final     BUN   Date/Time Value Ref Range Status   01/06/2024 0318 37 (H) 6 - 20 mg/dL Final     Creatinine   Date/Time Value Ref Range Status   01/06/2024 0318 1.61 (H) 0.76 - 1.27 mg/dL Final     Sodium   Date/Time Value Ref Range Status   01/06/2024 0318 140 136 - 145 mmol/L Final     Potassium   Date/Time Value Ref Range Status   01/06/2024 0318 4.0 3.5 - 5.2 mmol/L Final     Chloride   Date/Time Value Ref Range Status   01/06/2024 0318 111 (H) 98 - 107 mmol/L Final     CO2   Date/Time Value Ref Range Status   01/06/2024 0318 19.0 (L) 22.0 - 29.0 mmol/L Final     Calcium   Date/Time Value Ref Range Status   01/06/2024 0318 7.4 (L) 8.6 - 10.5 mg/dL Final     BUN/Creatinine Ratio   Date/Time Value Ref Range Status   01/06/2024 0318 23.0 7.0 - 25.0 Final     Anion Gap   Date/Time Value Ref Range Status   01/06/2024 0318 10.0 5.0 - 15.0 mmol/L Final     eGFR   Date/Time Value Ref Range Status   01/06/2024 0318 52.1 (L) >60.0 mL/min/1.73 Final     IMAGES:       Imaging Results (Last 24 Hours)       Procedure Component Value Units Date/Time    XR Chest PA & Lateral [[id number]] Collected: 01/06/24 0656     Updated: 01/06/24 0701    Narrative:      XR CHEST PA AND LATERAL-     HISTORY: CAD, s/p CABG     COMPARISON: 1/5/2024     FINDINGS: Frontal and lateral views the chest are obtained.     Median sternotomy wires. Removal of surgical drains. Right IJ sheath  remains in place. Epicardial leads remain in place. Low lung volumes.  Small bilateral pleural effusions. Bibasilar opacities favoring patchy  atelectasis. No convincing edema. Vertically oriented linear density of  the left midlung may represent overlying artifact. No convincing  pneumothorax. No acute regional bony pathology.       Impression:      1. Small bilateral pleural effusions  with bibasilar opacities favoring  patchy atelectasis. Intact median sternotomy wires. Similar mediastinal  contours.        This report was signed and finalized on 1/6/2024 6:58 AM by Dr. Hannah Clancy MD.               My image interpretation: Bibasilar atelectasis and effusion    Physical Exam:  General: Alert, oriented. No apparent distress.  Overweight.  Nasal cannula  Cardiovascular: Regular rate and rhythm without murmur, rubs, or gallops.    Pulmonary: Diminished in basesIncision clean, dry, and intact. Sternum stable. No clicks.   Abdomen: Soft, nondistended, and nontender.  Extremities: Warm, moves all extremities. Edema. Saphenectomy site clean, dry, and intact.  Neurologic:  Grossly intact with no focal deficits.       Impression:  Coronary artery disease  Hyperlipidemia, on statin  Hypertension, well-controlled  Type 2 diabetes mellitus with long-term use of insulin      Plan:  Encourage pulmonary toilet and ambulation  Routine postcardiac surgery care  Calcium x 1 g  Diuresis, stop IV fluids  Remove Wheeler  Anticipate transfer to floor today  Discussed with patient, family and nursing      ANUPAM Tanner  01/06/24  08:14 CST

## 2024-01-06 NOTE — PLAN OF CARE
Problem: Adult Inpatient Plan of Care  Goal: Plan of Care Review  Outcome: Ongoing, Progressing  Goal: Patient-Specific Goal (Individualized)  Outcome: Ongoing, Progressing  Goal: Absence of Hospital-Acquired Illness or Injury  Outcome: Ongoing, Progressing  Intervention: Identify and Manage Fall Risk  Intervention: Prevent Skin Injury  Intervention: Prevent and Manage VTE (Venous Thromboembolism) Risk  Goal: Optimal Comfort and Wellbeing  Outcome: Ongoing, Progressing  Goal: Readiness for Transition of Care  Outcome: Ongoing, Progressing     Problem: Skin Injury Risk Increased  Goal: Skin Health and Integrity  Outcome: Ongoing, Progressing  Intervention: Optimize Skin Protection     Problem: Fall Injury Risk  Goal: Absence of Fall and Fall-Related Injury  Outcome: Ongoing, Progressing  Intervention: Promote Injury-Free Environment   Goal Outcome Evaluation:           Progress: improving

## 2024-01-07 ENCOUNTER — APPOINTMENT (OUTPATIENT)
Dept: GENERAL RADIOLOGY | Facility: HOSPITAL | Age: 49
End: 2024-01-07
Payer: COMMERCIAL

## 2024-01-07 LAB
ANION GAP SERPL CALCULATED.3IONS-SCNC: 12 MMOL/L (ref 5–15)
BH BB BLOOD EXPIRATION DATE: NORMAL
BH BB BLOOD TYPE BARCODE: 7300
BH BB DISPENSE STATUS: NORMAL
BH BB PRODUCT CODE: NORMAL
BH BB UNIT NUMBER: NORMAL
BUN SERPL-MCNC: 38 MG/DL (ref 6–20)
BUN/CREAT SERPL: 29.2 (ref 7–25)
CALCIUM SPEC-SCNC: 8.7 MG/DL (ref 8.6–10.5)
CHLORIDE SERPL-SCNC: 102 MMOL/L (ref 98–107)
CO2 SERPL-SCNC: 23 MMOL/L (ref 22–29)
CREAT SERPL-MCNC: 1.3 MG/DL (ref 0.76–1.27)
CROSSMATCH INTERPRETATION: NORMAL
DEPRECATED RDW RBC AUTO: 45.4 FL (ref 37–54)
EGFRCR SERPLBLD CKD-EPI 2021: 67.3 ML/MIN/1.73
ERYTHROCYTE [DISTWIDTH] IN BLOOD BY AUTOMATED COUNT: 13.6 % (ref 12.3–15.4)
GLUCOSE BLDC GLUCOMTR-MCNC: 219 MG/DL (ref 70–130)
GLUCOSE BLDC GLUCOMTR-MCNC: 223 MG/DL (ref 70–130)
GLUCOSE BLDC GLUCOMTR-MCNC: 237 MG/DL (ref 70–130)
GLUCOSE BLDC GLUCOMTR-MCNC: 242 MG/DL (ref 70–130)
GLUCOSE SERPL-MCNC: 202 MG/DL (ref 65–99)
HCT VFR BLD AUTO: 26.7 % (ref 37.5–51)
HGB BLD-MCNC: 8.4 G/DL (ref 13–17.7)
MCH RBC QN AUTO: 28.6 PG (ref 26.6–33)
MCHC RBC AUTO-ENTMCNC: 31.5 G/DL (ref 31.5–35.7)
MCV RBC AUTO: 90.8 FL (ref 79–97)
PLATELET # BLD AUTO: 138 10*3/MM3 (ref 140–450)
PMV BLD AUTO: 10.7 FL (ref 6–12)
POTASSIUM SERPL-SCNC: 4 MMOL/L (ref 3.5–5.2)
RBC # BLD AUTO: 2.94 10*6/MM3 (ref 4.14–5.8)
SODIUM SERPL-SCNC: 137 MMOL/L (ref 136–145)
UNIT  ABO: NORMAL
UNIT  RH: NORMAL
WBC NRBC COR # BLD AUTO: 6.83 10*3/MM3 (ref 3.4–10.8)

## 2024-01-07 PROCEDURE — 82948 REAGENT STRIP/BLOOD GLUCOSE: CPT

## 2024-01-07 PROCEDURE — 97116 GAIT TRAINING THERAPY: CPT

## 2024-01-07 PROCEDURE — 25010000002 FUROSEMIDE PER 20 MG: Performed by: NURSE PRACTITIONER

## 2024-01-07 PROCEDURE — 25010000002 METOCLOPRAMIDE PER 10 MG: Performed by: SURGERY

## 2024-01-07 PROCEDURE — 25010000002 LABETALOL 5 MG/ML SOLUTION: Performed by: SURGERY

## 2024-01-07 PROCEDURE — 94799 UNLISTED PULMONARY SVC/PX: CPT

## 2024-01-07 PROCEDURE — 80048 BASIC METABOLIC PNL TOTAL CA: CPT | Performed by: SURGERY

## 2024-01-07 PROCEDURE — 71045 X-RAY EXAM CHEST 1 VIEW: CPT

## 2024-01-07 PROCEDURE — 85027 COMPLETE CBC AUTOMATED: CPT | Performed by: SURGERY

## 2024-01-07 PROCEDURE — 97530 THERAPEUTIC ACTIVITIES: CPT

## 2024-01-07 PROCEDURE — 25010000002 ENOXAPARIN PER 10 MG: Performed by: SURGERY

## 2024-01-07 PROCEDURE — 94664 DEMO&/EVAL PT USE INHALER: CPT

## 2024-01-07 PROCEDURE — 63710000001 INSULIN LISPRO (HUMAN) PER 5 UNITS: Performed by: NURSE PRACTITIONER

## 2024-01-07 RX ORDER — LABETALOL HYDROCHLORIDE 5 MG/ML
10 INJECTION, SOLUTION INTRAVENOUS ONCE
Status: COMPLETED | OUTPATIENT
Start: 2024-01-07 | End: 2024-01-07

## 2024-01-07 RX ORDER — LISINOPRIL 20 MG/1
20 TABLET ORAL
Status: DISCONTINUED | OUTPATIENT
Start: 2024-01-07 | End: 2024-01-07

## 2024-01-07 RX ORDER — BISACODYL 10 MG
10 SUPPOSITORY, RECTAL RECTAL ONCE
Status: DISCONTINUED | OUTPATIENT
Start: 2024-01-07 | End: 2024-01-08 | Stop reason: HOSPADM

## 2024-01-07 RX ORDER — METOPROLOL TARTRATE 50 MG/1
50 TABLET, FILM COATED ORAL EVERY 12 HOURS SCHEDULED
Status: DISCONTINUED | OUTPATIENT
Start: 2024-01-07 | End: 2024-01-08 | Stop reason: HOSPADM

## 2024-01-07 RX ORDER — LISINOPRIL 10 MG/1
10 TABLET ORAL ONCE
Status: COMPLETED | OUTPATIENT
Start: 2024-01-07 | End: 2024-01-07

## 2024-01-07 RX ORDER — METOCLOPRAMIDE 10 MG/1
10 TABLET ORAL
Status: DISCONTINUED | OUTPATIENT
Start: 2024-01-07 | End: 2024-01-08 | Stop reason: HOSPADM

## 2024-01-07 RX ADMIN — PANTOPRAZOLE SODIUM 40 MG: 40 TABLET, DELAYED RELEASE ORAL at 05:16

## 2024-01-07 RX ADMIN — OXYCODONE HYDROCHLORIDE 5 MG: 5 TABLET ORAL at 08:22

## 2024-01-07 RX ADMIN — ASPIRIN 81 MG: 81 TABLET, COATED ORAL at 08:15

## 2024-01-07 RX ADMIN — IPRATROPIUM BROMIDE AND ALBUTEROL SULFATE 3 ML: 2.5; .5 SOLUTION RESPIRATORY (INHALATION) at 11:33

## 2024-01-07 RX ADMIN — BISACODYL 10 MG: 5 TABLET ORAL at 08:16

## 2024-01-07 RX ADMIN — PREGABALIN 25 MG: 25 CAPSULE ORAL at 08:16

## 2024-01-07 RX ADMIN — FUROSEMIDE 20 MG: 10 INJECTION, SOLUTION INTRAMUSCULAR; INTRAVENOUS at 12:29

## 2024-01-07 RX ADMIN — CHLORHEXIDINE GLUCONATE 15 ML: 1.2 RINSE ORAL at 05:16

## 2024-01-07 RX ADMIN — INSULIN LISPRO 5 UNITS: 100 INJECTION, SOLUTION INTRAVENOUS; SUBCUTANEOUS at 12:29

## 2024-01-07 RX ADMIN — INSULIN LISPRO 5 UNITS: 100 INJECTION, SOLUTION INTRAVENOUS; SUBCUTANEOUS at 20:49

## 2024-01-07 RX ADMIN — METOCLOPRAMIDE 10 MG: 10 TABLET ORAL at 20:37

## 2024-01-07 RX ADMIN — LABETALOL HYDROCHLORIDE 10 MG: 5 INJECTION INTRAVENOUS at 16:31

## 2024-01-07 RX ADMIN — ACETAMINOPHEN 650 MG: 325 TABLET, FILM COATED ORAL at 23:26

## 2024-01-07 RX ADMIN — METOCLOPRAMIDE 10 MG: 10 TABLET ORAL at 16:32

## 2024-01-07 RX ADMIN — OXYCODONE HYDROCHLORIDE 5 MG: 5 TABLET ORAL at 16:02

## 2024-01-07 RX ADMIN — ACETAMINOPHEN 650 MG: 325 TABLET, FILM COATED ORAL at 05:16

## 2024-01-07 RX ADMIN — LIDOCAINE 5% 2 PATCH: 700 PATCH TOPICAL at 08:16

## 2024-01-07 RX ADMIN — METOCLOPRAMIDE HYDROCHLORIDE 10 MG: 5 INJECTION INTRAMUSCULAR; INTRAVENOUS at 02:18

## 2024-01-07 RX ADMIN — METOPROLOL TARTRATE 37.5 MG: 25 TABLET, FILM COATED ORAL at 08:15

## 2024-01-07 RX ADMIN — IPRATROPIUM BROMIDE AND ALBUTEROL SULFATE 3 ML: 2.5; .5 SOLUTION RESPIRATORY (INHALATION) at 19:05

## 2024-01-07 RX ADMIN — INSULIN LISPRO 5 UNITS: 100 INJECTION, SOLUTION INTRAVENOUS; SUBCUTANEOUS at 08:16

## 2024-01-07 RX ADMIN — ENOXAPARIN SODIUM 40 MG: 100 INJECTION SUBCUTANEOUS at 08:16

## 2024-01-07 RX ADMIN — METHOCARBAMOL 500 MG: 500 TABLET, FILM COATED ORAL at 20:38

## 2024-01-07 RX ADMIN — CLOPIDOGREL BISULFATE 75 MG: 75 TABLET, FILM COATED ORAL at 17:32

## 2024-01-07 RX ADMIN — ACETAMINOPHEN 650 MG: 325 TABLET, FILM COATED ORAL at 17:32

## 2024-01-07 RX ADMIN — POLYETHYLENE GLYCOL 3350 17 G: 17 POWDER, FOR SOLUTION ORAL at 08:17

## 2024-01-07 RX ADMIN — IPRATROPIUM BROMIDE AND ALBUTEROL SULFATE 3 ML: 2.5; .5 SOLUTION RESPIRATORY (INHALATION) at 06:40

## 2024-01-07 RX ADMIN — ACETAMINOPHEN 650 MG: 325 TABLET, FILM COATED ORAL at 12:29

## 2024-01-07 RX ADMIN — METOCLOPRAMIDE HYDROCHLORIDE 10 MG: 5 INJECTION INTRAMUSCULAR; INTRAVENOUS at 08:16

## 2024-01-07 RX ADMIN — INSULIN LISPRO 5 UNITS: 100 INJECTION, SOLUTION INTRAVENOUS; SUBCUTANEOUS at 17:32

## 2024-01-07 RX ADMIN — ATORVASTATIN CALCIUM 20 MG: 10 TABLET, FILM COATED ORAL at 20:37

## 2024-01-07 RX ADMIN — LISINOPRIL 10 MG: 10 TABLET ORAL at 16:31

## 2024-01-07 RX ADMIN — OXYCODONE HYDROCHLORIDE 5 MG: 5 TABLET ORAL at 20:37

## 2024-01-07 RX ADMIN — METOPROLOL TARTRATE 50 MG: 50 TABLET, FILM COATED ORAL at 20:37

## 2024-01-07 RX ADMIN — PREGABALIN 25 MG: 25 CAPSULE ORAL at 20:38

## 2024-01-07 RX ADMIN — METHOCARBAMOL 500 MG: 500 TABLET, FILM COATED ORAL at 05:16

## 2024-01-07 RX ADMIN — IPRATROPIUM BROMIDE AND ALBUTEROL SULFATE 3 ML: 2.5; .5 SOLUTION RESPIRATORY (INHALATION) at 15:05

## 2024-01-07 RX ADMIN — FUROSEMIDE 20 MG: 10 INJECTION, SOLUTION INTRAMUSCULAR; INTRAVENOUS at 23:26

## 2024-01-07 RX ADMIN — LISINOPRIL 20 MG: 20 TABLET ORAL at 11:00

## 2024-01-07 RX ADMIN — METHOCARBAMOL 500 MG: 500 TABLET, FILM COATED ORAL at 16:01

## 2024-01-07 NOTE — THERAPY TREATMENT NOTE
Acute Care - Physical Therapy Treatment Note  [facility]     Patient Name: Speedy Nichols  : 1975  MRN: [id number]  Today's Date: 2024      Visit Dx:     ICD-10-CM ICD-9-CM   1. Impaired mobility [Z74.09]  Z74.09 799.89   2. Coronary artery disease involving native coronary artery of native heart without angina pectoris  I25.10 414.01     Patient Active Problem List   Diagnosis    Type 2 diabetes mellitus with hyperglycemia, with long-term current use of insulin    Essential hypertension    Chronic systolic heart failure    Coronary artery disease involving native coronary artery of native heart without angina pectoris    CAD (coronary artery disease), native coronary artery     Past Medical History:   Diagnosis Date    Anxiety     CHF (congestive heart failure)     Coronary artery disease     Diabetes mellitus     Hypertension     Type 2 diabetes mellitus      Past Surgical History:   Procedure Laterality Date    CARDIAC CATHETERIZATION N/A 2022    Procedure: Left Heart Cath;  Surgeon: Gopal King MD;  Location:  [facility];  Service: Cardiology;  Laterality: N/A;    CARDIAC CATHETERIZATION N/A 2023    Procedure: Left Heart Cath;  Surgeon: Gopal King MD;  Location:  [facility];  Service: Cardiology;  Laterality: N/A;    CORONARY ARTERY BYPASS GRAFT N/A 1/3/2024    Procedure: CORONARY ARTERY BYPASS GRAFTING X5, BILATERAL INTERNAL MAMMARY ARTERY GRAFT, RIGHT UPPER LEG ENDOSCOPIC VEIN HARVEST, RIGHT LOWER LEG OPEN VEIN HARVEST, LEFT LEG ABORTED VEIN HARVEST, TRANSESOPHAGEAL ECHOCARDIOGRAM, XP STERNAL PLATING;  Surgeon: Papi Wilburn MD;  Location: [facility] OR;  Service: Cardiothoracic;  Laterality: N/A;    TOOTH EXTRACTION Right 2022    WISDOM TOOTH EXTRACTION       PT Assessment (last 12 hours)       PT Evaluation and Treatment       Row Name 24 0946          Physical Therapy Time and Intention    Subjective Information  complains of;pain  -TB     Document Type therapy note (daily note)  -TB     Mode of Treatment physical therapy  -TB       Row Name 01/07/24 0946          General Information    Existing Precautions/Restrictions fall;oxygen therapy device and L/min;sternal  -TB       Row Name 01/07/24 0946          Pain    Pretreatment Pain Rating 1/10  -TB     Posttreatment Pain Rating 1/10  -TB       Row Name 01/07/24 0946          Transfers    Transfers sit-stand transfer;stand-sit transfer;toilet transfer  -TB       Row Name 01/07/24 0946          Sit-Stand Transfer    Sit-Stand [place] (Transfers) contact guard;verbal cues  -TB       Row Name 01/07/24 0946          Stand-Sit Transfer    Stand-Sit [place] (Transfers) verbal cues;contact guard  -TB       Row Name 01/07/24 0946          Toilet Transfer    Type (Toilet Transfer) sit-stand;stand-sit  -TB     [place] Level (Toilet Transfer) standby assist  -TB     Assistive Device (Toilet Transfer) commode  -TB       Row Name 01/07/24 0946          Gait/Stairs (Locomotion)    [place] Level (Gait) contact guard;verbal cues  -TB     Distance in Feet (Gait) 250'  -TB     Deviations/Abnormal Patterns (Gait) base of support, wide;stride length decreased  -TB     Comment, (Gait/Stairs) 2L  -TB       Row Name             Wound 01/03/24 0815 midline sternal Incision    Wound - Properties Group Placement Date: 01/03/24  -TJ Placement Time: 0815 -TJ Orientation: midline  -TJ Location: sternal  -TJ Primary Wound Type: Incision  -TJ    Retired Wound - Properties Group Placement Date: 01/03/24  -TJ Placement Time: 0815  -TJ Orientation: midline  -TJ Location: sternal  -TJ Primary Wound Type: Incision  -TJ    Retired Wound - Properties Group Date first assessed: 01/03/24  -TJ Time first assessed: 0815  -TJ Location: sternal  -TJ Primary Wound Type: Incision  -TJ      Row Name             Wound 01/03/24 0808 Right leg Incision    Wound - Properties Group Placement Date: 01/03/24   -TJ Placement Time: 0808 -TJ Side: Right  -TJ Location: leg  -TJ Primary Wound Type: Incision  -TJ    Retired Wound - Properties Group Placement Date: 01/03/24  -TJ Placement Time: 0808 -TJ Side: Right  -TJ Location: leg  -TJ Primary Wound Type: Incision  -TJ    Retired Wound - Properties Group Date first assessed: 01/03/24  -TJ Time first assessed: 0808  -TJ Side: Right  -TJ Location: leg  -TJ Primary Wound Type: Incision  -TJ      Row Name             Wound 01/03/24 0900 Left leg Incision    Wound - Properties Group Placement Date: 01/03/24  -TJ Placement Time: 0900  -TJ Side: Left  -TJ Location: leg  -TJ Primary Wound Type: Incision  -TJ    Retired Wound - Properties Group Placement Date: 01/03/24  -TJ Placement Time: 0900  -TJ Side: Left  -TJ Location: leg  -TJ Primary Wound Type: Incision  -TJ    Retired Wound - Properties Group Date first assessed: 01/03/24  -TJ Time first assessed: 0900  -TJ Side: Left  -TJ Location: leg  -TJ Primary Wound Type: Incision  -TJ      Row Name 01/07/24 0946          Plan of Care Review    Plan of Care Reviewed With patient  -TB     Progress improving  -TB     Outcome Evaluation Pt up in bathroom upon therapy arrival. Pt not on O2 w/ sats 85%. Had pt sit on bed to recover. Placed on 1L. After a min pt was back up to 93%. Turned O2 off. Stood and Amb ~125' before needing to stand for a rest. O2 had bounced from 93%-86% during amb. Placed pt on 2L for amb back to room due to increased fatigue and pain. Left pt in chair on O2 w/ sat 97%. Notfiied RN so she could trial no O2 later.  -TB       Row Name 01/07/24 0946          Positioning and Restraints    Pre-Treatment Position bathroom  -TB     Post Treatment Position chair  -TB     In Chair reclined;sitting;call light within reach;encouraged to call for assist;notified nsg;legs elevated  -TB               User Key  (r) = Recorded By, (t) = Taken By, (c) = Cosigned By      Initials Name Provider Type    TB Rissa De Los Santos, PTA  Physical Therapist Assistant    Yue Mcnair, RN Registered Nurse                    Physical Therapy Education       Title: PT OT SLP Therapies (In Progress)       Topic: Physical Therapy (In Progress)       Point: Mobility training (In Progress)       Learning Progress Summary             Patient Acceptance, E, NR by KAILEE at 1/4/2024 0758    Comment: sternal prec, benefits of activity, progression of PT                         Point: Home exercise program (Not Started)       Learner Progress:  Not documented in this visit.              Point: Body mechanics (Not Started)       Learner Progress:  Not documented in this visit.              Point: Precautions (Done)       Learning Progress Summary             Patient Acceptance, E, VU,NR by GEORGIA at 1/5/2024 1012    Comment: sternal restrictions    Acceptance, E, NR by KAILEE at 1/4/2024 0758    Comment: sternal prec, benefits of activity, progression of PT                                         User Key       Initials Effective Dates Name Provider Type Discipline    KAILEE 02/03/23 -  Americo Zepeda, PT DPT Physical Therapist PT    [place] 02/03/23 -  Genevieve Castellon, PTA Physical Therapist Assistant PT                  PT Recommendation and Plan     Plan of Care Reviewed With: patient  Progress: improving  Outcome Evaluation: Pt up in bathroom upon therapy arrival. Pt not on O2 w/ sats 85%. Had pt sit on bed to recover. Placed on 1L. After a min pt was back up to 93%. Turned O2 off. Stood and Amb ~125' before needing to stand for a rest. O2 had bounced from 93%-86% during amb. Placed pt on 2L for amb back to room due to increased fatigue and pain. Left pt in chair on O2 w/ sat 97%. Notfiied RN so she could trial no O2 later.   Outcome Measures       Row Name 01/07/24 0946 01/06/24 0927 01/05/24 1000       How much help from another person do you currently need...    Turning from your back to your side while in flat bed without using bedrails? 3  -TB 3  -TB 2  -GEORGIA     Moving from lying on back to sitting on the side of a flat bed without bedrails? 3  -TB 3  -TB 2  -[place]IA    Moving to and from a bed to a chair (including a wheelchair)? 4  -TB 4  -TB 3  -[place]    Standing up from a chair using your arms (e.g., wheelchair, bedside chair)? 4  -TB 4  -TB 3  -[place]    Climbing 3-5 steps with a railing? 3  -TB 3  -TB 2  -[place]IA    To walk in hospital room? 3  -TB 3  -TB 3  -GEOR[place]IA    AM-PAC 6 Clicks Score (PT) 20  -TB 20  -TB 15  -GEORGIA    Highest Level of Mobility Goal 6 --> Walk 10 steps or more  -TB 6 --> Walk 10 steps or more  -TB 4 --> Transfer to chair/commode  -GEORGIA       Functional Assessment    Outcome Measure Options AM-PAC 6 Clicks Basic Mobility (PT)  -TB AM-PAC 6 Clicks Basic Mobility (PT)  -TB --      Row Name 01/04/24 1500             How much help from another person do you currently need...    Turning from your back to your side while in flat bed without using bedrails? 2  -[place]IA      Moving from lying on back to sitting on the side of a flat bed without bedrails? 2  -[place]      Moving to and from a bed to a chair (including a wheelchair)? 2  -[place]      Standing up from a chair using your arms (e.g., wheelchair, bedside chair)? 3  -[place]      Climbing 3-5 steps with a railing? 2  -[place]IA      To walk in hospital room? 2  -GEOR[place]IA      AM-PAC 6 Clicks Score (PT) 13  -GE[place]IA      Highest Level of Mobility Goal 4 --> Transfer to chair/commode  -GEOR[place]IA                User Key  (r) = Recorded By, (t) = Taken By, (c) = Cosigned By      Initials Name Provider Type    Genevieve Gamez, PTA Physical Therapist Assistant    Rissa Saez, LOWELL Physical Therapist Assistant                     Time Calculation:    PT Charges       Row Name 01/07/24 1203             Time Calculation    Start Time 0946  -TB      Stop Time 1025  -TB      Time Calculation (min) 39 min  -TB      PT Received On 01/07/24  -TB         Time Calculation- PT    Total Timed Code Minutes- PT 39 minute(s)  -TB                User Key  (r) =  Recorded By, (t) = Taken By, (c) = Cosigned By      Initials Name Provider Type    TB Rissa De Los Santos, LOWELL Physical Therapist Assistant                  Therapy Charges for Today       Code Description Service Date Service Provider Modifiers Qty    03656250619 HC GAIT TRAINING EA 15 MIN 1/6/2024 Rissa De Los Santos, PTA GP 2    37766413396 HC GAIT TRAINING EA 15 MIN 1/6/2024 Rissa De Los Santos, PTA GP 2    23577723968 HC GAIT TRAINING EA 15 MIN 1/7/2024 Rissa De Los Santos, PTA GP 2    09563845565 HC PT THERAPEUTIC ACT EA 15 MIN 1/7/2024 Rissa De Los Santos, PTA GP 1            PT G-Codes  Outcome Measure Options: AM-PAC 6 Clicks Basic Mobility (PT)  AM-PAC 6 Clicks Score (PT): 20    Rissa De Los Santos PTA  1/7/2024

## 2024-01-07 NOTE — PROGRESS NOTES
"Patient name: Speedy Nichols  Patient : 1975  VISIT # [id number]  MR #[id number]    Procedure:Procedure(s):  CORONARY ARTERY BYPASS GRAFTING X5, BILATERAL INTERNAL MAMMARY ARTERY GRAFT, RIGHT UPPER LEG ENDOSCOPIC VEIN HARVEST, RIGHT LOWER LEG OPEN VEIN HARVEST, LEFT LEG ABORTED VEIN HARVEST, TRANSESOPHAGEAL ECHOCARDIOGRAM, XP STERNAL PLATING  Procedure Date:1/3/2024  POD:4 Days Post-Op    Subjective   Chest pain      Afebrile.  On room air.  Mobilizing well with therapy.  No bowel movement yet.  Weight down 2 pounds overnight.  5 pounds above baseline.  Creatinine improved to 1.3, potassium 4.0.   Hesitant to take pain medications per nursing.  Discussed.       Telemetry: Sinus, 90s       Objective     Visit Vitals  /87 (BP Location: Left arm, Patient Position: Lying)   Pulse 97   Temp 98.4 °F (36.9 °C) (Oral)   Resp 16   Ht 182.9 cm (72\")   Wt 93 kg (205 lb)   SpO2 94%   BMI 27.80 kg/m²       Intake/Output Summary (Last 24 hours) at 2024 0658  Last data filed at 2024 0516  Gross per 24 hour   Intake 840 ml   Output 2775 ml   Net -1935 ml       LABS:    CBC  WBC   Date/Time Value Ref Range Status   2024 0344 6.83 3.40 - 10.80 10*3/mm3 Final     RBC   Date/Time Value Ref Range Status   2024 0344 2.94 (L) 4.14 - 5.80 10*6/mm3 Final     Hemoglobin   Date/Time Value Ref Range Status   2024 0344 8.4 (L) 13.0 - 17.7 g/dL Final     Hematocrit   Date/Time Value Ref Range Status   2024 0344 26.7 (L) 37.5 - 51.0 % Final     MCH   Date/Time Value Ref Range Status   2024 034 28.6 26.6 - 33.0 pg Final     MCHC   Date/Time Value Ref Range Status   2024 034 31.5 31.5 - 35.7 g/dL Final     RDW   Date/Time Value Ref Range Status   2024 0344 13.6 12.3 - 15.4 % Final     RDW-SD   Date/Time Value Ref Range Status   2024 0344 45.4 37.0 - 54.0 fl Final     MPV   Date/Time Value Ref Range Status   2024 0344 10.7 6.0 - 12.0 fL Final     Platelets   Date/Time " Value Ref Range Status   01/07/2024 0344 138 (L) 140 - 450 10*3/mm3 Final       BMP  Glucose   Date/Time Value Ref Range Status   01/07/2024 0344 202 (H) 65 - 99 mg/dL Final     BUN   Date/Time Value Ref Range Status   01/07/2024 0344 38 (H) 6 - 20 mg/dL Final     Creatinine   Date/Time Value Ref Range Status   01/07/2024 0344 1.30 (H) 0.76 - 1.27 mg/dL Final     Sodium   Date/Time Value Ref Range Status   01/07/2024 0344 137 136 - 145 mmol/L Final     Potassium   Date/Time Value Ref Range Status   01/07/2024 0344 4.0 3.5 - 5.2 mmol/L Final     Chloride   Date/Time Value Ref Range Status   01/07/2024 0344 102 98 - 107 mmol/L Final     CO2   Date/Time Value Ref Range Status   01/07/2024 0344 23.0 22.0 - 29.0 mmol/L Final     Calcium   Date/Time Value Ref Range Status   01/07/2024 0344 8.7 8.6 - 10.5 mg/dL Final     BUN/Creatinine Ratio   Date/Time Value Ref Range Status   01/07/2024 0344 29.2 (H) 7.0 - 25.0 Final     Anion Gap   Date/Time Value Ref Range Status   01/07/2024 0344 12.0 5.0 - 15.0 mmol/L Final     eGFR   Date/Time Value Ref Range Status   01/07/2024 0344 67.3 >60.0 mL/min/1.73 Final     IMAGES:       Imaging Results (Last 24 Hours)       Procedure Component Value Units Date/Time    XR Chest 1 View [[id number]] Resulted: 01/07/24 0350     Updated: 01/07/24 0353    XR Chest PA & Lateral [[id number]] Collected: 01/06/24 0656     Updated: 01/06/24 0701    Narrative:      XR CHEST PA AND LATERAL-     HISTORY: CAD, s/p CABG     COMPARISON: 1/5/2024     FINDINGS: Frontal and lateral views the chest are obtained.     Median sternotomy wires. Removal of surgical drains. Right IJ sheath  remains in place. Epicardial leads remain in place. Low lung volumes.  Small bilateral pleural effusions. Bibasilar opacities favoring patchy  atelectasis. No convincing edema. Vertically oriented linear density of  the left midlung may represent overlying artifact. No convincing  pneumothorax. No acute regional bony pathology.        Impression:      1. Small bilateral pleural effusions with bibasilar opacities favoring  patchy atelectasis. Intact median sternotomy wires. Similar mediastinal  contours.        This report was signed and finalized on 1/6/2024 6:58 AM by Dr. Hannah Clancy MD.             My image interpretation: Bibasilar atelectasis    Physical Exam:  General: Alert, oriented. No apparent distress.  Overweight.    Cardiovascular: Regular rate and rhythm without murmur, rubs, or gallops.    Pulmonary: Diminished in basesIncision clean, dry, and intact. Sternum stable. No clicks.   Abdomen: Soft, nondistended, and nontender.  Extremities: Warm, moves all extremities. Edema. Saphenectomy site clean, dry, and intact.  Neurologic:  Grossly intact with no focal deficits.     Impression:  Coronary artery disease  Hyperlipidemia, on statin  Hypertension, well-controlled  Type 2 diabetes mellitus with long-term use of insulin         Plan:  Encourage pulmonary toilet and ambulation  Routine postcardiac surgery care  Bowel regimen, suppository  Pain control discussed.  He is agreeable  Add back home lisinopril  Increase beta-blocker to 50 mg twice daily  Anticipate home tomorrow if stable  Discussed with patient, family and nursing      Aiyana Cochran, ANUPAM  01/07/24  06:58 CST

## 2024-01-07 NOTE — PROGRESS NOTES
Pharmacy Dosing Service  Automatic IV to PO Conversion  Reglan    Assessment/Action/Plan:  Patient meets criteria listed below. Reglan 10 mg IV every 6 hours has been changed to Reglan 10 mg PO ACHS.        Subjective:  Speedy Nichols is a 48 y.o. male who meets the following criteria for IV to PO therapy conversion     Policy Criteria:  Tolerating oral fluids or 40ml/hour of enteral nutrition and oral route not otherwise compromised  Receiving other oral medications on a scheduled basis    Additional Factors Considered:  Anti-emetic usage  Patient disposition per documentation  Disease states or conditions contraindicating oral conversion    Objective:  Diet Order   Procedures    Diet: Regular/House Diet; Texture: Regular Texture (IDDSI 7); Fluid Consistency: Thin (IDDSI 0)       Active Medications    Current Facility-Administered Medications:     acetaminophen (TYLENOL) tablet 650 mg, 650 mg, Oral, Q6H, Papi Wilburn MD, 650 mg at 01/07/24 0516    aspirin EC tablet 81 mg, 81 mg, Oral, Daily, Papi Wilburn MD, 81 mg at 01/07/24 0815    atorvastatin (LIPITOR) tablet 20 mg, 20 mg, Oral, Nightly, Papi Wiblurn MD, 20 mg at 01/06/24 2016    bisacodyl (DULCOLAX) EC tablet 10 mg, 10 mg, Oral, BID, Papi Wilburn MD, 10 mg at 01/07/24 0816    bisacodyl (DULCOLAX) suppository 10 mg, 10 mg, Rectal, Once, Aiyana Cochran APRN    Calcium Replacement - Follow Nurse / BPA Driven Protocol, , Does not apply, PRN, Papi Wilburn MD    chlorhexidine (PERIDEX) 0.12 % solution 15 mL, 15 mL, Mouth/Throat, Q12H, Papi Wilburn MD, 15 mL at 01/07/24 0516    clevidipine (CLEVIPREX) infusion 0.5 mg/mL, 2-32 mg/hr, Intravenous, Continuous PRN, Papi Wilburn MD    clopidogrel (PLAVIX) tablet 75 mg, 75 mg, Oral, Daily, Papi Wilburn MD, 75 mg at 01/06/24 1707    dextrose (D50W) (25 g/50 mL) IV injection 25 g, 25 g, Intravenous, Q15 Min PRN, Mavis Tinajero APRN, 25 g at 01/05/24 0500    dextrose (GLUTOSE) oral gel 15 g,  15 g, Oral, Q15 Min PRN, Mavis Tinajero APRN    Enoxaparin Sodium (LOVENOX) syringe 40 mg, 40 mg, Subcutaneous, Daily, Papi Wilburn MD, 40 mg at 01/07/24 0816    furosemide (LASIX) injection 20 mg, 20 mg, Intravenous, Q12H, Aiyana Cochran APRN, 20 mg at 01/06/24 2313    glucagon (GLUCAGEN) injection 1 mg, 1 mg, Intramuscular, Q15 Min PRN, Mavis Tinajero APRN    Insulin Lispro (humaLOG) injection 3-14 Units, 3-14 Units, Subcutaneous, 4x Daily AC & at Bedtime, Mavis Tinajero APRN, 5 Units at 01/07/24 0816    insulin regular (HumuLIN R,NovoLIN R) 100 Units in sodium chloride 0.9 % 100 mL (1 Units/mL) infusion, 0-100 Units/hr, Intravenous, Titrated, Papi Wilburn MD, Last Rate: 1.1 mL/hr at 01/06/24 0538, 1.1 Units/hr at 01/06/24 0538    ipratropium-albuterol (DUO-NEB) nebulizer solution 3 mL, 3 mL, Nebulization, 4x Daily - RT, Papi Wilburn MD, 3 mL at 01/07/24 0640    lidocaine (LIDODERM) 5 % 2 patch, 2 patch, Transdermal, Q24H, Papi Wilburn MD, 2 patch at 01/07/24 0816    lisinopril (PRINIVIL,ZESTRIL) tablet 20 mg, 20 mg, Oral, Q24H, Aiyana Cochran APRN    Magnesium Cardiology Dose Replacement - Follow Nurse / BPA Driven Protocol, , Does not apply, PRN, Papi Wilburn MD    methocarbamol (ROBAXIN) tablet 500 mg, 500 mg, Oral, Q8H, Papi Wilburn MD, 500 mg at 01/07/24 0516    metoclopramide (REGLAN) tablet 10 mg, 10 mg, Oral, 4x Daily AC & at Bedtime, Papi Wilburn MD    metoprolol tartrate (LOPRESSOR) tablet 50 mg, 50 mg, Oral, Q12H, Dimas, Aiyana M, APRN    niCARdipine (CARDENE) 25 mg in 250 mL NS infusion kit, 5-15 mg/hr, Intravenous, Continuous PRN, Papi Wilburn MD, Last Rate: 50 mL/hr at 01/06/24 0648, 5 mg/hr at 01/06/24 0648    nitroglycerin (NITROSTAT) SL tablet 0.4 mg, 0.4 mg, Sublingual, Q5 Min PRN, Papi Wilburn MD    nitroglycerin (TRIDIL) 200 mcg/ml infusion, 5 mcg/min, Intravenous, Continuous, Papi Wilburn MD, Stopped at 01/04/24 0810    norepinephrine (LEVOPHED) 8  mg in 250 mL NS infusion (premix), 0.02-0.3 mcg/kg/min, Intravenous, Continuous PRN, Papi Wilburn MD, Stopped at 01/05/24 0525    ondansetron (ZOFRAN) injection 4 mg, 4 mg, Intravenous, Q6H PRN, Papi Wilburn MD, 4 mg at 01/04/24 2331    oxyCODONE (ROXICODONE) immediate release tablet 5 mg, 5 mg, Oral, Q4H PRN, Papi Wilburn MD, 5 mg at 01/07/24 0822    pantoprazole (PROTONIX) EC tablet 40 mg, 40 mg, Oral, Q AM, Mavis Tinajero, APRN, 40 mg at 01/07/24 0516    Phosphorus Replacement - Follow Nurse / BPA Driven Protocol, , Does not apply, Cosmo MARIN Austin N, MD    polyethylene glycol (MIRALAX) packet 17 g, 17 g, Oral, Daily, Papi Wilburn MD, 17 g at 01/07/24 0817    Potassium Replacement - Follow Nurse / BPA Driven Protocol, , Does not apply, Cosmo MARIN Austin N, MD    pregabalin (LYRICA) capsule 25 mg, 25 mg, Oral, Q12H, Papi Wilburn MD, 25 mg at 01/07/24 0816    LAKESHIA Romero, PharmD  01/07/2410:51 CST

## 2024-01-07 NOTE — PLAN OF CARE
Goal Outcome Evaluation:  Plan of Care Reviewed With: patient        Progress: improving  Outcome Evaluation: Pt up in bathroom upon therapy arrival. Pt not on O2 w/ sats 85%. Had pt sit on bed to recover. Placed on 1L. After a min pt was back up to 93%. Turned O2 off. Stood and Amb ~125' before needing to stand for a rest. O2 had bounced from 93%-86% during amb. Placed pt on 2L for amb back to room due to increased fatigue and pain. Left pt in chair on O2 w/ sat 97%. Notfiied RN so she could trial no O2 later.

## 2024-01-07 NOTE — PLAN OF CARE
Goal Outcome Evaluation:  Plan of Care Reviewed With: patient        Progress: improving  Outcome Evaluation: SR/ST HR:  on tele. Pt c/o incisional chest pain, scheduled PO Tylenol given with good relief. Encouraged incentive spirometer use. Pt is on 1L of O2.  in place. Will continue to wean O2 down as patient tolerates. Voiding per urinal. Good output noted. Up x 1 to BR. Pt has ambulated in the [facility] once last night and once this AM (to the three elevators). Fatigue noted. Pt did rest some throughout the shift. BLE edema noted. Elevating lower extremities off the bed, foot of the bed kept elevated. Safety maintained. Pt eager to be discharged and patient hopes he can be discharged on Monday.

## 2024-01-07 NOTE — CASE MANAGEMENT/SOCIAL WORK
Continued Stay Note   Melissa     Patient Name: Speedy Nichols  MRN: [id number]  Today's Date: 1/7/2024    Admit Date: 1/3/2024        Discharge Plan       Row Name 01/07/24 0946       Plan    Plan Comments Patient resides at home with his spouse and family. No needs identified; likely dc home tomorrow.    Final Discharge Disposition Code 01 - home or self-care                   Discharge Codes    No documentation.                       LISA Farrell

## 2024-01-07 NOTE — THERAPY TREATMENT NOTE
Acute Care - Physical Therapy Treatment Note  [facility]     Patient Name: Speedy Nichols  : 1975  MRN: [id number]  Today's Date: 2024      Visit Dx:     ICD-10-CM ICD-9-CM   1. Impaired mobility [Z74.09]  Z74.09 799.89   2. Coronary artery disease involving native coronary artery of native heart without angina pectoris  I25.10 414.01     Patient Active Problem List   Diagnosis    Type 2 diabetes mellitus with hyperglycemia, with long-term current use of insulin    Essential hypertension    Chronic systolic heart failure    Coronary artery disease involving native coronary artery of native heart without angina pectoris    CAD (coronary artery disease), native coronary artery     Past Medical History:   Diagnosis Date    Anxiety     CHF (congestive heart failure)     Coronary artery disease     Diabetes mellitus     Hypertension     Type 2 diabetes mellitus      Past Surgical History:   Procedure Laterality Date    CARDIAC CATHETERIZATION N/A 2022    Procedure: Left Heart Cath;  Surgeon: Gopal King MD;  Location:  [facility] CATH INVASIVE LOCATION;  Service: Cardiology;  Laterality: N/A;    CARDIAC CATHETERIZATION N/A 2023    Procedure: Left Heart Cath;  Surgeon: Gopal King MD;  Location:  [facility] CATH INVASIVE LOCATION;  Service: Cardiology;  Laterality: N/A;    CORONARY ARTERY BYPASS GRAFT N/A 1/3/2024    Procedure: CORONARY ARTERY BYPASS GRAFTING X5, BILATERAL INTERNAL MAMMARY ARTERY GRAFT, RIGHT UPPER LEG ENDOSCOPIC VEIN HARVEST, RIGHT LOWER LEG OPEN VEIN HARVEST, LEFT LEG ABORTED VEIN HARVEST, TRANSESOPHAGEAL ECHOCARDIOGRAM, XP STERNAL PLATING;  Surgeon: Papi Wilburn MD;  Location: [facility] OR;  Service: Cardiothoracic;  Laterality: N/A;    TOOTH EXTRACTION Right 2022    WISDOM TOOTH EXTRACTION       PT Assessment (last 12 hours)       PT Evaluation and Treatment       Row Name 24 1355 24 0946       Physical Therapy Time and Intention    Subjective  Information no complaints  -TB complains of;pain  -TB    Document Type therapy note (daily note)  -TB therapy note (daily note)  -TB    Mode of Treatment physical therapy  -TB physical therapy  -TB      Row Name 01/07/24 1355 01/07/24 0946       General Information    Existing Precautions/Restrictions fall;oxygen therapy device and L/min;sternal  -TB fall;oxygen therapy device and L/min;sternal  -TB      Row Name 01/07/24 1355 01/07/24 0946       Pain    Pretreatment Pain Rating 1/10  -TB 1/10  -TB    Posttreatment Pain Rating 1/10  -TB 1/10  -TB      Row Name 01/07/24 1355          Bed Mobility    Comment, (Bed Mobility) Chair  -TB       Row Name 01/07/24 1355 01/07/24 0946       Transfers    Transfers sit-stand transfer;stand-sit transfer;toilet transfer  -TB sit-stand transfer;stand-sit transfer;toilet transfer  -TB      Row Name 01/07/24 1355 01/07/24 0946       Sit-Stand Transfer    Sit-Stand [place] (Transfers) standby assist;verbal cues  -TB contact guard;verbal cues  -TB      Row Name 01/07/24 1355 01/07/24 0946       Stand-Sit Transfer    Stand-Sit [place] (Transfers) standby assist;verbal cues  -TB verbal cues;contact guard  -TB      Row Name 01/07/24 1355 01/07/24 0946       Toilet Transfer    Type (Toilet Transfer) sit-stand;stand-sit  -TB sit-stand;stand-sit  -TB    [place] Level (Toilet Transfer) standby assist  -TB standby assist  -TB    Assistive Device (Toilet Transfer) commode  -TB commode  -TB      Row Name 01/07/24 1355 01/07/24 0946       Gait/Stairs (Locomotion)    [place] Level (Gait) contact guard;verbal cues  -TB contact guard;verbal cues  -TB    Distance in Feet (Gait) 350  -'  -TB    Deviations/Abnormal Patterns (Gait) base of support, wide;stride length decreased  -TB base of support, wide;stride length decreased  -TB    Comment, (Gait/Stairs) Amb on RA dropped to 88, recovered to 92  -TB 2L  -TB      Row Name 01/07/24 1355          Motor Skills    Comments,  Therapeutic Exercise Protocol x15  -TB       Row Name             Wound 01/03/24 0815 midline sternal Incision    Wound - Properties Group Placement Date: 01/03/24  -TJ Placement Time: 0815  -TJ Orientation: midline  -TJ Location: sternal  -TJ Primary Wound Type: Incision  -TJ    Retired Wound - Properties Group Placement Date: 01/03/24  -TJ Placement Time: 0815  -TJ Orientation: midline  -TJ Location: sternal  -TJ Primary Wound Type: Incision  -TJ    Retired Wound - Properties Group Date first assessed: 01/03/24  -TJ Time first assessed: 0815  -TJ Location: sternal  -TJ Primary Wound Type: Incision  -TJ      Row Name             Wound 01/03/24 0808 Right leg Incision    Wound - Properties Group Placement Date: 01/03/24  -TJ Placement Time: 0808  -TJ Side: Right  -TJ Location: leg  -TJ Primary Wound Type: Incision  -TJ    Retired Wound - Properties Group Placement Date: 01/03/24  -TJ Placement Time: 0808  -TJ Side: Right  -TJ Location: leg  -TJ Primary Wound Type: Incision  -TJ    Retired Wound - Properties Group Date first assessed: 01/03/24  -TJ Time first assessed: 0808  -TJ Side: Right  -TJ Location: leg  -TJ Primary Wound Type: Incision  -TJ      Row Name             Wound 01/03/24 0900 Left leg Incision    Wound - Properties Group Placement Date: 01/03/24  -TJ Placement Time: 0900  -TJ Side: Left  -TJ Location: leg  -TJ Primary Wound Type: Incision  -TJ    Retired Wound - Properties Group Placement Date: 01/03/24  -TJ Placement Time: 0900  -TJ Side: Left  -TJ Location: leg  -TJ Primary Wound Type: Incision  -TJ    Retired Wound - Properties Group Date first assessed: 01/03/24  -TJ Time first assessed: 0900  -TJ Side: Left  -TJ Location: leg  -TJ Primary Wound Type: Incision  -TJ      Row Name 01/07/24 0946          Plan of Care Review    Plan of Care Reviewed With patient  -TB     Progress improving  -TB     Outcome Evaluation Pt up in bathroom upon therapy arrival. Pt not on O2 w/ sats 85%. Had pt sit on bed  to recover. Placed on 1L. After a min pt was back up to 93%. Turned O2 off. Stood and Amb ~125' before needing to stand for a rest. O2 had bounced from 93%-86% during amb. Placed pt on 2L for amb back to room due to increased fatigue and pain. Left pt in chair on O2 w/ sat 97%. Notfiied RN so she could trial no O2 later.  -TB       Row Name 01/07/24 1355 01/07/24 0946       Positioning and Restraints    Pre-Treatment Position sitting in chair/recliner  -TB bathroom  -TB    Post Treatment Position chair  -TB chair  -TB    In Chair reclined;sitting;call light within reach;encouraged to call for assist;legs elevated  -TB reclined;sitting;call light within reach;encouraged to call for assist;notified nsg;legs elevated  -TB              User Key  (r) = Recorded By, (t) = Taken By, (c) = Cosigned By      Initials Name Provider Type    TB Rissa De Los Santos, PTA Physical Therapist Assistant    Yue Mcnair, RN Registered Nurse                    Physical Therapy Education       Title: PT OT SLP Therapies (In Progress)       Topic: Physical Therapy (In Progress)       Point: Mobility training (In Progress)       Learning Progress Summary             Patient Acceptance, E, NR by KAILEE at 1/4/2024 0758    Comment: sternal prec, benefits of activity, progression of PT                         Point: Home exercise program (Not Started)       Learner Progress:  Not documented in this visit.              Point: Body mechanics (Not Started)       Learner Progress:  Not documented in this visit.              Point: Precautions (Done)       Learning Progress Summary             Patient Acceptance, E, VU,NR by [place] at 1/5/2024 1012    Comment: sternal restrictions    Acceptance, E, NR by KAILEE at 1/4/2024 0758    Comment: sternal prec, benefits of activity, progression of PT                                         User Key       Initials Effective Dates Name Provider Type Discipline    KAILEE 02/03/23 -  Americo Zepeda, PT DPT  Physical Therapist PT     02/03/23 -  Genevieve Castellon, LOWELL Physical Therapist Assistant PT                  PT Recommendation and Plan     Plan of Care Reviewed With: patient  Progress: improving  Outcome Evaluation: Pt up in bathroom upon therapy arrival. Pt not on O2 w/ sats 85%. Had pt sit on bed to recover. Placed on 1L. After a min pt was back up to 93%. Turned O2 off. Stood and Amb ~125' before needing to stand for a rest. O2 had bounced from 93%-86% during amb. Placed pt on 2L for amb back to room due to increased fatigue and pain. Left pt in chair on O2 w/ sat 97%. Notfiied RN so she could trial no O2 later.   Outcome Measures       Row Name 01/07/24 0946 01/06/24 0927 01/05/24 1000       How much help from another person do you currently need...    Turning from your back to your side while in flat bed without using bedrails? 3  -TB 3  -TB 2  -[place]    Moving from lying on back to sitting on the side of a flat bed without bedrails? 3  -TB 3  -TB 2  -[place]    Moving to and from a bed to a chair (including a wheelchair)? 4  -TB 4  -TB 3  -[place]    Standing up from a chair using your arms (e.g., wheelchair, bedside chair)? 4  -TB 4  -TB 3  -[place]    Climbing 3-5 steps with a railing? 3  -TB 3  -TB 2  -[place]    To walk in hospital room? 3  -TB 3  -TB 3  -[place]    AM-PAC 6 Clicks Score (PT) 20  -TB 20  -TB 15  -[place]    Highest Level of Mobility Goal 6 --> Walk 10 steps or more  -TB 6 --> Walk 10 steps or more  -TB 4 --> Transfer to chair/commode  -[place]       Functional Assessment    Outcome Measure Options AM-PAC 6 Clicks Basic Mobility (PT)  -TB AM-PAC 6 Clicks Basic Mobility (PT)  -TB --              User Key  (r) = Recorded By, (t) = Taken By, (c) = Cosigned By      Initials Name Provider Type    [place] Genevieve Castellon, PTA Physical Therapist Assistant    Rissa Saez, LOWELL Physical Therapist Assistant                     Time Calculation:    PT Charges       Row Name 01/07/24 1549 01/07/24 1203          Time  Calculation    Start Time 1355  -TB 0946  -TB     Stop Time 1420  -TB 1025  -TB     Time Calculation (min) 25 min  -TB 39 min  -TB     PT Received On 01/07/24  -TB 01/07/24  -TB        Time Calculation- PT    Total Timed Code Minutes- PT 25 minute(s)  -TB 39 minute(s)  -TB               User Key  (r) = Recorded By, (t) = Taken By, (c) = Cosigned By      Initials Name Provider Type    TB Rissa De Los Santos, PTA Physical Therapist Assistant                  Therapy Charges for Today       Code Description Service Date Service Provider Modifiers Qty    76487373801 HC GAIT TRAINING EA 15 MIN 1/6/2024 Rissa De Los Santos, PTA GP 2    52389739360 HC GAIT TRAINING EA 15 MIN 1/6/2024 Rissa De Los Santos, PTA GP 2    18573727365 HC GAIT TRAINING EA 15 MIN 1/7/2024 Rissa De Los Santos, PTA GP 2    17827953072 HC PT THERAPEUTIC ACT EA 15 MIN 1/7/2024 Rissa De Los Santos, PTA GP 1    22305758263 HC GAIT TRAINING EA 15 MIN 1/7/2024 Rissa De Los Santos, PTA GP 2            PT G-Codes  Outcome Measure Options: AM-PAC 6 Clicks Basic Mobility (PT)  AM-PAC 6 Clicks Score (PT): 20    Rissa De Los Santos PTA  1/7/2024

## 2024-01-08 ENCOUNTER — TELEPHONE (OUTPATIENT)
Dept: FAMILY MEDICINE CLINIC | Age: 49
End: 2024-01-08

## 2024-01-08 ENCOUNTER — APPOINTMENT (OUTPATIENT)
Dept: GENERAL RADIOLOGY | Facility: HOSPITAL | Age: 49
End: 2024-01-08
Payer: COMMERCIAL

## 2024-01-08 ENCOUNTER — READMISSION MANAGEMENT (OUTPATIENT)
Dept: CALL CENTER | Facility: HOSPITAL | Age: 49
End: 2024-01-08
Payer: COMMERCIAL

## 2024-01-08 VITALS
DIASTOLIC BLOOD PRESSURE: 79 MMHG | OXYGEN SATURATION: 94 % | HEART RATE: 86 BPM | RESPIRATION RATE: 18 BRPM | WEIGHT: 203 LBS | BODY MASS INDEX: 27.5 KG/M2 | TEMPERATURE: 97.4 F | HEIGHT: 72 IN | SYSTOLIC BLOOD PRESSURE: 154 MMHG

## 2024-01-08 DIAGNOSIS — E11.9 TYPE 2 DIABETES MELLITUS WITHOUT COMPLICATION, WITHOUT LONG-TERM CURRENT USE OF INSULIN (HCC): ICD-10-CM

## 2024-01-08 PROBLEM — N17.9 ACUTE RENAL FAILURE SUPERIMPOSED ON CHRONIC KIDNEY DISEASE: Status: ACTIVE | Noted: 2024-01-08

## 2024-01-08 PROBLEM — E66.3 OVERWEIGHT WITH BODY MASS INDEX (BMI) OF 27 TO 27.9 IN ADULT: Status: ACTIVE | Noted: 2024-01-08

## 2024-01-08 PROBLEM — N18.9 ACUTE RENAL FAILURE SUPERIMPOSED ON CHRONIC KIDNEY DISEASE: Status: ACTIVE | Noted: 2024-01-08

## 2024-01-08 PROBLEM — Z78.9 NON-SMOKER: Status: ACTIVE | Noted: 2024-01-08

## 2024-01-08 LAB
ANION GAP SERPL CALCULATED.3IONS-SCNC: 10 MMOL/L (ref 5–15)
BUN SERPL-MCNC: 34 MG/DL (ref 6–20)
BUN/CREAT SERPL: 28.3 (ref 7–25)
CALCIUM SPEC-SCNC: 8.5 MG/DL (ref 8.6–10.5)
CHLORIDE SERPL-SCNC: 102 MMOL/L (ref 98–107)
CO2 SERPL-SCNC: 26 MMOL/L (ref 22–29)
CREAT SERPL-MCNC: 1.2 MG/DL (ref 0.76–1.27)
DEPRECATED RDW RBC AUTO: 44.5 FL (ref 37–54)
EGFRCR SERPLBLD CKD-EPI 2021: 74.1 ML/MIN/1.73
ERYTHROCYTE [DISTWIDTH] IN BLOOD BY AUTOMATED COUNT: 13.2 % (ref 12.3–15.4)
GLUCOSE BLDC GLUCOMTR-MCNC: 268 MG/DL (ref 70–130)
GLUCOSE SERPL-MCNC: 163 MG/DL (ref 65–99)
HCT VFR BLD AUTO: 28.3 % (ref 37.5–51)
HGB BLD-MCNC: 8.8 G/DL (ref 13–17.7)
MCH RBC QN AUTO: 28.5 PG (ref 26.6–33)
MCHC RBC AUTO-ENTMCNC: 31.1 G/DL (ref 31.5–35.7)
MCV RBC AUTO: 91.6 FL (ref 79–97)
PLATELET # BLD AUTO: 142 10*3/MM3 (ref 140–450)
PMV BLD AUTO: 10.6 FL (ref 6–12)
POTASSIUM SERPL-SCNC: 3.9 MMOL/L (ref 3.5–5.2)
POTASSIUM SERPL-SCNC: 4.2 MMOL/L (ref 3.5–5.2)
RBC # BLD AUTO: 3.09 10*6/MM3 (ref 4.14–5.8)
SODIUM SERPL-SCNC: 138 MMOL/L (ref 136–145)
WBC NRBC COR # BLD AUTO: 5.6 10*3/MM3 (ref 3.4–10.8)

## 2024-01-08 PROCEDURE — 71045 X-RAY EXAM CHEST 1 VIEW: CPT

## 2024-01-08 PROCEDURE — 94799 UNLISTED PULMONARY SVC/PX: CPT

## 2024-01-08 PROCEDURE — 94664 DEMO&/EVAL PT USE INHALER: CPT

## 2024-01-08 PROCEDURE — 94761 N-INVAS EAR/PLS OXIMETRY MLT: CPT

## 2024-01-08 PROCEDURE — 25010000002 FUROSEMIDE PER 20 MG: Performed by: NURSE PRACTITIONER

## 2024-01-08 PROCEDURE — 82948 REAGENT STRIP/BLOOD GLUCOSE: CPT

## 2024-01-08 PROCEDURE — 63710000001 INSULIN LISPRO (HUMAN) PER 5 UNITS: Performed by: NURSE PRACTITIONER

## 2024-01-08 PROCEDURE — 25010000002 ENOXAPARIN PER 10 MG: Performed by: SURGERY

## 2024-01-08 PROCEDURE — 84132 ASSAY OF SERUM POTASSIUM: CPT | Performed by: SURGERY

## 2024-01-08 PROCEDURE — 97116 GAIT TRAINING THERAPY: CPT

## 2024-01-08 PROCEDURE — 85027 COMPLETE CBC AUTOMATED: CPT | Performed by: SURGERY

## 2024-01-08 PROCEDURE — 80048 BASIC METABOLIC PNL TOTAL CA: CPT | Performed by: SURGERY

## 2024-01-08 RX ORDER — METOPROLOL TARTRATE 50 MG/1
50 TABLET, FILM COATED ORAL EVERY 12 HOURS SCHEDULED
Qty: 60 TABLET | Refills: 2 | Status: SHIPPED | OUTPATIENT
Start: 2024-01-08

## 2024-01-08 RX ORDER — LISINOPRIL 20 MG/1
20 TABLET ORAL
Status: DISCONTINUED | OUTPATIENT
Start: 2024-01-08 | End: 2024-01-08 | Stop reason: HOSPADM

## 2024-01-08 RX ORDER — LABETALOL HYDROCHLORIDE 5 MG/ML
10 INJECTION, SOLUTION INTRAVENOUS ONCE
Status: COMPLETED | OUTPATIENT
Start: 2024-01-08 | End: 2024-01-08

## 2024-01-08 RX ORDER — FUROSEMIDE 10 MG/ML
20 INJECTION INTRAMUSCULAR; INTRAVENOUS
Status: DISCONTINUED | OUTPATIENT
Start: 2024-01-08 | End: 2024-01-08 | Stop reason: HOSPADM

## 2024-01-08 RX ORDER — FUROSEMIDE 10 MG/ML
20 INJECTION INTRAMUSCULAR; INTRAVENOUS ONCE
Status: COMPLETED | OUTPATIENT
Start: 2024-01-08 | End: 2024-01-08

## 2024-01-08 RX ORDER — ASPIRIN 81 MG/1
81 TABLET ORAL DAILY
Qty: 30 TABLET | Refills: 2 | Status: SHIPPED | OUTPATIENT
Start: 2024-01-08

## 2024-01-08 RX ORDER — HYDROCODONE BITARTRATE AND ACETAMINOPHEN 5; 325 MG/1; MG/1
1 TABLET ORAL EVERY 6 HOURS PRN
Qty: 25 TABLET | Refills: 0 | Status: SHIPPED | OUTPATIENT
Start: 2024-01-08

## 2024-01-08 RX ORDER — POTASSIUM CHLORIDE 750 MG/1
20 CAPSULE, EXTENDED RELEASE ORAL ONCE
Status: COMPLETED | OUTPATIENT
Start: 2024-01-08 | End: 2024-01-08

## 2024-01-08 RX ORDER — CLOPIDOGREL BISULFATE 75 MG/1
75 TABLET ORAL DAILY
Qty: 30 TABLET | Refills: 2 | Status: SHIPPED | OUTPATIENT
Start: 2024-01-08

## 2024-01-08 RX ORDER — PANTOPRAZOLE SODIUM 40 MG/1
40 TABLET, DELAYED RELEASE ORAL
Qty: 30 TABLET | Refills: 0 | Status: SHIPPED | OUTPATIENT
Start: 2024-01-09

## 2024-01-08 RX ADMIN — METHOCARBAMOL 500 MG: 500 TABLET, FILM COATED ORAL at 06:11

## 2024-01-08 RX ADMIN — FUROSEMIDE 20 MG: 10 INJECTION, SOLUTION INTRAMUSCULAR; INTRAVENOUS at 09:35

## 2024-01-08 RX ADMIN — OXYCODONE HYDROCHLORIDE 5 MG: 5 TABLET ORAL at 04:16

## 2024-01-08 RX ADMIN — METOPROLOL TARTRATE 50 MG: 50 TABLET, FILM COATED ORAL at 09:27

## 2024-01-08 RX ADMIN — ACETAMINOPHEN 650 MG: 325 TABLET, FILM COATED ORAL at 06:11

## 2024-01-08 RX ADMIN — POTASSIUM CHLORIDE 20 MEQ: 10 CAPSULE, COATED, EXTENDED RELEASE ORAL at 09:32

## 2024-01-08 RX ADMIN — INSULIN LISPRO 8 UNITS: 100 INJECTION, SOLUTION INTRAVENOUS; SUBCUTANEOUS at 12:21

## 2024-01-08 RX ADMIN — ACETAMINOPHEN 650 MG: 325 TABLET, FILM COATED ORAL at 12:21

## 2024-01-08 RX ADMIN — PREGABALIN 25 MG: 25 CAPSULE ORAL at 09:28

## 2024-01-08 RX ADMIN — METOCLOPRAMIDE 10 MG: 10 TABLET ORAL at 12:21

## 2024-01-08 RX ADMIN — LIDOCAINE 5% 2 PATCH: 700 PATCH TOPICAL at 09:33

## 2024-01-08 RX ADMIN — ENOXAPARIN SODIUM 40 MG: 100 INJECTION SUBCUTANEOUS at 09:28

## 2024-01-08 RX ADMIN — IPRATROPIUM BROMIDE AND ALBUTEROL SULFATE 3 ML: 2.5; .5 SOLUTION RESPIRATORY (INHALATION) at 06:17

## 2024-01-08 RX ADMIN — LABETALOL HYDROCHLORIDE 10 MG: 5 INJECTION INTRAVENOUS at 07:16

## 2024-01-08 RX ADMIN — LISINOPRIL 20 MG: 20 TABLET ORAL at 09:27

## 2024-01-08 RX ADMIN — PANTOPRAZOLE SODIUM 40 MG: 40 TABLET, DELAYED RELEASE ORAL at 06:11

## 2024-01-08 RX ADMIN — BISACODYL 10 MG: 5 TABLET ORAL at 09:28

## 2024-01-08 RX ADMIN — FUROSEMIDE 20 MG: 10 INJECTION, SOLUTION INTRAMUSCULAR; INTRAVENOUS at 12:21

## 2024-01-08 RX ADMIN — METOCLOPRAMIDE 10 MG: 10 TABLET ORAL at 09:28

## 2024-01-08 RX ADMIN — INSULIN LISPRO 3 UNITS: 100 INJECTION, SOLUTION INTRAVENOUS; SUBCUTANEOUS at 09:32

## 2024-01-08 RX ADMIN — ASPIRIN 81 MG: 81 TABLET, COATED ORAL at 09:28

## 2024-01-08 NOTE — PLAN OF CARE
Problem: Adult Inpatient Plan of Care  Goal: Plan of Care Review  Outcome: Ongoing, Progressing  Flowsheets (Taken 1/8/2024 0404)  Progress: improving  Plan of Care Reviewed With: patient  Outcome Evaluation: Patient c/o pain, prn medication given with relief. On room air. Up with assist x1. BP elevated, MD notified, one time dose of labetalol ordered. Possible home today. NSR/ST  on tele. VSS. Safety maintained. Will notify MD of any changes.

## 2024-01-08 NOTE — PROGRESS NOTES
"Patient name: Speedy Nichols  Patient : 1975  VISIT # [id number]  MR #[id number]    Procedure:Procedure(s):  CORONARY ARTERY BYPASS GRAFTING X5, BILATERAL INTERNAL MAMMARY ARTERY GRAFT, RIGHT UPPER LEG ENDOSCOPIC VEIN HARVEST, RIGHT LOWER LEG OPEN VEIN HARVEST, LEFT LEG ABORTED VEIN HARVEST, TRANSESOPHAGEAL ECHOCARDIOGRAM, XP STERNAL PLATING  Procedure Date:1/3/2024  POD:5 Days Post-Op    Subjective     Patient is resting in chair tolerating room air.  Weight is down 2 pounds today and he is 3 pounds above his baseline weight.  Creatinine continues to improve, today 1.2.  Hemoglobin stable at 8.8.  He is having bowel movements.  Walking well with physical therapy.    Telemetry: Sinus rhythm 90s  IV drips: None       Objective     Visit Vitals  /92 (BP Location: Left arm, Patient Position: Lying)   Pulse 97   Temp 97.5 °F (36.4 °C) (Oral)   Resp 18   Ht 182.9 cm (72\")   Wt 92.1 kg (203 lb)   SpO2 95%   BMI 27.53 kg/m²   Baseline weight 200 pounds    Intake/Output Summary (Last 24 hours) at 2024 0759  Last data filed at 2024 0603  Gross per 24 hour   Intake 720 ml   Output 1750 ml   Net -1030 ml       Lab:     CBC:  Results from last 7 days   Lab Units 24  03224  0344 24  0318   WBC 10*3/mm3 5.60 6.83 6.88   HEMATOCRIT % 28.3* 26.7* 26.2*   PLATELETS 10*3/mm3 142 138* 119*          BMP:  Results from last 7 days   Lab Units 24  03224  0344 24  0318   SODIUM mmol/L 138 137 140   POTASSIUM mmol/L 3.9 4.0 4.0   CHLORIDE mmol/L 102 102 111*   CO2 mmol/L 26.0 23.0 19.0*   GLUCOSE mg/dL 163* 202* 141*   BUN mg/dL 34* 38* 37*   CREATININE mg/dL 1.20 1.30* 1.61*          COAG:  Results from last 7 days   Lab Units 24  0244 24  1313   INR  1.17* 1.14*   APTT seconds  --  66.8*       IMAGES:       Imaging Results (Last 24 Hours)       Procedure Component Value Units Date/Time    XR Chest 1 View [[id number]] Collected: 24 0653     Updated: 24 " 0657    Narrative:      EXAMINATION: XR CHEST 1 VW-     1/8/2024 2:35 AM     HISTORY: Post-Op Heart Surgery     A frontal projection of the chest is compared with the previous study  dated 1/7/2024.     The lungs are poorly expanded, right more than the left, similar to the  previous study.     There are atelectatic changes in the lower lung bilaterally.     There is no pleural effusion, pulmonary congestion or pneumothorax.     There is persistent moderate cardiomegaly. There is evidence of prior  cardiac surgery.     There is no acute bony abnormality.       Impression:      1. No change from a previous study 1 day ago.        This report was signed and finalized on 1/8/2024 6:54 AM by Dr. Roxanne Wilder MD.             CXR: No pneumothorax.  Bibasilar atelectasis and hypoventilation although improving.  No pleural effusion.    Physical Exam:  General: Alert, oriented. No apparent distress.  Overweight  Cardiovascular: Regular rate and rhythm without murmur, rubs, or gallops.    Pulmonary: Lung sounds clear bilaterally without wheezing, rubs, or rales.  On room air  Chest: Sternotomy incision clean, dry, and intact. Sternum stable. No clicks.   Abdomen: Soft, nondistended, and nontender.  Extremities: Warm, moves all extremities.  Moderate lower extremity edema.  Saphenectomy site clean, dry, and intact.  Neurologic:  Grossly intact with no focal deficits.            Impression:  Coronary artery disease  Hyperlipidemia, on statin  Hypertension, well-controlled  Type 2 diabetes mellitus with long-term use of insulin  Acute on chronic renal failure        Plan:  Give additional dose of Lasix 20 mg IV x 1 dose today at 12:00  Give morning antihypertensive medications.  Encourage pulmonary toilet and ambulation  Routine postcardiac surgery care  If blood pressure improves after medication adjustments, possibly discharge home later today versus tomorrow  Discussed with patient      Mavis Tinajero  ANUPAM  01/08/24  07:59 CST

## 2024-01-08 NOTE — THERAPY TREATMENT NOTE
Acute Care - Physical Therapy Treatment Note  [facility]     Patient Name: Speedy Nichols  : 1975  MRN: [id number]  Today's Date: 2024      Visit Dx:     ICD-10-CM ICD-9-CM   1. Impaired mobility [Z74.09]  Z74.09 799.89   2. Coronary artery disease involving native coronary artery of native heart without angina pectoris  I25.10 414.01     Patient Active Problem List   Diagnosis    Type 2 diabetes mellitus with hyperglycemia, with long-term current use of insulin    Essential hypertension    Chronic systolic heart failure    Coronary artery disease involving native coronary artery of native heart without angina pectoris    CAD (coronary artery disease), native coronary artery    Acute renal failure superimposed on chronic kidney disease    Non-smoker    Overweight with body mass index (BMI) of 27 to 27.9 in adult     Past Medical History:   Diagnosis Date    Anxiety     CHF (congestive heart failure)     Coronary artery disease     Diabetes mellitus     Hypertension     Type 2 diabetes mellitus      Past Surgical History:   Procedure Laterality Date    CARDIAC CATHETERIZATION N/A 2022    Procedure: Left Heart Cath;  Surgeon: Gopal King MD;  Location: [facility] CATH INVASIVE LOCATION;  Service: Cardiology;  Laterality: N/A;    CARDIAC CATHETERIZATION N/A 2023    Procedure: Left Heart Cath;  Surgeon: Gopal King MD;  Location: [facility] CATH INVASIVE LOCATION;  Service: Cardiology;  Laterality: N/A;    CORONARY ARTERY BYPASS GRAFT N/A 1/3/2024    Procedure: CORONARY ARTERY BYPASS GRAFTING X5, BILATERAL INTERNAL MAMMARY ARTERY GRAFT, RIGHT UPPER LEG ENDOSCOPIC VEIN HARVEST, RIGHT LOWER LEG OPEN VEIN HARVEST, LEFT LEG ABORTED VEIN HARVEST, TRANSESOPHAGEAL ECHOCARDIOGRAM, XP STERNAL PLATING;  Surgeon: Papi Wilburn MD;  Location: [facility];  Service: Cardiothoracic;  Laterality: N/A;    TOOTH EXTRACTION Right 2022    WISDOM TOOTH EXTRACTION       PT Assessment (last 12  hours)       PT Evaluation and Treatment       Row Name 01/08/24 0955          Physical Therapy Time and Intention    Subjective Information no complaints  -[place]     Document Type therapy note (daily note)  -[place]     Mode of Treatment physical therapy  -     Comment reviewed restrictions, POC.  -       Row Name 01/08/24 0955          General Information    Existing Precautions/Restrictions fall;sternal  -[place]       Row Name 01/08/24 0955          Pain    Posttreatment Pain Rating 2/10  -     Pain Location - chest  -       Row Name 01/08/24 0955          Sit-Stand Transfer    Sit-Stand [place] (Transfers) supervision  -       Row Name 01/08/24 0955          Stand-Sit Transfer    Stand-Sit [place] (Transfers) supervision  -[place]       Row Name 01/08/24 0955          Toilet Transfer    Type (Toilet Transfer) sit-stand;stand-sit  -[place]     [place] Level (Toilet Transfer) supervision  -       Row Name 01/08/24 0955          Gait/Stairs (Locomotion)    [place] Level (Gait) standby assist  -     Distance in Feet (Gait) 400  -[place]     Deviations/Abnormal Patterns (Gait) base of support, wide;sebastián decreased  -       Row Name 01/08/24 0955          Motor Skills    Comments, Therapeutic Exercise protocol x15  -       Row Name             Wound 01/03/24 0815 midline sternal Incision    Wound - Properties Group Placement Date: 01/03/24  -TJ Placement Time: 0815  -TJ Orientation: midline  -TJ Location: sternal  -TJ Primary Wound Type: Incision  -TJ    Retired Wound - Properties Group Placement Date: 01/03/24  -TJ Placement Time: 0815  -TJ Orientation: midline  -TJ Location: sternal  -TJ Primary Wound Type: Incision  -TJ    Retired Wound - Properties Group Date first assessed: 01/03/24  -TJ Time first assessed: 0815  -TJ Location: sternal  -TJ Primary Wound Type: Incision  -TJ      Row Name             Wound 01/03/24 0808 Right leg Incision    Wound - Properties Group Placement Date: 01/03/24  -TJ  Placement Time: 0808  -TJ Side: Right  -TJ Location: leg  -TJ Primary Wound Type: Incision  -TJ    Retired Wound - Properties Group Placement Date: 01/03/24  -TJ Placement Time: 0808  -TJ Side: Right  -TJ Location: leg  -TJ Primary Wound Type: Incision  -TJ    Retired Wound - Properties Group Date first assessed: 01/03/24  -TJ Time first assessed: 0808  -TJ Side: Right  -TJ Location: leg  -TJ Primary Wound Type: Incision  -TJ      Row Name             Wound 01/03/24 0900 Left leg Incision    Wound - Properties Group Placement Date: 01/03/24  -TJ Placement Time: 0900  -TJ Side: Left  -TJ Location: leg  -TJ Primary Wound Type: Incision  -TJ    Retired Wound - Properties Group Placement Date: 01/03/24  -TJ Placement Time: 0900  -TJ Side: Left  -TJ Location: leg  -TJ Primary Wound Type: Incision  -TJ    Retired Wound - Properties Group Date first assessed: 01/03/24  -TJ Time first assessed: 0900  -TJ Side: Left  -TJ Location: leg  -TJ Primary Wound Type: Incision  -TJ      Row Name 01/08/24 0955          Positioning and Restraints    Pre-Treatment Position sitting in chair/recliner  -[place]     Post Treatment Position chair  -[place]     In Chair reclined;call light within reach;encouraged to call for assist  -[place]               User Key  (r) = Recorded By, (t) = Taken By, (c) = Cosigned By      Initials Name Provider Type    Genevieve Gamez PTA Physical Therapist Assistant    Yue Mcnair, RN Registered Nurse                    Physical Therapy Education       Title: PT OT SLP Therapies (In Progress)       Topic: Physical Therapy (In Progress)       Point: Mobility training (In Progress)       Learning Progress Summary             Patient Acceptance, E, NR by KAILEE at 1/4/2024 5524    Comment: sternal prec, benefits of activity, progression of PT                         Point: Home exercise program (Not Started)       Learner Progress:  Not documented in this visit.              Point: Body mechanics (Not Started)        Learner Progress:  Not documented in this visit.              Point: Precautions (Done)       Learning Progress Summary             Patient Acceptance, E,D, DU,VU by [place] at 1/8/2024 1045    Comment: sternal restrictions    Acceptance, E, VU,NR by [place] at 1/5/2024 1012    Comment: sternal restrictions    Acceptance, E, NR by KAILEE at 1/4/2024 0758    Comment: sternal prec, benefits of activity, progression of PT                                         User Key       Initials Effective Dates Name Provider Type Discipline    KAILEE 02/03/23 -  Americo Zepeda, PT DPT Physical Therapist PT    [place] 02/03/23 -  Genevieve Castellon, PTA Physical Therapist Assistant PT                  PT Recommendation and Plan         Outcome Measures       Row Name 01/08/24 1000 01/07/24 0946 01/06/24 0927       How much help from another person do you currently need...    Turning from your back to your side while in flat bed without using bedrails? 4  -[place] 3  -TB 3  -TB    Moving from lying on back to sitting on the side of a flat bed without bedrails? 3  -[place] 3  -TB 3  -TB    Moving to and from a bed to a chair (including a wheelchair)? 4  -[place] 4  -TB 4  -TB    Standing up from a chair using your arms (e.g., wheelchair, bedside chair)? 4  no arms  -[place] 4  -TB 4  -TB    Climbing 3-5 steps with a railing? 4  -[place] 3  -TB 3  -TB    To walk in hospital room? 4  -[place] 3  -TB 3  -TB    AM-PAC 6 Clicks Score (PT) 23  -[place] 20  -TB 20  -TB    Highest Level of Mobility Goal 7 --> Walk 25 feet or more  -[place] 6 --> Walk 10 steps or more  -TB 6 --> Walk 10 steps or more  -TB       Functional Assessment    Outcome Measure Options -- AM-PAC 6 Clicks Basic Mobility (PT)  -TB AM-PAC 6 Clicks Basic Mobility (PT)  -TB              User Key  (r) = Recorded By, (t) = Taken By, (c) = Cosigned By      Initials Name Provider Type    Genevieve Gamez, PTA Physical Therapist Assistant    Rissa Saez, PTA Physical Therapist Assistant                     Time  Calculation:    PT Charges       Row Name 01/08/24 1046             Time Calculation    Start Time 0955  -[place]      Stop Time 1020  -[place]      Time Calculation (min) 25 min  -[place]         Timed Charges    78305 - Gait Training Minutes  25  -[place]         Total Minutes    Timed Charges Total Minutes 25  -[place]       Total Minutes 25  -[place]                User Key  (r) = Recorded By, (t) = Taken By, (c) = Cosigned By      Initials Name Provider Type    Genevieve Gamez PTA Physical Therapist Assistant                  Therapy Charges for Today       Code Description Service Date Service Provider Modifiers Qty    15483155681 HC GAIT TRAINING EA 15 MIN 1/8/2024 Genevieve Castellon PTA GP 2            PT G-Codes  Outcome Measure Options: AM-PAC 6 Clicks Basic Mobility (PT)  AM-PAC 6 Clicks Score (PT): 23    Genevieve Castellon PTA  1/8/2024

## 2024-01-08 NOTE — THERAPY DISCHARGE NOTE
Acute Care - Physical Therapy Discharge Summary  [facility]       Patient Name: Speedy Nichols  : 1975  MRN: [id number]    Today's Date: 2024                 Admit Date: 1/3/2024      PT Recommendation and Plan    Visit Dx:    ICD-10-CM ICD-9-CM   1. Impaired mobility [Z74.09]  Z74.09 799.89   2. Coronary artery disease involving native coronary artery of native heart without angina pectoris  I25.10 414.01        Outcome Measures       Row Name 24 1000 24 0946 24 0927       How much help from another person do you currently need...    Turning from your back to your side while in flat bed without using bedrails? 4  -[place] 3  -TB 3  -TB    Moving from lying on back to sitting on the side of a flat bed without bedrails? 3  -[place] 3  -TB 3  -TB    Moving to and from a bed to a chair (including a wheelchair)? 4  -[place] 4  -TB 4  -TB    Standing up from a chair using your arms (e.g., wheelchair, bedside chair)? 4  no arms  -[place] 4  -TB 4  -TB    Climbing 3-5 steps with a railing? 4  -[place] 3  -TB 3  -TB    To walk in hospital room? 4  -[place] 3  -TB 3  -TB    AM-PAC 6 Clicks Score (PT) 23  -[place] 20  -TB 20  -TB    Highest Level of Mobility Goal 7 --> Walk 25 feet or more  -[place] 6 --> Walk 10 steps or more  -TB 6 --> Walk 10 steps or more  -TB       Functional Assessment    Outcome Measure Options -- AM-PAC 6 Clicks Basic Mobility (PT)  -TB AM-PAC 6 Clicks Basic Mobility (PT)  -TB              User Key  (r) = Recorded By, (t) = Taken By, (c) = Cosigned By      Initials Name Provider Type    Genevieve Gamez, LOWELL Physical Therapist Assistant    Rissa Saez PTA Physical Therapist Assistant                     PT Charges       Row Name 24 1046             Time Calculation    Start Time 0955  -[place]      Stop Time 1020  -[place]      Time Calculation (min) 25 min  -[place]         Timed Charges    51932 - Gait Training Minutes  25  -[place]         Total Minutes    Timed Charges Total Minutes 25  -[place]       Total  Minutes 25  -[place]                User Key  (r) = Recorded By, (t) = Taken By, (c) = Cosigned By      Initials Name Provider Type    Genevieve Gamez PTA Physical Therapist Assistant                     PT Rehab Goals       Row Name 01/08/24 1400             Bed Mobility Goal 1 (PT)    [place] Level/Cues Needed (Bed Mobility Goal 1, PT) minimum assist (75% or more patient effort)  Goal: supervision  -[place]      Progress/Outcomes (Bed Mobility Goal 1, PT) goal not met  -[place]         Transfer Goal 1 (PT)    [place] Level/Cues Needed (Transfer Goal 1, PT) supervision required  -[place]      Progress/Outcome (Transfer Goal 1, PT) goal met  -[place]         Gait Training Goal 1 (PT)    [place] Level (Gait Training Goal 1, PT) standby assist  Goal: supervision  -[place]      Distance (Gait Training Goal 1, PT) 400  Goal: 200  -[place]      Progress/Outcome (Gait Training Goal 1, PT) goal met  -[place]                User Key  (r) = Recorded By, (t) = Taken By, (c) = Cosigned By      Initials Name Provider Type Discipline    Genevieve Gamez PTA Physical Therapist Assistant PT                    Therapy Charges for Today       Code Description Service Date Service Provider Modifiers Qty    [id number] HC GAIT TRAINING EA 15 MIN 1/8/2024 Genevieve Castellon PTA GP 2            PT Discharge Summary  Anticipated Discharge Disposition (PT): home with assist  Reason for Discharge: Discharge from facility  Outcomes Achieved: Patient able to partially acheive established goals  Discharge Destination: Home with assist      Genevieve Castellon PTA   1/8/2024

## 2024-01-08 NOTE — PLAN OF CARE
Goal Outcome Evaluation:      Patient stood and ambulated 400' with a slow sebastián for 400' with SBA. Reviewed sternal restrictions

## 2024-01-08 NOTE — DISCHARGE SUMMARY
[facility] Cardiothoracic Surgery  DISCHARGE SUMMARY        Date of Admission: 1/3/2024  Date of Discharge:  1/8/2024  Primary Care Physician: Selvin Schumacher DO    Discharge Diagnoses:  Active Hospital Problems    Diagnosis     **Coronary artery disease involving native coronary artery of native heart without angina pectoris     Acute renal failure superimposed on chronic kidney disease     Non-smoker     Overweight with body mass index (BMI) of 27 to 27.9 in adult     CAD (coronary artery disease), native coronary artery     Type 2 diabetes mellitus with hyperglycemia, with long-term current use of insulin     Essential hypertension        Procedures Performed:   CABG x5 (LIMA to LAD, YOLANDA to OM2 through transverse sinus, SVG to OM1, SVG to Diagonal Artery, SVG to R-PDA) by Dr. Wilburn on 1/3/2024    HPI:  Mr. pSeedy Nichols is a 48 y.o. male who presented to Dr. Wilburn as an outpatient approximately 1 year ago with unstable angina and severe multivessel coronary artery disease.  Attempts were made to set him up for CABG but he did not want to proceed without non-COVID-19 vaccinated patient blood available for possible donor.  This was arranged however his A1c at that time was severely uncontrolled, greater than 10.  Given this his surgery was delayed.  Hemoglobin A1c did improve and patient ultimately agreed to have blood transfusion from any donor if this were needed.  Dr. Wilburn discussed with him the risk and benefits of proceeding with CABG, he understood the risk and agreed to proceed    Hospital Course: On 1/3/2024, Mr. Nichols was taken to the operating room for coronary artery bypass grafting.  See separate op note by Dr. Wilburn detailing the operation.  Following surgery he was transferred to the ICU in stable but guarded condition.  He remained hemodynamically stable and was extubated without remark during the evening hours following surgery.  On postop day 1 he was transfused 1 unit  PRBC due to hemoglobin 7.4.  He was clipped in ICU for close monitoring of renal function.  During the evening hours of postop day 1 urine output decreased and he was found to be hyperkalemic.  This did improve with fluid bolus, insulin, and Kayexalate.  Mediastinal chest tubes and bilateral pleural drains were removed without remark on postop day 2.  Creatinine continued improve and he was transferred to  on postop day 3.  The remainder of his hospital stay was significant for encouraging pulmonary toilet and ambulation, diuresis, and pain control.  He is eating well and is demonstrated adequate bowel function.  He is tolerating room air and has met all physical therapy goals.  Creatinine has continued to improve and is within normal range at the time of discharge.  Pacing wires were removed without remark and he meets criteria for discharge home on postop day 5.  The patient is agreeable to this plan.    He will resume Lasix 40 mg daily at discharge.  Routine follow-up with ANUPAM Woods in 1 week with labs and chest x-ray prior to appointment.    Condition on Discharge:  Neurologically intact and has good pain control.  He is eating well and has demonstrable good bowel function.  The sternum is stable without clicks and the saphenectomy incisions are healing nicely.  The heart is in normal sinus rhythm.  He has met all physical therapy criteria and verbalizes understanding of sternotomy precautions.   He verbalizes understanding of a separately handed out cardiac surgery handout.       Discharge Disposition:  Home or Self Care [1]    Discharge Medications:     Discharge Medications        New Medications        Instructions Start Date   aspirin 81 MG EC tablet   81 mg, Oral, Daily      clopidogrel 75 MG tablet  Commonly known as: PLAVIX   75 mg, Oral, Daily      HYDROcodone-acetaminophen 5-325 MG per tablet  Commonly known as: Norco   1 tablet, Oral, Every 6 Hours PRN      metoprolol tartrate 50 MG  tablet  Commonly known as: LOPRESSOR   50 mg, Oral, Every 12 Hours Scheduled      pantoprazole 40 MG EC tablet  Commonly known as: PROTONIX   40 mg, Oral, Every Early Morning   Start Date: January 9, 2024            Continue These Medications        Instructions Start Date   atorvastatin 40 MG tablet  Commonly known as: LIPITOR   40 mg, Oral, Daily      furosemide 40 MG tablet  Commonly known as: LASIX   40 mg, Oral, Daily      insulin aspart 100 UNIT/ML solution pen-injector sc pen  Commonly known as: novoLOG FLEXPEN   Subcutaneous, 3 Times Daily With Meals,  Use as directed per carb ratio and correction, average daily use 30 units.      insulin glargine 100 UNIT/ML injection  Commonly known as: LANTUS, SEMGLEE   30 Units, Subcutaneous, Nightly      lisinopril 20 MG tablet  Commonly known as: PRINIVIL,ZESTRIL   20 mg, Oral, Daily      metFORMIN 1000 MG tablet  Commonly known as: GLUCOPHAGE   1,000 mg, Oral, 2 Times Daily With Meals             Stop These Medications      fish oil 1000 MG capsule capsule     metoprolol succinate XL 50 MG 24 hr tablet  Commonly known as: TOPROL-XL     mupirocin 2 % nasal ointment  Commonly known as: BACTROBAN     nitroglycerin 0.4 MG SL tablet  Commonly known as: NITROSTAT              Discharge Diet: No concentrated sweets diet with an effort to maintain acceptable glycemic control.  He will not be referred to cardiac rehab at this time due to recent sternotomy.  We will reassess at his postop follow-up visit.    Discharge Care Plan / Instructions: Please see the separately handed out cardiac surgery handout.    Activity at Discharge:   No heavy lifting greater than 5 pounds or a gallon of milk while maintaining sternal precautions.  Speedy Nichols has been instructed on an exercise  regiment as detailed in a handed out cardiac surgery handout.    Tobacco:  The patient does not use tobacco products and therefore does not need tobacco cessation education.    BMI: BMI is >= 25 and  <30. (Overweight) The following options were offered after discussion;: referral to primary care      Follow-up Appointments: Speedy Brendon Nichols  is requested to see Selvin Schumacher DO within 1-2 weeks from time of discharge, to see Mavis BO in 1 week with labs and chest x-ray prior to appointment, to follow-up with Dr. Wilburn in 6 weeks,  and to follow-up with Dr. King of the cardiology service in 6 weeks.

## 2024-01-09 ENCOUNTER — TELEPHONE (OUTPATIENT)
Dept: FAMILY MEDICINE CLINIC | Age: 49
End: 2024-01-09

## 2024-01-09 DIAGNOSIS — E11.9 TYPE 2 DIABETES MELLITUS WITHOUT COMPLICATION, WITHOUT LONG-TERM CURRENT USE OF INSULIN (HCC): ICD-10-CM

## 2024-01-09 LAB — GLUCOSE BLDC GLUCOMTR-MCNC: 181 MG/DL (ref 70–130)

## 2024-01-09 RX ORDER — PROCHLORPERAZINE 25 MG/1
SUPPOSITORY RECTAL
Qty: 3 EACH | Refills: 11 | Status: SHIPPED | OUTPATIENT
Start: 2024-01-09

## 2024-01-09 RX ORDER — PROCHLORPERAZINE 25 MG/1
SUPPOSITORY RECTAL
Qty: 3 EACH | Refills: 0 | OUTPATIENT
Start: 2024-01-09

## 2024-01-09 NOTE — TELEPHONE ENCOUNTER
Care Transitions Initial Follow Up Call    Outreach made within 2 business days of discharge: Yes    Patient: Anthony Coelho Patient : 1975   MRN: [id number]  Reason for Admission: No discharge information exists for this patient.  Discharge Date:    2024     Spoke with: pt    Discharge department/facility: [facility] Interactive Patient Contact:  Was patient able to fill all prescriptions: Yes  Was patient instructed to bring all medications to the follow-up visit: Yes  Is patient taking all medications as directed in the discharge summary? Yes  Does patient understand their discharge instructions: Yes  Does patient have questions or concerns that need addressed prior to 7-14 day follow up office visit: no- just a little sore    Scheduled appointment with PCP within 7-14 days    Follow Up  Future Appointments   Date Time Provider Department Center   2024  9:30 AM MARIO Munoz DO [facility]       Celeste Phillip MA

## 2024-01-09 NOTE — PAYOR COMM NOTE
"REF:    [id number]     [facility]  FAX  [phone]     Speedy Hong \"Jostin\" (48 y.o. Male)       Date of Birth   1975    Social Security Number       Address   [address] [address]    Home Phone   [phone]    MRN   [id number]       [employer]   [employer]    Marital Status                               Admission Date   1/3/24    Admission Type   Elective    Admitting Provider   Papi Wilburn MD    Attending Provider       Department, Room/Bed   [facility] 4B, 433/1       Discharge Date   1/8/2024    Discharge Disposition   Home or Self Care    Discharge Destination                                 Attending Provider: (none)   Allergies: Poison Ivy Extract    Isolation: None   Infection: None   Code Status: Prior    Ht: 182.9 cm (72\")   Wt: 92.1 kg (203 lb)    Admission Cmt: None   Principal Problem: Coronary artery disease involving native coronary artery of native heart without angina pectoris [I25.10]                   Active Insurance as of 1/3/2024       Primary Coverage       Payor Plan Insurance Group Employer/Plan Group    [employer] [facility] [place]        Payor Plan Address Payor Plan Phone Number Payor Plan Fax Number Effective Dates    [address]   7/1/2017 - None Entered    [facility] [id number]         Subscriber Name Subscriber Birth Date Member ID       SPEEDY HONG 1975 [id number]                     Emergency Contacts        (Rel.) Home Phone Work Phone Mobile Phone    Marilia Hong (Spouse) [phone] -- --                 Discharge Summary        Mavis Tinajero APRN at 01/08/24 1221       Attestation signed by Papi Wilburn MD at 01/08/24 1635    I have personally seen and examined Speedy Hong and reviewed the record. Agree with the aforementioned plan rendered jointly with Mavis Tinajero.    Papi Wilburn M.D.  Cardiothoracic Surgeon                  "         [facility] Cardiothoracic Surgery  DISCHARGE SUMMARY        Date of Admission: 1/3/2024  Date of Discharge:  1/8/2024  Primary Care Physician: Selvin Schumacher DO    Discharge Diagnoses:  Active Hospital Problems    Diagnosis     **Coronary artery disease involving native coronary artery of native heart without angina pectoris     Acute renal failure superimposed on chronic kidney disease     Non-smoker     Overweight with body mass index (BMI) of 27 to 27.9 in adult     CAD (coronary artery disease), native coronary artery     Type 2 diabetes mellitus with hyperglycemia, with long-term current use of insulin     Essential hypertension        Procedures Performed:   CABG x5 (LIMA to LAD, YOLANDA to OM2 through transverse sinus, SVG to OM1, SVG to Diagonal Artery, SVG to R-PDA) by Dr. Wilburn on 1/3/2024    HPI:  Mr. Speedy Nichols is a 48 y.o. male who presented to Dr. Wilburn as an outpatient approximately 1 year ago with unstable angina and severe multivessel coronary artery disease.  Attempts were made to set him up for CABG but he did not want to proceed without non-COVID-19 vaccinated patient blood available for possible donor.  This was arranged however his A1c at that time was severely uncontrolled, greater than 10.  Given this his surgery was delayed.  Hemoglobin A1c did improve and patient ultimately agreed to have blood transfusion from any donor if this were needed.  Dr. Wilburn discussed with him the risk and benefits of proceeding with CABG, he understood the risk and agreed to proceed    Hospital Course: On 1/3/2024, Mr. Nichols was taken to the operating room for coronary artery bypass grafting.  See separate op note by Dr. Wilburn detailing the operation.  Following surgery he was transferred to the ICU in stable but guarded condition.  He remained hemodynamically stable and was extubated without remark during the evening hours following surgery.  On postop day 1 he was transfused 1  unit PRBC due to hemoglobin 7.4.  He was clipped in ICU for close monitoring of renal function.  During the evening hours of postop day 1 urine output decreased and he was found to be hyperkalemic.  This did improve with fluid bolus, insulin, and Kayexalate.  Mediastinal chest tubes and bilateral pleural drains were removed without remark on postop day 2.  Creatinine continued improve and he was transferred to  on postop day 3.  The remainder of his hospital stay was significant for encouraging pulmonary toilet and ambulation, diuresis, and pain control.  He is eating well and is demonstrated adequate bowel function.  He is tolerating room air and has met all physical therapy goals.  Creatinine has continued to improve and is within normal range at the time of discharge.  Pacing wires were removed without remark and he meets criteria for discharge home on postop day 5.  The patient is agreeable to this plan.    He will resume Lasix 40 mg daily at discharge.  Routine follow-up with ANUPAM Woods in 1 week with labs and chest x-ray prior to appointment.    Condition on Discharge:  Neurologically intact and has good pain control.  He is eating well and has demonstrable good bowel function.  The sternum is stable without clicks and the saphenectomy incisions are healing nicely.  The heart is in normal sinus rhythm.  He has met all physical therapy criteria and verbalizes understanding of sternotomy precautions.   He verbalizes understanding of a separately handed out cardiac surgery handout.       Discharge Disposition:  Home or Self Care [1]    Discharge Medications:     Discharge Medications        New Medications        Instructions Start Date   aspirin 81 MG EC tablet   81 mg, Oral, Daily      clopidogrel 75 MG tablet  Commonly known as: PLAVIX   75 mg, Oral, Daily      HYDROcodone-acetaminophen 5-325 MG per tablet  Commonly known as: Norco   1 tablet, Oral, Every 6 Hours PRN      metoprolol tartrate 50 MG  tablet  Commonly known as: LOPRESSOR   50 mg, Oral, Every 12 Hours Scheduled      pantoprazole 40 MG EC tablet  Commonly known as: PROTONIX   40 mg, Oral, Every Early Morning   Start Date: January 9, 2024            Continue These Medications        Instructions Start Date   atorvastatin 40 MG tablet  Commonly known as: LIPITOR   40 mg, Oral, Daily      furosemide 40 MG tablet  Commonly known as: LASIX   40 mg, Oral, Daily      insulin aspart 100 UNIT/ML solution pen-injector sc pen  Commonly known as: novoLOG FLEXPEN   Subcutaneous, 3 Times Daily With Meals,  Use as directed per carb ratio and correction, average daily use 30 units.      insulin glargine 100 UNIT/ML injection  Commonly known as: LANTUS, SEMGLEE   30 Units, Subcutaneous, Nightly      lisinopril 20 MG tablet  Commonly known as: PRINIVIL,ZESTRIL   20 mg, Oral, Daily      metFORMIN 1000 MG tablet  Commonly known as: GLUCOPHAGE   1,000 mg, Oral, 2 Times Daily With Meals             Stop These Medications      fish oil 1000 MG capsule capsule     metoprolol succinate XL 50 MG 24 hr tablet  Commonly known as: TOPROL-XL     mupirocin 2 % nasal ointment  Commonly known as: BACTROBAN     nitroglycerin 0.4 MG SL tablet  Commonly known as: NITROSTAT              Discharge Diet: No concentrated sweets diet with an effort to maintain acceptable glycemic control.  He will not be referred to cardiac rehab at this time due to recent sternotomy.  We will reassess at his postop follow-up visit.    Discharge Care Plan / Instructions: Please see the separately handed out cardiac surgery handout.    Activity at Discharge:   No heavy lifting greater than 5 pounds or a gallon of milk while maintaining sternal precautions.  Speedy Nichols has been instructed on an exercise  regiment as detailed in a handed out cardiac surgery handout.    Tobacco:  The patient does not use tobacco products and therefore does not need tobacco cessation education.    BMI: BMI is >= 25 and  <30. (Overweight) The following options were offered after discussion;: referral to primary care      Follow-up Appointments: Speedy Nichols  is requested to see Selvin Schumacher DO within 1-2 weeks from time of discharge, to see Mavis BO in 1 week with labs and chest x-ray prior to appointment, to follow-up with Dr. Wilburn in 6 weeks,  and to follow-up with Dr. King of the cardiology service in 6 weeks.        Electronically signed by Rosa Wilburn MD at 01/08/24 1631       Discharge Order (From admission, onward)       Start     Ordered    01/08/24 1220  Discharge patient  Once        Expected Discharge Date: 01/08/24   Discharge Disposition: Home or Self Care   Physician of Record for Attribution - Please select from Treatment Team: ROSA WILBURN [[id number]]   Review needed by CMO to determine Physician of Record: No      Question Answer Comment   Physician of Record for Attribution - Please select from Treatment Team ROSA WILBURN    Review needed by CMO to determine Physician of Record No        01/08/24 1220

## 2024-01-09 NOTE — TELEPHONE ENCOUNTER
Received call/My Chart Message from patient requesting refill on medication(s). Pt was last seen in office on 12/7/2023  and has a follow up scheduled for 1/16/2024. Will send request to provider for authorization.     Requested Prescriptions     Pending Prescriptions Disp Refills    Continuous Blood Gluc Sensor (DEXCOM G6 SENSOR) MISC 3 each 11     Sig: Use with dexcom system

## 2024-01-09 NOTE — OUTREACH NOTE
Prep Survey      Flowsheet Row Responses   [facility] patient discharged from? [facility]   Is LACE score < 7 ? No   Eligibility Readm Mgmt   Discharge diagnosis CAD, CABG x 5 using right upper & lower leg vein harvest attempted/aborted left leg vein harvest   Does the patient have one of the following disease processes/diagnoses(primary or secondary)? Cardiothoracic surgery   Does the patient have Home health ordered? No   Is there a DME ordered? No   Prep survey completed? Yes            Bethany NOBLE - Registered Nurse

## 2024-01-09 NOTE — TELEPHONE ENCOUNTER
Sent to pharmacy for pt    Requested Prescriptions     Signed Prescriptions Disp Refills    Continuous Blood Gluc Sensor (DEXCOM G6 SENSOR) MISC 3 each 11     Sig: Use with dexcom system     Authorizing Provider: MARIO QUEEN     Ordering User: MARINA BANDA

## 2024-01-09 NOTE — TELEPHONE ENCOUNTER
Received fax from pharmacy requesting refill on pts medication(s). Pt was last seen in office on 12/7/2023  and has a follow up scheduled for 1/16/2024. Will send request to  Dr. Munoz  for authorization.     Requested Prescriptions     Pending Prescriptions Disp Refills    Continuous Blood Gluc Sensor (DEXCOM G6 SENSOR) MISC [Pharmacy Med Name: DEXCOM G6 SENSOR    MIS] 3 each 0     Sig: USE AS DIRECTED WITH DEXCOM SYSTEM (CHANGE SENSOR EVERY 10 DAYS)

## 2024-01-10 ENCOUNTER — READMISSION MANAGEMENT (OUTPATIENT)
Dept: CALL CENTER | Facility: HOSPITAL | Age: 49
End: 2024-01-10
Payer: COMMERCIAL

## 2024-01-10 NOTE — OUTREACH NOTE
CT Surgery Week 1 Survey      Flowsheet Row Responses   [facility] patient discharged from? [place]   Does the patient have one of the following disease processes/diagnoses(primary or secondary)? Cardiothoracic surgery   Week 1 attempt successful? Yes   Call start time 1130   Call end time 1137   Discharge diagnosis CAD, CABG x 5 using right upper & lower leg vein harvest attempted/aborted left leg vein harvest   Person spoke with today (if not patient) and relationship Patient   Meds reviewed with patient/caregiver? Yes   Does the patient have all medications related to this admission filled (includes all antibiotics, pain medications, cardiac medications, etc.) Yes   Is the patient taking all medications as directed (includes completed medication regime)? Yes   Does the patient have a primary care provider?  Yes   Does the patient have an appointment scheduled with their C/T surgeon? Yes  [1/18  1pm Post op with Mavis Bess]   Comments regarding PCP Follow up with Selvin Schumacher  Tuesday Jan 16, 2024  @ 9:30   Has the patient kept scheduled appointments due by today? N/A   Comments Dr Wilburn 2/19/24  245pm   Has home health visited the patient within 72 hours of discharge? N/A   Psychosocial issues? No   Did the patient receive a copy of their discharge instructions? Yes   Nursing interventions Reviewed instructions with patient   What is the patient's perception of their health status since discharge? Improving   Nursing interventions Nurse provided patient education   Is the patient /caregiver able to teach back basic post-op care? Continue use of incentive spirometry at least 1 week post discharge, Practice cough and deep breath every 4 hours while awake, Hold pillow to support chest when coughing   Is the patient/caregiver able to teach back signs and symptoms of incisional infection? Increased redness, swelling or pain at the incisonal site, Increased drainage or bleeding, Pus or odor from incision,  Fever   Is the patient/caregiver able to teach back steps to recovery at home? Set small, achievable goals for return to baseline health, Rest and rebuild strength, gradually increase activity, Eat a well-balance diet   If the patient is a current smoker, are they able to teach back resources for cessation? Not a smoker   Is the patient/caregiver able to teach back the hierarchy of who to call/visit for symptoms/problems? PCP, Specialist, Home health nurse, Urgent Care, ED, 911 Yes   Week 1 call completed? Yes   Is the patient interested in additional calls from an ambulatory ? No   Would this patient benefit from a Referral to [facility]? No   Call end time 1137              PAIGE MARTINS - Registered Nurse

## 2024-01-11 ENCOUNTER — TELEPHONE (OUTPATIENT)
Dept: CARDIAC SURGERY | Facility: CLINIC | Age: 49
End: 2024-01-11
Payer: COMMERCIAL

## 2024-01-11 NOTE — TELEPHONE ENCOUNTER
"Patient calling today with some questions. First he verified his appt date/time for Mavis Bess. I advised he is to arrive around [id number] to obtain labs/cxr prior to appt and then the OV is at 1300. He then states his \"bottom is sore\" from sitting on it and asked if he could lay on his side. He states he is in a recliner, and I informed him this is usually best so that he can keep his chest slightly elevated. Patient also asked about paid med. He states he is taking 3 per day and thinks he might run out on Sunday. I advised that Dr Wilburn would like him to alternate with Tylenol and then this could be addressed at his appt on Monday if he does run out and still needs more pain med. He voiced understanding to all.   "

## 2024-01-13 ENCOUNTER — NURSE TRIAGE (OUTPATIENT)
Dept: CALL CENTER | Facility: HOSPITAL | Age: 49
End: 2024-01-13
Payer: COMMERCIAL

## 2024-01-14 NOTE — TELEPHONE ENCOUNTER
Caller states recent CABG and now exposed to Strep and has cough, low grade temperature and sore throat. Caller denies any chest pain bleeding or redness/drainage. Caller states temperature check 99.6. Dr. Koehler called and given above information and states ok to be seen at urgent care in the morning.         Reason for Disposition   [1] Caller has URGENT question AND [2] triager unable to answer question    Additional Information   Negative: [1] Major abdominal surgical incision AND [2] wound gaping open AND [3] visible internal organs   Negative: Sounds like a life-threatening emergency to the triager   Negative: Patient has a Negative Pressure Wound Therapy device   Negative: Patient is followed by a wound clinic or wound specialist for this wound   Negative: [1] Bleeding from incision AND [2] won't stop after 10 minutes of direct pressure   Negative: [1] Bleeding (more than a few drops) from incision AND [2] tracheostomy or blood vessel surgery (e.g., carotid endarterectomy, femoral bypass graft, kidney dialysis fistula)   Negative: [1] Widespread rash AND [2] bright red, sunburn-like   Negative: Severe pain in the incision   Negative: [1] Incision gaping open AND [2] < 48 hours since wound re-opened   Negative: [1] Incision gaping open AND [2] length of opening > 2 inches (5 cm)   Negative: Patient sounds very sick or weak to the triager   Negative: Sounds like a serious complication to the triager   Negative: Fever > 100.4 F (38.0 C)   Negative: [1] Incision looks infected (spreading redness, pain) AND [2] fever > 99.5 F (37.5 C)   Negative: [1] Incision looks infected (spreading redness, pain) AND [2] large red area (> 2 in. or 5 cm)   Negative: [1] Incision looks infected (spreading redness, pain) AND [2] face wound   Negative: [1] Red streak runs from the incision AND [2] longer than 1 inch (2.5 cm)   Negative: [1] Pus or bad-smelling fluid draining from incision AND [2] no fever   Negative: [1] Post-op  "pain AND [2] not controlled with pain medications   Negative: Dressing soaked with blood or body fluid (e.g., drainage)   Negative: [1] Scant bleeding (e.g., few drops) from incision AND AND [2] tracheostomy or blood vessel surgery (e.g., carotid endarterectomy, femoral bypass graft, kidney dialysis fistula)   Negative: [1] Raised bruise and [2] size > 2 inches (5 cm) and expanding    Answer Assessment - Initial Assessment Questions  1. SYMPTOM: \"What's the main symptom you're concerned about?\" (e.g., drainage, incision opening up, pain, redness)      Sore throat and low grade temperature states exposure to Strep  2. ONSET: \"When did symptoms start?\"        3. SURGERY: \"What surgery did you have?\"      CABG  4. DATE of SURGERY: \"When was the surgery?\"       On the 3rd of this month   5. INCISION SITE: \"Where is the incision located?\"         6. REDNESS: \"Is there any redness at the incision site?\" If Yes, ask: \"How wide across is the redness?\" (Inches, centimeters)       No c/o   7. PAIN: \"Is there any pain?\" If Yes, ask: \"How bad is it?\"  (Scale 1-10; or mild, moderate, severe)    - NONE (0): no pain    - MILD (1-3): doesn't interfere with normal activities     - MODERATE (4-7): interferes with normal activities or awakens from sleep     - SEVERE (8-10): excruciating pain, unable to do any normal activities      States recent exposure to Strep  8. BLEEDING: \"Is there any bleeding?\" If Yes, ask: \"How much?\" and \"Where?\"      No c/o   9. DRAINAGE: \"Is there any drainage from the incision site?\" If Yes, ask: \"What color and how much?\" (e.g., red, cloudy, pus; drops, teaspoon)        10. FEVER: \"Do you have a fever?\" If Yes, ask: \"What is your temperature, how was it measured, and when did it start?\"        Low grade he states 99.6   11. OTHER SYMPTOMS: \"Do you have any other symptoms?\" (e.g., dizziness, rash elsewhere on body, shaking chills, weakness)    Protocols used: Post-Op Incision Symptoms and " Questions-ADULT-AH

## 2024-01-15 ENCOUNTER — HOSPITAL ENCOUNTER (OUTPATIENT)
Dept: GENERAL RADIOLOGY | Facility: HOSPITAL | Age: 49
Discharge: HOME | End: 2024-01-15
Payer: COMMERCIAL

## 2024-01-15 ENCOUNTER — OFFICE VISIT (OUTPATIENT)
Dept: CARDIAC SURGERY | Facility: CLINIC | Age: 49
End: 2024-01-15
Payer: COMMERCIAL

## 2024-01-15 ENCOUNTER — LAB (OUTPATIENT)
Dept: LAB | Facility: HOSPITAL | Age: 49
End: 2024-01-15
Payer: COMMERCIAL

## 2024-01-15 VITALS
HEIGHT: 72 IN | HEART RATE: 73 BPM | DIASTOLIC BLOOD PRESSURE: 74 MMHG | WEIGHT: 202.6 LBS | SYSTOLIC BLOOD PRESSURE: 132 MMHG | OXYGEN SATURATION: 98 % | BODY MASS INDEX: 27.44 KG/M2

## 2024-01-15 DIAGNOSIS — E66.3 OVERWEIGHT WITH BODY MASS INDEX (BMI) OF 27 TO 27.9 IN ADULT: ICD-10-CM

## 2024-01-15 DIAGNOSIS — I25.10 CORONARY ARTERY DISEASE INVOLVING NATIVE CORONARY ARTERY OF NATIVE HEART WITHOUT ANGINA PECTORIS: Primary | Chronic | ICD-10-CM

## 2024-01-15 DIAGNOSIS — Z78.9 NON-SMOKER: ICD-10-CM

## 2024-01-15 DIAGNOSIS — I25.10 CORONARY ARTERY DISEASE INVOLVING NATIVE CORONARY ARTERY OF NATIVE HEART WITHOUT ANGINA PECTORIS: ICD-10-CM

## 2024-01-15 DIAGNOSIS — Z79.4 TYPE 2 DIABETES MELLITUS WITH HYPERGLYCEMIA, WITH LONG-TERM CURRENT USE OF INSULIN: Chronic | ICD-10-CM

## 2024-01-15 DIAGNOSIS — E11.65 TYPE 2 DIABETES MELLITUS WITH HYPERGLYCEMIA, WITH LONG-TERM CURRENT USE OF INSULIN: Chronic | ICD-10-CM

## 2024-01-15 LAB
ALBUMIN SERPL-MCNC: 3.8 G/DL (ref 3.5–5.2)
ALBUMIN/GLOB SERPL: 1.2 G/DL
ALP SERPL-CCNC: 103 U/L (ref 39–117)
ALT SERPL W P-5'-P-CCNC: 21 U/L (ref 1–41)
ANION GAP SERPL CALCULATED.3IONS-SCNC: 10 MMOL/L (ref 5–15)
AST SERPL-CCNC: 22 U/L (ref 1–40)
BASOPHILS # BLD AUTO: 0.05 10*3/MM3 (ref 0–0.2)
BASOPHILS NFR BLD AUTO: 0.6 % (ref 0–1.5)
BILIRUB SERPL-MCNC: 0.4 MG/DL (ref 0–1.2)
BUN SERPL-MCNC: 16 MG/DL (ref 6–20)
BUN/CREAT SERPL: 16.3 (ref 7–25)
CALCIUM SPEC-SCNC: 8.6 MG/DL (ref 8.6–10.5)
CHLORIDE SERPL-SCNC: 100 MMOL/L (ref 98–107)
CO2 SERPL-SCNC: 28 MMOL/L (ref 22–29)
CREAT SERPL-MCNC: 0.98 MG/DL (ref 0.76–1.27)
DEPRECATED RDW RBC AUTO: 42.6 FL (ref 37–54)
EGFRCR SERPLBLD CKD-EPI 2021: 94.5 ML/MIN/1.73
EOSINOPHIL # BLD AUTO: 0.19 10*3/MM3 (ref 0–0.4)
EOSINOPHIL NFR BLD AUTO: 2.4 % (ref 0.3–6.2)
ERYTHROCYTE [DISTWIDTH] IN BLOOD BY AUTOMATED COUNT: 12.7 % (ref 12.3–15.4)
GLOBULIN UR ELPH-MCNC: 3.1 GM/DL
GLUCOSE SERPL-MCNC: 158 MG/DL (ref 65–99)
HCT VFR BLD AUTO: 32.9 % (ref 37.5–51)
HGB BLD-MCNC: 9.8 G/DL (ref 13–17.7)
IMM GRANULOCYTES # BLD AUTO: 0.05 10*3/MM3 (ref 0–0.05)
IMM GRANULOCYTES NFR BLD AUTO: 0.6 % (ref 0–0.5)
LYMPHOCYTES # BLD AUTO: 1.07 10*3/MM3 (ref 0.7–3.1)
LYMPHOCYTES NFR BLD AUTO: 13.7 % (ref 19.6–45.3)
MCH RBC QN AUTO: 27.4 PG (ref 26.6–33)
MCHC RBC AUTO-ENTMCNC: 29.8 G/DL (ref 31.5–35.7)
MCV RBC AUTO: 91.9 FL (ref 79–97)
MONOCYTES # BLD AUTO: 0.54 10*3/MM3 (ref 0.1–0.9)
MONOCYTES NFR BLD AUTO: 6.9 % (ref 5–12)
NEUTROPHILS NFR BLD AUTO: 5.9 10*3/MM3 (ref 1.7–7)
NEUTROPHILS NFR BLD AUTO: 75.8 % (ref 42.7–76)
NRBC BLD AUTO-RTO: 0 /100 WBC (ref 0–0.2)
PLATELET # BLD AUTO: 226 10*3/MM3 (ref 140–450)
PMV BLD AUTO: 10.2 FL (ref 6–12)
POTASSIUM SERPL-SCNC: 4.7 MMOL/L (ref 3.5–5.2)
PROT SERPL-MCNC: 6.9 G/DL (ref 6–8.5)
RBC # BLD AUTO: 3.58 10*6/MM3 (ref 4.14–5.8)
SODIUM SERPL-SCNC: 138 MMOL/L (ref 136–145)
WBC NRBC COR # BLD AUTO: 7.8 10*3/MM3 (ref 3.4–10.8)

## 2024-01-15 PROCEDURE — 99024 POSTOP FOLLOW-UP VISIT: CPT

## 2024-01-15 PROCEDURE — 36415 COLL VENOUS BLD VENIPUNCTURE: CPT

## 2024-01-15 PROCEDURE — 80053 COMPREHEN METABOLIC PANEL: CPT

## 2024-01-15 PROCEDURE — 85025 COMPLETE CBC W/AUTO DIFF WBC: CPT

## 2024-01-15 PROCEDURE — 71046 X-RAY EXAM CHEST 2 VIEWS: CPT

## 2024-01-15 RX ORDER — PEN NEEDLE, DIABETIC 32GX 5/32"
NEEDLE, DISPOSABLE MISCELLANEOUS
COMMUNITY
Start: 2023-12-21

## 2024-01-15 RX ORDER — PROCHLORPERAZINE 25 MG/1
SUPPOSITORY RECTAL
COMMUNITY
Start: 2024-01-09

## 2024-01-15 NOTE — PROGRESS NOTES
"Subjective   Chief Complaint   Patient presents with    Post-op Follow-up     Pt is here for PO follow up w/ labs and CXR    Pt had CABG x 5 with sternal plating on 01/03/2024       Patient ID: Speedy Nichols is a 48 y.o. male who is here for follow-up having had coronary artery bypass grafting x 5 (LIMA to LAD, YOLANDA to OM2 through transverse sinus, SVG to OM1, SVG to diagonal artery, SVG to R-PDA) by Dr. Wilburn on 1/3/2024.    History of Present Illness  Post operative recovery was uneventful without any major complications. Sleep habits are fair. Pain control has been good. No fevers/sweats/chills. No drainage from incisions.  No sternal clicks. No chest pain or shortness of breath. Appetite is fair and is improving. Glycemic control is good. Denies tobacco use..Ambulating 5 minutes once daily. He does report some indigestion and constipation. He reports he will start stool softeners tonight. His weight is down 3 lbs from discharge per home scales.     The following portions of the patient's history were reviewed and updated as appropriate: allergies, current medications, past family history, past medical history, past social history, past surgical history and problem list.    Review of Systems   Constitutional:  Positive for fatigue (from surgery, improving). Negative for chills and fever.   HENT:  Negative for sore throat and trouble swallowing.    Respiratory:  Negative for chest tightness and shortness of breath.    Cardiovascular:  Positive for chest pain (incisional, improving). Negative for palpitations and leg swelling.   Skin:  Negative for color change, pallor and rash.       Objective   Visit Vitals  /74 (BP Location: Right arm, Patient Position: Sitting, Cuff Size: Adult)   Pulse 73   Ht 182.9 cm (72\")   Wt 91.9 kg (202 lb 9.6 oz)   SpO2 98%   BMI 27.48 kg/m²       Physical Exam  Constitutional:       Appearance: He is not ill-appearing.   HENT:      Head: Normocephalic and atraumatic.   Eyes:     "  Pupils: Pupils are equal, round, and reactive to light.   Cardiovascular:      Rate and Rhythm: Normal rate and regular rhythm.      Heart sounds: No murmur heard.  Pulmonary:      Effort: Pulmonary effort is normal. No respiratory distress.      Breath sounds: Normal breath sounds. No wheezing, rhonchi or rales.   Abdominal:      General: There is no distension.      Palpations: Abdomen is soft.      Tenderness: There is no abdominal tenderness.   Musculoskeletal:         General: Normal range of motion.      Cervical back: Normal range of motion and neck supple.      Right lower leg: No edema.      Left lower leg: No edema.   Skin:     General: Skin is warm and dry.      Comments: Sternotomy incision is clean, dry, and intact.  No signs of infection.  No sternal clicks.  Right saphenectomy site is clean, dry, and intact.  No signs of infection.   Neurological:      General: No focal deficit present.      Mental Status: He is alert and oriented to person, place, and time.   Psychiatric:         Mood and Affect: Mood normal.         Behavior: Behavior normal.     Labs:  CMP:     CBC:    Chest x-ray:      Independent Review of Radiographic Studies:    Trace bilateral pleural effusions, mild bibasilar atelectasis, no pneumothorax.    Assessment & Plan         Diagnoses and all orders for this visit:    1. Coronary artery disease involving native coronary artery of native heart without angina pectoris (Primary)    2. Overweight with body mass index (BMI) of 27 to 27.9 in adult    3. Type 2 diabetes mellitus with hyperglycemia, with long-term current use of insulin    4. Non-smoker        Overall, Speedy Nichols is doing well. Surgical incisions are clean and dry without evidence of infection.  Blood glucose has been well-controlled.  He is down 3 pounds since discharge according to his home scales.  Labs today are improved.  Creatinine is 0.98.  We discussed current sternotomy precautions and how these will not be  advanced yet. Continue post op surgical wound care: shower daily with antibacterial soap and water, avoid use of lotions, creams, ointments, powders etc, allow steri strips to fall off on their own. Following post op cardiac surgery home instructions.     We discussed the importance of strict glycemic control in the post operative setting and this impacts wound healing, infection risk, and future cardiovascular disease. Continue to follow with primary care for management of diabetes mellitus.     Provided support and encouragement. All questions have been answered to the best of my ability. Will discuss starting cardiac rehabilitation at official 1 month post op visit. Patient has follow up with Dr. Koehler in a few weeks. Patient has follow up with Cardiology in a few weeks. Patient has been instructed to contact our office with any questions or concerns should they arise prior to the next office visit.        Body mass index is 27.48 kg/m².(Overweight). Educational material given.      Speedy Nichols is a non-smoker and therefore does not need tobacco cessation education/counseling.      Advance Care Planning   NA. Patient is under 65 years of age.

## 2024-01-16 ENCOUNTER — TELEMEDICINE (OUTPATIENT)
Dept: FAMILY MEDICINE CLINIC | Age: 49
End: 2024-01-16
Payer: MEDICAID

## 2024-01-16 DIAGNOSIS — I10 PRIMARY HYPERTENSION: ICD-10-CM

## 2024-01-16 DIAGNOSIS — D64.9 ANEMIA, UNSPECIFIED TYPE: ICD-10-CM

## 2024-01-16 DIAGNOSIS — E78.2 MIXED HYPERLIPIDEMIA: ICD-10-CM

## 2024-01-16 DIAGNOSIS — N17.9 ACUTE KIDNEY INJURY (HCC): ICD-10-CM

## 2024-01-16 DIAGNOSIS — E11.9 TYPE 2 DIABETES MELLITUS WITHOUT COMPLICATION, WITHOUT LONG-TERM CURRENT USE OF INSULIN (HCC): ICD-10-CM

## 2024-01-16 DIAGNOSIS — E87.8 ELECTROLYTE ABNORMALITY: ICD-10-CM

## 2024-01-16 DIAGNOSIS — Z09 HOSPITAL DISCHARGE FOLLOW-UP: Primary | ICD-10-CM

## 2024-01-16 DIAGNOSIS — G89.18 POST-OPERATIVE PAIN: ICD-10-CM

## 2024-01-16 DIAGNOSIS — I25.118 CORONARY ARTERY DISEASE OF NATIVE ARTERY OF NATIVE HEART WITH STABLE ANGINA PECTORIS (HCC): ICD-10-CM

## 2024-01-16 PROCEDURE — 1111F DSCHRG MED/CURRENT MED MERGE: CPT | Performed by: PEDIATRICS

## 2024-01-16 PROCEDURE — 99215 OFFICE O/P EST HI 40 MIN: CPT | Performed by: PEDIATRICS

## 2024-01-16 RX ORDER — HYDROCODONE BITARTRATE AND ACETAMINOPHEN 5; 325 MG/1; MG/1
1 TABLET ORAL EVERY 6 HOURS PRN
Qty: 40 TABLET | Refills: 0 | Status: SHIPPED | OUTPATIENT
Start: 2024-01-16 | End: 2024-01-26

## 2024-01-16 RX ORDER — CLOPIDOGREL BISULFATE 75 MG/1
75 TABLET ORAL DAILY
COMMUNITY
Start: 2024-01-08

## 2024-01-16 RX ORDER — HYDROCODONE BITARTRATE AND ACETAMINOPHEN 5; 325 MG/1; MG/1
1 TABLET ORAL EVERY 6 HOURS PRN
COMMUNITY
Start: 2024-01-08 | End: 2024-01-16 | Stop reason: SDUPTHER

## 2024-01-16 RX ORDER — PANTOPRAZOLE SODIUM 40 MG/1
1 TABLET, DELAYED RELEASE ORAL EVERY MORNING
COMMUNITY
Start: 2024-01-09

## 2024-01-16 RX ORDER — METOPROLOL TARTRATE 50 MG/1
50 TABLET, FILM COATED ORAL EVERY 12 HOURS
COMMUNITY
Start: 2024-01-08

## 2024-01-16 ASSESSMENT — PATIENT HEALTH QUESTIONNAIRE - PHQ9
SUM OF ALL RESPONSES TO PHQ QUESTIONS 1-9: 0
SUM OF ALL RESPONSES TO PHQ QUESTIONS 1-9: 0
1. LITTLE INTEREST OR PLEASURE IN DOING THINGS: 0
SUM OF ALL RESPONSES TO PHQ QUESTIONS 1-9: 0
2. FEELING DOWN, DEPRESSED OR HOPELESS: 0
SUM OF ALL RESPONSES TO PHQ QUESTIONS 1-9: 0
SUM OF ALL RESPONSES TO PHQ9 QUESTIONS 1 & 2: 0

## 2024-01-16 ASSESSMENT — ENCOUNTER SYMPTOMS
SHORTNESS OF BREATH: 1
BACK PAIN: 0
WHEEZING: 0
ABDOMINAL PAIN: 0
COUGH: 0

## 2024-01-16 NOTE — PROGRESS NOTES
[address] [place] [address]  Phone [phone]   Fax [phone]      OFFICE VISIT: 2024    Anthony Coelho-: 1975      HPI  Reason For Visit:  Anthony is a 48 y.o.     Follow-Up from Hospital (Follow up from CABG. He was admitted 1/3/24 and discharged 24 from [facility]. He states he is healing well, still very sore. BG are running 120s, with no concerns. )    Patient presents on follow-up from hospital admission.  He had coronary artery bypass grafting performed at [facility] in [place].  Admission was from 1/3/2024 to 2024.     vitals were not taken for this visit.      There is no height or weight on file to calculate BMI.    I have reviewed the following with the Mr. Coelho   Lab Review  Orders Only on 2023   Component Date Value    Adenovirus by PCR 2023 Not Detected     Bordetella parapertussis* 2023 Not Detected     Bordetella pertussis by * 2023 Not Detected     Chlamydophilia pneumonia* 2023 Not Detected     Coronavirus 229E by PCR 2023 Not Detected     Coronavirus HKU1 by PCR 2023 Not Detected     Coronavirus NL63 by PCR 2023 Not Detected     Coronavirus OC43 by PCR 2023 Not Detected     SARS-CoV-2, PCR 2023 Not Detected     Human Metapneumovirus by* 2023 Not Detected     Human Rhinovirus/Enterov* 2023 Not Detected     Influenza A by PCR 2023 Not Detected     Influenza B by PCR 2023 Not Detected     Mycoplasma pneumoniae by* 2023 Not Detected     Parainfluenza Virus 1 by* 2023 Not Detected     Parainfluenza Virus 2 by* 2023 Not Detected     Parainfluenza Virus 3 by* 2023 Not Detected     Parainfluenza Virus 4 by* 2023 Not Detected     Respiratory Syncytial Vi* 2023 Not Detected    Office Visit on 10/18/2023   Component Date Value    Strep A Ag 10/18/2023 None Detected      Copies of these are in the chart.    Current

## 2024-01-17 ENCOUNTER — HOSPITAL ENCOUNTER (EMERGENCY)
Facility: HOSPITAL | Age: 49
Discharge: HOME | End: 2024-01-17
Attending: EMERGENCY MEDICINE | Admitting: EMERGENCY MEDICINE
Payer: COMMERCIAL

## 2024-01-17 VITALS
DIASTOLIC BLOOD PRESSURE: 83 MMHG | HEIGHT: 72 IN | OXYGEN SATURATION: 99 % | WEIGHT: 194.6 LBS | HEART RATE: 84 BPM | SYSTOLIC BLOOD PRESSURE: 164 MMHG | TEMPERATURE: 98.1 F | RESPIRATION RATE: 18 BRPM | BODY MASS INDEX: 26.36 KG/M2

## 2024-01-17 DIAGNOSIS — E16.2 HYPOGLYCEMIA: Primary | ICD-10-CM

## 2024-01-17 LAB
ANION GAP SERPL CALCULATED.3IONS-SCNC: 12 MMOL/L (ref 5–15)
BASOPHILS # BLD AUTO: 0.04 10*3/MM3 (ref 0–0.2)
BASOPHILS NFR BLD AUTO: 0.4 % (ref 0–1.5)
BUN SERPL-MCNC: 15 MG/DL (ref 6–20)
BUN/CREAT SERPL: 14.7 (ref 7–25)
CALCIUM SPEC-SCNC: 8.5 MG/DL (ref 8.6–10.5)
CHLORIDE SERPL-SCNC: 98 MMOL/L (ref 98–107)
CO2 SERPL-SCNC: 26 MMOL/L (ref 22–29)
CREAT SERPL-MCNC: 1.02 MG/DL (ref 0.76–1.27)
DEPRECATED RDW RBC AUTO: 41.8 FL (ref 37–54)
EGFRCR SERPLBLD CKD-EPI 2021: 90.1 ML/MIN/1.73
EOSINOPHIL # BLD AUTO: 0.11 10*3/MM3 (ref 0–0.4)
EOSINOPHIL NFR BLD AUTO: 1.2 % (ref 0.3–6.2)
ERYTHROCYTE [DISTWIDTH] IN BLOOD BY AUTOMATED COUNT: 12.5 % (ref 12.3–15.4)
GLUCOSE BLDC GLUCOMTR-MCNC: 173 MG/DL (ref 70–130)
GLUCOSE BLDC GLUCOMTR-MCNC: 249 MG/DL (ref 70–130)
GLUCOSE SERPL-MCNC: 182 MG/DL (ref 65–99)
HCT VFR BLD AUTO: 29.3 % (ref 37.5–51)
HGB BLD-MCNC: 9.4 G/DL (ref 13–17.7)
IMM GRANULOCYTES # BLD AUTO: 0.04 10*3/MM3 (ref 0–0.05)
IMM GRANULOCYTES NFR BLD AUTO: 0.4 % (ref 0–0.5)
LYMPHOCYTES # BLD AUTO: 0.76 10*3/MM3 (ref 0.7–3.1)
LYMPHOCYTES NFR BLD AUTO: 8.1 % (ref 19.6–45.3)
MCH RBC QN AUTO: 28.9 PG (ref 26.6–33)
MCHC RBC AUTO-ENTMCNC: 32.1 G/DL (ref 31.5–35.7)
MCV RBC AUTO: 90.2 FL (ref 79–97)
MONOCYTES # BLD AUTO: 0.48 10*3/MM3 (ref 0.1–0.9)
MONOCYTES NFR BLD AUTO: 5.1 % (ref 5–12)
NEUTROPHILS NFR BLD AUTO: 8 10*3/MM3 (ref 1.7–7)
NEUTROPHILS NFR BLD AUTO: 84.8 % (ref 42.7–76)
NRBC BLD AUTO-RTO: 0 /100 WBC (ref 0–0.2)
PLATELET # BLD AUTO: 191 10*3/MM3 (ref 140–450)
PMV BLD AUTO: 10.3 FL (ref 6–12)
POTASSIUM SERPL-SCNC: 4.1 MMOL/L (ref 3.5–5.2)
RBC # BLD AUTO: 3.25 10*6/MM3 (ref 4.14–5.8)
SODIUM SERPL-SCNC: 136 MMOL/L (ref 136–145)
WBC NRBC COR # BLD AUTO: 9.43 10*3/MM3 (ref 3.4–10.8)

## 2024-01-17 PROCEDURE — 80048 BASIC METABOLIC PNL TOTAL CA: CPT | Performed by: EMERGENCY MEDICINE

## 2024-01-17 PROCEDURE — 36415 COLL VENOUS BLD VENIPUNCTURE: CPT

## 2024-01-17 PROCEDURE — 82948 REAGENT STRIP/BLOOD GLUCOSE: CPT

## 2024-01-17 PROCEDURE — 99283 EMERGENCY DEPT VISIT LOW MDM: CPT

## 2024-01-17 PROCEDURE — 85025 COMPLETE CBC W/AUTO DIFF WBC: CPT | Performed by: EMERGENCY MEDICINE

## 2024-01-17 NOTE — ED PROVIDER NOTES
Subjective   History of Present Illness  Pt presents to the EC with report of low blood glucose at home.  Pt recently s/p CABG with Dr. Wilburn.  Reports he had taken shower and then began to feel like his glucose was dropping.  Dexcom showed blood glucose < 40.  Pt reports he ate several items and his glucose improved to 90s but then dipped back to 70s so he came into the EC.  No new complaints otherwise.  No changes to medications.        Review of Systems   Constitutional:  Negative for fever.   Respiratory:  Negative for shortness of breath.    Gastrointestinal:  Negative for abdominal pain and vomiting.   Skin:  Negative for rash.   Neurological:  Positive for light-headedness.   All other systems reviewed and are negative.      Past Medical History:   Diagnosis Date    Anxiety     CHF (congestive heart failure)     Coronary artery disease     Diabetes mellitus     Hypertension     Type 2 diabetes mellitus        Allergies   Allergen Reactions    Poison Ivy Extract Rash       Past Surgical History:   Procedure Laterality Date    CARDIAC CATHETERIZATION N/A 12/8/2022    Procedure: Left Heart Cath;  Surgeon: Gopal King MD;  Location:  [facility] CATH [facility]AS[facility];  Service: Cardiology;  Laterality: N/A;    CARDIAC CATHETERIZATION N/A 11/22/2023    Procedure: Left Heart Cath;  Surgeon: Gopal King MD;  Location:  [facility]ATH [facility];  Service: Cardiology;  Laterality: N/A;    CORONARY ARTERY BYPASS GRAFT N/A 1/3/2024    Procedure: CORONARY ARTERY BYPASS GRAFTING X5, BILATERAL INTERNAL MAMMARY ARTERY GRAFT, RIGHT UPPER LEG ENDOSCOPIC VEIN HARVEST, RIGHT LOWER LEG OPEN VEIN HARVEST, LEFT LEG ABORTED VEIN HARVEST, TRANSESOPHAGEAL ECHOCARDIOGRAM, XP STERNAL PLATING;  Surgeon: Papi Wilburn MD;  Location: [facility] OR;  Service: Cardiothoracic;  Laterality: N/A;    TOOTH EXTRACTION Right 09/21/2022    WISDOM TOOTH EXTRACTION         Family History   Problem Relation Age of Onset     Diabetes Father     Diabetes Paternal Grandmother        Social History     Socioeconomic History    Marital status:    Tobacco Use    Smoking status: Never     Passive exposure: Past    Smokeless tobacco: Never   Vaping Use    Vaping Use: Never used   Substance and Sexual Activity    Alcohol use: No     Comment: 0    Drug use: No    Sexual activity: Defer           Objective   Physical Exam  Vitals and nursing note reviewed.   Constitutional:       Appearance: Normal appearance.   Cardiovascular:      Rate and Rhythm: Normal rate and regular rhythm.      Pulses: Normal pulses.      Heart sounds: Normal heart sounds.   Pulmonary:      Effort: Pulmonary effort is normal.      Breath sounds: Normal breath sounds.   Skin:     General: Skin is warm and dry.   Neurological:      General: No focal deficit present.      Mental Status: He is alert and oriented to person, place, and time.   Psychiatric:         Mood and Affect: Mood normal.         Behavior: Behavior normal.         Procedures           ED Course      Labs Reviewed   BASIC METABOLIC PANEL - Abnormal; Notable for the following components:       Result Value    Glucose 182 (*)     Calcium 8.5 (*)     All other components within normal limits    Narrative:     GFR Normal >60  Chronic Kidney Disease <60  Kidney Failure <15     CBC WITH AUTO DIFFERENTIAL - Abnormal; Notable for the following components:    RBC 3.25 (*)     Hemoglobin 9.4 (*)     Hematocrit 29.3 (*)     Neutrophil % 84.8 (*)     Lymphocyte % 8.1 (*)     Neutrophils, Absolute 8.00 (*)     All other components within normal limits   POCT GLUCOSE FINGERSTICK - Abnormal; Notable for the following components:    Glucose 173 (*)     All other components within normal limits   POCT GLUCOSE FINGERSTICK - Abnormal; Notable for the following components:    Glucose 249 (*)     All other components within normal limits   POCT GLUCOSE FINGERSTICK   CBC AND DIFFERENTIAL    Narrative:     The following  orders were created for panel order CBC & Differential.  Procedure                               Abnormality         Status                     ---------                               -----------         ------                     CBC Auto Differential[[id number]]        Abnormal            Final result                 Please view results for these tests on the individual orders.                                            Medical Decision Making  Pt stable in EC - resting comfortably and in NAD.  Blood glucose has been stable/rising c/w reported ingestion of high sugar containing foods at home.  At this point - will d/c to home and recommend continued glucose monitoring and outpt f/u    Amount and/or Complexity of Data Reviewed  Labs: ordered.        Final diagnoses:   Hypoglycemia       ED Disposition  ED Disposition       ED Disposition   Discharge    Condition   Stable    Comment   --               Selvin Schumacher, DO  [address] [facility] [address] [phone]               Medication List      No changes were made to your prescriptions during this visit.            Nitin Hough,   01/17/24 0101       Nitin Hough,   01/17/24 0212

## 2024-02-06 RX ORDER — PANTOPRAZOLE SODIUM 40 MG/1
40 TABLET, DELAYED RELEASE ORAL EVERY MORNING
Qty: 30 TABLET | Refills: 0 | OUTPATIENT
Start: 2024-02-06

## 2024-02-09 RX ORDER — PANTOPRAZOLE SODIUM 40 MG/1
40 TABLET, DELAYED RELEASE ORAL EVERY MORNING
Qty: 30 TABLET | Refills: 0 | OUTPATIENT
Start: 2024-02-09

## 2024-02-09 NOTE — TELEPHONE ENCOUNTER
This is a 30 day prescription from [facility]. If pt still needs this prescription, he may contact PCP.

## 2024-02-15 ENCOUNTER — TELEPHONE (OUTPATIENT)
Dept: CARDIAC SURGERY | Facility: CLINIC | Age: 49
End: 2024-02-15
Payer: COMMERCIAL

## 2024-02-19 ENCOUNTER — OFFICE VISIT (OUTPATIENT)
Dept: CARDIAC SURGERY | Facility: CLINIC | Age: 49
End: 2024-02-19
Payer: COMMERCIAL

## 2024-02-19 ENCOUNTER — OFFICE VISIT (OUTPATIENT)
Dept: CARDIOLOGY | Facility: CLINIC | Age: 49
End: 2024-02-19
Payer: COMMERCIAL

## 2024-02-19 VITALS
OXYGEN SATURATION: 99 % | HEART RATE: 80 BPM | HEIGHT: 72 IN | BODY MASS INDEX: 25.23 KG/M2 | SYSTOLIC BLOOD PRESSURE: 136 MMHG | DIASTOLIC BLOOD PRESSURE: 82 MMHG | WEIGHT: 186.29 LBS

## 2024-02-19 VITALS
HEART RATE: 80 BPM | DIASTOLIC BLOOD PRESSURE: 82 MMHG | SYSTOLIC BLOOD PRESSURE: 136 MMHG | OXYGEN SATURATION: 99 % | HEIGHT: 72 IN | BODY MASS INDEX: 25.22 KG/M2 | RESPIRATION RATE: 18 BRPM | WEIGHT: 186.2 LBS

## 2024-02-19 DIAGNOSIS — E78.5 HYPERLIPIDEMIA LDL GOAL <70: Chronic | ICD-10-CM

## 2024-02-19 DIAGNOSIS — Z79.4 TYPE 2 DIABETES MELLITUS WITH HYPERGLYCEMIA, WITH LONG-TERM CURRENT USE OF INSULIN: Chronic | ICD-10-CM

## 2024-02-19 DIAGNOSIS — E11.65 TYPE 2 DIABETES MELLITUS WITH HYPERGLYCEMIA, WITH LONG-TERM CURRENT USE OF INSULIN: Chronic | ICD-10-CM

## 2024-02-19 DIAGNOSIS — I25.10 CORONARY ARTERY DISEASE INVOLVING NATIVE CORONARY ARTERY OF NATIVE HEART WITHOUT ANGINA PECTORIS: Primary | Chronic | ICD-10-CM

## 2024-02-19 DIAGNOSIS — I50.22 CHRONIC SYSTOLIC HEART FAILURE: Chronic | ICD-10-CM

## 2024-02-19 DIAGNOSIS — I10 ESSENTIAL HYPERTENSION: Chronic | ICD-10-CM

## 2024-02-19 DIAGNOSIS — E11.9 TYPE 2 DIABETES MELLITUS WITHOUT COMPLICATION, WITHOUT LONG-TERM CURRENT USE OF INSULIN (HCC): ICD-10-CM

## 2024-02-19 DIAGNOSIS — Z95.1 S/P CABG (CORONARY ARTERY BYPASS GRAFT): Primary | Chronic | ICD-10-CM

## 2024-02-19 DIAGNOSIS — Z95.1 S/P CABG (CORONARY ARTERY BYPASS GRAFT): ICD-10-CM

## 2024-02-19 DIAGNOSIS — I25.10 CORONARY ARTERY DISEASE INVOLVING NATIVE CORONARY ARTERY OF NATIVE HEART WITHOUT ANGINA PECTORIS: Primary | ICD-10-CM

## 2024-02-19 PROBLEM — N17.9 ACUTE RENAL FAILURE SUPERIMPOSED ON CHRONIC KIDNEY DISEASE: Status: RESOLVED | Noted: 2024-01-08 | Resolved: 2024-02-19

## 2024-02-19 PROBLEM — N18.9 ACUTE RENAL FAILURE SUPERIMPOSED ON CHRONIC KIDNEY DISEASE: Status: RESOLVED | Noted: 2024-01-08 | Resolved: 2024-02-19

## 2024-02-19 PROCEDURE — 1160F RVW MEDS BY RX/DR IN RCRD: CPT | Performed by: NURSE PRACTITIONER

## 2024-02-19 PROCEDURE — 93000 ELECTROCARDIOGRAM COMPLETE: CPT | Performed by: NURSE PRACTITIONER

## 2024-02-19 PROCEDURE — 1159F MED LIST DOCD IN RCRD: CPT | Performed by: NURSE PRACTITIONER

## 2024-02-19 PROCEDURE — 3075F SYST BP GE 130 - 139MM HG: CPT | Performed by: NURSE PRACTITIONER

## 2024-02-19 PROCEDURE — 99024 POSTOP FOLLOW-UP VISIT: CPT | Performed by: SURGERY

## 2024-02-19 PROCEDURE — 3075F SYST BP GE 130 - 139MM HG: CPT | Performed by: SURGERY

## 2024-02-19 PROCEDURE — 99214 OFFICE O/P EST MOD 30 MIN: CPT | Performed by: NURSE PRACTITIONER

## 2024-02-19 PROCEDURE — 3079F DIAST BP 80-89 MM HG: CPT | Performed by: SURGERY

## 2024-02-19 PROCEDURE — 3079F DIAST BP 80-89 MM HG: CPT | Performed by: NURSE PRACTITIONER

## 2024-02-19 RX ORDER — METOPROLOL SUCCINATE 100 MG/1
100 TABLET, EXTENDED RELEASE ORAL DAILY
Qty: 90 TABLET | Refills: 3 | Status: SHIPPED | OUTPATIENT
Start: 2024-02-19

## 2024-02-19 NOTE — TELEPHONE ENCOUNTER
Received fax from pharmacy requesting refill on pts medication(s). Pt was last seen in office on 1/16/2024  and has a follow up scheduled for 4/16/2024. Will send request to  Dr. Munoz  for authorization.     Requested Prescriptions     Pending Prescriptions Disp Refills    metFORMIN (GLUCOPHAGE) 1000 MG tablet [Pharmacy Med Name: metFORMIN HCl 1000 MG Oral Tablet] 180 tablet 0     Sig: TAKE 1 TABLET BY MOUTH TWICE DAILY WITH MEALS . APPOINTMENT REQUIRED FOR FUTURE REFILLS

## 2024-02-19 NOTE — LETTER
February 26, 2024       No Recipients    Patient: Speedy Nichols   YOB: 1975   Date of Visit: 2/19/2024       Dear Selvin Schumacher DO,    Speedy Nichols was in my office today. Below are the relevant portions of my assessment and plan of care.    Mr. Speedy Nichols is a 48-year-old male who is now 7 weeks status post coronary artery bypass grafting x5.  He has done very well after surgery and I am happy with his progress. I was worried about wound healing prior to surgery due to his poorly controlled diabetes, but he has done much better with this and his wounds have healed fine without any problem. He was seen by Cierra Herrera this morning for a cardiology follow-up.     I discussed with him returning to normal activity on a progressive basis. He understands and agrees. Okay to drive; he has been driving. We will send a referral to [facility] for cardiac rehab, which he can start. I would prefer he remain on DAPT for at least 3 months postop, then it would be okay to transition to aspirin only; discussed this with him.    Mr. Nichols can follow up with us on an as-needed basis. Thank you for trusting me with the care of Mr. Nichols. Please do not hesitate to call with any questions or concerns.         Sincerely,        Papi Wilburn MD        CC:   No Recipients

## 2024-02-19 NOTE — PROGRESS NOTES
Subjective:     Encounter Date:02/19/2024      Patient ID: Speedy Nichols is a 48 y.o. male with known coronary artery disease status post recent CABG, hypertension, hyperlipidemia, chronic systolic congestive heart failure, insulin requiring diabetes mellitus who presents today for cardiac follow up after recent revascularization.     Chief Complaint: CAD follow Up  Coronary Artery Disease  Presents for follow-up visit. Symptoms include chest pain (soreness). Pertinent negatives include no chest pressure, chest tightness, dizziness, leg swelling, palpitations or shortness of breath. The symptoms have been stable. Compliance with diet is good. Compliance with exercise is variable. Compliance with medications is good.     Mr. Nichols presents today for follow up. He has known multivessel coronary artery disease who actually was diagnosed around 1 year ago. He had delay in revascularization and actually just had surgery in January 2024. He had successful 5 vessel CABG in Jan 2024 per Dr. Wilburn. He has been following as an outpatient with them and doing well.    He presents today for his first cardiac follow up since revascularization. He has no specific complaints. He tells me that he has been improving. He admits to chest soreness from surgery but denies anginal chest pain. He has mild shortness of breath related to activity but no orthopnea, PND, leg swelling, bloating, or signs of heart failure. He has been compliant with his medications. He denies any bleeding issues. He is asking about use of DAPT following CABG.    He denies any palpitations, lightheadedness, or dizziness. He has had some insomnia. He has had a tele health visit with his PCP since his surgery. He has no other complaints today.     The following portions of the patient's history were reviewed and updated as appropriate: allergies, current medications, past family history, past medical history, past social history, and past surgical  history.    Allergies   Allergen Reactions    Poison Ivy Extract Rash       Current Outpatient Medications:     aspirin 81 MG EC tablet, Take 1 tablet by mouth Daily., Disp: 30 tablet, Rfl: 2    atorvastatin (LIPITOR) 40 MG tablet, Take 1 tablet by mouth once daily, Disp: 90 tablet, Rfl: 3    BD Pen Needle Magnolia 2nd Gen 32G X 4 MM misc, USE WITH INSULIN 4 TIMES DAILY IN THE MORNING, AT NOON, IN THE EVENING, AND AT BEDTIME, Disp: , Rfl:     clopidogrel (PLAVIX) 75 MG tablet, Take 1 tablet by mouth Daily., Disp: 30 tablet, Rfl: 2    Continuous Blood Gluc Sensor (Dexcom G6 Sensor), USE WITH DEXCOM SYSTEM (REPLACE SENSOR EVERY 10 DAYS), Disp: , Rfl:     furosemide (LASIX) 40 MG tablet, Take 1 tablet by mouth once daily, Disp: 90 tablet, Rfl: 0    HYDROcodone-acetaminophen (Norco) 5-325 MG per tablet, Take 1 tablet by mouth Every 6 (Six) Hours As Needed for Moderate Pain., Disp: 25 tablet, Rfl: 0    insulin aspart (novoLOG FLEXPEN) 100 UNIT/ML solution pen-injector sc pen, Inject  under the skin into the appropriate area as directed 3 (Three) Times a Day With Meals.  Use as directed per carb ratio and correction, average daily use 30 units., Disp: , Rfl:     insulin glargine (LANTUS, SEMGLEE) 100 UNIT/ML injection, Inject 30 Units under the skin into the appropriate area as directed Every Night., Disp: , Rfl:     lisinopril (PRINIVIL,ZESTRIL) 20 MG tablet, Take 1 tablet by mouth once daily, Disp: 90 tablet, Rfl: 3    metFORMIN (GLUCOPHAGE) 1000 MG tablet, Take 1 tablet by mouth 2 (Two) Times a Day With Meals., Disp: 60 tablet, Rfl: 0    metoprolol succinate XL (Toprol XL) 100 MG 24 hr tablet, Take 1 tablet by mouth Daily., Disp: 90 tablet, Rfl: 3      Review of Systems   Constitutional: Negative for diaphoresis, fever and malaise/fatigue.   HENT:  Negative for congestion.    Cardiovascular:  Positive for chest pain (soreness) and dyspnea on exertion (mild). Negative for irregular heartbeat, leg swelling, near-syncope,  orthopnea, palpitations, paroxysmal nocturnal dyspnea and syncope.   Respiratory:  Negative for chest tightness, shortness of breath and wheezing.    Hematologic/Lymphatic: Negative for bleeding problem. Bruises/bleeds easily.   Gastrointestinal:  Negative for bloating, abdominal pain, nausea and vomiting.   Neurological:  Negative for dizziness, headaches, light-headedness and weakness.   Psychiatric/Behavioral:  Negative for altered mental status. The patient has insomnia.          ECG 12 Lead    Date/Time: 2/19/2024 8:35 AM  Performed by: Cierra Herrera APRN    Authorized by: Cierra Herrera APRN  Comparison: compared with previous ECG from 1/5/2024  Similar to previous ECG  Rhythm: sinus rhythm  Rate: normal  BPM: 80  Conduction: conduction normal  Q waves: V2 and V3    T inversion: I, aVL and V2  QRS axis: normal  Other findings: non-specific ST-T wave changes    Clinical impression: abnormal EKG and non-specific ECG         Objective:     Vitals reviewed.   Constitutional:       General: Awake. Not in acute distress.     Appearance: Normal appearance. Well-developed, well-groomed, overweight and not in distress. Not ill-appearing.   HENT:      Head: Normocephalic and atraumatic.   Pulmonary:      Effort: Pulmonary effort is normal.      Breath sounds: Normal breath sounds. No wheezing. No rhonchi. No rales.   Cardiovascular:      Normal rate. Regular rhythm.      Murmurs: There is no murmur.      No gallop.  No rub.   Edema:     Peripheral edema absent.   Musculoskeletal:      Cervical back: Normal range of motion and neck supple. Skin:     General: Skin is warm and dry.   Neurological:      Mental Status: Alert, oriented to person, place, and time and oriented to person, place and time.   Psychiatric:         Attention and Perception: Attention normal.         Mood and Affect: Mood normal.         Speech: Speech normal.         Behavior: Behavior normal. Behavior is cooperative.         Thought Content:  "Thought content normal.         Cognition and Memory: Cognition and memory normal.         Judgment: Judgment normal.         /82   Pulse 80   Resp 18   Ht 182.9 cm (72\")   Wt 84.5 kg (186 lb 3.2 oz)   SpO2 99%   BMI 25.25 kg/m²     Lab Review:     Results for orders placed during the hospital encounter of 01/03/24    Intra-Op TAVR Transesophageal Echo    Interpretation Summary    Left ventricular systolic function is normal.    Normal right ventricular cavity size and systolic function.    There is a small interatrial flow communication with left to right shunting by color Doppler assessment.    There are no hemodynamically significant (more than mild) valve abnormalities.        Assessment:          Diagnosis Plan   1. Coronary artery disease involving native coronary artery of native heart without angina pectoris        2. S/P CABG (coronary artery bypass graft)        3. Chronic systolic heart failure        4. Essential hypertension        5. Hyperlipidemia LDL goal <70        6. Type 2 diabetes mellitus with hyperglycemia, with long-term current use of insulin               Plan:       -CAD: Stable. Recent vascularization.  No complaints of chest pain or chest pressure.  Reports no significant shortness of breath other than mild with exertion.  Currently managed on aspirin, Plavix, statin, and beta-blocker therapies.  Doing well postoperatively.  No changes recommended today.    -CABG: Recent revascularization per Dr. Papi Wilburn 1/3/2024.  5 vessel coronary artery bypass grafting with LIMA to LAD, YOLANDA to OM 2, SVG to OM1, SVG to diagonal artery, SVG to R-PDA    -Chronic systolic congestive heart failure: Stable.  No recent complaints of weight gain, shortness of breath, orthopnea, PND.  Historically, the patient has had known chronic systolic congestive heart failure.  He recently had surgical revascularization.  He was previously managed on Toprol-XL.  After getting discharged from the hospital " he was placed back on Lopressor.  We will transition him back to guideline directed medical therapy with Toprol-XL today.  Continue lisinopril and Lasix at this time.  He appears euvolemic.  Most recent echocardiogram is referenced above.    -Essential hypertension: Well-controlled on current medications.    -Hyperlipidemia: LDL goal less than 70 given known coronary artery disease.  Currently managed on high intensity statin therapy with atorvastatin.  Continue current therapies.    -Diabetes mellitus: Insulin-dependent.  Continue with management through primary care office.  Glycemic control recommended given known coronary artery disease.      Continue with dual antiplatelet therapy at this time.  Transition Lopressor to Toprol-XL.  Continue other therapies.  Follow-up in 3 months

## 2024-02-19 NOTE — PROGRESS NOTES
"[facility] Cardiothoracic Surgery  PROGRESS NOTE          Procedure Performed: CABG x5 (LIMA to LAD, YOLANDA to OM2 through transverse sinus, SVG to OM1, SVG to Diagonal Artery, SVG to R-PDA)   Date of Procedure: 01/03/2024.    Mr. Speedy Nichols is a 48-year-old male who presents for a 6-week cardiac follow-up.    He has a few scars stemming from his procedure. Insomnia has been challenging for him at the current time. He sleeps between 2 to 6 hours each night. He started taking melatonin which has been helpful. He feels frustrated in that he is unable to currently do very much. The surgical site causes symptoms of pruritus especially with the regrowth of his chest hair.     The patient experiences mild crepitus with turning and twisting motions. He was seen by Cardiologist, Dr. King this morning. Driving has since been resumed. He affirms taking aspirin and Plavix.     Diabetes has been under good control as he has been monitoring himself very closely with his Dexcom system.     Social History:  The patient resides near the [facility].      Objective:      02/19/24  0934   Weight: 84.5 kg (186 lb 4.6 oz)              PE:  Visit Vitals  /82 (BP Location: Right arm, Patient Position: Sitting, Cuff Size: Adult)   Pulse 80   Ht 182.9 cm (72.01\")   Wt 84.5 kg (186 lb 4.6 oz)   SpO2 99%   BMI 25.26 kg/m²        GENERAL: NAD, resting comfortably, normal color.  CARDIOVASCULAR: regular, regular rate, sinus, well-healed sternotomy with stable sternum.  PULMONARY: Normal bilateral breath sounds, no labored breathing.  ABDOMEN: soft, nt/nd.  EXTREMITIES: mild peripheral edema, normal pulses, normal.               Lab Results (last 72 hours)      ** No results found for the last 72 hours. **            Assessment/Plan         Mr. Speedy Nichols is a 48-year-old male who is now 7 weeks status post coronary artery bypass grafting x5.  He has done very well after surgery and I am happy with his progress. I " was worried about wound healing prior to surgery due to his poorly controlled diabetes, but he has done much better with this and his wounds have healed fine without any problem. He was seen by Cierra Herrera this morning for a cardiology follow-up.     I discussed with him returning to normal activity on a progressive basis. He understands and agrees. Okay to drive; he has been driving. We will send a referral to [facility] for cardiac rehab, which he can start. I would prefer he remain on DAPT for at least 3 months postop, then it would be okay to transition to aspirin only; discussed this with him.    Mr. Nichols can follow up with us on an as-needed basis. Thank you for trusting me with the care of Mr. Nichols. Please do not hesitate to call with any questions or concerns.         Papi Wilburn MD   Cardiothoracic Surgeon    Transcribed from ambient dictation for Papi Wilburn MD by Sanam Zamora.  02/19/24   14:59 CST    Patient or patient representative verbalized consent to the visit recording.  I have personally performed the services described in this document as transcribed by the above individual, and it is both accurate and complete.

## 2024-02-20 DIAGNOSIS — Z95.1 S/P CABG (CORONARY ARTERY BYPASS GRAFT): Primary | ICD-10-CM

## 2024-03-12 RX ORDER — FUROSEMIDE 40 MG/1
40 TABLET ORAL DAILY
Qty: 90 TABLET | Refills: 3 | Status: SHIPPED | OUTPATIENT
Start: 2024-03-12

## 2024-03-27 ENCOUNTER — TELEPHONE (OUTPATIENT)
Dept: FAMILY MEDICINE CLINIC | Age: 49
End: 2024-03-27

## 2024-03-27 DIAGNOSIS — G89.29 CHRONIC DENTAL PAIN: Primary | ICD-10-CM

## 2024-03-27 DIAGNOSIS — K08.9 CHRONIC DENTAL PAIN: Primary | ICD-10-CM

## 2024-03-28 RX ORDER — AMOXICILLIN 500 MG/1
500 CAPSULE ORAL 3 TIMES DAILY
Qty: 30 CAPSULE | Refills: 1 | Status: SHIPPED | OUTPATIENT
Start: 2024-03-28 | End: 2024-04-17

## 2024-03-28 NOTE — TELEPHONE ENCOUNTER
Called patient, spoke with: Patient and advised of Dr. Munoz recommendations.   Patient did voice understanding    Sent rx to pharmacy for pt    Requested Prescriptions     Signed Prescriptions Disp Refills    amoxicillin (AMOXIL) 500 MG capsule 30 capsule 1     Sig: Take 1 capsule by mouth 3 times daily for 20 days     Authorizing Provider: MARIO MUNOZ     Ordering User: MARINA BANDA

## 2024-04-04 ENCOUNTER — TELEPHONE (OUTPATIENT)
Dept: CARDIOLOGY | Facility: CLINIC | Age: 49
End: 2024-04-04
Payer: COMMERCIAL

## 2024-04-04 NOTE — TELEPHONE ENCOUNTER
Received a call from cardiac rehab, today was patients first session there. Nurse reported patient BP was 203/121 (R), 201/115 (L), they let patient rest a while and re-checked it at 206/125. Nurse stated their protocol was to send patient to ER, but wanted to make sure we were aware if any medications needs to be adjusted. Patient does not own BP cuff at home so he hasn't been checking BP.    I advised her to continue with their protocal to send patient to the ER & we will get back with patient if any medication changes need to be made.

## 2024-04-04 NOTE — TELEPHONE ENCOUNTER
Marjorie with [facility] cardiac rehab called to inform us the patient arrived to his session today with elevated blood pressure readings.  They checked it in his right arm and they got 201/115, and when they checked his left arm it was 203/121.  They went ahead and sent the patient to the ER for further evaluation, but wanted to let us know since we transitioned his metoprolol on 2/19/24 from Lopressor to Toprol-XL.  They will wait to resume cardiac rehab until his BP is back under control.     I called the patient to inquire about any symptoms he may have been having.  He denied having any symptoms.  I asked if he has been checking his BP readings at home and he stated he does not have the means to do so.  I asked if he could purchase a cuff to have at home so he can start keeping a BP log and he stated he will have to go and see how much they are.  I have made him a follow-up appointment for next week to see if any further medication changes need to be made.

## 2024-04-10 ENCOUNTER — OFFICE VISIT (OUTPATIENT)
Dept: CARDIOLOGY | Facility: CLINIC | Age: 49
End: 2024-04-10
Payer: COMMERCIAL

## 2024-04-10 VITALS
SYSTOLIC BLOOD PRESSURE: 162 MMHG | BODY MASS INDEX: 26.01 KG/M2 | DIASTOLIC BLOOD PRESSURE: 100 MMHG | WEIGHT: 192 LBS | OXYGEN SATURATION: 98 % | HEART RATE: 62 BPM | HEIGHT: 72 IN

## 2024-04-10 DIAGNOSIS — I25.10 CORONARY ARTERY DISEASE INVOLVING NATIVE CORONARY ARTERY OF NATIVE HEART WITHOUT ANGINA PECTORIS: Chronic | ICD-10-CM

## 2024-04-10 DIAGNOSIS — I10 ESSENTIAL HYPERTENSION: Primary | Chronic | ICD-10-CM

## 2024-04-10 DIAGNOSIS — I50.22 CHRONIC SYSTOLIC HEART FAILURE: Chronic | ICD-10-CM

## 2024-04-10 DIAGNOSIS — Z95.1 S/P CABG (CORONARY ARTERY BYPASS GRAFT): Chronic | ICD-10-CM

## 2024-04-10 RX ORDER — LISINOPRIL 40 MG/1
40 TABLET ORAL DAILY
Qty: 90 TABLET | Refills: 3 | Status: SHIPPED | OUTPATIENT
Start: 2024-04-10

## 2024-04-10 NOTE — PROGRESS NOTES
Subjective:     Encounter Date:04/10/2024      Patient ID: Speedy Nichols is a 49 y.o. male with known coronary artery disease status post recent CABG, hypertension, hyperlipidemia, chronic systolic congestive heart failure, insulin requiring diabetes mellitus who presents today for follow-up after recent findings of elevated blood pressure at cardiac rehab.    Chief Complaint: Uncontrolled hypertension  Hypertension  This is a chronic problem. The current episode started more than 1 year ago. The problem is uncontrolled. Pertinent negatives include no chest pain, headaches, malaise/fatigue, orthopnea, palpitations, PND or shortness of breath. Risk factors for coronary artery disease include male gender, diabetes mellitus and dyslipidemia. Current antihypertension treatment includes beta blockers and ACE inhibitors. The current treatment provides moderate improvement. Hypertensive end-organ damage includes CAD/MI and heart failure.     Mr. Nichols presents to the office today at the recommendation of cardiac rehab.  He recently presented to start cardiac rehab on 4/4/2024 at [facility].  His blood pressure was checked there and noted to be significantly elevated.  Our office was contacted and updated that there protocol is to send patients with blood pressure elevations to the emergency room for evaluation.  He was sent to [facility] ER.  The patient states he was given some medications in the ER but no new prescriptions.  He was recommended to follow-up with cardiology for further medication adjustments.    He presents today.  He reports he has been asymptomatic.  He is not having chest pain shortness of breath, headaches.  He denied any lightheadedness, dizziness, palpitations.  He was unable to monitor his blood pressure at home until this past Saturday when he obtained a blood pressure monitor.  He has had consistently elevated blood pressure readings since that time with  diastolic readings in the 100s and systolic readings in the 190s on average.  He has been compliant with his home medications including lisinopril and Toprol-XL.  He reports no recent illnesses.  He had not been checking his blood pressure prior to this as he did not note that it has been elevated.  He has been cutting back on his caffeine intake.  He reports some mild increase stress at home recently but nothing significantly outside of normal.    The following portions of the patient's history were reviewed and updated as appropriate: allergies, current medications, past family history, past medical history, past social history, and past surgical history.    Allergies   Allergen Reactions    Poison Ivy Extract Rash       Current Outpatient Medications:     aspirin 81 MG EC tablet, Take 1 tablet by mouth Daily., Disp: 30 tablet, Rfl: 2    atorvastatin (LIPITOR) 40 MG tablet, Take 1 tablet by mouth once daily, Disp: 90 tablet, Rfl: 3    BD Pen Needle Magnolia 2nd Gen 32G X 4 MM misc, USE WITH INSULIN 4 TIMES DAILY IN THE MORNING, AT NOON, IN THE EVENING, AND AT BEDTIME, Disp: , Rfl:     Continuous Blood Gluc Sensor (Dexcom G6 Sensor), USE WITH DEXCOM SYSTEM (REPLACE SENSOR EVERY 10 DAYS), Disp: , Rfl:     furosemide (LASIX) 40 MG tablet, Take 1 tablet by mouth once daily, Disp: 90 tablet, Rfl: 3    HYDROcodone-acetaminophen (Norco) 5-325 MG per tablet, Take 1 tablet by mouth Every 6 (Six) Hours As Needed for Moderate Pain., Disp: 25 tablet, Rfl: 0    insulin aspart (novoLOG FLEXPEN) 100 UNIT/ML solution pen-injector sc pen, Inject  under the skin into the appropriate area as directed 3 (Three) Times a Day With Meals.  Use as directed per carb ratio and correction, average daily use 30 units., Disp: , Rfl:     insulin glargine (LANTUS, SEMGLEE) 100 UNIT/ML injection, Inject 30 Units under the skin into the appropriate area as directed Every Night., Disp: , Rfl:     lisinopril (PRINIVIL,ZESTRIL) 40 MG tablet, Take 1  tablet by mouth Daily., Disp: 90 tablet, Rfl: 3    MAGNESIUM PO, Take  by mouth., Disp: , Rfl:     metFORMIN (GLUCOPHAGE) 1000 MG tablet, Take 1 tablet by mouth 2 (Two) Times a Day With Meals., Disp: 60 tablet, Rfl: 0    metoprolol succinate XL (Toprol XL) 100 MG 24 hr tablet, Take 1 tablet by mouth Daily., Disp: 90 tablet, Rfl: 3      Review of Systems   Constitutional: Negative for diaphoresis, fever and malaise/fatigue.   HENT:  Negative for congestion.    Cardiovascular:  Positive for dyspnea on exertion (mild). Negative for chest pain, irregular heartbeat, leg swelling, near-syncope, orthopnea, palpitations, paroxysmal nocturnal dyspnea and syncope.   Respiratory:  Negative for cough, shortness of breath and wheezing.    Hematologic/Lymphatic: Negative for bleeding problem. Bruises/bleeds easily.   Gastrointestinal:  Negative for bloating, abdominal pain, nausea and vomiting.   Neurological:  Negative for dizziness, headaches, light-headedness and weakness.   Psychiatric/Behavioral:  Negative for altered mental status.        Procedures   Objective:     Vitals reviewed.   Constitutional:       General: Awake. Not in acute distress.     Appearance: Normal appearance. Well-developed, well-groomed, overweight and not in distress. Not ill-appearing.   HENT:      Head: Normocephalic and atraumatic.   Pulmonary:      Effort: Pulmonary effort is normal.      Breath sounds: Normal breath sounds. No wheezing. No rhonchi. No rales.   Cardiovascular:      Normal rate. Regular rhythm.      Murmurs: There is no murmur.      No gallop.  No rub.   Edema:     Peripheral edema absent.   Musculoskeletal:      Cervical back: Normal range of motion and neck supple. Skin:     General: Skin is warm and dry.   Neurological:      Mental Status: Alert, oriented to person, place, and time and oriented to person, place and time.   Psychiatric:         Attention and Perception: Attention normal.         Mood and Affect: Mood normal.        "  Speech: Speech normal.         Behavior: Behavior normal. Behavior is cooperative.         Thought Content: Thought content normal.         Cognition and Memory: Cognition and memory normal.         Judgment: Judgment normal.         /100   Pulse 62   Ht 182.9 cm (72\")   Wt 87.1 kg (192 lb)   SpO2 98%   BMI 26.04 kg/m²     Lab Review:     Results for orders placed during the hospital encounter of 01/03/24    Intra-Op TAVR Transesophageal Echo    Interpretation Summary    Left ventricular systolic function is normal.    Normal right ventricular cavity size and systolic function.    There is a small interatrial flow communication with left to right shunting by color Doppler assessment.    There are no hemodynamically significant (more than mild) valve abnormalities.        Assessment:          Diagnosis Plan   1. Essential hypertension        2. Coronary artery disease involving native coronary artery of native heart without angina pectoris        3. S/P CABG (coronary artery bypass graft)        4. Chronic systolic heart failure                 Plan:       -Essential hypertension: Uncontrolled.  Previously controlled at last office visit.  Recent evaluation at cardiac rehab indicated elevated blood pressure readings.  He has been monitoring them over the last several days after obtaining a monitor at home noting significant persistent elevated blood pressure readings in the morning and evening time.  Recommend medication changes.  We will increase his lisinopril from 20 mg daily to 40 mg daily.  I suspect he will also need additional therapy such as amlodipine for further management of his blood pressure.  He is on metoprolol succinate 100 mg daily which we will continue at this time.  Currently asymptomatic with the elevated blood pressure readings.    -CAD: Stable. Recent vascularization in January 2024.  No complaints of chest pain or chest pressure.  Reports no significant shortness of breath other " than mild with exertion.  Currently managed on aspirin, statin, and beta-blocker therapies.  Completed Plavix therapy.    -CABG: Recent revascularization per Dr. Papi Wilburn 1/3/2024.  5 vessel coronary artery bypass grafting with LIMA to LAD, YOLANDA to OM 2, SVG to OM1, SVG to diagonal artery, SVG to R-PDA    -Chronic systolic congestive heart failure: Stable.  No recent complaints of weight gain, shortness of breath, orthopnea, PND.  Historically, the patient has had known chronic systolic congestive heart failure.  He is currently managed on lisinopril and Toprol-XL.  He takes Lasix 40 mg daily.  He reports no issues with volume management recently.      Increase lisinopril from 20 mg daily to 40 mg daily.  He has been encouraged to take another 20 mg tablet this morning.  Follow-up with blood pressure readings after he has taken his morning medications.  Follow-up in 1 month, call sooner if he continues to have persistent elevated readings despite increase lisinopril dose.  Will consider addition of amlodipine at follow-up or sooner if needed.    I attest that all portions of this note have been reviewed and updated to reflect the patient's current status.

## 2024-04-16 ENCOUNTER — OFFICE VISIT (OUTPATIENT)
Dept: FAMILY MEDICINE CLINIC | Age: 49
End: 2024-04-16
Payer: MEDICAID

## 2024-04-16 VITALS
DIASTOLIC BLOOD PRESSURE: 90 MMHG | TEMPERATURE: 97.2 F | BODY MASS INDEX: 26.18 KG/M2 | SYSTOLIC BLOOD PRESSURE: 158 MMHG | OXYGEN SATURATION: 100 % | HEART RATE: 83 BPM | WEIGHT: 193.25 LBS | HEIGHT: 72 IN

## 2024-04-16 DIAGNOSIS — I10 PRIMARY HYPERTENSION: ICD-10-CM

## 2024-04-16 DIAGNOSIS — I25.810 CORONARY ARTERY DISEASE INVOLVING CORONARY BYPASS GRAFT OF NATIVE HEART WITHOUT ANGINA PECTORIS: ICD-10-CM

## 2024-04-16 DIAGNOSIS — E78.2 MIXED HYPERLIPIDEMIA: ICD-10-CM

## 2024-04-16 DIAGNOSIS — E11.9 TYPE 2 DIABETES MELLITUS WITHOUT COMPLICATION, WITHOUT LONG-TERM CURRENT USE OF INSULIN (HCC): Primary | ICD-10-CM

## 2024-04-16 DIAGNOSIS — I50.22 CHRONIC SYSTOLIC CONGESTIVE HEART FAILURE (HCC): ICD-10-CM

## 2024-04-16 PROCEDURE — 99214 OFFICE O/P EST MOD 30 MIN: CPT | Performed by: PEDIATRICS

## 2024-04-16 PROCEDURE — 3077F SYST BP >= 140 MM HG: CPT | Performed by: PEDIATRICS

## 2024-04-16 PROCEDURE — 3080F DIAST BP >= 90 MM HG: CPT | Performed by: PEDIATRICS

## 2024-04-16 RX ORDER — LISINOPRIL 40 MG/1
40 TABLET ORAL DAILY
COMMUNITY
Start: 2024-04-10

## 2024-04-16 RX ORDER — METOPROLOL SUCCINATE 50 MG/1
50 TABLET, EXTENDED RELEASE ORAL DAILY
COMMUNITY
Start: 2024-03-10

## 2024-04-16 RX ORDER — DAPAGLIFLOZIN 5 MG/1
5 TABLET, FILM COATED ORAL EVERY MORNING
Qty: 90 TABLET | Refills: 0 | Status: SHIPPED | OUTPATIENT
Start: 2024-04-16

## 2024-04-16 ASSESSMENT — PATIENT HEALTH QUESTIONNAIRE - PHQ9
SUM OF ALL RESPONSES TO PHQ QUESTIONS 1-9: 0
SUM OF ALL RESPONSES TO PHQ QUESTIONS 1-9: 0
2. FEELING DOWN, DEPRESSED OR HOPELESS: NOT AT ALL
SUM OF ALL RESPONSES TO PHQ9 QUESTIONS 1 & 2: 0
1. LITTLE INTEREST OR PLEASURE IN DOING THINGS: NOT AT ALL
SUM OF ALL RESPONSES TO PHQ QUESTIONS 1-9: 0
SUM OF ALL RESPONSES TO PHQ QUESTIONS 1-9: 0

## 2024-04-16 ASSESSMENT — ENCOUNTER SYMPTOMS
COUGH: 0
SHORTNESS OF BREATH: 1
WHEEZING: 0
ABDOMINAL PAIN: 0
BACK PAIN: 0

## 2024-04-16 NOTE — PROGRESS NOTES
[address] [place] [address]  Phone [phone]   Fax [phone]      OFFICE VISIT: 2024    Anthony Coelho-: 1975      HPI  Reason For Visit:  Anthony is a 49 y.o.     Follow-up (Bp has been running high was 200/100 /Had cardiac rehab this am /Staying around 150-170 range on top per patient /Doubled up his lisinopril )    Patient presents on follow-up for multiple health issues.  Present concerns:  He notes that his blood pressure has been running high recently.  He had cardiac rehab this morning and his blood pressure was in the 150s to 170 range.  It has been running as high as 200/100 at times.  He recently doubled up his lisinopril in an effort to get his blood pressure down      Hypertension:   BP today was   BP Readings from Last 1 Encounters:   24 (!) 158/90      Recent BP readings:    BP Readings from Last 3 Encounters:   24 (!) 158/90   23 132/78   23 134/72     Medication   Lisinopril 40 mg daily   Metoprolol succinate 50 mg   Furosemide 40 mg daily  Medication compliance:  compliant most of the time  Home blood pressure monitoring: Yes - and running high.    He  is somewhat  adherent to a low sodium diet.     Symptoms: none  Laboratory:  Lab Results   Component Value Date    BUN 19 2021    CREATININE 0.9 2021         Diabetes Mellitus Type 2  Diet compliance:  mostly compliant  Nutrition Consultation Needed:  no  Medication:              Lantus insulin 30 units nightly              NovoLog insulin with carb ratio and correction              Metformin 1000 mg twice daily  Medication compliance:  compliant most of the time  Weight trend:down 5 lb in the past 4 months  Current exercise: he tries to remain active  Checking: Dexcom   He feels like his sugar has been up lately.  Home blood sugar records: fasting range: Dexcom 6  Low BG:  no  Eye exam current (within one year): yes  Checking Feet regularly:  yes - and no

## 2024-05-14 ENCOUNTER — OFFICE VISIT (OUTPATIENT)
Dept: CARDIOLOGY | Facility: CLINIC | Age: 49
End: 2024-05-14
Payer: COMMERCIAL

## 2024-05-14 VITALS
WEIGHT: 187 LBS | OXYGEN SATURATION: 99 % | DIASTOLIC BLOOD PRESSURE: 88 MMHG | BODY MASS INDEX: 25.33 KG/M2 | HEIGHT: 72 IN | HEART RATE: 78 BPM | SYSTOLIC BLOOD PRESSURE: 140 MMHG

## 2024-05-14 DIAGNOSIS — I10 ESSENTIAL HYPERTENSION: Primary | Chronic | ICD-10-CM

## 2024-05-14 DIAGNOSIS — Z95.1 S/P CABG (CORONARY ARTERY BYPASS GRAFT): Chronic | ICD-10-CM

## 2024-05-14 DIAGNOSIS — I25.10 CORONARY ARTERY DISEASE INVOLVING NATIVE CORONARY ARTERY OF NATIVE HEART WITHOUT ANGINA PECTORIS: Chronic | ICD-10-CM

## 2024-05-14 DIAGNOSIS — I50.22 CHRONIC SYSTOLIC HEART FAILURE: Chronic | ICD-10-CM

## 2024-05-14 RX ORDER — DAPAGLIFLOZIN 5 MG/1
5 TABLET, FILM COATED ORAL EVERY MORNING
COMMUNITY

## 2024-05-14 NOTE — PROGRESS NOTES
Subjective:     Encounter Date:05/14/2024      Patient ID: Speedy Nichols is a 49 y.o. male with known coronary artery disease status post recent CABG, hypertension, hyperlipidemia, chronic systolic congestive heart failure, insulin requiring diabetes mellitus who presents today for follow-up after recent findings of elevated blood pressure at cardiac rehab.    Chief Complaint: One month blood pressure follow up  Hypertension  This is a chronic problem. The current episode started more than 1 year ago. The problem is uncontrolled. Pertinent negatives include no chest pain, headaches, malaise/fatigue, orthopnea, palpitations, PND or shortness of breath. Risk factors for coronary artery disease include male gender, diabetes mellitus and dyslipidemia. Current antihypertension treatment includes beta blockers and ACE inhibitors. The current treatment provides moderate improvement. Hypertensive end-organ damage includes CAD/MI and heart failure.     Mr. Nichols was evaluated in the office 1 month ago due to uncontrolled blood pressure. He follows in our office due to coronary artery disease with recent coronary artery bypass grafting. He has also been managed for chronic systolic heart failure. Due to uncontrolled BP readings at his last visit medication changes were made with increase to his lisinopril.  He was advised to take 40 mg daily (he had been on 20 mg daily) and monitor BP at home or through cardiac rehab.     He presents back today for follow up of blood pressure after his last visit and medication adjustments.  He reports improvement after medication adjustments with lisinopril.  He states that his blood pressures at cardiac rehab have been improved.  Initially, they had sent him to the ER after findings of significantly elevated blood pressure prior to starting exercise.  He has had no further issues with extremely elevated blood pressure readings.    He follows with a primary care doctor and reports he  is due for routine follow-up and lab work soon.  He denies any worsening symptoms.  He does state that he feels differently than he has in quite some time and likely thinks that this is related to improvement of his blood pressure as he has probably had uncontrolled blood pressure for quite some times, admittedly.  He remains on Farxiga and furosemide.  In addition he is on metoprolol.    The following portions of the patient's history were reviewed and updated as appropriate: allergies, current medications, past family history, past medical history, past social history, and past surgical history.    Allergies   Allergen Reactions    Poison Ivy Extract Rash       Current Outpatient Medications:     aspirin 81 MG EC tablet, Take 1 tablet by mouth Daily., Disp: 30 tablet, Rfl: 2    atorvastatin (LIPITOR) 40 MG tablet, Take 1 tablet by mouth once daily, Disp: 90 tablet, Rfl: 3    BD Pen Needle Magnolia 2nd Gen 32G X 4 MM misc, USE WITH INSULIN 4 TIMES DAILY IN THE MORNING, AT NOON, IN THE EVENING, AND AT BEDTIME, Disp: , Rfl:     Continuous Blood Gluc Sensor (Dexcom G6 Sensor), USE WITH DEXCOM SYSTEM (REPLACE SENSOR EVERY 10 DAYS), Disp: , Rfl:     Farxiga 5 MG tablet tablet, Take 1 tablet by mouth Every Morning., Disp: , Rfl:     furosemide (LASIX) 40 MG tablet, Take 1 tablet by mouth once daily, Disp: 90 tablet, Rfl: 3    HYDROcodone-acetaminophen (Norco) 5-325 MG per tablet, Take 1 tablet by mouth Every 6 (Six) Hours As Needed for Moderate Pain., Disp: 25 tablet, Rfl: 0    insulin aspart (novoLOG FLEXPEN) 100 UNIT/ML solution pen-injector sc pen, Inject  under the skin into the appropriate area as directed 3 (Three) Times a Day With Meals.  Use as directed per carb ratio and correction, average daily use 30 units., Disp: , Rfl:     insulin glargine (LANTUS, SEMGLEE) 100 UNIT/ML injection, Inject 30 Units under the skin into the appropriate area as directed Every Night., Disp: , Rfl:     lisinopril (PRINIVIL,ZESTRIL) 40  MG tablet, Take 1 tablet by mouth Daily., Disp: 90 tablet, Rfl: 3    MAGNESIUM PO, Take  by mouth., Disp: , Rfl:     metFORMIN (GLUCOPHAGE) 1000 MG tablet, Take 1 tablet by mouth 2 (Two) Times a Day With Meals., Disp: 60 tablet, Rfl: 0    metoprolol succinate XL (Toprol XL) 100 MG 24 hr tablet, Take 1 tablet by mouth Daily., Disp: 90 tablet, Rfl: 3      Review of Systems   Constitutional: Negative for diaphoresis, fever and malaise/fatigue.   HENT:  Negative for congestion.    Cardiovascular:  Positive for dyspnea on exertion (mild). Negative for chest pain, irregular heartbeat, leg swelling, near-syncope, orthopnea, palpitations, paroxysmal nocturnal dyspnea and syncope.   Respiratory:  Negative for cough, shortness of breath and wheezing.    Hematologic/Lymphatic: Negative for bleeding problem. Bruises/bleeds easily.   Gastrointestinal:  Negative for bloating, abdominal pain, nausea and vomiting.   Neurological:  Negative for dizziness, headaches, light-headedness and weakness.   Psychiatric/Behavioral:  Negative for altered mental status.        Procedures     Objective:     Vitals reviewed.   Constitutional:       General: Awake. Not in acute distress.     Appearance: Normal appearance. Well-developed, well-groomed, overweight and not in distress. Not ill-appearing.   HENT:      Head: Normocephalic and atraumatic.   Pulmonary:      Effort: Pulmonary effort is normal.      Breath sounds: Normal breath sounds. No wheezing. No rhonchi. No rales.   Cardiovascular:      Normal rate. Regular rhythm.      Murmurs: There is no murmur.      No gallop.  No rub.   Edema:     Peripheral edema absent.   Musculoskeletal:      Cervical back: Normal range of motion and neck supple. Skin:     General: Skin is warm and dry.   Neurological:      Mental Status: Alert, oriented to person, place, and time and oriented to person, place and time.   Psychiatric:         Attention and Perception: Attention normal.         Mood and  "Affect: Mood normal.         Speech: Speech normal.         Behavior: Behavior normal. Behavior is cooperative.         Thought Content: Thought content normal.         Cognition and Memory: Cognition and memory normal.         Judgment: Judgment normal.         /88   Pulse 78   Ht 182.9 cm (72\")   Wt 84.8 kg (187 lb)   SpO2 99%   BMI 25.36 kg/m²     Lab Review:     Results for orders placed during the hospital encounter of 01/03/24    Intra-Op TAVR Transesophageal Echo    Interpretation Summary    Left ventricular systolic function is normal.    Normal right ventricular cavity size and systolic function.    There is a small interatrial flow communication with left to right shunting by color Doppler assessment.    There are no hemodynamically significant (more than mild) valve abnormalities.        Assessment:          Diagnosis Plan   1. Essential hypertension        2. Coronary artery disease involving native coronary artery of native heart without angina pectoris        3. S/P CABG (coronary artery bypass graft)        4. Chronic systolic heart failure                     Plan:       -Essential hypertension: Improved.  Slightly elevated in the office today.  He has had no significant elevations as high as what he was having previously.  We will continue to monitor his blood pressure through cardiac rehab and his PCP.  We would recommend further monitoring of his blood pressure.  If he continues to have blood pressure elevations would likely recommend the addition of spironolactone given his history of ischemic cardiomyopathy as this will help for optimization of guideline directed medical therapies as well as blood pressure control.    -CAD: Stable. Recent vascularization in January 2024.  No complaints of chest pain or chest pressure.  Reports no significant shortness of breath other than mild with exertion.  Currently managed on aspirin, statin, and beta-blocker therapies.  Completed Plavix " therapy.    -CABG: Recent revascularization per Dr. Papi Wilburn 1/3/2024.  5 vessel coronary artery bypass grafting with LIMA to LAD, YOLANDA to OM 2, SVG to OM1, SVG to diagonal artery, SVG to R-PDA    -Chronic systolic congestive heart failure: Stable.  No recent complaints of weight gain, shortness of breath, orthopnea, PND.  Historically, the patient has had known chronic systolic congestive heart failure.  He is currently managed on lisinopril and Toprol-XL.  He takes Lasix 40 mg daily.  He reports no issues with volume management recently.      Continue current medications.  Follow-up in 3 months.  Continue monitoring of blood pressure and if he continues to have elevation in his blood pressure would recommend optimization with the addition of spironolactone 25 mg daily and close outpatient monitoring of his potassium level shortly thereafter.  He understands if he continues to have issues with his blood pressure especially with significant elevations he is to call us sooner.      I attest that all portions of this note have been reviewed and updated to reflect the patient's current status.

## 2024-06-05 ENCOUNTER — TELEPHONE (OUTPATIENT)
Dept: CARDIOLOGY | Facility: CLINIC | Age: 49
End: 2024-06-05
Payer: COMMERCIAL

## 2024-06-05 DIAGNOSIS — E11.9 TYPE 2 DIABETES MELLITUS WITHOUT COMPLICATION, WITHOUT LONG-TERM CURRENT USE OF INSULIN (HCC): ICD-10-CM

## 2024-06-05 NOTE — TELEPHONE ENCOUNTER
Received fax from pharmacy requesting refill on pts medication(s). Pt was last seen in office on 4/16/2024  and has a follow up scheduled for 7/11/2024. Will send request to  Dr. Munoz  for authorization.     Requested Prescriptions     Pending Prescriptions Disp Refills    metFORMIN (GLUCOPHAGE) 1000 MG tablet [Pharmacy Med Name: metFORMIN HCl 1000 MG Oral Tablet] 180 tablet 3     Sig: TAKE 1 TABLET BY MOUTH TWICE DAILY WITH MEALS

## 2024-06-05 NOTE — TELEPHONE ENCOUNTER
"  Caller: Speedy Nichols \"Jostin\"    Relationship: Self    Best call back number: [phone]    What is the best time to reach you: ANY    Who are you requesting to speak with (clinical staff, provider,  specific staff member): ANY       What was the call regarding: PATIENTS NEW PRESCRIPTION OF METOPROLOL IS FOR 100MG.  HIS BLOOD PRESSURE IS DOING GOOD RIGHT NOW WITH THE 50MG HE IS ON AND THE FARXIGA.  HE WOULD LIKE TO KNOW IF UPPING THE DOSAGE WOULD MAKE CAUSE IT TO GO LOWER THAN IT SHOULD?            "

## 2024-06-07 NOTE — TELEPHONE ENCOUNTER
Patient verbalized understanding. He also wanted to make sure 50 mg of Metoprolol is what is in his chart to be filled next time.

## 2024-06-19 DIAGNOSIS — E11.9 TYPE 2 DIABETES MELLITUS WITHOUT COMPLICATION, WITHOUT LONG-TERM CURRENT USE OF INSULIN (HCC): ICD-10-CM

## 2024-06-19 RX ORDER — PROCHLORPERAZINE 25 MG/1
SUPPOSITORY RECTAL
Qty: 1 EACH | Refills: 3 | Status: SHIPPED | OUTPATIENT
Start: 2024-06-19

## 2024-06-19 NOTE — TELEPHONE ENCOUNTER
Received fax from pharmacy requesting refill on pts medication(s). Pt was last seen in office on 4/16/2024  and has a follow up scheduled for 7/11/2024. Will send request to  Dr. Munoz  for authorization.     Requested Prescriptions     Pending Prescriptions Disp Refills    Continuous Glucose Transmitter (DEXCOM G6 TRANSMITTER) MISC [Pharmacy Med Name: DEXCOM G6 TRANSMIT  MIS] 1 each 3     Sig: USE AS DIRECTED EVERY  90  DAYS.

## 2024-07-11 ENCOUNTER — OFFICE VISIT (OUTPATIENT)
Dept: FAMILY MEDICINE CLINIC | Age: 49
End: 2024-07-11
Payer: MEDICAID

## 2024-07-11 VITALS
HEIGHT: 72 IN | HEART RATE: 72 BPM | BODY MASS INDEX: 25.67 KG/M2 | OXYGEN SATURATION: 99 % | TEMPERATURE: 97.3 F | SYSTOLIC BLOOD PRESSURE: 132 MMHG | DIASTOLIC BLOOD PRESSURE: 76 MMHG | WEIGHT: 189.5 LBS

## 2024-07-11 DIAGNOSIS — E78.2 MIXED HYPERLIPIDEMIA: ICD-10-CM

## 2024-07-11 DIAGNOSIS — I25.5 ISCHEMIC CARDIOMYOPATHY: ICD-10-CM

## 2024-07-11 DIAGNOSIS — I10 PRIMARY HYPERTENSION: Primary | ICD-10-CM

## 2024-07-11 DIAGNOSIS — E11.9 TYPE 2 DIABETES MELLITUS WITHOUT COMPLICATION, WITHOUT LONG-TERM CURRENT USE OF INSULIN (HCC): ICD-10-CM

## 2024-07-11 DIAGNOSIS — I25.810 CORONARY ARTERY DISEASE INVOLVING CORONARY BYPASS GRAFT OF NATIVE HEART WITHOUT ANGINA PECTORIS: ICD-10-CM

## 2024-07-11 PROCEDURE — 3075F SYST BP GE 130 - 139MM HG: CPT | Performed by: PEDIATRICS

## 2024-07-11 PROCEDURE — 99214 OFFICE O/P EST MOD 30 MIN: CPT | Performed by: PEDIATRICS

## 2024-07-11 PROCEDURE — 3078F DIAST BP <80 MM HG: CPT | Performed by: PEDIATRICS

## 2024-07-11 RX ORDER — INSULIN ASPART 100 [IU]/ML
INJECTION, SOLUTION INTRAVENOUS; SUBCUTANEOUS
Qty: 27 ML | Refills: 11 | Status: SHIPPED | OUTPATIENT
Start: 2024-07-11

## 2024-07-11 RX ORDER — NITROGLYCERIN 0.4 MG/1
0.4 TABLET SUBLINGUAL PRN
Qty: 25 TABLET | Refills: 3 | Status: SHIPPED | OUTPATIENT
Start: 2024-07-11

## 2024-07-11 SDOH — ECONOMIC STABILITY: INCOME INSECURITY: HOW HARD IS IT FOR YOU TO PAY FOR THE VERY BASICS LIKE FOOD, HOUSING, MEDICAL CARE, AND HEATING?: NOT HARD AT ALL

## 2024-07-11 SDOH — ECONOMIC STABILITY: FOOD INSECURITY: WITHIN THE PAST 12 MONTHS, YOU WORRIED THAT YOUR FOOD WOULD RUN OUT BEFORE YOU GOT MONEY TO BUY MORE.: NEVER TRUE

## 2024-07-11 SDOH — ECONOMIC STABILITY: FOOD INSECURITY: WITHIN THE PAST 12 MONTHS, THE FOOD YOU BOUGHT JUST DIDN'T LAST AND YOU DIDN'T HAVE MONEY TO GET MORE.: NEVER TRUE

## 2024-07-11 ASSESSMENT — PATIENT HEALTH QUESTIONNAIRE - PHQ9
1. LITTLE INTEREST OR PLEASURE IN DOING THINGS: NOT AT ALL
SUM OF ALL RESPONSES TO PHQ QUESTIONS 1-9: 0
SUM OF ALL RESPONSES TO PHQ QUESTIONS 1-9: 0
SUM OF ALL RESPONSES TO PHQ9 QUESTIONS 1 & 2: 0
2. FEELING DOWN, DEPRESSED OR HOPELESS: NOT AT ALL
SUM OF ALL RESPONSES TO PHQ QUESTIONS 1-9: 0
SUM OF ALL RESPONSES TO PHQ QUESTIONS 1-9: 0

## 2024-07-11 ASSESSMENT — ENCOUNTER SYMPTOMS
COUGH: 0
ABDOMINAL PAIN: 0
SHORTNESS OF BREATH: 1
BACK PAIN: 0
WHEEZING: 0

## 2024-07-11 NOTE — PROGRESS NOTES
[address] [address]  Phone [phone]   Fax [phone]      OFFICE VISIT: 2024    Anthony Coelho-: 1975      HPI  Reason For Visit:  Anthony is a 49 y.o.     Follow-up (Denies issues or concerns)    Patient presents on follow-up for multiple health issues.  Present concerns:  No present concerns        Diabetes Mellitus Type 2  Diet compliance:  mostly compliant  Nutrition Consultation Needed:  no  Medication:              Lantus insulin 30 units nightly              NovoLog insulin with carb ratio and correction              Metformin 1000 mg twice daily  Medication compliance:  compliant most of the time  Weight trend:  Current exercise: he tries to remain active  Checking: Dexcom   He feels like his sugar has been up lately.  Home blood sugar records: fasting range: Dexcom 6  Low BG:  no  Eye exam current (within one year): yes  Checking Feet regularly:  yes - and no sores  ACE/ARB:  yes -lisinopril 5 mg  Aspirin: No: But recommended  Tobacco history: He  reports that he has never smoked. He has never used smokeless tobacco.    Lab Results   Component Value Date    LABA1C 11.0 (H) 2023    LABA1C 11.4 (H) 2021    LABA1C 10.5 (H) 2019     Lab Results   Component Value Date    CREATININE 0.9 2021       Hypertension:   BP today was   BP Readings from Last 1 Encounters:   24 132/76      Recent BP readings:    BP Readings from Last 3 Encounters:   24 132/76   24 (!) 158/90   23 132/78     Medication              Lisinopril 40 mg daily              Metoprolol succinate 50 mg              Furosemide 40 mg daily   Medication compliance:  compliant most of the time  Home blood pressure monitoring: No.    He  is somewhat  adherent to a low sodium diet.     Symptoms: None  Laboratory:  Lab Results   Component Value Date    BUN 19 2021    CREATININE 0.9 2021       Hyperlipidemia:   Medication:

## 2024-07-21 DIAGNOSIS — E11.9 TYPE 2 DIABETES MELLITUS WITHOUT COMPLICATION, WITHOUT LONG-TERM CURRENT USE OF INSULIN (HCC): ICD-10-CM

## 2024-07-21 DIAGNOSIS — I50.22 CHRONIC SYSTOLIC CONGESTIVE HEART FAILURE (HCC): ICD-10-CM

## 2024-07-21 DIAGNOSIS — I25.810 CORONARY ARTERY DISEASE INVOLVING CORONARY BYPASS GRAFT OF NATIVE HEART WITHOUT ANGINA PECTORIS: ICD-10-CM

## 2024-07-22 RX ORDER — DAPAGLIFLOZIN 5 MG/1
5 TABLET, FILM COATED ORAL EVERY MORNING
Qty: 90 TABLET | Refills: 3 | Status: SHIPPED | OUTPATIENT
Start: 2024-07-22

## 2024-07-22 NOTE — TELEPHONE ENCOUNTER
Received fax from pharmacy requesting refill on pts medication(s). Pt was last seen in office on 7/11/2024  and has a follow up scheduled for Visit date not found. Will send request to  Dr. Munoz  for patient.     Requested Prescriptions     Pending Prescriptions Disp Refills    dapagliflozin (FARXIGA) 5 MG tablet [Pharmacy Med Name: Farxiga 5 MG Oral Tablet] 90 tablet 3     Sig: Take 1 tablet by mouth every morning

## 2024-07-25 DIAGNOSIS — E11.9 TYPE 2 DIABETES MELLITUS WITHOUT COMPLICATION, WITH LONG-TERM CURRENT USE OF INSULIN (HCC): ICD-10-CM

## 2024-07-25 DIAGNOSIS — Z79.4 TYPE 2 DIABETES MELLITUS WITHOUT COMPLICATION, WITH LONG-TERM CURRENT USE OF INSULIN (HCC): ICD-10-CM

## 2024-07-25 RX ORDER — PEN NEEDLE, DIABETIC 32GX 5/32"
NEEDLE, DISPOSABLE MISCELLANEOUS
Qty: 100 EACH | Refills: 11 | Status: SHIPPED | OUTPATIENT
Start: 2024-07-25

## 2024-07-25 NOTE — TELEPHONE ENCOUNTER
Received fax from pharmacy requesting refill on pts medication(s). Pt was last seen in office on 7/11/2024  and has a follow up scheduled for Visit date not found. Will send request to  Dr. Munoz  for authorization.     Requested Prescriptions     Pending Prescriptions Disp Refills    BD PEN NEEDLE JOSE 2ND GEN 32G X 4 MM MISC [Pharmacy Med Name: BD PEN NEEDLE/JOSE [id number] MIS] 100 each 11     Sig: USE WITH INSULIN 4 TIMES DAILY IN THE MORNING, AT NOON, IN THE EVENING, AND AT BEDTIME

## 2024-08-09 DIAGNOSIS — K04.7 TOOTH ABSCESS: Primary | ICD-10-CM

## 2024-08-09 RX ORDER — AMOXICILLIN 500 MG/1
500 CAPSULE ORAL 3 TIMES DAILY
Qty: 30 CAPSULE | Refills: 0 | Status: SHIPPED | OUTPATIENT
Start: 2024-08-09 | End: 2024-08-19

## 2024-08-09 NOTE — TELEPHONE ENCOUNTER
Pt called stating he had a tooth abscess and would like something called in before the weekend- if approved he would like sent to [facility]

## 2024-08-30 ENCOUNTER — OFFICE VISIT (OUTPATIENT)
Dept: CARDIOLOGY | Facility: CLINIC | Age: 49
End: 2024-08-30
Payer: COMMERCIAL

## 2024-08-30 VITALS
HEIGHT: 72 IN | WEIGHT: 187 LBS | HEART RATE: 76 BPM | BODY MASS INDEX: 25.33 KG/M2 | DIASTOLIC BLOOD PRESSURE: 80 MMHG | SYSTOLIC BLOOD PRESSURE: 120 MMHG

## 2024-08-30 DIAGNOSIS — I50.22 CHRONIC SYSTOLIC HEART FAILURE: Chronic | ICD-10-CM

## 2024-08-30 DIAGNOSIS — Z95.1 S/P CABG (CORONARY ARTERY BYPASS GRAFT): Chronic | ICD-10-CM

## 2024-08-30 DIAGNOSIS — E78.5 HYPERLIPIDEMIA LDL GOAL <70: Chronic | ICD-10-CM

## 2024-08-30 DIAGNOSIS — I25.10 CORONARY ARTERY DISEASE INVOLVING NATIVE CORONARY ARTERY OF NATIVE HEART WITHOUT ANGINA PECTORIS: Primary | Chronic | ICD-10-CM

## 2024-08-30 DIAGNOSIS — I10 ESSENTIAL HYPERTENSION: Chronic | ICD-10-CM

## 2024-08-30 RX ORDER — PROCHLORPERAZINE 25 MG/1
SUPPOSITORY RECTAL
COMMUNITY
Start: 2024-06-19

## 2024-08-30 NOTE — PROGRESS NOTES
Subjective:     Encounter Date:08/30/2024      Patient ID: Speedy Nichols is a 49 y.o. male with known coronary artery disease status post CABG, hypertension, hyperlipidemia, chronic systolic congestive heart failure, insulin requiring diabetes mellitus who presents today for follow-up.    Chief Complaint: CAD CHF follow up  Congestive Heart Failure  Presents for follow-up visit. Pertinent negatives include no abdominal pain, chest pain, chest pressure, edema, fatigue, near-syncope, nocturia, orthopnea, palpitations, paroxysmal nocturnal dyspnea, shortness of breath or unexpected weight change. The symptoms have been stable. Compliance with total regimen is %. Compliance with diet is %. Compliance with exercise is %. Compliance with medications is %.     Mr. Nichols presents today for routine follow-up.  He is followed in our office due to known coronary artery disease with prior surgical revascularization and congestive heart failure.  He reports to be doing stable.  He denies any chest pain or chest pressure.  He reports no shortness of breath, orthopnea, PND.  He has had no rapid weight gain or lower extremity swelling.  He continues to participate in cardiac rehab.  He is concerned that some days during his workouts he is noted to have blood pressure drops.  He is asking if he is on too many medications or if some of his medications need to be reduced.  He denies any syncopal episodes.  He has no lightheadedness or dizziness on most days but does experience some dizziness with drops in blood pressure during rehab.  He is currently managed on lisinopril, metoprolol, furosemide, Farxiga.  He is hoping he can become more active even on days where he is not participating in cardiac rehab but overall reports improvement in activity since his surgery.        The following portions of the patient's history were reviewed and updated as appropriate: allergies, current medications, past family  history, past medical history, past social history, and past surgical history.    Allergies   Allergen Reactions    Poison Ivy Extract Rash       Current Outpatient Medications:     aspirin 81 MG EC tablet, Take 1 tablet by mouth Daily., Disp: 30 tablet, Rfl: 2    atorvastatin (LIPITOR) 40 MG tablet, Take 1 tablet by mouth once daily, Disp: 90 tablet, Rfl: 3    BD Pen Needle Magnolia 2nd Gen 32G X 4 MM misc, USE WITH INSULIN 4 TIMES DAILY IN THE MORNING, AT NOON, IN THE EVENING, AND AT BEDTIME, Disp: , Rfl:     Continuous Blood Gluc Sensor (Dexcom G6 Sensor), USE WITH DEXCOM SYSTEM (REPLACE SENSOR EVERY 10 DAYS), Disp: , Rfl:     Continuous Glucose Transmitter (Dexcom G6 Transmitter) misc, USE AS DIRECTED EVERY 90 DAYS., Disp: , Rfl:     Farxiga 5 MG tablet tablet, Take 1 tablet by mouth Every Morning., Disp: , Rfl:     furosemide (LASIX) 40 MG tablet, Take 1 tablet by mouth once daily, Disp: 90 tablet, Rfl: 3    HYDROcodone-acetaminophen (Norco) 5-325 MG per tablet, Take 1 tablet by mouth Every 6 (Six) Hours As Needed for Moderate Pain., Disp: 25 tablet, Rfl: 0    insulin aspart (novoLOG FLEXPEN) 100 UNIT/ML solution pen-injector sc pen, Inject  under the skin into the appropriate area as directed 3 (Three) Times a Day With Meals.  Use as directed per carb ratio and correction, average daily use 30 units., Disp: , Rfl:     insulin glargine (LANTUS, SEMGLEE) 100 UNIT/ML injection, Inject 30 Units under the skin into the appropriate area as directed Every Night., Disp: , Rfl:     lisinopril (PRINIVIL,ZESTRIL) 40 MG tablet, Take 1 tablet by mouth Daily., Disp: 90 tablet, Rfl: 3    MAGNESIUM PO, Take  by mouth., Disp: , Rfl:     metFORMIN (GLUCOPHAGE) 1000 MG tablet, Take 1 tablet by mouth 2 (Two) Times a Day With Meals., Disp: 60 tablet, Rfl: 0    metoprolol succinate XL (Toprol XL) 100 MG 24 hr tablet, Take 1 tablet by mouth Daily. (Patient taking differently: Take 0.5 tablets by mouth Daily.), Disp: 90 tablet, Rfl:  3      Review of Systems   Constitutional: Negative for diaphoresis, fatigue, fever, malaise/fatigue and unexpected weight change.   HENT:  Negative for congestion.    Cardiovascular:  Positive for dyspnea on exertion (mild). Negative for chest pain, irregular heartbeat, leg swelling, near-syncope, orthopnea, palpitations, paroxysmal nocturnal dyspnea and syncope.   Respiratory:  Negative for cough, shortness of breath and wheezing.    Hematologic/Lymphatic: Negative for bleeding problem. Bruises/bleeds easily.   Gastrointestinal:  Negative for bloating, abdominal pain, nausea and vomiting.   Genitourinary:  Negative for nocturia.   Neurological:  Positive for dizziness. Negative for headaches, light-headedness and weakness.   Psychiatric/Behavioral:  Negative for altered mental status.          ECG 12 Lead    Date/Time: 8/30/2024 9:27 AM  Performed by: Cierra Herrera APRN    Authorized by: Cierra Herrera APRN  Comparison: compared with previous ECG from 2/19/2024  Similar to previous ECG  Rhythm: sinus rhythm  Rate: normal  BPM: 76  Conduction: conduction normal  QRS axis: normal  Other findings: non-specific ST-T wave changes    Clinical impression: non-specific ECG           Objective:     Vitals reviewed.   Constitutional:       General: Awake. Not in acute distress.     Appearance: Normal appearance. Well-developed, well-groomed, overweight and not in distress. Not ill-appearing.   HENT:      Head: Normocephalic and atraumatic.   Pulmonary:      Effort: Pulmonary effort is normal.      Breath sounds: Normal breath sounds. No wheezing. No rhonchi. No rales.   Cardiovascular:      Normal rate. Regular rhythm.      Murmurs: There is no murmur.      No gallop.  No rub.   Edema:     Peripheral edema absent.   Musculoskeletal:      Cervical back: Normal range of motion and neck supple. Skin:     General: Skin is warm and dry.   Neurological:      Mental Status: Alert, oriented to person, place, and time and  "oriented to person, place and time.   Psychiatric:         Attention and Perception: Attention normal.         Mood and Affect: Mood normal.         Speech: Speech normal.         Behavior: Behavior normal. Behavior is cooperative.         Thought Content: Thought content normal.         Cognition and Memory: Cognition and memory normal.         Judgment: Judgment normal.         /80   Pulse 76   Ht 182.9 cm (72\")   Wt 84.8 kg (187 lb)   BMI 25.36 kg/m²     Lab Review:     Results for orders placed during the hospital encounter of 01/03/24    Intra-Op TAVR Transesophageal Echo    Interpretation Summary    Left ventricular systolic function is normal.    Normal right ventricular cavity size and systolic function.    There is a small interatrial flow communication with left to right shunting by color Doppler assessment.    There are no hemodynamically significant (more than mild) valve abnormalities.        Assessment:          Diagnosis Plan   1. Coronary artery disease involving native coronary artery of native heart without angina pectoris        2. S/P CABG (coronary artery bypass graft)        3. Essential hypertension        4. Chronic systolic heart failure        5. Hyperlipidemia LDL goal <70             Plan:         -CAD: Stable. Recent surgical vascularization in January 2024.  No complaints of chest pain or chest pressure.  Reports no significant shortness of breath other than mild with exertion.  Currently managed on aspirin, statin, and beta-blocker therapies.      -CABG: Revascularization per Dr. Papi Wilburn 1/3/2024.  5 vessel coronary artery bypass grafting with LIMA to LAD, YOLANDA to OM 2, SVG to OM1, SVG to diagonal artery, SVG to R-PDA    -Chronic systolic congestive heart failure: Stable.  No recent complaints of weight gain, shortness of breath, orthopnea, PND.  Historically, the patient has had known chronic systolic congestive heart failure.  He is currently managed on lisinopril and " Toprol-XL.  He takes Lasix 40 mg daily.  He reports no issues with volume management recently.  Given his drop in blood pressure and intermittent dizziness with this I have asked the patient to hold his Lasix on days of cardiac rehab.  He could consider even weaning the medication completely but would need to use this on an as-needed basis for weight gain, increased shortness of breath.  Also may require use of a lower dose daily if he finds himself using the medication more often.  For now, I have asked him to hold Lasix on Tuesdays and Thursdays which are the days he currently participates in cardiac rehab.    -Essential hypertension: Stable on current medications.  His only issues are sometimes with exertion he notes a drop in blood pressure with associated dizziness which has been documented through his workouts at cardiac rehab.      Continue current medications other than holding Lasix on Tuesdays and Thursdays when he participates in cardiac rehab.  Consider weaning the dose.  Follow-up in 3 months  Call sooner if needed    I attest that all portions of this note have been reviewed and updated to reflect the patient's current status.

## 2024-09-28 DIAGNOSIS — E11.9 TYPE 2 DIABETES MELLITUS WITHOUT COMPLICATION, WITHOUT LONG-TERM CURRENT USE OF INSULIN (HCC): ICD-10-CM

## 2024-09-30 RX ORDER — INSULIN GLARGINE 100 [IU]/ML
INJECTION, SOLUTION SUBCUTANEOUS
Qty: 6 ML | Refills: 5 | Status: SHIPPED | OUTPATIENT
Start: 2024-09-30

## 2024-09-30 NOTE — TELEPHONE ENCOUNTER
Received fax from pharmacy requesting refill on pts medication(s). Pt was last seen in office on 7/11/2024  and has a follow up scheduled for Visit date not found. Will send request to  Dr. Munoz  for authorization.     Requested Prescriptions     Pending Prescriptions Disp Refills    LANTUS SOLOSTAR 100 UNIT/ML injection pen [Pharmacy Med Name: Lantus SoloStar 100 UNIT/ML Subcutaneous Solution Pen-injector] 6 mL 5     Sig: INJECT 30 UNITS SUBCUTANEOUSLY NIGHTLY

## 2024-10-21 ENCOUNTER — OFFICE VISIT (OUTPATIENT)
Dept: FAMILY MEDICINE CLINIC | Age: 49
End: 2024-10-21
Payer: MEDICAID

## 2024-10-21 VITALS
BODY MASS INDEX: 26.18 KG/M2 | TEMPERATURE: 98.7 F | WEIGHT: 193 LBS | HEART RATE: 72 BPM | SYSTOLIC BLOOD PRESSURE: 136 MMHG | DIASTOLIC BLOOD PRESSURE: 79 MMHG

## 2024-10-21 DIAGNOSIS — K57.92 DIVERTICULITIS: Primary | ICD-10-CM

## 2024-10-21 PROCEDURE — 99213 OFFICE O/P EST LOW 20 MIN: CPT | Performed by: NURSE PRACTITIONER

## 2024-10-21 PROCEDURE — 3075F SYST BP GE 130 - 139MM HG: CPT | Performed by: NURSE PRACTITIONER

## 2024-10-21 PROCEDURE — 3078F DIAST BP <80 MM HG: CPT | Performed by: NURSE PRACTITIONER

## 2024-10-21 RX ORDER — CIPROFLOXACIN 500 MG/1
500 TABLET, FILM COATED ORAL 2 TIMES DAILY
Qty: 20 TABLET | Refills: 0 | Status: SHIPPED | OUTPATIENT
Start: 2024-10-21 | End: 2024-10-31

## 2024-10-21 RX ORDER — METRONIDAZOLE 500 MG/1
500 TABLET ORAL 2 TIMES DAILY
Qty: 20 TABLET | Refills: 0 | Status: SHIPPED | OUTPATIENT
Start: 2024-10-21 | End: 2024-10-31

## 2024-10-21 ASSESSMENT — ENCOUNTER SYMPTOMS
NAUSEA: 0
DIARRHEA: 0
VOMITING: 0
SORE THROAT: 0
CHOKING: 0
RHINORRHEA: 0
COUGH: 0
CONSTIPATION: 0
ABDOMINAL PAIN: 1
WHEEZING: 0
BLOOD IN STOOL: 0
EYE DISCHARGE: 0
EYE REDNESS: 0

## 2024-10-21 NOTE — PROGRESS NOTES
Anthony Coelho (:  1975) is a 49 y.o. male,Established patient, here for evaluation of the following chief complaint(s):  Abdominal Pain (Pts states he has hx of colitis. States he has been having symptoms of this again in last few days. Pain left side. )      ASSESSMENT/PLAN:    ICD-10-CM    1. Diverticulitis  K57.92 ciprofloxacin (CIPRO) 500 MG tablet     metroNIDAZOLE (FLAGYL) 500 MG tablet          Return if symptoms worsen or fail to improve.    SUBJECTIVE/OBJECTIVE:  HPI  Abdominal Pain (Pts states he has hx of colitis. States he has been having symptoms of this again in last few days. Pain left side.  He has had diverticulitis before in the past and this feels similar to the pain that he had then.  He is not having any guarding.  He denies fever and chills.  Denies bloody stools    Review of Systems   Constitutional:  Negative for appetite change and unexpected weight change.   HENT:  Negative for congestion, ear pain, rhinorrhea and sore throat.    Eyes:  Negative for discharge and redness.   Respiratory:  Negative for cough, choking and wheezing.    Cardiovascular:  Negative for chest pain.   Gastrointestinal:  Positive for abdominal pain. Negative for blood in stool, constipation, diarrhea, nausea and vomiting.   Genitourinary:  Negative for decreased urine volume and dysuria.   Skin:  Negative for rash.   Neurological:  Negative for weakness.   Hematological:  Negative for adenopathy.   Psychiatric/Behavioral:  Negative for suicidal ideas.        /79 (Site: Right Upper Arm, Position: Sitting, Cuff Size: Large Adult)   Pulse 72   Temp 98.7 °F (37.1 °C) (Temporal)   Wt 87.5 kg (193 lb)   PF 96 L/min   BMI 26.18 kg/m²    Physical Exam  Vitals reviewed.   Constitutional:       Appearance: He is well-developed.   HENT:      Head: Normocephalic.   Eyes:      Conjunctiva/sclera: Conjunctivae normal.   Cardiovascular:      Rate and Rhythm: Normal rate and regular rhythm.      Heart sounds: Normal

## 2024-12-01 DIAGNOSIS — E11.9 TYPE 2 DIABETES MELLITUS WITHOUT COMPLICATION, WITHOUT LONG-TERM CURRENT USE OF INSULIN (HCC): ICD-10-CM

## 2024-12-02 RX ORDER — INSULIN GLARGINE 100 [IU]/ML
INJECTION, SOLUTION SUBCUTANEOUS
Qty: 27 ML | Refills: 0 | Status: SHIPPED | OUTPATIENT
Start: 2024-12-02

## 2024-12-02 NOTE — TELEPHONE ENCOUNTER
Received fax from pharmacy requesting refill on pts medication(s). Pt was last seen in office on 10/21/2024  and has a follow up scheduled for Visit date not found. Will send request to  Dr. Munoz  for authorization.     Requested Prescriptions     Pending Prescriptions Disp Refills    LANTUS SOLOSTAR 100 UNIT/ML injection pen [Pharmacy Med Name: Lantus SoloStar 100 UNIT/ML Subcutaneous Solution Pen-injector] 27 mL 0     Sig: INJECT 30 UNITS SUB-Q ONCE NIGHTLY

## 2024-12-09 ENCOUNTER — OFFICE VISIT (OUTPATIENT)
Dept: CARDIOLOGY | Facility: CLINIC | Age: 49
End: 2024-12-09
Payer: COMMERCIAL

## 2024-12-09 VITALS
HEIGHT: 72 IN | SYSTOLIC BLOOD PRESSURE: 110 MMHG | HEART RATE: 83 BPM | WEIGHT: 190.4 LBS | OXYGEN SATURATION: 98 % | DIASTOLIC BLOOD PRESSURE: 68 MMHG | BODY MASS INDEX: 25.79 KG/M2

## 2024-12-09 DIAGNOSIS — E78.5 HYPERLIPIDEMIA LDL GOAL <70: Chronic | ICD-10-CM

## 2024-12-09 DIAGNOSIS — Z95.1 S/P CABG (CORONARY ARTERY BYPASS GRAFT): Chronic | ICD-10-CM

## 2024-12-09 DIAGNOSIS — I50.22 CHRONIC SYSTOLIC HEART FAILURE: Chronic | ICD-10-CM

## 2024-12-09 DIAGNOSIS — I10 ESSENTIAL HYPERTENSION: Chronic | ICD-10-CM

## 2024-12-09 DIAGNOSIS — I25.10 CORONARY ARTERY DISEASE INVOLVING NATIVE CORONARY ARTERY OF NATIVE HEART WITHOUT ANGINA PECTORIS: Primary | Chronic | ICD-10-CM

## 2024-12-09 RX ORDER — FUROSEMIDE 40 MG/1
40 TABLET ORAL DAILY
Qty: 90 TABLET | Refills: 3 | Status: SHIPPED | OUTPATIENT
Start: 2024-12-09

## 2024-12-09 RX ORDER — METOPROLOL SUCCINATE 50 MG/1
50 TABLET, EXTENDED RELEASE ORAL DAILY
Qty: 90 TABLET | Refills: 3 | Status: SHIPPED | OUTPATIENT
Start: 2024-12-09

## 2024-12-09 RX ORDER — ATORVASTATIN CALCIUM 40 MG/1
40 TABLET, FILM COATED ORAL DAILY
Qty: 90 TABLET | Refills: 3 | Status: SHIPPED | OUTPATIENT
Start: 2024-12-09

## 2024-12-09 NOTE — PROGRESS NOTES
Subjective:     Encounter Date:12/09/2024      Patient ID: Speedy Nichols is a 49 y.o. male with known coronary artery disease status post CABG, hypertension, hyperlipidemia, chronic systolic congestive heart failure, insulin requiring diabetes mellitus who presents today for routine follow-up.    Chief Complaint: Routine CHF follow-up  Congestive Heart Failure  Presents for follow-up visit. Pertinent negatives include no abdominal pain, chest pain, chest pressure, edema, fatigue, near-syncope, nocturia, orthopnea, palpitations, paroxysmal nocturnal dyspnea, shortness of breath or unexpected weight change. The symptoms have been stable. Compliance with total regimen is %. Compliance with diet is %. Compliance with exercise is %. Compliance with medications is %.     Mr. Nichols presents today for routine follow-up.  He is followed in our office due to known coronary artery disease with prior surgical revascularization and congestive heart failure.  He reports to be doing stable.  He denies any chest pain or chest pressure.  He reports no shortness of breath, orthopnea, PND.  He has had no rapid weight gain or lower extremity swelling.  He is currently managed on lisinopril, metoprolol, furosemide, Farxiga.  He did not make any changes to his loop diuretic after his last visit. He has continued to take this daily. He reports well controlled blood pressure. He has no symptoms of hypotension. He reports compliance with his cardiac medications. He takes his insulin, including lantus, as needed based on his continuous blood glucose readings.     He denies any chest pain, pressure, or shortness of breath. He has no swelling orthopnea, or PND. He has a routine PCP but no recent visit or labs.       The following portions of the patient's history were reviewed and updated as appropriate: allergies, current medications, past family history, past medical history, past social history, and past  surgical history.    Allergies   Allergen Reactions    Poison Ivy Extract Rash       Current Outpatient Medications:     aspirin 81 MG EC tablet, Take 1 tablet by mouth Daily., Disp: 30 tablet, Rfl: 2    atorvastatin (LIPITOR) 40 MG tablet, Take 1 tablet by mouth Daily., Disp: 90 tablet, Rfl: 3    BD Pen Needle Magnolia 2nd Gen 32G X 4 MM misc, USE WITH INSULIN 4 TIMES DAILY IN THE MORNING, AT NOON, IN THE EVENING, AND AT BEDTIME, Disp: , Rfl:     Continuous Blood Gluc Sensor (Dexcom G6 Sensor), USE WITH DEXHomeLight SYSTEM (REPLACE SENSOR EVERY 10 DAYS), Disp: , Rfl:     Continuous Glucose Transmitter (Dexcom G6 Transmitter) misc, USE AS DIRECTED EVERY 90 DAYS., Disp: , Rfl:     Farxiga 5 MG tablet tablet, Take 1 tablet by mouth Every Morning., Disp: , Rfl:     furosemide (LASIX) 40 MG tablet, Take 1 tablet by mouth Daily., Disp: 90 tablet, Rfl: 3    HYDROcodone-acetaminophen (Norco) 5-325 MG per tablet, Take 1 tablet by mouth Every 6 (Six) Hours As Needed for Moderate Pain., Disp: 25 tablet, Rfl: 0    insulin aspart (novoLOG FLEXPEN) 100 UNIT/ML solution pen-injector sc pen, Inject  under the skin into the appropriate area as directed 3 (Three) Times a Day With Meals.  Use as directed per carb ratio and correction, average daily use 30 units., Disp: , Rfl:     insulin glargine (LANTUS, SEMGLEE) 100 UNIT/ML injection, Inject 30 Units under the skin into the appropriate area as directed Every Night., Disp: , Rfl:     lisinopril (PRINIVIL,ZESTRIL) 40 MG tablet, Take 1 tablet by mouth Daily., Disp: 90 tablet, Rfl: 3    MAGNESIUM PO, Take  by mouth., Disp: , Rfl:     metFORMIN (GLUCOPHAGE) 1000 MG tablet, Take 1 tablet by mouth 2 (Two) Times a Day With Meals., Disp: 60 tablet, Rfl: 0    metoprolol succinate XL (Toprol XL) 50 MG 24 hr tablet, Take 1 tablet by mouth Daily., Disp: 90 tablet, Rfl: 3      Review of Systems   Constitutional: Negative for diaphoresis, fatigue, fever, malaise/fatigue and unexpected weight change.  "  HENT:  Negative for congestion.    Cardiovascular:  Positive for dyspnea on exertion (mild). Negative for chest pain, irregular heartbeat, leg swelling, near-syncope, orthopnea, palpitations, paroxysmal nocturnal dyspnea and syncope.   Respiratory:  Negative for cough, shortness of breath and wheezing.    Hematologic/Lymphatic: Negative for bleeding problem. Bruises/bleeds easily.   Gastrointestinal:  Negative for bloating, abdominal pain, nausea and vomiting.   Genitourinary:  Negative for nocturia.   Neurological:  Negative for dizziness, headaches, light-headedness and weakness.   Psychiatric/Behavioral:  Negative for altered mental status.        Procedures     Objective:     Vitals reviewed.   Constitutional:       General: Awake. Not in acute distress.     Appearance: Normal appearance. Well-developed, well-groomed, overweight and not in distress. Not ill-appearing.   HENT:      Head: Normocephalic and atraumatic.   Pulmonary:      Effort: Pulmonary effort is normal.      Breath sounds: Normal breath sounds. No wheezing. No rhonchi. No rales.   Cardiovascular:      Normal rate. Regular rhythm.      Murmurs: There is no murmur.      No gallop.  No rub.   Edema:     Peripheral edema absent.   Musculoskeletal:      Cervical back: Normal range of motion and neck supple. Skin:     General: Skin is warm and dry.   Neurological:      Mental Status: Alert, oriented to person, place, and time and oriented to person, place and time.   Psychiatric:         Attention and Perception: Attention normal.         Mood and Affect: Mood normal.         Speech: Speech normal.         Behavior: Behavior normal. Behavior is cooperative.         Thought Content: Thought content normal.         Cognition and Memory: Cognition and memory normal.         Judgment: Judgment normal.         /68   Pulse 83   Ht 182.9 cm (72\")   Wt 86.4 kg (190 lb 6.4 oz)   SpO2 98%   BMI 25.82 kg/m²     Lab Review:     Results for orders " placed during the hospital encounter of 01/03/24    Intra-Op TAVR Transesophageal Echo    Interpretation Summary    Left ventricular systolic function is normal.    Normal right ventricular cavity size and systolic function.    There is a small interatrial flow communication with left to right shunting by color Doppler assessment.    There are no hemodynamically significant (more than mild) valve abnormalities.        Assessment:          Diagnosis Plan   1. Coronary artery disease involving native coronary artery of native heart without angina pectoris        2. S/P CABG (coronary artery bypass graft)        3. Chronic systolic heart failure        4. Essential hypertension        5. Hyperlipidemia LDL goal <70               Plan:         -CAD: Stable. surgical vascularization in January 2024.  No complaints of chest pain or chest pressure.  Reports no significant shortness of breath other than mild with exertion.  Currently managed on aspirin, statin, and beta-blocker therapies.      -CABG: Revascularization per Dr. Papi Wilburn 1/3/2024.  5 vessel coronary artery bypass grafting with LIMA to LAD, YOLANDA to OM 2, SVG to OM1, SVG to diagonal artery, SVG to R-PDA    -Chronic systolic congestive heart failure: Stable.  No recent complaints of weight gain, shortness of breath, orthopnea, PND.  Historically, the patient has had known chronic systolic congestive heart failure.  He is currently managed on lisinopril and Toprol-XL.  He takes Lasix 40 mg daily.  He reports no issues with volume management recently.  No recent labs. He denies any symptomatic hypotension. He never made changes to his lasix dose after our last visit.     -Essential hypertension: Stable on current medications.      - Hyperlipidemia: statin therapy. LDL goal < 70.     No changes  at this time.   Routine labs with PCP  Follow up 3 months, sooner if needed.      I attest that all portions of this note have been reviewed and updated to reflect the  patient's current status.

## 2024-12-11 DIAGNOSIS — K04.7 ABSCESSED TOOTH: Primary | ICD-10-CM

## 2024-12-11 RX ORDER — AMOXICILLIN 500 MG/1
500 CAPSULE ORAL 3 TIMES DAILY
Qty: 30 CAPSULE | Refills: 1 | Status: SHIPPED | OUTPATIENT
Start: 2024-12-11 | End: 2024-12-31

## 2024-12-11 NOTE — TELEPHONE ENCOUNTER
Pt aware and voiced understanding. Informed patient of any recommendations from providers. Will call with any further questions.     Medication is pended for provider review.

## 2024-12-27 DIAGNOSIS — E11.9 TYPE 2 DIABETES MELLITUS WITHOUT COMPLICATION, WITHOUT LONG-TERM CURRENT USE OF INSULIN (HCC): ICD-10-CM

## 2024-12-27 RX ORDER — PROCHLORPERAZINE 25 MG/1
SUPPOSITORY RECTAL
Qty: 3 EACH | Refills: 0 | Status: SHIPPED | OUTPATIENT
Start: 2024-12-27

## 2024-12-27 NOTE — TELEPHONE ENCOUNTER
Received fax from pharmacy requesting refill on pts medication(s). Pt was last seen in office on 10/21/2024 and has a follow up scheduled for Visit date not found. Will send request to Dr. Munoz for authorization.     Requested Prescriptions     Pending Prescriptions Disp Refills    Continuous Glucose Sensor (DEXCOM G6 SENSOR) MISC [Pharmacy Med Name: DEXCOM G6 SENSOR    MIS] 3 each 0     Sig: USE WITH DEXCOM SYSTEM (REPLACE SENSOR EVERY 10 DAYS)

## 2025-01-09 DIAGNOSIS — E11.9 TYPE 2 DIABETES MELLITUS WITHOUT COMPLICATION, WITHOUT LONG-TERM CURRENT USE OF INSULIN (HCC): ICD-10-CM

## 2025-01-09 RX ORDER — PROCHLORPERAZINE 25 MG/1
SUPPOSITORY RECTAL
Qty: 1 EACH | Refills: 3 | Status: SHIPPED | OUTPATIENT
Start: 2025-01-09

## 2025-01-09 NOTE — TELEPHONE ENCOUNTER
Received call/My Chart Message from patient requesting refill on medication(s). Pt was last seen in office on Visit date not found  and has a follow up scheduled for Visit date not found. Will send request to pharmacy for pt.     Requested Prescriptions     Signed Prescriptions Disp Refills    Continuous Glucose Transmitter (DEXCOM G6 TRANSMITTER) MISC 1 each 3     Sig: USE AS DIRECTED EVERY  90  DAYS.     Authorizing Provider: MARIO QUEEN     Ordering User: MARINA BANDA

## 2025-01-27 DIAGNOSIS — R68.89 FLU-LIKE SYMPTOMS: Primary | ICD-10-CM

## 2025-01-27 NOTE — TELEPHONE ENCOUNTER
Patient called stating that Dr Munoz said he would send abx in for him and his wife if they got sick. They do need them now. Both of their boys are sick as well.    Will send to provider for approval.

## 2025-01-28 RX ORDER — AMOXICILLIN 500 MG/1
500 CAPSULE ORAL 2 TIMES DAILY
Qty: 20 CAPSULE | Refills: 0 | Status: SHIPPED | OUTPATIENT
Start: 2025-01-28 | End: 2025-02-07

## 2025-01-28 NOTE — TELEPHONE ENCOUNTER
I have sent this to the pharmacy for pt    Requested Prescriptions     Signed Prescriptions Disp Refills    amoxicillin (AMOXIL) 500 MG capsule 20 capsule 0     Sig: Take 1 capsule by mouth 2 times daily for 10 days     Authorizing Provider: MARIO QUEEN     Ordering User: MARINA BANDA

## 2025-01-28 NOTE — TELEPHONE ENCOUNTER
For both parents, we can send in amoxicillin 500 mg twice daily x 10 days, #20 with no refills.  The children depending on their wait may require a lower dose (possibly 250 mg twice daily x 10 days), but I would need to know other weights to be sure.

## 2025-01-28 NOTE — TELEPHONE ENCOUNTER
Will send to provider to send in.    Requested Prescriptions     Pending Prescriptions Disp Refills    amoxicillin (AMOXIL) 500 MG capsule 20 capsule 0     Sig: Take 1 capsule by mouth 2 times daily for 10 days

## 2025-01-30 ENCOUNTER — NURSE TRIAGE (OUTPATIENT)
Dept: CALL CENTER | Facility: HOSPITAL | Age: 50
End: 2025-01-30
Payer: COMMERCIAL

## 2025-01-30 DIAGNOSIS — Z12.11 SPECIAL SCREENING FOR MALIGNANT NEOPLASMS, COLON: Primary | ICD-10-CM

## 2025-01-31 ENCOUNTER — OFFICE VISIT (OUTPATIENT)
Age: 50
End: 2025-01-31
Payer: MEDICAID

## 2025-01-31 VITALS
HEART RATE: 85 BPM | WEIGHT: 193.25 LBS | HEIGHT: 72 IN | TEMPERATURE: 97.8 F | DIASTOLIC BLOOD PRESSURE: 70 MMHG | OXYGEN SATURATION: 95 % | BODY MASS INDEX: 26.18 KG/M2 | SYSTOLIC BLOOD PRESSURE: 130 MMHG

## 2025-01-31 DIAGNOSIS — J10.1 INFLUENZA A: Primary | ICD-10-CM

## 2025-01-31 DIAGNOSIS — R09.81 NASAL CONGESTION: ICD-10-CM

## 2025-01-31 DIAGNOSIS — Z20.828 EXPOSURE TO THE FLU: ICD-10-CM

## 2025-01-31 DIAGNOSIS — R05.1 ACUTE COUGH: ICD-10-CM

## 2025-01-31 LAB
INFLUENZA A ANTIBODY: ABNORMAL
INFLUENZA B ANTIBODY: ABNORMAL

## 2025-01-31 PROCEDURE — 99213 OFFICE O/P EST LOW 20 MIN: CPT | Performed by: NURSE PRACTITIONER

## 2025-01-31 PROCEDURE — 3078F DIAST BP <80 MM HG: CPT | Performed by: NURSE PRACTITIONER

## 2025-01-31 PROCEDURE — 87804 INFLUENZA ASSAY W/OPTIC: CPT | Performed by: NURSE PRACTITIONER

## 2025-01-31 PROCEDURE — 3075F SYST BP GE 130 - 139MM HG: CPT | Performed by: NURSE PRACTITIONER

## 2025-01-31 RX ORDER — DEXTROMETHORPHAN HYDROBROMIDE AND PROMETHAZINE HYDROCHLORIDE 15; 6.25 MG/5ML; MG/5ML
5 SYRUP ORAL 4 TIMES DAILY PRN
Qty: 118 ML | Refills: 0 | Status: SHIPPED | OUTPATIENT
Start: 2025-01-31 | End: 2025-02-07

## 2025-01-31 RX ORDER — OSELTAMIVIR PHOSPHATE 75 MG/1
75 CAPSULE ORAL 2 TIMES DAILY
Qty: 10 CAPSULE | Refills: 0 | Status: SHIPPED | OUTPATIENT
Start: 2025-01-31 | End: 2025-02-05

## 2025-01-31 SDOH — ECONOMIC STABILITY: FOOD INSECURITY: WITHIN THE PAST 12 MONTHS, THE FOOD YOU BOUGHT JUST DIDN'T LAST AND YOU DIDN'T HAVE MONEY TO GET MORE.: NEVER TRUE

## 2025-01-31 SDOH — ECONOMIC STABILITY: FOOD INSECURITY: WITHIN THE PAST 12 MONTHS, YOU WORRIED THAT YOUR FOOD WOULD RUN OUT BEFORE YOU GOT MONEY TO BUY MORE.: NEVER TRUE

## 2025-01-31 ASSESSMENT — PATIENT HEALTH QUESTIONNAIRE - PHQ9
SUM OF ALL RESPONSES TO PHQ QUESTIONS 1-9: 0
SUM OF ALL RESPONSES TO PHQ QUESTIONS 1-9: 0
SUM OF ALL RESPONSES TO PHQ9 QUESTIONS 1 & 2: 0
2. FEELING DOWN, DEPRESSED OR HOPELESS: NOT AT ALL
SUM OF ALL RESPONSES TO PHQ QUESTIONS 1-9: 0
1. LITTLE INTEREST OR PLEASURE IN DOING THINGS: NOT AT ALL
SUM OF ALL RESPONSES TO PHQ QUESTIONS 1-9: 0

## 2025-01-31 ASSESSMENT — ENCOUNTER SYMPTOMS
RHINORRHEA: 1
DIARRHEA: 1
NAUSEA: 1
VOMITING: 0
COUGH: 1
SORE THROAT: 1

## 2025-01-31 NOTE — TELEPHONE ENCOUNTER
Reason for Disposition   [1] Probable influenza (fever) with no complications AND [2] NOT HIGH RISK    Additional Information   Negative: SEVERE difficulty breathing (e.g., struggling for each breath, speaks in single words)   Negative: Difficult to awaken or acting confused (e.g., disoriented, slurred speech)   Negative: Bluish (or gray) lips or face now   Negative: Shock suspected (e.g., cold/pale/clammy skin, too weak to stand, low BP, rapid pulse)   Negative: Sounds like a life-threatening emergency to the triager   Negative: [1] Symptoms of COVID-19 (e.g., cough, fever, SOB, or others) AND [2] lives in an area with community spread   Negative: [1] Sounds like a cold AND [2] no fever   Negative: [1] Cough with sputum AND [2] no fever   Negative: [1] Dry cough (no sputum) AND [2] no fever   Negative: [1] Throat pain AND [2] no fever   Negative: Influenza vaccine reaction is suspected   Negative: Chest pain  (Exception: MILD central chest pain, present only when coughing.)   Negative: [1] Headache AND [2] stiff neck (can't touch chin to chest)   Negative: Fever > 104 F (40 C)   Negative: [1] Difficulty breathing AND [2] not severe AND [3] not from stuffy nose (e.g., not relieved by cleaning out the nose)   Negative: Patient sounds very sick or weak to the triager   Negative: [1] Fever > 101 F (38.3 C) AND [2] age > 60 years   Negative: [1] Fever > 100.0 F (37.8 C) AND [2] bedridden (e.g., CVA, chronic illness, recovering from surgery)   Negative: [1] Fever > 100.0 F (37.8 C) AND [2] diabetes mellitus or weak immune system (e.g., HIV positive, cancer chemo, splenectomy, organ transplant, chronic steroids)   Negative: Fever present > 3 days (72 hours)   Negative: [1] Fever returns after gone for over 24 hours AND [2] symptoms worse or not improved   Negative: [1] Using nasal washes and pain medicine > 24 hours AND [2] sinus pain (around cheekbone or eye) persists   Negative: Earache   Negative: Patient is HIGH RISK  "(e.g., age > 64 years, pregnant, HIV+, or chronic medical condition)   Negative: [1] Patient is NOT HIGH RISK AND [2] strongly requests antiviral medicine AND [3] flu symptoms present < 48 hours   Negative: [1] Nasal discharge AND [2] present > 10 days   Negative: Cough present > 3 weeks    Answer Assessment - Initial Assessment Questions  1. WORST SYMPTOM: \"What is your worst symptom?\" (e.g., cough, runny nose, muscle aches, headache, sore throat, fever)       Fever  2. ONSET: \"When did your flu symptoms start?\"       Yesterday  3. COUGH: \"How bad is the cough?\"        Moderate  4. RESPIRATORY DISTRESS: \"Describe your breathing.\"       Normal  5. FEVER: \"Do you have a fever?\" If Yes, ask: \"What is your temperature, how was it measured, and when did it start?\"      103.5 PO   6. EXPOSURE: \"Were you exposed to someone with influenza?\"        Children diagnosed with Flu A  7. FLU VACCINE: \"Did you get a flu shot this year?\"      Not asked  8. HIGH RISK DISEASE: \"Do you have any chronic medical problems?\" (e.g., heart or lung disease, asthma, weak immune system, or other HIGH RISK conditions)      Not asked  9. PREGNANCY: \"Is there any chance you are pregnant?\" \"When was your last menstrual period?\"      Na  10. OTHER SYMPTOMS: \"Do you have any other symptoms?\"  (e.g., runny nose, muscle aches, headache, sore throat)        Mild HA, sore throat, cough and fever.    Protocols used: Influenza (Flu) - Seasonal-ADULT-    "

## 2025-01-31 NOTE — PROGRESS NOTES
Anthony Coelho (:  1975) is a 49 y.o. male,Established patient, here for evaluation of the following chief complaint(s):  Cough and Congestion      ASSESSMENT/PLAN:    ICD-10-CM    1. Influenza A  J10.1 oseltamivir (TAMIFLU) 75 MG capsule  Supportive care  Rest  Hydration      2. Acute cough  R05.1 POCT Influenza A/B     promethazine-dextromethorphan (PROMETHAZINE-DM) 6.25-15 MG/5ML syrup      3. Nasal congestion  R09.81 POCT Influenza A/B      4. Exposure to the flu  Z20.828 POCT Influenza A/B          Return if symptoms worsen or fail to improve.    SUBJECTIVE/OBJECTIVE:  HPI    \"Yesterday, I ran a 103.5F temperature then it broke.\"  \"I am coughing a lot.\"  \"I am coughing up yellow stuff.\"  \"My throat feels like I swallowed a cheese grater.\"  Reports nausea  Denies vomiting.  Reports mild diarrhea  Reports HA    He is currently taking Sambucol.    \"My blood sugars have been running kind a high.\"    /70 (Site: Left Upper Arm, Position: Sitting, Cuff Size: Medium Adult)   Pulse 85   Temp 97.8 °F (36.6 °C) (Temporal)   Ht 1.829 m (6')   Wt 87.7 kg (193 lb 4 oz)   SpO2 95%   BMI 26.21 kg/m²     Review of Systems   Constitutional:  Positive for chills and fever.   HENT:  Positive for congestion, rhinorrhea and sore throat.    Respiratory:  Positive for cough.    Gastrointestinal:  Positive for diarrhea (mild) and nausea. Negative for vomiting.   Neurological:  Positive for headaches.       Physical Exam  Vitals reviewed.   Constitutional:       Appearance: He is well-developed.   HENT:      Head: Normocephalic.      Right Ear: Tympanic membrane and external ear normal.      Left Ear: Tympanic membrane and external ear normal.      Nose: Nose normal.   Cardiovascular:      Rate and Rhythm: Normal rate and regular rhythm.   Pulmonary:      Effort: Pulmonary effort is normal.      Breath sounds: Decreased breath sounds present. No wheezing, rhonchi or rales.   Abdominal:      General: Bowel sounds are

## 2025-02-09 DIAGNOSIS — E11.9 TYPE 2 DIABETES MELLITUS WITHOUT COMPLICATION, WITHOUT LONG-TERM CURRENT USE OF INSULIN (HCC): ICD-10-CM

## 2025-02-10 DIAGNOSIS — E11.9 TYPE 2 DIABETES MELLITUS WITHOUT COMPLICATION, WITHOUT LONG-TERM CURRENT USE OF INSULIN (HCC): ICD-10-CM

## 2025-02-10 RX ORDER — PROCHLORPERAZINE 25 MG/1
SUPPOSITORY RECTAL
Qty: 3 EACH | Refills: 0 | Status: SHIPPED | OUTPATIENT
Start: 2025-02-10

## 2025-02-10 RX ORDER — PROCHLORPERAZINE 25 MG/1
SUPPOSITORY RECTAL
Qty: 9 EACH | Refills: 3 | Status: SHIPPED | OUTPATIENT
Start: 2025-02-10

## 2025-02-10 NOTE — TELEPHONE ENCOUNTER
Pharmacy requests a refill on Anthony Coelho's medication.    Last Office Visit: 10/21/2024  Next Office Visit: Visit date not found     Requested Prescriptions     Pending Prescriptions Disp Refills    Continuous Glucose Sensor (DEXCOM G6 SENSOR) MISC [Pharmacy Med Name: DEXCOM G6 SENSOR    MIS] 3 each 0     Sig: USE WITH DEXCOM SYSTEM. REPLACE SENSOR EVERY 10 DAYS.       Christal Colvin LPN

## 2025-02-10 NOTE — TELEPHONE ENCOUNTER
Received fax from pharmacy requesting refill on pts medication(s). Pt was last seen in office on 1/31/2025  and has a follow up scheduled for Visit date not found. Will send request to  Dr. Munoz  for patient.     Requested Prescriptions     Pending Prescriptions Disp Refills    Continuous Glucose Sensor (DEXCOM G6 SENSOR) MISC 9 each 3     Sig: USE WITH DEXCOM SYSTEM (REPLACE SENSOR EVERY 10 DAYS)

## 2025-03-10 ENCOUNTER — OFFICE VISIT (OUTPATIENT)
Dept: CARDIOLOGY | Facility: CLINIC | Age: 50
End: 2025-03-10
Payer: COMMERCIAL

## 2025-03-10 VITALS
WEIGHT: 189 LBS | HEART RATE: 75 BPM | BODY MASS INDEX: 25.6 KG/M2 | DIASTOLIC BLOOD PRESSURE: 70 MMHG | HEIGHT: 72 IN | SYSTOLIC BLOOD PRESSURE: 120 MMHG

## 2025-03-10 DIAGNOSIS — I50.22 CHRONIC SYSTOLIC HEART FAILURE: Chronic | ICD-10-CM

## 2025-03-10 DIAGNOSIS — E78.5 HYPERLIPIDEMIA LDL GOAL <70: Chronic | ICD-10-CM

## 2025-03-10 DIAGNOSIS — E11.65 TYPE 2 DIABETES MELLITUS WITH HYPERGLYCEMIA, WITH LONG-TERM CURRENT USE OF INSULIN: Chronic | ICD-10-CM

## 2025-03-10 DIAGNOSIS — Z95.1 S/P CABG (CORONARY ARTERY BYPASS GRAFT): Chronic | ICD-10-CM

## 2025-03-10 DIAGNOSIS — Z79.4 TYPE 2 DIABETES MELLITUS WITH HYPERGLYCEMIA, WITH LONG-TERM CURRENT USE OF INSULIN: Chronic | ICD-10-CM

## 2025-03-10 DIAGNOSIS — I10 ESSENTIAL HYPERTENSION: Chronic | ICD-10-CM

## 2025-03-10 DIAGNOSIS — I25.10 CORONARY ARTERY DISEASE INVOLVING NATIVE CORONARY ARTERY OF NATIVE HEART WITHOUT ANGINA PECTORIS: Primary | Chronic | ICD-10-CM

## 2025-03-10 PROCEDURE — 1159F MED LIST DOCD IN RCRD: CPT | Performed by: NURSE PRACTITIONER

## 2025-03-10 PROCEDURE — 1160F RVW MEDS BY RX/DR IN RCRD: CPT | Performed by: NURSE PRACTITIONER

## 2025-03-10 PROCEDURE — 3078F DIAST BP <80 MM HG: CPT | Performed by: NURSE PRACTITIONER

## 2025-03-10 PROCEDURE — 93000 ELECTROCARDIOGRAM COMPLETE: CPT | Performed by: NURSE PRACTITIONER

## 2025-03-10 PROCEDURE — 3074F SYST BP LT 130 MM HG: CPT | Performed by: NURSE PRACTITIONER

## 2025-03-10 PROCEDURE — 99214 OFFICE O/P EST MOD 30 MIN: CPT | Performed by: NURSE PRACTITIONER

## 2025-03-10 RX ORDER — LISINOPRIL 40 MG/1
40 TABLET ORAL DAILY
Qty: 90 TABLET | Refills: 3 | Status: SHIPPED | OUTPATIENT
Start: 2025-03-10

## 2025-03-10 NOTE — PROGRESS NOTES
Subjective:     Encounter Date:03/10/2025      Patient ID: Speedy Nichols is a 49 y.o. male with known coronary artery disease status post CABG, hypertension, hyperlipidemia, chronic systolic congestive heart failure, insulin requiring diabetes mellitus who presents today for routine follow-up.    Chief Complaint: Routine CAD follow-up  Congestive Heart Failure  Presents for follow-up visit. Pertinent negatives include no abdominal pain, chest pain, chest pressure, edema, fatigue, near-syncope, nocturia, orthopnea, palpitations, paroxysmal nocturnal dyspnea, shortness of breath or unexpected weight change. The symptoms have been stable. His past medical history is significant for CAD. Compliance with total regimen is %. Compliance with diet is %. Compliance with exercise is %. Compliance with medications is %.   Coronary Artery Disease  Presents for follow-up visit. Pertinent negatives include no chest pain, chest pressure, chest tightness, dizziness, leg swelling, palpitations or shortness of breath. His past medical history is significant for CHF. The symptoms have been stable. Compliance with diet is good. Compliance with exercise is good. Compliance with medications is good.     Mr. Nichols presents today for routine follow-up.  He is followed in our office due to known coronary artery disease with prior surgical revascularization and congestive heart failure.  He reports to be doing stable.  He denies any chest pain or chest pressure.  He reports no shortness of breath, orthopnea, PND.  He has had no rapid weight gain or lower extremity swelling.  He is currently managed on lisinopril, metoprolol, furosemide, Farxiga.  He reports compliance with his cardiac medications. He takes his insulin, including lantus, as needed based on his continuous blood glucose readings. He denies any chest pain, pressure, or shortness of breath. He has no swelling orthopnea, or PND.       The following  portions of the patient's history were reviewed and updated as appropriate: allergies, current medications, past family history, past medical history, past social history, and past surgical history.    Allergies   Allergen Reactions    Poison Ivy Extract Rash       Current Outpatient Medications:     aspirin 81 MG EC tablet, Take 1 tablet by mouth Daily., Disp: 30 tablet, Rfl: 2    atorvastatin (LIPITOR) 40 MG tablet, Take 1 tablet by mouth Daily., Disp: 90 tablet, Rfl: 3    BD Pen Needle Magnolia 2nd Gen 32G X 4 MM misc, USE WITH INSULIN 4 TIMES DAILY IN THE MORNING, AT NOON, IN THE EVENING, AND AT BEDTIME, Disp: , Rfl:     Continuous Blood Gluc Sensor (Dexcom G6 Sensor), USE WITH DEXCOM SYSTEM (REPLACE SENSOR EVERY 10 DAYS), Disp: , Rfl:     Continuous Glucose Transmitter (Dexcom G6 Transmitter) misc, USE AS DIRECTED EVERY 90 DAYS., Disp: , Rfl:     Farxiga 5 MG tablet tablet, Take 1 tablet by mouth Every Morning., Disp: , Rfl:     furosemide (LASIX) 40 MG tablet, Take 1 tablet by mouth Daily., Disp: 90 tablet, Rfl: 3    insulin aspart (novoLOG FLEXPEN) 100 UNIT/ML solution pen-injector sc pen, Inject  under the skin into the appropriate area as directed 3 (Three) Times a Day With Meals.  Use as directed per carb ratio and correction, average daily use 30 units., Disp: , Rfl:     insulin glargine (LANTUS, SEMGLEE) 100 UNIT/ML injection, Inject 30 Units under the skin into the appropriate area as directed Every Night., Disp: , Rfl:     lisinopril (PRINIVIL,ZESTRIL) 40 MG tablet, Take 1 tablet by mouth Daily., Disp: 90 tablet, Rfl: 3    MAGNESIUM PO, Take  by mouth., Disp: , Rfl:     metFORMIN (GLUCOPHAGE) 1000 MG tablet, Take 1 tablet by mouth 2 (Two) Times a Day With Meals., Disp: 60 tablet, Rfl: 0    metoprolol succinate XL (Toprol XL) 50 MG 24 hr tablet, Take 1 tablet by mouth Daily., Disp: 90 tablet, Rfl: 3      Review of Systems   Constitutional: Negative for diaphoresis, fatigue, fever, malaise/fatigue and  unexpected weight change.   HENT:  Negative for congestion.    Cardiovascular:  Positive for dyspnea on exertion (mild). Negative for chest pain, irregular heartbeat, leg swelling, near-syncope, orthopnea, palpitations, paroxysmal nocturnal dyspnea and syncope.   Respiratory:  Negative for chest tightness, cough, shortness of breath and wheezing.    Hematologic/Lymphatic: Negative for bleeding problem. Bruises/bleeds easily.   Musculoskeletal:  Positive for back pain.   Gastrointestinal:  Negative for bloating, abdominal pain, nausea and vomiting.   Genitourinary:  Negative for nocturia.   Neurological:  Negative for dizziness, headaches, light-headedness and weakness.   Psychiatric/Behavioral:  Negative for altered mental status.          ECG 12 Lead    Date/Time: 3/10/2025 1:37 PM  Performed by: Cierra Herrera APRN    Authorized by: Cierra Herrera APRN  Comparison: compared with previous ECG from 8/30/2024  Similar to previous ECG  Rhythm: sinus rhythm  Rate: normal  BPM: 75  Conduction: conduction normal  QRS axis: normal  Other findings: non-specific ST-T wave changes    Clinical impression: non-specific ECG           Objective:     Vitals reviewed.   Constitutional:       General: Awake. Not in acute distress.     Appearance: Normal and healthy appearance. Well-developed, well-groomed, overweight and not in distress. Not ill-appearing.   HENT:      Head: Normocephalic and atraumatic.   Pulmonary:      Effort: Pulmonary effort is normal.      Breath sounds: Normal breath sounds. No wheezing. No rhonchi. No rales.   Cardiovascular:      Normal rate. Regular rhythm.      Murmurs: There is no murmur.      No gallop.  No rub.   Edema:     Peripheral edema absent.   Musculoskeletal:      Cervical back: Normal range of motion and neck supple. Skin:     General: Skin is warm and dry.   Neurological:      Mental Status: Alert, oriented to person, place, and time and oriented to person, place and time.   Psychiatric:  "        Attention and Perception: Attention normal.         Mood and Affect: Mood normal.         Speech: Speech normal.         Behavior: Behavior normal. Behavior is cooperative.         Thought Content: Thought content normal.         Cognition and Memory: Cognition and memory normal.         Judgment: Judgment normal.         /70   Pulse 75   Ht 182.9 cm (72\")   Wt 85.7 kg (189 lb)   BMI 25.63 kg/m²     Lab Review:     Results for orders placed during the hospital encounter of 01/03/24    Intra-Op TAVR Transesophageal Echo    Interpretation Summary    Left ventricular systolic function is normal.    Normal right ventricular cavity size and systolic function.    There is a small interatrial flow communication with left to right shunting by color Doppler assessment.    There are no hemodynamically significant (more than mild) valve abnormalities.        Assessment:          Diagnosis Plan   1. Coronary artery disease involving native coronary artery of native heart without angina pectoris        2. S/P CABG (coronary artery bypass graft)        3. Chronic systolic heart failure        4. Essential hypertension        5. Hyperlipidemia LDL goal <70        6. Type 2 diabetes mellitus with hyperglycemia, with long-term current use of insulin                 Plan:         -CAD: Stable. surgical vascularization in January 2024.  No complaints of chest pain or chest pressure.  Reports no significant shortness of breath other than mild with exertion.  Currently managed on aspirin, statin, and beta-blocker therapies.  No changes today.    -CABG: Revascularization per Dr. Papi Wilburn 1/3/2024.  5 vessel coronary artery bypass grafting with LIMA to LAD, YOLANDA to OM 2, SVG to OM1, SVG to diagonal artery, SVG to R-PDA    -Chronic systolic congestive heart failure: Stable.  No recent complaints of weight gain, shortness of breath, orthopnea, PND.  Historically, the patient has had known chronic systolic congestive " heart failure.  He is currently managed on lisinopril and Toprol-XL.  He takes Lasix 40 mg daily.  He reports no issues with volume management recently.  He denies any symptomatic hypotension.     -Essential hypertension: Stable on current medications.      - Hyperlipidemia: statin therapy. LDL goal < 70.     - T2DM: on insulin. Follows with PCP.         No changes at this time  Continue current medications  Follow-up in 6 months, call sooner if needed.    I attest that all portions of this note have been reviewed and updated to reflect the patient's current status.

## 2025-03-28 ENCOUNTER — OFFICE VISIT (OUTPATIENT)
Age: 50
End: 2025-03-28
Payer: MEDICAID

## 2025-03-28 VITALS
HEART RATE: 76 BPM | TEMPERATURE: 98.9 F | HEIGHT: 72 IN | OXYGEN SATURATION: 98 % | BODY MASS INDEX: 25.75 KG/M2 | SYSTOLIC BLOOD PRESSURE: 130 MMHG | WEIGHT: 190.13 LBS | DIASTOLIC BLOOD PRESSURE: 75 MMHG

## 2025-03-28 DIAGNOSIS — Z20.818 EXPOSURE TO STREP THROAT: ICD-10-CM

## 2025-03-28 DIAGNOSIS — J02.9 SORE THROAT: Primary | ICD-10-CM

## 2025-03-28 LAB — S PYO AG THROAT QL: NORMAL

## 2025-03-28 PROCEDURE — 3078F DIAST BP <80 MM HG: CPT | Performed by: NURSE PRACTITIONER

## 2025-03-28 PROCEDURE — 87880 STREP A ASSAY W/OPTIC: CPT | Performed by: NURSE PRACTITIONER

## 2025-03-28 PROCEDURE — 99213 OFFICE O/P EST LOW 20 MIN: CPT | Performed by: NURSE PRACTITIONER

## 2025-03-28 PROCEDURE — 3075F SYST BP GE 130 - 139MM HG: CPT | Performed by: NURSE PRACTITIONER

## 2025-03-28 RX ORDER — AMOXICILLIN 500 MG/1
500 CAPSULE ORAL 3 TIMES DAILY
Qty: 30 CAPSULE | Refills: 0 | Status: SHIPPED | OUTPATIENT
Start: 2025-03-28 | End: 2025-04-07

## 2025-03-28 ASSESSMENT — ENCOUNTER SYMPTOMS
RHINORRHEA: 1
SORE THROAT: 1
ABDOMINAL PAIN: 0
COUGH: 1

## 2025-03-28 NOTE — PROGRESS NOTES
Anthony Coelho (:  1975) is a 49 y.o. male,Established patient, here for evaluation of the following chief complaint(s):  Sore Throat (Exposed to strep - not sore yet but itching )      ASSESSMENT/PLAN:    ICD-10-CM    1. Sore throat  J02.9 POCT rapid strep A     amoxicillin (AMOXIL) 500 MG capsule  Strep throat is negative but due to exposure and symptoms, printed a prescription of Amoxil and instructed the patient to fill if symptoms worsen      2. Exposure to strep throat  Z20.818 POCT rapid strep A     amoxicillin (AMOXIL) 500 MG capsule          Return if symptoms worsen or fail to improve.    SUBJECTIVE/OBJECTIVE:  HPI    Both boys tested positive for strep throat yesterday.  Reports a mild itchy throat.  Reports a mild cough  Reports nasal congestion and drainage.  Reports PND  Denies HA  Denies abdominal pain    /75 (BP Site: Left Upper Arm, Patient Position: Sitting, BP Cuff Size: Large Adult)   Pulse 76   Temp 98.9 °F (37.2 °C) (Temporal)   Ht 1.829 m (6')   Wt 86.2 kg (190 lb 2 oz)   SpO2 98%   BMI 25.79 kg/m²     Review of Systems   Constitutional:  Negative for chills and fever.   HENT:  Positive for congestion, postnasal drip, rhinorrhea and sore throat (scratchy).    Respiratory:  Positive for cough.    Gastrointestinal:  Negative for abdominal pain.   Neurological:  Negative for headaches.       Physical Exam  Vitals reviewed.   Constitutional:       Appearance: He is well-developed.   HENT:      Head: Normocephalic.      Right Ear: Tympanic membrane and external ear normal.      Left Ear: Tympanic membrane and external ear normal.      Nose: Nose normal.      Mouth/Throat:      Tonsils: 2+ on the right. 2+ on the left.      Comments: Cryptic tonsils bilaterally  Cardiovascular:      Rate and Rhythm: Normal rate and regular rhythm.   Pulmonary:      Effort: Pulmonary effort is normal.      Breath sounds: Normal breath sounds. No wheezing, rhonchi or rales.   Abdominal:      General:  0

## 2025-04-22 ENCOUNTER — RESULTS FOLLOW-UP (OUTPATIENT)
Age: 50
End: 2025-04-22

## 2025-04-22 DIAGNOSIS — I10 PRIMARY HYPERTENSION: ICD-10-CM

## 2025-04-22 DIAGNOSIS — E11.9 TYPE 2 DIABETES MELLITUS WITHOUT COMPLICATION, WITHOUT LONG-TERM CURRENT USE OF INSULIN (HCC): Primary | ICD-10-CM

## 2025-04-22 DIAGNOSIS — E78.2 MIXED HYPERLIPIDEMIA: ICD-10-CM

## 2025-04-22 DIAGNOSIS — I25.810 CORONARY ARTERY DISEASE INVOLVING CORONARY BYPASS GRAFT OF NATIVE HEART WITHOUT ANGINA PECTORIS: ICD-10-CM

## 2025-04-22 DIAGNOSIS — E11.9 TYPE 2 DIABETES MELLITUS WITHOUT COMPLICATION, WITHOUT LONG-TERM CURRENT USE OF INSULIN (HCC): ICD-10-CM

## 2025-04-22 LAB
ALBUMIN SERPL-MCNC: 4.4 G/DL (ref 3.5–5.2)
ALP SERPL-CCNC: 62 U/L (ref 40–129)
ALT SERPL-CCNC: 20 U/L (ref 10–50)
ANION GAP SERPL CALCULATED.3IONS-SCNC: 14 MMOL/L (ref 8–16)
AST SERPL-CCNC: 21 U/L (ref 10–50)
BASOPHILS # BLD: 0 K/UL (ref 0–0.2)
BASOPHILS NFR BLD: 0.7 % (ref 0–1)
BILIRUB SERPL-MCNC: 0.3 MG/DL (ref 0.2–1.2)
BUN SERPL-MCNC: 33 MG/DL (ref 6–20)
CALCIUM SERPL-MCNC: 9.2 MG/DL (ref 8.6–10)
CHLORIDE SERPL-SCNC: 101 MMOL/L (ref 98–107)
CHOLEST SERPL-MCNC: 161 MG/DL (ref 0–199)
CO2 SERPL-SCNC: 23 MMOL/L (ref 22–29)
CREAT SERPL-MCNC: 1.4 MG/DL (ref 0.7–1.2)
CREAT UR-MCNC: 67.3 MG/DL (ref 39–259)
EOSINOPHIL # BLD: 0.2 K/UL (ref 0–0.6)
EOSINOPHIL NFR BLD: 3.2 % (ref 0–5)
ERYTHROCYTE [DISTWIDTH] IN BLOOD BY AUTOMATED COUNT: 12.9 % (ref 11.5–14.5)
GLUCOSE SERPL-MCNC: 180 MG/DL (ref 70–99)
HBA1C MFR BLD: 8.5 % (ref 4–5.6)
HCT VFR BLD AUTO: 37.9 % (ref 42–52)
HDLC SERPL-MCNC: 31 MG/DL (ref 40–60)
HGB BLD-MCNC: 12.4 G/DL (ref 14–18)
IMM GRANULOCYTES # BLD: 0 K/UL
LDLC SERPL CALC-MCNC: 55 MG/DL
LYMPHOCYTES # BLD: 1.6 K/UL (ref 1.1–4.5)
LYMPHOCYTES NFR BLD: 27.9 % (ref 20–40)
MCH RBC QN AUTO: 29.9 PG (ref 27–31)
MCHC RBC AUTO-ENTMCNC: 32.7 G/DL (ref 33–37)
MCV RBC AUTO: 91.3 FL (ref 80–94)
MICROALBUMIN UR-MCNC: 2.72 MG/DL (ref 0–1.99)
MICROALBUMIN/CREAT UR-RTO: 40.4 MG/G (ref 0–29)
MONOCYTES # BLD: 0.4 K/UL (ref 0–0.9)
MONOCYTES NFR BLD: 7.7 % (ref 0–10)
NEUTROPHILS # BLD: 3.3 K/UL (ref 1.5–7.5)
NEUTS SEG NFR BLD: 60.1 % (ref 50–65)
PLATELET # BLD AUTO: 152 K/UL (ref 130–400)
PMV BLD AUTO: 11.9 FL (ref 9.4–12.4)
POTASSIUM SERPL-SCNC: 4.5 MMOL/L (ref 3.5–5.1)
PROT SERPL-MCNC: 7.1 G/DL (ref 6.4–8.3)
RBC # BLD AUTO: 4.15 M/UL (ref 4.7–6.1)
SODIUM SERPL-SCNC: 138 MMOL/L (ref 136–145)
T4 FREE SERPL-MCNC: 1.28 NG/DL (ref 0.93–1.7)
TRIGL SERPL-MCNC: 374 MG/DL (ref 0–149)
TSH SERPL DL<=0.005 MIU/L-ACNC: 1.17 UIU/ML (ref 0.27–4.2)
WBC # BLD AUTO: 5.6 K/UL (ref 4.8–10.8)

## 2025-04-22 RX ORDER — DAPAGLIFLOZIN 10 MG/1
10 TABLET, FILM COATED ORAL EVERY MORNING
Qty: 90 TABLET | Refills: 3 | Status: SHIPPED | OUTPATIENT
Start: 2025-04-22

## 2025-04-23 ENCOUNTER — OFFICE VISIT (OUTPATIENT)
Age: 50
End: 2025-04-23
Payer: MEDICAID

## 2025-04-23 VITALS
TEMPERATURE: 96.9 F | DIASTOLIC BLOOD PRESSURE: 70 MMHG | HEART RATE: 81 BPM | SYSTOLIC BLOOD PRESSURE: 120 MMHG | OXYGEN SATURATION: 98 % | HEIGHT: 72 IN | BODY MASS INDEX: 25.4 KG/M2 | WEIGHT: 187.5 LBS

## 2025-04-23 DIAGNOSIS — I10 PRIMARY HYPERTENSION: ICD-10-CM

## 2025-04-23 DIAGNOSIS — E11.9 TYPE 2 DIABETES MELLITUS WITHOUT COMPLICATION, WITHOUT LONG-TERM CURRENT USE OF INSULIN (HCC): ICD-10-CM

## 2025-04-23 DIAGNOSIS — E78.2 MIXED HYPERLIPIDEMIA: ICD-10-CM

## 2025-04-23 DIAGNOSIS — Z12.5 SCREENING FOR PROSTATE CANCER: ICD-10-CM

## 2025-04-23 DIAGNOSIS — Z00.00 ENCOUNTER FOR WELL ADULT EXAM WITHOUT ABNORMAL FINDINGS: Primary | ICD-10-CM

## 2025-04-23 PROCEDURE — 99396 PREV VISIT EST AGE 40-64: CPT | Performed by: NURSE PRACTITIONER

## 2025-04-23 PROCEDURE — 3078F DIAST BP <80 MM HG: CPT | Performed by: NURSE PRACTITIONER

## 2025-04-23 PROCEDURE — 3074F SYST BP LT 130 MM HG: CPT | Performed by: NURSE PRACTITIONER

## 2025-04-23 RX ORDER — INSULIN GLARGINE 100 [IU]/ML
20 INJECTION, SOLUTION SUBCUTANEOUS NIGHTLY
Qty: 27 ML | Refills: 3 | Status: SHIPPED | OUTPATIENT
Start: 2025-04-23

## 2025-04-23 SDOH — ECONOMIC STABILITY: FOOD INSECURITY: WITHIN THE PAST 12 MONTHS, YOU WORRIED THAT YOUR FOOD WOULD RUN OUT BEFORE YOU GOT MONEY TO BUY MORE.: NEVER TRUE

## 2025-04-23 SDOH — ECONOMIC STABILITY: FOOD INSECURITY: WITHIN THE PAST 12 MONTHS, THE FOOD YOU BOUGHT JUST DIDN'T LAST AND YOU DIDN'T HAVE MONEY TO GET MORE.: NEVER TRUE

## 2025-04-23 ASSESSMENT — PATIENT HEALTH QUESTIONNAIRE - PHQ9
10. IF YOU CHECKED OFF ANY PROBLEMS, HOW DIFFICULT HAVE THESE PROBLEMS MADE IT FOR YOU TO DO YOUR WORK, TAKE CARE OF THINGS AT HOME, OR GET ALONG WITH OTHER PEOPLE: NOT DIFFICULT AT ALL
9. THOUGHTS THAT YOU WOULD BE BETTER OFF DEAD, OR OF HURTING YOURSELF: NOT AT ALL
7. TROUBLE CONCENTRATING ON THINGS, SUCH AS READING THE NEWSPAPER OR WATCHING TELEVISION: NOT AT ALL
8. MOVING OR SPEAKING SO SLOWLY THAT OTHER PEOPLE COULD HAVE NOTICED. OR THE OPPOSITE, BEING SO FIGETY OR RESTLESS THAT YOU HAVE BEEN MOVING AROUND A LOT MORE THAN USUAL: NOT AT ALL
SUM OF ALL RESPONSES TO PHQ QUESTIONS 1-9: 3
6. FEELING BAD ABOUT YOURSELF - OR THAT YOU ARE A FAILURE OR HAVE LET YOURSELF OR YOUR FAMILY DOWN: NOT AT ALL
5. POOR APPETITE OR OVEREATING: NOT AT ALL
4. FEELING TIRED OR HAVING LITTLE ENERGY: SEVERAL DAYS
3. TROUBLE FALLING OR STAYING ASLEEP: SEVERAL DAYS
SUM OF ALL RESPONSES TO PHQ QUESTIONS 1-9: 3
SUM OF ALL RESPONSES TO PHQ QUESTIONS 1-9: 3
2. FEELING DOWN, DEPRESSED OR HOPELESS: SEVERAL DAYS
SUM OF ALL RESPONSES TO PHQ QUESTIONS 1-9: 3
1. LITTLE INTEREST OR PLEASURE IN DOING THINGS: NOT AT ALL

## 2025-04-23 ASSESSMENT — ENCOUNTER SYMPTOMS
CONSTIPATION: 0
EYE REDNESS: 0
RHINORRHEA: 0
COUGH: 0
ABDOMINAL PAIN: 0
SORE THROAT: 0
DIARRHEA: 0
VOMITING: 0
SHORTNESS OF BREATH: 0

## 2025-04-23 NOTE — ASSESSMENT & PLAN NOTE
Recommend ADA diet. Increase physical activity. Patient reports that he has not been taking his Lantus due to fear of going low. Encouraged patient to restart his Lantus because his blood sugars are not well controlled. Recommend he decrease his Lantus from 30 unit down to 20 units. Continue metformin, carb ratio insulin with meals and Farxiga 10 mg daily. Recheck A1C in 3 months    Orders:    LANTUS SOLOSTAR 100 UNIT/ML injection pen; Inject 20 Units into the skin nightly    Basic Metabolic Panel; Future

## 2025-04-23 NOTE — PROGRESS NOTES
ear pain, rhinorrhea and sore throat.    Eyes:  Negative for redness.   Respiratory:  Negative for cough and shortness of breath.    Cardiovascular:  Negative for chest pain.   Gastrointestinal:  Negative for abdominal pain, constipation, diarrhea and vomiting.   Skin:  Negative for rash.   Neurological:  Negative for dizziness and headaches.   Psychiatric/Behavioral:  Positive for sleep disturbance. The patient is nervous/anxious.         Increased life stressors         Prior to Visit Medications    Medication Sig Taking? Authorizing Provider   LANTUS SOLOSTAR 100 UNIT/ML injection pen Inject 20 Units into the skin nightly Yes Nabila Muhammad APRN   dapagliflozin (FARXIGA) 10 MG tablet Take 1 tablet by mouth every morning Yes MARIO Munoz DO   Continuous Glucose Sensor (DEXCOM G6 SENSOR) MISC USE WITH DEXCOM SYSTEM (REPLACE SENSOR EVERY 10 DAYS) Yes MARIO Munoz DO   Continuous Glucose Transmitter (DEXCOM G6 TRANSMITTER) MISC USE AS DIRECTED EVERY  90  DAYS. Yes MARIO Munoz DO   BD PEN NEEDLE JOSE 2ND GEN 32G X 4 MM MISC USE WITH INSULIN 4 TIMES DAILY IN THE MORNING, AT NOON, IN THE EVENING, AND AT BEDTIME Yes MARIO Munoz DO   insulin aspart (NOVOLOG FLEXPEN RELION) 100 UNIT/ML injection pen USE AS DIRECTED PER CARB RATIO AND CORRECTION. AVERAGE DAILY USE-30 UNITS. Yes MARIO Munoz DO   metFORMIN (GLUCOPHAGE) 1000 MG tablet TAKE 1 TABLET BY MOUTH TWICE DAILY WITH MEALS Yes MARIO Munoz DO   lisinopril (PRINIVIL;ZESTRIL) 40 MG tablet Take 1 tablet by mouth daily Yes Kira Dumont MD   metoprolol succinate (TOPROL XL) 50 MG extended release tablet Take 1 tablet by mouth daily Yes Kira Dumont MD   furosemide (LASIX) 40 MG tablet Take 1 tablet by mouth daily Yes Kira Dumont MD   atorvastatin (LIPITOR) 40 MG tablet Take 1 tablet by mouth at bedtime Yes Kira Dumont MD   aspirin 81 MG EC tablet Take 1 tablet by mouth daily Yes

## 2025-05-13 ENCOUNTER — RESULTS FOLLOW-UP (OUTPATIENT)
Age: 50
End: 2025-05-13

## 2025-05-13 DIAGNOSIS — E11.9 TYPE 2 DIABETES MELLITUS WITHOUT COMPLICATION, WITHOUT LONG-TERM CURRENT USE OF INSULIN (HCC): ICD-10-CM

## 2025-05-13 DIAGNOSIS — Z12.5 SCREENING FOR PROSTATE CANCER: ICD-10-CM

## 2025-05-13 LAB
ANION GAP SERPL CALCULATED.3IONS-SCNC: 13 MMOL/L (ref 8–16)
BUN SERPL-MCNC: 25 MG/DL (ref 6–20)
CALCIUM SERPL-MCNC: 9.3 MG/DL (ref 8.6–10)
CHLORIDE SERPL-SCNC: 105 MMOL/L (ref 98–107)
CO2 SERPL-SCNC: 21 MMOL/L (ref 22–29)
CREAT SERPL-MCNC: 1.3 MG/DL (ref 0.7–1.2)
GLUCOSE SERPL-MCNC: 155 MG/DL (ref 70–99)
POTASSIUM SERPL-SCNC: 4.9 MMOL/L (ref 3.5–5.1)
PSA SERPL-MCNC: 0.96 NG/ML (ref 0–4)
SODIUM SERPL-SCNC: 139 MMOL/L (ref 136–145)

## 2025-06-08 DIAGNOSIS — E11.9 TYPE 2 DIABETES MELLITUS WITHOUT COMPLICATION, WITHOUT LONG-TERM CURRENT USE OF INSULIN (HCC): ICD-10-CM

## 2025-06-09 NOTE — TELEPHONE ENCOUNTER
Anthony called requesting a refill of the below medication which has been pended for you:     Requested Prescriptions     Pending Prescriptions Disp Refills    metFORMIN (GLUCOPHAGE) 1000 MG tablet [Pharmacy Med Name: metFORMIN HCl 1000 MG Oral Tablet] 180 tablet 0     Sig: TAKE 1 TABLET BY MOUTH TWICE DAILY WITH MEALS       Last Appointment Date: 7/11/2024  Next Appointment Date: Visit date not found    Allergies   Allergen Reactions    Poison Ivy Extract Rash

## 2025-06-30 DIAGNOSIS — E11.9 TYPE 2 DIABETES MELLITUS WITHOUT COMPLICATION, WITHOUT LONG-TERM CURRENT USE OF INSULIN (HCC): ICD-10-CM

## 2025-06-30 RX ORDER — INSULIN ASPART 100 [IU]/ML
INJECTION, SOLUTION INTRAVENOUS; SUBCUTANEOUS
Qty: 27 ML | Refills: 11 | Status: SHIPPED | OUTPATIENT
Start: 2025-06-30

## 2025-06-30 NOTE — TELEPHONE ENCOUNTER
Anthony Meliton called to request a refill on his medication.      Last office visit : 4/23/2025   Next office visit : Visit date not found     Requested Prescriptions     Pending Prescriptions Disp Refills    insulin aspart (NOVOLOG FLEXPEN RELION) 100 UNIT/ML injection pen 27 mL 11     Sig: USE AS DIRECTED PER CARB RATIO AND CORRECTION. AVERAGE DAILY USE-30 UNITS.            Christal Colvin LPN

## 2025-07-16 ENCOUNTER — OFFICE VISIT (OUTPATIENT)
Age: 50
End: 2025-07-16
Payer: MEDICAID

## 2025-07-16 VITALS
BODY MASS INDEX: 25.33 KG/M2 | OXYGEN SATURATION: 97 % | DIASTOLIC BLOOD PRESSURE: 78 MMHG | SYSTOLIC BLOOD PRESSURE: 142 MMHG | TEMPERATURE: 97.3 F | HEART RATE: 72 BPM | HEIGHT: 72 IN | WEIGHT: 187 LBS

## 2025-07-16 DIAGNOSIS — L08.9 INFECTED SEBACEOUS CYST: Primary | ICD-10-CM

## 2025-07-16 DIAGNOSIS — L72.3 INFECTED SEBACEOUS CYST: Primary | ICD-10-CM

## 2025-07-16 PROCEDURE — 3077F SYST BP >= 140 MM HG: CPT | Performed by: NURSE PRACTITIONER

## 2025-07-16 PROCEDURE — 99213 OFFICE O/P EST LOW 20 MIN: CPT | Performed by: NURSE PRACTITIONER

## 2025-07-16 PROCEDURE — 3078F DIAST BP <80 MM HG: CPT | Performed by: NURSE PRACTITIONER

## 2025-07-16 PROCEDURE — 10061 I&D ABSCESS COMP/MULTIPLE: CPT | Performed by: NURSE PRACTITIONER

## 2025-07-16 RX ORDER — CLINDAMYCIN HYDROCHLORIDE 300 MG/1
300 CAPSULE ORAL 3 TIMES DAILY
Qty: 21 CAPSULE | Refills: 0 | Status: SHIPPED | OUTPATIENT
Start: 2025-07-16 | End: 2025-07-23

## 2025-07-16 NOTE — PROGRESS NOTES
Anthony Coelho (:  1975) is a 50 y.o. male, Established patient, here for evaluation of the following chief complaint(s):  Cyst (Patient presents today for painful cyst on back. He has hx of these and has I&D in past. Voices no other concerns today. )     Assessment & Plan  1.  Sebaceous cyst: Acute.  - Culture to rule out MRSA.  - Drain and pack cyst for 24 hours to facilitate continuous drainage.  - Prescribe clindamycin; advise to take with food and consume yogurt daily to prevent C. difficile infection.  - Keep area covered until follow-up visit tomorrow morning to assess need for repacking.    Follow-up  - Follow-up visit tomorrow morning to remove packing and assess need for repacking.    PROCEDURE    Procedure: Incision and drainage of cyst on the back    All questions were answered and agreement to proceed was given after the following Pre-Procedure details were reviewed:  - Risks and Benefits: Risks of infection, bleeding, and pain; benefits of relieving discomfort and preventing further infection.  - Alternative Options: Surgical removal of the cyst.  - Side effects: Pain, potential for infection, and need for repacking.  - Consent: Verbal consent obtained.    Intra-Procedure:  - Site Preparation: Area cleaned and prepped with chlorhexidine  - Anesthesia: Lidocaine used for local anesthesia.  -12 blade was used to open the abscess.  Hemostats were used to spread abscess.  Thick, foul-smelling, purulent drainage extracted.  Culture obtained and sent to lab for further evaluation.  1/4 inch iodoform inserted into incision.  - Dressing: Packing placed in the cyst cavity, covered with dressing.    Post-Procedure:  - Tolerance Level: Patient tolerated the procedure well.  - Home Care Instructions: Keep the area covered, return for repacking if necessary, take prescribed antibiotics, monitor for signs of infection, and use a zip lock bag to cover the area while showering.    Results        ICD-10-CM

## 2025-07-17 ENCOUNTER — OFFICE VISIT (OUTPATIENT)
Age: 50
End: 2025-07-17
Payer: MEDICAID

## 2025-07-17 VITALS
HEART RATE: 66 BPM | DIASTOLIC BLOOD PRESSURE: 74 MMHG | SYSTOLIC BLOOD PRESSURE: 130 MMHG | BODY MASS INDEX: 25.23 KG/M2 | OXYGEN SATURATION: 98 % | TEMPERATURE: 97.3 F | WEIGHT: 186 LBS

## 2025-07-17 DIAGNOSIS — L72.3 INFECTED SEBACEOUS CYST: Primary | ICD-10-CM

## 2025-07-17 DIAGNOSIS — L08.9 INFECTED SEBACEOUS CYST: Primary | ICD-10-CM

## 2025-07-17 PROCEDURE — 3078F DIAST BP <80 MM HG: CPT | Performed by: NURSE PRACTITIONER

## 2025-07-17 PROCEDURE — 3075F SYST BP GE 130 - 139MM HG: CPT | Performed by: NURSE PRACTITIONER

## 2025-07-17 PROCEDURE — 99213 OFFICE O/P EST LOW 20 MIN: CPT | Performed by: NURSE PRACTITIONER

## 2025-07-18 ENCOUNTER — OFFICE VISIT (OUTPATIENT)
Age: 50
End: 2025-07-18
Payer: MEDICAID

## 2025-07-18 VITALS
SYSTOLIC BLOOD PRESSURE: 120 MMHG | HEART RATE: 75 BPM | WEIGHT: 187.38 LBS | TEMPERATURE: 97.7 F | HEIGHT: 72 IN | OXYGEN SATURATION: 99 % | BODY MASS INDEX: 25.38 KG/M2 | DIASTOLIC BLOOD PRESSURE: 86 MMHG

## 2025-07-18 DIAGNOSIS — L08.9 INFECTED SEBACEOUS CYST: Primary | ICD-10-CM

## 2025-07-18 DIAGNOSIS — L72.3 INFECTED SEBACEOUS CYST: Primary | ICD-10-CM

## 2025-07-18 PROCEDURE — 99213 OFFICE O/P EST LOW 20 MIN: CPT | Performed by: NURSE PRACTITIONER

## 2025-07-18 PROCEDURE — 3079F DIAST BP 80-89 MM HG: CPT | Performed by: NURSE PRACTITIONER

## 2025-07-18 PROCEDURE — 3074F SYST BP LT 130 MM HG: CPT | Performed by: NURSE PRACTITIONER

## 2025-07-18 SDOH — ECONOMIC STABILITY: FOOD INSECURITY: WITHIN THE PAST 12 MONTHS, THE FOOD YOU BOUGHT JUST DIDN'T LAST AND YOU DIDN'T HAVE MONEY TO GET MORE.: NEVER TRUE

## 2025-07-18 SDOH — ECONOMIC STABILITY: FOOD INSECURITY: WITHIN THE PAST 12 MONTHS, YOU WORRIED THAT YOUR FOOD WOULD RUN OUT BEFORE YOU GOT MONEY TO BUY MORE.: NEVER TRUE

## 2025-07-18 ASSESSMENT — PATIENT HEALTH QUESTIONNAIRE - PHQ9
9. THOUGHTS THAT YOU WOULD BE BETTER OFF DEAD, OR OF HURTING YOURSELF: NOT AT ALL
5. POOR APPETITE OR OVEREATING: NOT AT ALL
3. TROUBLE FALLING OR STAYING ASLEEP: NOT AT ALL
10. IF YOU CHECKED OFF ANY PROBLEMS, HOW DIFFICULT HAVE THESE PROBLEMS MADE IT FOR YOU TO DO YOUR WORK, TAKE CARE OF THINGS AT HOME, OR GET ALONG WITH OTHER PEOPLE: NOT DIFFICULT AT ALL
SUM OF ALL RESPONSES TO PHQ QUESTIONS 1-9: 0
6. FEELING BAD ABOUT YOURSELF - OR THAT YOU ARE A FAILURE OR HAVE LET YOURSELF OR YOUR FAMILY DOWN: NOT AT ALL
2. FEELING DOWN, DEPRESSED OR HOPELESS: NOT AT ALL
1. LITTLE INTEREST OR PLEASURE IN DOING THINGS: NOT AT ALL
SUM OF ALL RESPONSES TO PHQ QUESTIONS 1-9: 0
8. MOVING OR SPEAKING SO SLOWLY THAT OTHER PEOPLE COULD HAVE NOTICED. OR THE OPPOSITE, BEING SO FIGETY OR RESTLESS THAT YOU HAVE BEEN MOVING AROUND A LOT MORE THAN USUAL: NOT AT ALL
7. TROUBLE CONCENTRATING ON THINGS, SUCH AS READING THE NEWSPAPER OR WATCHING TELEVISION: NOT AT ALL
SUM OF ALL RESPONSES TO PHQ QUESTIONS 1-9: 0
SUM OF ALL RESPONSES TO PHQ QUESTIONS 1-9: 0
4. FEELING TIRED OR HAVING LITTLE ENERGY: NOT AT ALL

## 2025-07-18 ASSESSMENT — ENCOUNTER SYMPTOMS
NAUSEA: 0
CONSTIPATION: 0
COUGH: 0
DIARRHEA: 0
TROUBLE SWALLOWING: 0
RHINORRHEA: 0
SORE THROAT: 0
VOMITING: 0
ABDOMINAL PAIN: 0
SHORTNESS OF BREATH: 0

## 2025-07-18 NOTE — PROGRESS NOTES
Anthony Coelho (:  1975) is a 50 y.o. male, Established patient, here for evaluation of the following chief complaint(s):  Cyst (Follow up. No other concerns.)         Assessment & Plan  1. Abscess: Acute. Gram stain shows gram-positive cocci in pairs; final culture results are pending. No signs of infection such as erythema, heat, or swelling on exam; moderate amount of cystic white discharge expressed today.  - Continue clindamycin 300 mg three times daily  - Apply hot compresses 2-3 times daily  - Keep the area clean with soap and water  - Consume yogurt and probiotic gummies to prevent C. difficile infection  - Contact the clinic if the condition worsens    Follow-up  - As needed      Results  Labs   - Gram stain of cyst drainage: Gram-positive cocci in pairs      ICD-10-CM    1. Infected sebaceous cyst  L72.3     L08.9         Return if symptoms worsen or fail to improve.       Subjective   History of Present Illness  The patient is a 50-year-old male who presents for follow-up on an abscess.    Abscess  - He has been under the care of Nabila Muhammad, who performed an incision and drainage (I & D) on 2025.  - The drainage was cultured to rule out MRSA, and the wound was packed with iodoform.  - He was started on clindamycin.  - The abscess or cyst was located on his mid back.  - He returned yesterday for a follow-up to have the iodoform removed, but there was still significant drainage.  - The wound was repacked, and he was advised to follow up today for a recheck.  - He reports no fevers or other issues.    Sebaceous Cyst  - He has been informed over the years that this is a sebaceous cyst.  - The cyst first appeared about 30 years ago, was incised and drained, and then recurred about 10 years later.  - It has been recurring approximately every 10 years since then.  - He has been advised to consult a surgeon.  - He is concerned that seeing a general surgeon will result in him being unable to do

## 2025-07-20 LAB
BACTERIA SPEC ANAEROBE CULT: ABNORMAL
BACTERIA SPEC ANAEROBE CULT: ABNORMAL
BACTERIA SPEC ANAEROBE+AEROBE CULT: ABNORMAL
GRAM STN SPEC: ABNORMAL
ORGANISM: ABNORMAL
ORGANISM: ABNORMAL

## 2025-08-19 DIAGNOSIS — E11.9 TYPE 2 DIABETES MELLITUS WITHOUT COMPLICATION, WITH LONG-TERM CURRENT USE OF INSULIN (HCC): ICD-10-CM

## 2025-08-19 DIAGNOSIS — Z79.4 TYPE 2 DIABETES MELLITUS WITHOUT COMPLICATION, WITH LONG-TERM CURRENT USE OF INSULIN (HCC): ICD-10-CM

## 2025-08-20 RX ORDER — PEN NEEDLE, DIABETIC 32GX 5/32"
NEEDLE, DISPOSABLE MISCELLANEOUS
Qty: 100 EACH | Refills: 0 | Status: SHIPPED | OUTPATIENT
Start: 2025-08-20

## 2025-09-02 DIAGNOSIS — E11.9 TYPE 2 DIABETES MELLITUS WITHOUT COMPLICATION, WITHOUT LONG-TERM CURRENT USE OF INSULIN (HCC): ICD-10-CM

## (undated) DEVICE — BLAKE SILICONE DRAINS CARDIO CONNECTOR 2:1: Brand: BLAKE

## (undated) DEVICE — MARKER,SKIN,WI/RULER AND LABELS: Brand: MEDLINE

## (undated) DEVICE — CANN NASL ETCO2 LO/FLO A/

## (undated) DEVICE — GLIDESHEATH SLENDER STAINLESS STEEL KIT: Brand: GLIDESHEATH SLENDER

## (undated) DEVICE — TR BAND RADIAL ARTERY COMPRESSION DEVICE: Brand: TR BAND

## (undated) DEVICE — PAD, DEFIB, ADULT, RADIOTRANS, PHYSIO: Brand: MEDLINE

## (undated) DEVICE — TB SXN SURG DLP MALL/CARD SFT/TP 6F 6IN

## (undated) DEVICE — E-PACK PROCEDURE KIT: Brand: E-PACK

## (undated) DEVICE — DRAIN,WOUND,ROUND,24FR,5/16",FULL-FLUTED: Brand: MEDLINE

## (undated) DEVICE — ELECTRD BLD EZ CLN MOD 4IN

## (undated) DEVICE — PK CATH CARD 30 CA/4

## (undated) DEVICE — BG OR ZSUT SADDLE 20IN CLR STRL

## (undated) DEVICE — RADIFOCUS OPTITORQUE ANGIOGRAPHIC CATHETER: Brand: OPTITORQUE

## (undated) DEVICE — GLV SURG BIOGEL LTX PF 6 1/2

## (undated) DEVICE — BLD STERNALOCK XP ZDRIVE

## (undated) DEVICE — SUT PROLN 4/0 RB1 36IN 4PK M8557

## (undated) DEVICE — DRAPE,ANGIO,BRACH,STERILE,38X44: Brand: MEDLINE

## (undated) DEVICE — DRSNG SURESITE123 4X10IN

## (undated) DEVICE — Device: Brand: ZDRIVE™

## (undated) DEVICE — SOLIDIFIER LIQUI LOC PLUS 2000CC

## (undated) DEVICE — PK OPN HEART 30

## (undated) DEVICE — ADHS LIQ MASTISOL 2/3ML

## (undated) DEVICE — SUT SILK 2/0 SH CR8 18IN CR8 C012D

## (undated) DEVICE — Device: Brand: MEDEX

## (undated) DEVICE — GLV SURG BIOGEL M LTX PF 7 1/2

## (undated) DEVICE — CLTH CLENS READYCLEANSE PERI CARE PK/5

## (undated) DEVICE — DRSNG WND SIL OPTIFOAM GNTL BRDR ADHS 1.6X2IN

## (undated) DEVICE — BLD SCLPL BEAVR MINI STR 2BVL 180D LF

## (undated) DEVICE — SUP ARMBRD ART/LINE BLU

## (undated) DEVICE — BAPTIST TURNOVER KIT: Brand: MEDLINE INDUSTRIES, INC.

## (undated) DEVICE — SYS DISTNTION VEIN BONCHEK 300MMHG

## (undated) DEVICE — ST TBG PNEUMOCLEAR EVAC SMOKE HIFLO

## (undated) DEVICE — STERNUM BLADE, OFFSET (32.0 X 0.8 X 6.3MM)

## (undated) DEVICE — OPTIFOAM GENTLE SA, POSTOP, 4X12: Brand: MEDLINE

## (undated) DEVICE — ELECTRD BLD MEGADYNE EZCLEAN STD 2.75IN XLNG

## (undated) DEVICE — SUT SILK 2/0 FS BLK 18IN 685G

## (undated) DEVICE — SYR LUERLOK 20CC BX/50

## (undated) DEVICE — SUT ETHIB 2/0 SH SH 36IN X523H

## (undated) DEVICE — 1/2 FORCE SURGICAL SPRING CLIP: Brand: STEALTH® SPRING CLIP

## (undated) DEVICE — ROTATING SURGICAL PUNCHES, 1 PER POUCH: Brand: A&E MEDICAL / ROTATING SURGICAL PUNCHES

## (undated) DEVICE — TRAP FLD MINIVAC MEGADYNE 100ML

## (undated) DEVICE — OASIS DRAIN, SINGLE, INLINE & ATS COMPATIBLE: Brand: OASIS

## (undated) DEVICE — DRP SLUSH MACH OM-ORS-320

## (undated) DEVICE — Device

## (undated) DEVICE — TIBURON BRACHIAL ANGIOGRAPHY DRAPE: Brand: CONVERTORS

## (undated) DEVICE — PK SET UP ANES OPN HEART 30

## (undated) DEVICE — SYS VASOVIEW HEMOPRO ENDOSCOPIC HARVST VESL

## (undated) DEVICE — SCANLAN® SURG-I-PAW® INSTRUMENT COVERS - RED, 1/10" X 5"/ 3 MMX13 CM (2 - 5" PCS /PKG): Brand: SCANLAN® SURG-I-PAW® INSTRUMENT COVERS

## (undated) DEVICE — SOL IRR NACL 0.9PCT BT 1000ML

## (undated) DEVICE — STRIP,CLOSURE,WOUND,MEDI-STRIP,1/2X4: Brand: MEDLINE

## (undated) DEVICE — KIT,ANTI FOG,W/SPONGE & FLUID,SOFT PACK: Brand: MEDLINE

## (undated) DEVICE — ANTIBACTERIAL UNDYED BRAIDED (POLYGLACTIN 910), SYNTHETIC ABSORBABLE SUTURE: Brand: COATED VICRYL

## (undated) DEVICE — BLAKE CARDIO CONNECTOR 1:1: Brand: J-VAC

## (undated) DEVICE — SUREFIT, DUAL DISPERSIVE ELECTRODE, CONTACT QUALITY MONITOR: Brand: SUREFIT